# Patient Record
Sex: FEMALE | Race: WHITE | NOT HISPANIC OR LATINO | Employment: UNEMPLOYED | URBAN - NONMETROPOLITAN AREA
[De-identification: names, ages, dates, MRNs, and addresses within clinical notes are randomized per-mention and may not be internally consistent; named-entity substitution may affect disease eponyms.]

---

## 2023-01-01 ENCOUNTER — APPOINTMENT (OUTPATIENT)
Dept: ULTRASOUND IMAGING | Facility: HOSPITAL | Age: 63
DRG: 870 | End: 2023-01-01
Payer: COMMERCIAL

## 2023-01-01 ENCOUNTER — APPOINTMENT (OUTPATIENT)
Dept: CT IMAGING | Facility: HOSPITAL | Age: 63
DRG: 870 | End: 2023-01-01
Payer: COMMERCIAL

## 2023-01-01 ENCOUNTER — APPOINTMENT (OUTPATIENT)
Dept: GENERAL RADIOLOGY | Facility: HOSPITAL | Age: 63
DRG: 870 | End: 2023-01-01
Payer: COMMERCIAL

## 2023-01-01 ENCOUNTER — APPOINTMENT (OUTPATIENT)
Dept: CARDIOLOGY | Facility: HOSPITAL | Age: 63
DRG: 870 | End: 2023-01-01
Payer: COMMERCIAL

## 2023-01-01 ENCOUNTER — HOSPITAL ENCOUNTER (INPATIENT)
Facility: HOSPITAL | Age: 63
LOS: 14 days | DRG: 870 | End: 2023-07-27
Attending: STUDENT IN AN ORGANIZED HEALTH CARE EDUCATION/TRAINING PROGRAM | Admitting: INTERNAL MEDICINE
Payer: COMMERCIAL

## 2023-01-01 ENCOUNTER — APPOINTMENT (OUTPATIENT)
Dept: RESPIRATORY THERAPY | Facility: HOSPITAL | Age: 63
DRG: 870 | End: 2023-01-01
Payer: COMMERCIAL

## 2023-01-01 VITALS
TEMPERATURE: 99.4 F | BODY MASS INDEX: 41.95 KG/M2 | RESPIRATION RATE: 22 BRPM | HEIGHT: 70 IN | OXYGEN SATURATION: 79 % | SYSTOLIC BLOOD PRESSURE: 114 MMHG | WEIGHT: 293 LBS | DIASTOLIC BLOOD PRESSURE: 104 MMHG

## 2023-01-01 DIAGNOSIS — D64.9 SYMPTOMATIC ANEMIA: Primary | ICD-10-CM

## 2023-01-01 DIAGNOSIS — J96.01 SEPSIS WITH ACUTE HYPOXIC RESPIRATORY FAILURE WITHOUT SEPTIC SHOCK, DUE TO UNSPECIFIED ORGANISM: ICD-10-CM

## 2023-01-01 DIAGNOSIS — A41.9 SEPSIS WITH ACUTE HYPOXIC RESPIRATORY FAILURE WITHOUT SEPTIC SHOCK, DUE TO UNSPECIFIED ORGANISM: ICD-10-CM

## 2023-01-01 DIAGNOSIS — N39.0 ACUTE UTI: ICD-10-CM

## 2023-01-01 DIAGNOSIS — I50.31 ACUTE HEART FAILURE WITH PRESERVED EJECTION FRACTION (HFPEF): ICD-10-CM

## 2023-01-01 DIAGNOSIS — R65.20 SEPSIS WITH ACUTE HYPOXIC RESPIRATORY FAILURE WITHOUT SEPTIC SHOCK, DUE TO UNSPECIFIED ORGANISM: ICD-10-CM

## 2023-01-01 LAB
A-A DO2: 123 MMHG (ref 0–300)
A-A DO2: 130.7 MMHG (ref 0–300)
A-A DO2: 131.2 MMHG (ref 0–300)
A-A DO2: 136.5 MMHG (ref 0–300)
A-A DO2: 137 MMHG (ref 0–300)
A-A DO2: 139.3 MMHG (ref 0–300)
A-A DO2: 139.5 MMHG (ref 0–300)
A-A DO2: 151.6 MMHG (ref 0–300)
A-A DO2: 175.2 MMHG (ref 0–300)
A-A DO2: 194.9 MMHG (ref 0–300)
A-A DO2: 201.9 MMHG (ref 0–300)
A-A DO2: 270.3 MMHG (ref 0–300)
A-A DO2: 277.4 MMHG (ref 0–300)
A-A DO2: 39.2 MMHG (ref 0–300)
A-A DO2: 513.9 MMHG (ref 0–300)
A-A DO2: 75.1 MMHG (ref 0–300)
A-A DO2: 76.5 MMHG (ref 0–300)
ABO GROUP BLD: NORMAL
ABSOLUTE LUNG FLUID CONTENT: 28 % (ref 20–35)
ACANTHOCYTES BLD QL SMEAR: NORMAL
ACANTHOCYTES BLD QL SMEAR: NORMAL
ALBUMIN SERPL-MCNC: 1.9 G/DL (ref 3.5–5.2)
ALBUMIN SERPL-MCNC: 2 G/DL (ref 3.5–5.2)
ALBUMIN SERPL-MCNC: 2.1 G/DL (ref 3.5–5.2)
ALBUMIN SERPL-MCNC: 2.1 G/DL (ref 3.5–5.2)
ALBUMIN SERPL-MCNC: 2.3 G/DL (ref 3.5–5.2)
ALBUMIN SERPL-MCNC: 2.4 G/DL (ref 3.5–5.2)
ALBUMIN SERPL-MCNC: 2.5 G/DL (ref 3.5–5.2)
ALBUMIN SERPL-MCNC: 2.8 G/DL (ref 3.5–5.2)
ALBUMIN SERPL-MCNC: 2.9 G/DL (ref 3.5–5.2)
ALBUMIN SERPL-MCNC: 3 G/DL (ref 3.5–5.2)
ALBUMIN SERPL-MCNC: 3.1 G/DL (ref 3.5–5.2)
ALBUMIN SERPL-MCNC: 3.3 G/DL (ref 3.5–5.2)
ALBUMIN SERPL-MCNC: 3.4 G/DL (ref 3.5–5.2)
ALBUMIN SERPL-MCNC: 3.6 G/DL (ref 3.5–5.2)
ALBUMIN/GLOB SERPL: 0.5 G/DL
ALBUMIN/GLOB SERPL: 0.6 G/DL
ALBUMIN/GLOB SERPL: 0.7 G/DL
ALBUMIN/GLOB SERPL: 0.8 G/DL
ALBUMIN/GLOB SERPL: 0.9 G/DL
ALBUMIN/GLOB SERPL: 1.2 G/DL
ALBUMIN/GLOB SERPL: 1.3 G/DL
ALBUMIN/GLOB SERPL: 1.3 G/DL
ALBUMIN/GLOB SERPL: 1.4 G/DL
ALBUMIN/GLOB SERPL: 1.6 G/DL
ALP SERPL-CCNC: 49 U/L (ref 39–117)
ALP SERPL-CCNC: 51 U/L (ref 39–117)
ALP SERPL-CCNC: 59 U/L (ref 39–117)
ALP SERPL-CCNC: 60 U/L (ref 39–117)
ALP SERPL-CCNC: 68 U/L (ref 39–117)
ALP SERPL-CCNC: 73 U/L (ref 39–117)
ALP SERPL-CCNC: 73 U/L (ref 39–117)
ALP SERPL-CCNC: 81 U/L (ref 39–117)
ALP SERPL-CCNC: 91 U/L (ref 39–117)
ALP SERPL-CCNC: 93 U/L (ref 39–117)
ALP SERPL-CCNC: 95 U/L (ref 39–117)
ALP SERPL-CCNC: 97 U/L (ref 39–117)
ALP SERPL-CCNC: 97 U/L (ref 39–117)
ALP SERPL-CCNC: 99 U/L (ref 39–117)
ALT SERPL W P-5'-P-CCNC: 10 U/L (ref 1–33)
ALT SERPL W P-5'-P-CCNC: 10 U/L (ref 1–33)
ALT SERPL W P-5'-P-CCNC: 12 U/L (ref 1–33)
ALT SERPL W P-5'-P-CCNC: 33 U/L (ref 1–33)
ALT SERPL W P-5'-P-CCNC: 35 U/L (ref 1–33)
ALT SERPL W P-5'-P-CCNC: 5 U/L (ref 1–33)
ALT SERPL W P-5'-P-CCNC: 6 U/L (ref 1–33)
ALT SERPL W P-5'-P-CCNC: 8 U/L (ref 1–33)
ALT SERPL W P-5'-P-CCNC: 8 U/L (ref 1–33)
ALT SERPL W P-5'-P-CCNC: <5 U/L (ref 1–33)
AMMONIA BLD-SCNC: 35 UMOL/L (ref 11–51)
AMMONIA BLD-SCNC: 55 UMOL/L (ref 11–51)
AMMONIA BLD-SCNC: 55 UMOL/L (ref 11–51)
AMMONIA BLD-SCNC: 67 UMOL/L (ref 11–51)
AMMONIA BLD-SCNC: 79 UMOL/L (ref 11–51)
ANION GAP SERPL CALCULATED.3IONS-SCNC: 10.1 MMOL/L (ref 5–15)
ANION GAP SERPL CALCULATED.3IONS-SCNC: 11.2 MMOL/L (ref 5–15)
ANION GAP SERPL CALCULATED.3IONS-SCNC: 12.2 MMOL/L (ref 5–15)
ANION GAP SERPL CALCULATED.3IONS-SCNC: 12.5 MMOL/L (ref 5–15)
ANION GAP SERPL CALCULATED.3IONS-SCNC: 13.1 MMOL/L (ref 5–15)
ANION GAP SERPL CALCULATED.3IONS-SCNC: 13.4 MMOL/L (ref 5–15)
ANION GAP SERPL CALCULATED.3IONS-SCNC: 13.8 MMOL/L (ref 5–15)
ANION GAP SERPL CALCULATED.3IONS-SCNC: 15.5 MMOL/L (ref 5–15)
ANION GAP SERPL CALCULATED.3IONS-SCNC: 21.5 MMOL/L (ref 5–15)
ANION GAP SERPL CALCULATED.3IONS-SCNC: 7.2 MMOL/L (ref 5–15)
ANION GAP SERPL CALCULATED.3IONS-SCNC: 7.2 MMOL/L (ref 5–15)
ANION GAP SERPL CALCULATED.3IONS-SCNC: 7.4 MMOL/L (ref 5–15)
ANION GAP SERPL CALCULATED.3IONS-SCNC: 7.8 MMOL/L (ref 5–15)
ANION GAP SERPL CALCULATED.3IONS-SCNC: 8.8 MMOL/L (ref 5–15)
ANION GAP SERPL CALCULATED.3IONS-SCNC: 9.3 MMOL/L (ref 5–15)
ANION GAP SERPL CALCULATED.3IONS-SCNC: 9.8 MMOL/L (ref 5–15)
ANION GAP SERPL CALCULATED.3IONS-SCNC: 9.8 MMOL/L (ref 5–15)
ANION GAP SERPL CALCULATED.3IONS-SCNC: 9.9 MMOL/L (ref 5–15)
ANION GAP SERPL CALCULATED.3IONS-SCNC: 9.9 MMOL/L (ref 5–15)
ANISOCYTOSIS BLD QL: ABNORMAL
ANISOCYTOSIS BLD QL: ABNORMAL
ANISOCYTOSIS BLD QL: NORMAL
APTT PPP: 108.6 SECONDS (ref 26.5–34.5)
APTT PPP: 30.6 SECONDS (ref 26.5–34.5)
APTT PPP: 36.4 SECONDS (ref 26.5–34.5)
APTT PPP: 87.5 SECONDS (ref 26.5–34.5)
ARTERIAL PATENCY WRIST A: ABNORMAL
ARTERIAL PATENCY WRIST A: POSITIVE
AST SERPL-CCNC: 11 U/L (ref 1–32)
AST SERPL-CCNC: 11 U/L (ref 1–32)
AST SERPL-CCNC: 12 U/L (ref 1–32)
AST SERPL-CCNC: 14 U/L (ref 1–32)
AST SERPL-CCNC: 14 U/L (ref 1–32)
AST SERPL-CCNC: 15 U/L (ref 1–32)
AST SERPL-CCNC: 15 U/L (ref 1–32)
AST SERPL-CCNC: 17 U/L (ref 1–32)
AST SERPL-CCNC: 23 U/L (ref 1–32)
AST SERPL-CCNC: 30 U/L (ref 1–32)
AST SERPL-CCNC: 36 U/L (ref 1–32)
ATMOSPHERIC PRESS: 722 MMHG
ATMOSPHERIC PRESS: 723 MMHG
ATMOSPHERIC PRESS: 724 MMHG
ATMOSPHERIC PRESS: 725 MMHG
ATMOSPHERIC PRESS: 726 MMHG
ATMOSPHERIC PRESS: 727 MMHG
ATMOSPHERIC PRESS: 728 MMHG
ATMOSPHERIC PRESS: 728 MMHG
ATMOSPHERIC PRESS: 729 MMHG
ATMOSPHERIC PRESS: 730 MMHG
ATMOSPHERIC PRESS: 731 MMHG
ATMOSPHERIC PRESS: 731 MMHG
ATMOSPHERIC PRESS: 732 MMHG
B PARAPERT DNA SPEC QL NAA+PROBE: NOT DETECTED
B PERT DNA SPEC QL NAA+PROBE: NOT DETECTED
BACTERIA SPEC AEROBE CULT: ABNORMAL
BACTERIA SPEC AEROBE CULT: NORMAL
BACTERIA SPEC RESP CULT: ABNORMAL
BACTERIA UR QL AUTO: ABNORMAL /HPF
BASE EXCESS BLDA CALC-SCNC: 0.8 MMOL/L (ref 0–2)
BASE EXCESS BLDA CALC-SCNC: 11.4 MMOL/L (ref 0–2)
BASE EXCESS BLDA CALC-SCNC: 15.5 MMOL/L (ref 0–2)
BASE EXCESS BLDA CALC-SCNC: 17.4 MMOL/L (ref 0–2)
BASE EXCESS BLDA CALC-SCNC: 17.5 MMOL/L (ref 0–2)
BASE EXCESS BLDA CALC-SCNC: 18.1 MMOL/L (ref 0–2)
BASE EXCESS BLDA CALC-SCNC: 18.1 MMOL/L (ref 0–2)
BASE EXCESS BLDA CALC-SCNC: 18.6 MMOL/L (ref 0–2)
BASE EXCESS BLDA CALC-SCNC: 18.6 MMOL/L (ref 0–2)
BASE EXCESS BLDA CALC-SCNC: 18.8 MMOL/L (ref 0–2)
BASE EXCESS BLDA CALC-SCNC: 19.1 MMOL/L (ref 0–2)
BASE EXCESS BLDA CALC-SCNC: 19.4 MMOL/L (ref 0–2)
BASE EXCESS BLDA CALC-SCNC: 19.7 MMOL/L (ref 0–2)
BASE EXCESS BLDA CALC-SCNC: 20 MMOL/L (ref 0–2)
BASE EXCESS BLDA CALC-SCNC: 21.7 MMOL/L (ref 0–2)
BASE EXCESS BLDA CALC-SCNC: 4.4 MMOL/L (ref 0–2)
BASE EXCESS BLDA CALC-SCNC: 5.1 MMOL/L (ref 0–2)
BASE EXCESS BLDV CALC-SCNC: 19.8 MMOL/L (ref 0–2)
BASE EXCESS BLDV CALC-SCNC: 4.6 MMOL/L (ref 0–2)
BASE EXCESS BLDV CALC-SCNC: 8.8 MMOL/L (ref 0–2)
BASO STIPL COARSE BLD QL SMEAR: NORMAL
BASO STIPL COARSE BLD QL SMEAR: NORMAL
BASOPHILS # BLD AUTO: 0.02 10*3/MM3 (ref 0–0.2)
BASOPHILS # BLD AUTO: 0.03 10*3/MM3 (ref 0–0.2)
BASOPHILS # BLD AUTO: 0.05 10*3/MM3 (ref 0–0.2)
BASOPHILS # BLD AUTO: 0.06 10*3/MM3 (ref 0–0.2)
BASOPHILS # BLD AUTO: 0.07 10*3/MM3 (ref 0–0.2)
BASOPHILS # BLD AUTO: 0.08 10*3/MM3 (ref 0–0.2)
BASOPHILS # BLD AUTO: 0.09 10*3/MM3 (ref 0–0.2)
BASOPHILS NFR BLD AUTO: 0.1 % (ref 0–1.5)
BASOPHILS NFR BLD AUTO: 0.2 % (ref 0–1.5)
BASOPHILS NFR BLD AUTO: 0.3 % (ref 0–1.5)
BASOPHILS NFR BLD AUTO: 0.4 % (ref 0–1.5)
BASOPHILS NFR BLD AUTO: 0.4 % (ref 0–1.5)
BDY SITE: ABNORMAL
BH BB BLOOD EXPIRATION DATE: NORMAL
BH BB BLOOD TYPE BARCODE: 5100
BH BB DISPENSE STATUS: NORMAL
BH BB PRODUCT CODE: NORMAL
BH BB UNIT NUMBER: NORMAL
BH CV ECHO MEAS - ACS: 2 CM
BH CV ECHO MEAS - AO MAX PG: 17.2 MMHG
BH CV ECHO MEAS - AO MEAN PG: 8.5 MMHG
BH CV ECHO MEAS - AO ROOT DIAM: 3.3 CM
BH CV ECHO MEAS - AO V2 MAX: 207.5 CM/SEC
BH CV ECHO MEAS - AO V2 VTI: 28.6 CM
BH CV ECHO MEAS - AVA(I,D): 3.1 CM2
BH CV ECHO MEAS - EDV(CUBED): 146.1 ML
BH CV ECHO MEAS - EDV(CUBED): 182.3 ML
BH CV ECHO MEAS - EDV(MOD-SP4): 144 ML
BH CV ECHO MEAS - EF(MOD-SP4): 55.3 %
BH CV ECHO MEAS - ESV(CUBED): 46.7 ML
BH CV ECHO MEAS - ESV(CUBED): 71.5 ML
BH CV ECHO MEAS - ESV(MOD-SP4): 64.4 ML
BH CV ECHO MEAS - FS: 26.8 %
BH CV ECHO MEAS - FS: 31.6 %
BH CV ECHO MEAS - IVS/LVPW: 0.9 CM
BH CV ECHO MEAS - IVS/LVPW: 1.43 CM
BH CV ECHO MEAS - IVSD: 1.55 CM
BH CV ECHO MEAS - IVSD: 2.1 CM
BH CV ECHO MEAS - LAT PEAK E' VEL: 6.6 CM/SEC
BH CV ECHO MEAS - LV DIASTOLIC VOL/BSA (35-75): 51.4 CM2
BH CV ECHO MEAS - LV MASS(C)D: 445.4 GRAMS
BH CV ECHO MEAS - LV MASS(C)D: 452.5 GRAMS
BH CV ECHO MEAS - LV MAX PG: 5.4 MMHG
BH CV ECHO MEAS - LV MEAN PG: 3 MMHG
BH CV ECHO MEAS - LV SYSTOLIC VOL/BSA (12-30): 23 CM2
BH CV ECHO MEAS - LV V1 MAX: 116 CM/SEC
BH CV ECHO MEAS - LV V1 VTI: 19.4 CM
BH CV ECHO MEAS - LVIDD: 5.3 CM
BH CV ECHO MEAS - LVIDD: 5.7 CM
BH CV ECHO MEAS - LVIDS: 3.6 CM
BH CV ECHO MEAS - LVIDS: 4.2 CM
BH CV ECHO MEAS - LVOT AREA: 4.5 CM2
BH CV ECHO MEAS - LVOT DIAM: 2.4 CM
BH CV ECHO MEAS - LVPWD: 1.47 CM
BH CV ECHO MEAS - LVPWD: 1.73 CM
BH CV ECHO MEAS - MED PEAK E' VEL: 5.3 CM/SEC
BH CV ECHO MEAS - MV A MAX VEL: 98.8 CM/SEC
BH CV ECHO MEAS - MV E MAX VEL: 69.7 CM/SEC
BH CV ECHO MEAS - MV E/A: 0.71
BH CV ECHO MEAS - PA ACC TIME: 0.08 SEC
BH CV ECHO MEAS - RAP SYSTOLE: 10 MMHG
BH CV ECHO MEAS - RAP SYSTOLE: 10 MMHG
BH CV ECHO MEAS - RVSP: 36 MMHG
BH CV ECHO MEAS - RVSP: 55.2 MMHG
BH CV ECHO MEAS - SI(MOD-SP4): 28.4 ML/M2
BH CV ECHO MEAS - SV(LVOT): 87.8 ML
BH CV ECHO MEAS - SV(MOD-SP4): 79.6 ML
BH CV ECHO MEAS - TAPSE (>1.6): 2.42 CM
BH CV ECHO MEAS - TR MAX PG: 26 MMHG
BH CV ECHO MEAS - TR MAX PG: 45.2 MMHG
BH CV ECHO MEAS - TR MAX VEL: 255 CM/SEC
BH CV ECHO MEAS - TR MAX VEL: 336 CM/SEC
BH CV ECHO MEASUREMENTS AVERAGE E/E' RATIO: 11.71
BILIRUB SERPL-MCNC: 0.4 MG/DL (ref 0–1.2)
BILIRUB SERPL-MCNC: 0.5 MG/DL (ref 0–1.2)
BILIRUB SERPL-MCNC: 0.6 MG/DL (ref 0–1.2)
BILIRUB SERPL-MCNC: 0.7 MG/DL (ref 0–1.2)
BILIRUB SERPL-MCNC: 0.8 MG/DL (ref 0–1.2)
BILIRUB SERPL-MCNC: 0.8 MG/DL (ref 0–1.2)
BILIRUB UR QL STRIP: ABNORMAL
BILIRUB UR QL STRIP: NEGATIVE
BILIRUB UR QL STRIP: NEGATIVE
BLD GP AB SCN SERPL QL: NEGATIVE
BUN SERPL-MCNC: 18 MG/DL (ref 8–23)
BUN SERPL-MCNC: 20 MG/DL (ref 8–23)
BUN SERPL-MCNC: 25 MG/DL (ref 8–23)
BUN SERPL-MCNC: 27 MG/DL (ref 8–23)
BUN SERPL-MCNC: 28 MG/DL (ref 8–23)
BUN SERPL-MCNC: 29 MG/DL (ref 8–23)
BUN SERPL-MCNC: 31 MG/DL (ref 8–23)
BUN SERPL-MCNC: 32 MG/DL (ref 8–23)
BUN SERPL-MCNC: 33 MG/DL (ref 8–23)
BUN SERPL-MCNC: 42 MG/DL (ref 8–23)
BUN SERPL-MCNC: 50 MG/DL (ref 8–23)
BUN SERPL-MCNC: 56 MG/DL (ref 8–23)
BUN SERPL-MCNC: 60 MG/DL (ref 8–23)
BUN SERPL-MCNC: 63 MG/DL (ref 8–23)
BUN SERPL-MCNC: 64 MG/DL (ref 8–23)
BUN SERPL-MCNC: 68 MG/DL (ref 8–23)
BUN SERPL-MCNC: 69 MG/DL (ref 8–23)
BUN SERPL-MCNC: 70 MG/DL (ref 8–23)
BUN SERPL-MCNC: 74 MG/DL (ref 8–23)
BUN/CREAT SERPL: 19.6 (ref 7–25)
BUN/CREAT SERPL: 20.2 (ref 7–25)
BUN/CREAT SERPL: 20.4 (ref 7–25)
BUN/CREAT SERPL: 20.6 (ref 7–25)
BUN/CREAT SERPL: 21.1 (ref 7–25)
BUN/CREAT SERPL: 22.3 (ref 7–25)
BUN/CREAT SERPL: 23.4 (ref 7–25)
BUN/CREAT SERPL: 23.9 (ref 7–25)
BUN/CREAT SERPL: 25.2 (ref 7–25)
BUN/CREAT SERPL: 25.3 (ref 7–25)
BUN/CREAT SERPL: 25.8 (ref 7–25)
BUN/CREAT SERPL: 26.3 (ref 7–25)
BUN/CREAT SERPL: 28.1 (ref 7–25)
BUN/CREAT SERPL: 31.7 (ref 7–25)
BUN/CREAT SERPL: 32.2 (ref 7–25)
BUN/CREAT SERPL: 32.5 (ref 7–25)
BUN/CREAT SERPL: 32.6 (ref 7–25)
BUN/CREAT SERPL: 33.3 (ref 7–25)
BUN/CREAT SERPL: 33.7 (ref 7–25)
C PNEUM DNA NPH QL NAA+NON-PROBE: NOT DETECTED
CALCIUM SPEC-SCNC: 10.2 MG/DL (ref 8.6–10.5)
CALCIUM SPEC-SCNC: 10.3 MG/DL (ref 8.6–10.5)
CALCIUM SPEC-SCNC: 10.4 MG/DL (ref 8.6–10.5)
CALCIUM SPEC-SCNC: 10.5 MG/DL (ref 8.6–10.5)
CALCIUM SPEC-SCNC: 10.7 MG/DL (ref 8.6–10.5)
CALCIUM SPEC-SCNC: 10.9 MG/DL (ref 8.6–10.5)
CALCIUM SPEC-SCNC: 11.1 MG/DL (ref 8.6–10.5)
CALCIUM SPEC-SCNC: 11.2 MG/DL (ref 8.6–10.5)
CALCIUM SPEC-SCNC: 9.6 MG/DL (ref 8.6–10.5)
CALCIUM SPEC-SCNC: 9.6 MG/DL (ref 8.6–10.5)
CALCIUM SPEC-SCNC: 9.8 MG/DL (ref 8.6–10.5)
CALCIUM SPEC-SCNC: 9.9 MG/DL (ref 8.6–10.5)
CHLORIDE SERPL-SCNC: 102 MMOL/L (ref 98–107)
CHLORIDE SERPL-SCNC: 104 MMOL/L (ref 98–107)
CHLORIDE SERPL-SCNC: 109 MMOL/L (ref 98–107)
CHLORIDE SERPL-SCNC: 109 MMOL/L (ref 98–107)
CHLORIDE SERPL-SCNC: 112 MMOL/L (ref 98–107)
CHLORIDE SERPL-SCNC: 112 MMOL/L (ref 98–107)
CHLORIDE SERPL-SCNC: 93 MMOL/L (ref 98–107)
CHLORIDE SERPL-SCNC: 93 MMOL/L (ref 98–107)
CHLORIDE SERPL-SCNC: 94 MMOL/L (ref 98–107)
CHLORIDE SERPL-SCNC: 95 MMOL/L (ref 98–107)
CHLORIDE SERPL-SCNC: 97 MMOL/L (ref 98–107)
CHLORIDE SERPL-SCNC: 97 MMOL/L (ref 98–107)
CHLORIDE SERPL-SCNC: 99 MMOL/L (ref 98–107)
CLARITY UR: ABNORMAL
CO2 BLDA-SCNC: 28.7 MMOL/L (ref 22–33)
CO2 BLDA-SCNC: 32.2 MMOL/L (ref 22–33)
CO2 BLDA-SCNC: 32.9 MMOL/L (ref 22–33)
CO2 BLDA-SCNC: 34.7 MMOL/L (ref 22–33)
CO2 BLDA-SCNC: 38.1 MMOL/L (ref 22–33)
CO2 BLDA-SCNC: 39.6 MMOL/L (ref 22–33)
CO2 BLDA-SCNC: 44 MMOL/L (ref 22–33)
CO2 BLDA-SCNC: 46.2 MMOL/L (ref 22–33)
CO2 BLDA-SCNC: 47.1 MMOL/L (ref 22–33)
CO2 BLDA-SCNC: 47.8 MMOL/L (ref 22–33)
CO2 BLDA-SCNC: 48.1 MMOL/L (ref 22–33)
CO2 BLDA-SCNC: 48.2 MMOL/L (ref 22–33)
CO2 BLDA-SCNC: 49.6 MMOL/L (ref 22–33)
CO2 BLDA-SCNC: 49.7 MMOL/L (ref 22–33)
CO2 BLDA-SCNC: 50.1 MMOL/L (ref 22–33)
CO2 BLDA-SCNC: 50.2 MMOL/L (ref 22–33)
CO2 BLDA-SCNC: 50.4 MMOL/L (ref 22–33)
CO2 BLDA-SCNC: 50.5 MMOL/L (ref 22–33)
CO2 BLDA-SCNC: 50.7 MMOL/L (ref 22–33)
CO2 BLDA-SCNC: 51.1 MMOL/L (ref 22–33)
CO2 SERPL-SCNC: 26.2 MMOL/L (ref 22–29)
CO2 SERPL-SCNC: 27.6 MMOL/L (ref 22–29)
CO2 SERPL-SCNC: 29.2 MMOL/L (ref 22–29)
CO2 SERPL-SCNC: 29.5 MMOL/L (ref 22–29)
CO2 SERPL-SCNC: 29.9 MMOL/L (ref 22–29)
CO2 SERPL-SCNC: 30.1 MMOL/L (ref 22–29)
CO2 SERPL-SCNC: 30.8 MMOL/L (ref 22–29)
CO2 SERPL-SCNC: 31.9 MMOL/L (ref 22–29)
CO2 SERPL-SCNC: 32.7 MMOL/L (ref 22–29)
CO2 SERPL-SCNC: 33.5 MMOL/L (ref 22–29)
CO2 SERPL-SCNC: 33.5 MMOL/L (ref 22–29)
CO2 SERPL-SCNC: 33.6 MMOL/L (ref 22–29)
CO2 SERPL-SCNC: 39.1 MMOL/L (ref 22–29)
CO2 SERPL-SCNC: 39.8 MMOL/L (ref 22–29)
CO2 SERPL-SCNC: 42.8 MMOL/L (ref 22–29)
CO2 SERPL-SCNC: 43.2 MMOL/L (ref 22–29)
CO2 SERPL-SCNC: 43.8 MMOL/L (ref 22–29)
CO2 SERPL-SCNC: 45.2 MMOL/L (ref 22–29)
CO2 SERPL-SCNC: 46.2 MMOL/L (ref 22–29)
COHGB MFR BLD: 2.3 % (ref 0–5)
COHGB MFR BLD: 2.3 % (ref 0–5)
COHGB MFR BLD: 2.4 % (ref 0–5)
COHGB MFR BLD: 2.5 % (ref 0–5)
COHGB MFR BLD: 2.6 % (ref 0–5)
COHGB MFR BLD: 2.7 % (ref 0–5)
COHGB MFR BLD: 2.8 % (ref 0–5)
COHGB MFR BLD: 2.8 % (ref 0–5)
COHGB MFR BLD: 2.9 % (ref 0–5)
COLOR UR: ABNORMAL
COLOR UR: YELLOW
COLOR UR: YELLOW
CORTIS AM PEAK SERPL-MCNC: 19.04 MCG/DL
CORTIS AM PEAK SERPL-MCNC: 28.94 MCG/DL
CORTIS SERPL-MCNC: 39.96 MCG/DL
CREAT SERPL-MCNC: 0.64 MG/DL (ref 0.57–1)
CREAT SERPL-MCNC: 0.76 MG/DL (ref 0.57–1)
CREAT SERPL-MCNC: 0.77 MG/DL (ref 0.57–1)
CREAT SERPL-MCNC: 0.83 MG/DL (ref 0.57–1)
CREAT SERPL-MCNC: 0.9 MG/DL (ref 0.57–1)
CREAT SERPL-MCNC: 0.95 MG/DL (ref 0.57–1)
CREAT SERPL-MCNC: 0.96 MG/DL (ref 0.57–1)
CREAT SERPL-MCNC: 1.04 MG/DL (ref 0.57–1)
CREAT SERPL-MCNC: 1.31 MG/DL (ref 0.57–1)
CREAT SERPL-MCNC: 2.08 MG/DL (ref 0.57–1)
CREAT SERPL-MCNC: 2.45 MG/DL (ref 0.57–1)
CREAT SERPL-MCNC: 2.64 MG/DL (ref 0.57–1)
CREAT SERPL-MCNC: 2.65 MG/DL (ref 0.57–1)
CREAT SERPL-MCNC: 2.7 MG/DL (ref 0.57–1)
CREAT SERPL-MCNC: 2.71 MG/DL (ref 0.57–1)
CREAT SERPL-MCNC: 2.74 MG/DL (ref 0.57–1)
CREAT SERPL-MCNC: 2.93 MG/DL (ref 0.57–1)
CREAT SERPL-MCNC: 3.06 MG/DL (ref 0.57–1)
CREAT SERPL-MCNC: 3.09 MG/DL (ref 0.57–1)
CREAT UR-MCNC: 139.1 MG/DL
CREAT UR-MCNC: 8.5 MG/DL
CROSSMATCH INTERPRETATION: NORMAL
CRP SERPL-MCNC: 15.57 MG/DL (ref 0–0.5)
CRP SERPL-MCNC: 20.36 MG/DL (ref 0–0.5)
CRP SERPL-MCNC: 20.87 MG/DL (ref 0–0.5)
CRP SERPL-MCNC: 21.23 MG/DL (ref 0–0.5)
CRP SERPL-MCNC: 22.62 MG/DL (ref 0–0.5)
CRP SERPL-MCNC: 24.42 MG/DL (ref 0–0.5)
D-LACTATE SERPL-SCNC: 0.8 MMOL/L (ref 0.5–2)
D-LACTATE SERPL-SCNC: 1.6 MMOL/L (ref 0.5–2)
D-LACTATE SERPL-SCNC: 2.1 MMOL/L (ref 0.5–2)
D-LACTATE SERPL-SCNC: 2.1 MMOL/L (ref 0.5–2)
D-LACTATE SERPL-SCNC: 2.5 MMOL/L (ref 0.5–2)
D-LACTATE SERPL-SCNC: 2.6 MMOL/L (ref 0.5–2)
D-LACTATE SERPL-SCNC: 2.6 MMOL/L (ref 0.5–2)
D-LACTATE SERPL-SCNC: 2.7 MMOL/L (ref 0.5–2)
DACRYOCYTES BLD QL SMEAR: NORMAL
DEPRECATED RDW RBC AUTO: 61.1 FL (ref 37–54)
DEPRECATED RDW RBC AUTO: 61.8 FL (ref 37–54)
DEPRECATED RDW RBC AUTO: 62.4 FL (ref 37–54)
DEPRECATED RDW RBC AUTO: 63 FL (ref 37–54)
DEPRECATED RDW RBC AUTO: 63.2 FL (ref 37–54)
DEPRECATED RDW RBC AUTO: 64.4 FL (ref 37–54)
DEPRECATED RDW RBC AUTO: 65.1 FL (ref 37–54)
DEPRECATED RDW RBC AUTO: 67.2 FL (ref 37–54)
DEPRECATED RDW RBC AUTO: 69.4 FL (ref 37–54)
DEPRECATED RDW RBC AUTO: 70.7 FL (ref 37–54)
DEPRECATED RDW RBC AUTO: 74.3 FL (ref 37–54)
DEPRECATED RDW RBC AUTO: 77.1 FL (ref 37–54)
DEPRECATED RDW RBC AUTO: 90.2 FL (ref 37–54)
DEPRECATED RDW RBC AUTO: 94.7 FL (ref 37–54)
DEPRECATED RDW RBC AUTO: 96.5 FL (ref 37–54)
DEPRECATED RDW RBC AUTO: 97.2 FL (ref 37–54)
DEPRECATED RDW RBC AUTO: 97.6 FL (ref 37–54)
DEPRECATED RDW RBC AUTO: 98 FL (ref 37–54)
EGFRCR SERPLBLD CKD-EPI 2021: 100.7 ML/MIN/1.73
EGFRCR SERPLBLD CKD-EPI 2021: 16.6 ML/MIN/1.73
EGFRCR SERPLBLD CKD-EPI 2021: 16.8 ML/MIN/1.73
EGFRCR SERPLBLD CKD-EPI 2021: 17.7 ML/MIN/1.73
EGFRCR SERPLBLD CKD-EPI 2021: 19.2 ML/MIN/1.73
EGFRCR SERPLBLD CKD-EPI 2021: 19.4 ML/MIN/1.73
EGFRCR SERPLBLD CKD-EPI 2021: 19.5 ML/MIN/1.73
EGFRCR SERPLBLD CKD-EPI 2021: 19.9 ML/MIN/1.73
EGFRCR SERPLBLD CKD-EPI 2021: 20 ML/MIN/1.73
EGFRCR SERPLBLD CKD-EPI 2021: 21.9 ML/MIN/1.73
EGFRCR SERPLBLD CKD-EPI 2021: 26.7 ML/MIN/1.73
EGFRCR SERPLBLD CKD-EPI 2021: 46.5 ML/MIN/1.73
EGFRCR SERPLBLD CKD-EPI 2021: 61.3 ML/MIN/1.73
EGFRCR SERPLBLD CKD-EPI 2021: 67.5 ML/MIN/1.73
EGFRCR SERPLBLD CKD-EPI 2021: 68.3 ML/MIN/1.73
EGFRCR SERPLBLD CKD-EPI 2021: 72.9 ML/MIN/1.73
EGFRCR SERPLBLD CKD-EPI 2021: 80.3 ML/MIN/1.73
EGFRCR SERPLBLD CKD-EPI 2021: 87.9 ML/MIN/1.73
EGFRCR SERPLBLD CKD-EPI 2021: 89.3 ML/MIN/1.73
ELLIPTOCYTES BLD QL SMEAR: ABNORMAL
ELLIPTOCYTES BLD QL SMEAR: NORMAL
EOSINOPHIL # BLD AUTO: 0 10*3/MM3 (ref 0–0.4)
EOSINOPHIL # BLD AUTO: 0.01 10*3/MM3 (ref 0–0.4)
EOSINOPHIL # BLD AUTO: 0.05 10*3/MM3 (ref 0–0.4)
EOSINOPHIL # BLD AUTO: 0.06 10*3/MM3 (ref 0–0.4)
EOSINOPHIL # BLD AUTO: 0.07 10*3/MM3 (ref 0–0.4)
EOSINOPHIL # BLD AUTO: 0.07 10*3/MM3 (ref 0–0.4)
EOSINOPHIL # BLD AUTO: 0.12 10*3/MM3 (ref 0–0.4)
EOSINOPHIL # BLD AUTO: 0.15 10*3/MM3 (ref 0–0.4)
EOSINOPHIL # BLD AUTO: 0.27 10*3/MM3 (ref 0–0.4)
EOSINOPHIL # BLD AUTO: 0.38 10*3/MM3 (ref 0–0.4)
EOSINOPHIL # BLD AUTO: 0.48 10*3/MM3 (ref 0–0.4)
EOSINOPHIL # BLD AUTO: 0.7 10*3/MM3 (ref 0–0.4)
EOSINOPHIL # BLD MANUAL: 0.68 10*3/MM3 (ref 0–0.4)
EOSINOPHIL NFR BLD AUTO: 0 % (ref 0.3–6.2)
EOSINOPHIL NFR BLD AUTO: 0.2 % (ref 0.3–6.2)
EOSINOPHIL NFR BLD AUTO: 0.3 % (ref 0.3–6.2)
EOSINOPHIL NFR BLD AUTO: 0.6 % (ref 0.3–6.2)
EOSINOPHIL NFR BLD AUTO: 0.8 % (ref 0.3–6.2)
EOSINOPHIL NFR BLD AUTO: 1.1 % (ref 0.3–6.2)
EOSINOPHIL NFR BLD AUTO: 1.8 % (ref 0.3–6.2)
EOSINOPHIL NFR BLD AUTO: 2.1 % (ref 0.3–6.2)
EOSINOPHIL NFR BLD AUTO: 2.4 % (ref 0.3–6.2)
EOSINOPHIL NFR BLD MANUAL: 2 % (ref 0.3–6.2)
EPAP: 12
EPAP: 8
ERYTHROCYTE [DISTWIDTH] IN BLOOD BY AUTOMATED COUNT: 23.7 % (ref 12.3–15.4)
ERYTHROCYTE [DISTWIDTH] IN BLOOD BY AUTOMATED COUNT: 23.7 % (ref 12.3–15.4)
ERYTHROCYTE [DISTWIDTH] IN BLOOD BY AUTOMATED COUNT: 23.9 % (ref 12.3–15.4)
ERYTHROCYTE [DISTWIDTH] IN BLOOD BY AUTOMATED COUNT: 23.9 % (ref 12.3–15.4)
ERYTHROCYTE [DISTWIDTH] IN BLOOD BY AUTOMATED COUNT: 24 % (ref 12.3–15.4)
ERYTHROCYTE [DISTWIDTH] IN BLOOD BY AUTOMATED COUNT: 24.1 % (ref 12.3–15.4)
ERYTHROCYTE [DISTWIDTH] IN BLOOD BY AUTOMATED COUNT: 24.4 % (ref 12.3–15.4)
ERYTHROCYTE [DISTWIDTH] IN BLOOD BY AUTOMATED COUNT: 25.2 % (ref 12.3–15.4)
ERYTHROCYTE [DISTWIDTH] IN BLOOD BY AUTOMATED COUNT: 25.5 % (ref 12.3–15.4)
ERYTHROCYTE [DISTWIDTH] IN BLOOD BY AUTOMATED COUNT: 25.7 % (ref 12.3–15.4)
ERYTHROCYTE [DISTWIDTH] IN BLOOD BY AUTOMATED COUNT: 27.4 % (ref 12.3–15.4)
ERYTHROCYTE [DISTWIDTH] IN BLOOD BY AUTOMATED COUNT: 29.4 % (ref 12.3–15.4)
ERYTHROCYTE [DISTWIDTH] IN BLOOD BY AUTOMATED COUNT: 30.9 % (ref 12.3–15.4)
ERYTHROCYTE [DISTWIDTH] IN BLOOD BY AUTOMATED COUNT: 32.2 % (ref 12.3–15.4)
ERYTHROCYTE [DISTWIDTH] IN BLOOD BY AUTOMATED COUNT: 32.6 % (ref 12.3–15.4)
ERYTHROCYTE [DISTWIDTH] IN BLOOD BY AUTOMATED COUNT: 33.1 % (ref 12.3–15.4)
ERYTHROCYTE [DISTWIDTH] IN BLOOD BY AUTOMATED COUNT: 33.1 % (ref 12.3–15.4)
ERYTHROCYTE [DISTWIDTH] IN BLOOD BY AUTOMATED COUNT: 33.2 % (ref 12.3–15.4)
FERRITIN SERPL-MCNC: 1554 NG/ML (ref 13–150)
FERRITIN SERPL-MCNC: 25.22 NG/ML (ref 13–150)
FIBRINOGEN PPP-MCNC: 678 MG/DL (ref 173–524)
FLUAV RNA RESP QL NAA+PROBE: NOT DETECTED
FLUAV SUBTYP SPEC NAA+PROBE: NOT DETECTED
FLUBV RNA ISLT QL NAA+PROBE: NOT DETECTED
FLUBV RNA ISLT QL NAA+PROBE: NOT DETECTED
FOLATE SERPL-MCNC: 9.36 NG/ML (ref 4.78–24.2)
GAS FLOW AIRWAY: 3 LPM
GAS FLOW AIRWAY: 4 LPM
GAS FLOW AIRWAY: 4 LPM
GAS FLOW AIRWAY: 6 LPM
GEN 5 2HR TROPONIN T REFLEX: 24 NG/L
GEN 5 2HR TROPONIN T REFLEX: 32 NG/L
GLOBULIN UR ELPH-MCNC: 2 GM/DL
GLOBULIN UR ELPH-MCNC: 2.5 GM/DL
GLOBULIN UR ELPH-MCNC: 2.6 GM/DL
GLOBULIN UR ELPH-MCNC: 2.6 GM/DL
GLOBULIN UR ELPH-MCNC: 2.7 GM/DL
GLOBULIN UR ELPH-MCNC: 2.8 GM/DL
GLOBULIN UR ELPH-MCNC: 3 GM/DL
GLOBULIN UR ELPH-MCNC: 3.1 GM/DL
GLOBULIN UR ELPH-MCNC: 3.2 GM/DL
GLOBULIN UR ELPH-MCNC: 3.4 GM/DL
GLOBULIN UR ELPH-MCNC: 3.4 GM/DL
GLOBULIN UR ELPH-MCNC: 3.6 GM/DL
GLUCOSE BLDC GLUCOMTR-MCNC: 111 MG/DL (ref 70–130)
GLUCOSE BLDC GLUCOMTR-MCNC: 130 MG/DL (ref 70–130)
GLUCOSE BLDC GLUCOMTR-MCNC: 133 MG/DL (ref 70–130)
GLUCOSE BLDC GLUCOMTR-MCNC: 165 MG/DL (ref 70–130)
GLUCOSE BLDC GLUCOMTR-MCNC: 211 MG/DL (ref 70–130)
GLUCOSE BLDC GLUCOMTR-MCNC: 213 MG/DL (ref 70–130)
GLUCOSE BLDC GLUCOMTR-MCNC: 217 MG/DL (ref 70–130)
GLUCOSE BLDC GLUCOMTR-MCNC: 221 MG/DL (ref 70–130)
GLUCOSE BLDC GLUCOMTR-MCNC: 223 MG/DL (ref 70–130)
GLUCOSE BLDC GLUCOMTR-MCNC: 223 MG/DL (ref 70–130)
GLUCOSE BLDC GLUCOMTR-MCNC: 225 MG/DL (ref 70–130)
GLUCOSE BLDC GLUCOMTR-MCNC: 225 MG/DL (ref 70–130)
GLUCOSE BLDC GLUCOMTR-MCNC: 234 MG/DL (ref 70–130)
GLUCOSE SERPL-MCNC: 106 MG/DL (ref 65–99)
GLUCOSE SERPL-MCNC: 107 MG/DL (ref 65–99)
GLUCOSE SERPL-MCNC: 110 MG/DL (ref 65–99)
GLUCOSE SERPL-MCNC: 111 MG/DL (ref 65–99)
GLUCOSE SERPL-MCNC: 118 MG/DL (ref 65–99)
GLUCOSE SERPL-MCNC: 118 MG/DL (ref 65–99)
GLUCOSE SERPL-MCNC: 120 MG/DL (ref 65–99)
GLUCOSE SERPL-MCNC: 121 MG/DL (ref 65–99)
GLUCOSE SERPL-MCNC: 121 MG/DL (ref 65–99)
GLUCOSE SERPL-MCNC: 126 MG/DL (ref 65–99)
GLUCOSE SERPL-MCNC: 130 MG/DL (ref 65–99)
GLUCOSE SERPL-MCNC: 133 MG/DL (ref 65–99)
GLUCOSE SERPL-MCNC: 135 MG/DL (ref 65–99)
GLUCOSE SERPL-MCNC: 138 MG/DL (ref 65–99)
GLUCOSE SERPL-MCNC: 138 MG/DL (ref 65–99)
GLUCOSE SERPL-MCNC: 152 MG/DL (ref 65–99)
GLUCOSE SERPL-MCNC: 157 MG/DL (ref 65–99)
GLUCOSE SERPL-MCNC: 220 MG/DL (ref 65–99)
GLUCOSE SERPL-MCNC: 229 MG/DL (ref 65–99)
GLUCOSE UR STRIP-MCNC: NEGATIVE MG/DL
GRAM STN SPEC: ABNORMAL
HADV DNA SPEC NAA+PROBE: NOT DETECTED
HBA1C MFR BLD: 5.8 % (ref 4.8–5.6)
HCO3 BLDA-SCNC: 27.1 MMOL/L (ref 20–26)
HCO3 BLDA-SCNC: 30.6 MMOL/L (ref 20–26)
HCO3 BLDA-SCNC: 31.1 MMOL/L (ref 20–26)
HCO3 BLDA-SCNC: 37.8 MMOL/L (ref 20–26)
HCO3 BLDA-SCNC: 42 MMOL/L (ref 20–26)
HCO3 BLDA-SCNC: 44.1 MMOL/L (ref 20–26)
HCO3 BLDA-SCNC: 44.8 MMOL/L (ref 20–26)
HCO3 BLDA-SCNC: 45.7 MMOL/L (ref 20–26)
HCO3 BLDA-SCNC: 46.1 MMOL/L (ref 20–26)
HCO3 BLDA-SCNC: 46.5 MMOL/L (ref 20–26)
HCO3 BLDA-SCNC: 46.8 MMOL/L (ref 20–26)
HCO3 BLDA-SCNC: 47.1 MMOL/L (ref 20–26)
HCO3 BLDA-SCNC: 47.2 MMOL/L (ref 20–26)
HCO3 BLDA-SCNC: 47.2 MMOL/L (ref 20–26)
HCO3 BLDA-SCNC: 47.7 MMOL/L (ref 20–26)
HCO3 BLDA-SCNC: 47.7 MMOL/L (ref 20–26)
HCO3 BLDA-SCNC: 47.8 MMOL/L (ref 20–26)
HCO3 BLDV-SCNC: 32.6 MMOL/L (ref 22–28)
HCO3 BLDV-SCNC: 36.1 MMOL/L (ref 22–28)
HCO3 BLDV-SCNC: 47.8 MMOL/L (ref 22–28)
HCOV 229E RNA SPEC QL NAA+PROBE: NOT DETECTED
HCOV HKU1 RNA SPEC QL NAA+PROBE: NOT DETECTED
HCOV NL63 RNA SPEC QL NAA+PROBE: NOT DETECTED
HCOV OC43 RNA SPEC QL NAA+PROBE: NOT DETECTED
HCT VFR BLD AUTO: 26.5 % (ref 34–46.6)
HCT VFR BLD AUTO: 27 % (ref 34–46.6)
HCT VFR BLD AUTO: 27.4 % (ref 34–46.6)
HCT VFR BLD AUTO: 28.2 % (ref 34–46.6)
HCT VFR BLD AUTO: 28.4 % (ref 34–46.6)
HCT VFR BLD AUTO: 29.3 % (ref 34–46.6)
HCT VFR BLD AUTO: 29.4 % (ref 34–46.6)
HCT VFR BLD AUTO: 29.4 % (ref 34–46.6)
HCT VFR BLD AUTO: 29.9 % (ref 34–46.6)
HCT VFR BLD AUTO: 30.4 % (ref 34–46.6)
HCT VFR BLD AUTO: 30.9 % (ref 34–46.6)
HCT VFR BLD AUTO: 31.2 % (ref 34–46.6)
HCT VFR BLD AUTO: 32 % (ref 34–46.6)
HCT VFR BLD AUTO: 33.5 % (ref 34–46.6)
HCT VFR BLD AUTO: 33.8 % (ref 34–46.6)
HCT VFR BLD AUTO: 34.4 % (ref 34–46.6)
HCT VFR BLD AUTO: 35.1 % (ref 34–46.6)
HCT VFR BLD AUTO: 35.7 % (ref 34–46.6)
HCT VFR BLD AUTO: 35.8 % (ref 34–46.6)
HCT VFR BLD AUTO: 36 % (ref 34–46.6)
HCT VFR BLD AUTO: 38.7 % (ref 34–46.6)
HCT VFR BLD CALC: 20.1 % (ref 38–51)
HCT VFR BLD CALC: 20.4 % (ref 38–51)
HCT VFR BLD CALC: 20.8 % (ref 38–51)
HCT VFR BLD CALC: 21.6 % (ref 38–51)
HCT VFR BLD CALC: 22.4 % (ref 38–51)
HCT VFR BLD CALC: 22.5 % (ref 38–51)
HCT VFR BLD CALC: 22.5 % (ref 38–51)
HCT VFR BLD CALC: 22.7 % (ref 38–51)
HCT VFR BLD CALC: 23.2 % (ref 38–51)
HCT VFR BLD CALC: 23.4 % (ref 38–51)
HCT VFR BLD CALC: 23.6 % (ref 38–51)
HCT VFR BLD CALC: 24.1 % (ref 38–51)
HCT VFR BLD CALC: 25.9 % (ref 38–51)
HCT VFR BLD CALC: 28.1 % (ref 38–51)
HCT VFR BLD CALC: 28.4 % (ref 38–51)
HCT VFR BLD CALC: 28.7 % (ref 38–51)
HCT VFR BLD CALC: 30.3 % (ref 38–51)
HGB BLD-MCNC: 6 G/DL (ref 12–15.9)
HGB BLD-MCNC: 6.4 G/DL (ref 12–15.9)
HGB BLD-MCNC: 7 G/DL (ref 12–15.9)
HGB BLD-MCNC: 7 G/DL (ref 12–15.9)
HGB BLD-MCNC: 7.1 G/DL (ref 12–15.9)
HGB BLD-MCNC: 7.1 G/DL (ref 12–15.9)
HGB BLD-MCNC: 7.2 G/DL (ref 12–15.9)
HGB BLD-MCNC: 7.4 G/DL (ref 12–15.9)
HGB BLD-MCNC: 7.4 G/DL (ref 12–15.9)
HGB BLD-MCNC: 7.7 G/DL (ref 12–15.9)
HGB BLD-MCNC: 7.8 G/DL (ref 12–15.9)
HGB BLD-MCNC: 7.9 G/DL (ref 12–15.9)
HGB BLD-MCNC: 8.1 G/DL (ref 12–15.9)
HGB BLD-MCNC: 8.2 G/DL (ref 12–15.9)
HGB BLD-MCNC: 8.5 G/DL (ref 12–15.9)
HGB BLD-MCNC: 8.8 G/DL (ref 12–15.9)
HGB BLD-MCNC: 9.1 G/DL (ref 12–15.9)
HGB BLD-MCNC: 9.2 G/DL (ref 12–15.9)
HGB BLD-MCNC: 9.9 G/DL (ref 12–15.9)
HGB BLDA-MCNC: 6.6 G/DL (ref 13.5–17.5)
HGB BLDA-MCNC: 6.7 G/DL (ref 13.5–17.5)
HGB BLDA-MCNC: 6.8 G/DL (ref 13.5–17.5)
HGB BLDA-MCNC: 7 G/DL (ref 13.5–17.5)
HGB BLDA-MCNC: 7.3 G/DL (ref 13.5–17.5)
HGB BLDA-MCNC: 7.4 G/DL (ref 13.5–17.5)
HGB BLDA-MCNC: 7.4 G/DL (ref 13.5–17.5)
HGB BLDA-MCNC: 7.6 G/DL (ref 13.5–17.5)
HGB BLDA-MCNC: 7.6 G/DL (ref 13.5–17.5)
HGB BLDA-MCNC: 7.7 G/DL (ref 13.5–17.5)
HGB BLDA-MCNC: 7.9 G/DL (ref 13.5–17.5)
HGB BLDA-MCNC: 8.5 G/DL (ref 13.5–17.5)
HGB BLDA-MCNC: 8.9 G/DL (ref 13.5–17.5)
HGB BLDA-MCNC: 9 G/DL (ref 13.5–17.5)
HGB BLDA-MCNC: 9.2 G/DL (ref 13.5–17.5)
HGB BLDA-MCNC: 9.3 G/DL (ref 13.5–17.5)
HGB BLDA-MCNC: 9.4 G/DL (ref 13.5–17.5)
HGB BLDA-MCNC: 9.9 G/DL (ref 13.5–17.5)
HGB UR QL STRIP.AUTO: ABNORMAL
HMPV RNA NPH QL NAA+NON-PROBE: NOT DETECTED
HOLD SPECIMEN: NORMAL
HOLD SPECIMEN: NORMAL
HPIV1 RNA ISLT QL NAA+PROBE: NOT DETECTED
HPIV2 RNA SPEC QL NAA+PROBE: NOT DETECTED
HPIV3 RNA NPH QL NAA+PROBE: NOT DETECTED
HPIV4 P GENE NPH QL NAA+PROBE: NOT DETECTED
HYALINE CASTS UR QL AUTO: ABNORMAL /LPF
HYPOCHROMIA BLD QL: ABNORMAL
HYPOCHROMIA BLD QL: ABNORMAL
HYPOCHROMIA BLD QL: NORMAL
IMM GRANULOCYTES # BLD AUTO: 0.16 10*3/MM3 (ref 0–0.05)
IMM GRANULOCYTES # BLD AUTO: 0.16 10*3/MM3 (ref 0–0.05)
IMM GRANULOCYTES # BLD AUTO: 0.18 10*3/MM3 (ref 0–0.05)
IMM GRANULOCYTES # BLD AUTO: 0.2 10*3/MM3 (ref 0–0.05)
IMM GRANULOCYTES # BLD AUTO: 0.2 10*3/MM3 (ref 0–0.05)
IMM GRANULOCYTES # BLD AUTO: 0.21 10*3/MM3 (ref 0–0.05)
IMM GRANULOCYTES # BLD AUTO: 0.28 10*3/MM3 (ref 0–0.05)
IMM GRANULOCYTES # BLD AUTO: 0.31 10*3/MM3 (ref 0–0.05)
IMM GRANULOCYTES # BLD AUTO: 0.36 10*3/MM3 (ref 0–0.05)
IMM GRANULOCYTES # BLD AUTO: 0.37 10*3/MM3 (ref 0–0.05)
IMM GRANULOCYTES # BLD AUTO: 0.39 10*3/MM3 (ref 0–0.05)
IMM GRANULOCYTES # BLD AUTO: 0.49 10*3/MM3 (ref 0–0.05)
IMM GRANULOCYTES NFR BLD AUTO: 0.7 % (ref 0–0.5)
IMM GRANULOCYTES NFR BLD AUTO: 0.8 % (ref 0–0.5)
IMM GRANULOCYTES NFR BLD AUTO: 0.8 % (ref 0–0.5)
IMM GRANULOCYTES NFR BLD AUTO: 1 % (ref 0–0.5)
IMM GRANULOCYTES NFR BLD AUTO: 1 % (ref 0–0.5)
IMM GRANULOCYTES NFR BLD AUTO: 1.1 % (ref 0–0.5)
IMM GRANULOCYTES NFR BLD AUTO: 1.1 % (ref 0–0.5)
IMM GRANULOCYTES NFR BLD AUTO: 1.2 % (ref 0–0.5)
IMM GRANULOCYTES NFR BLD AUTO: 1.3 % (ref 0–0.5)
IMM GRANULOCYTES NFR BLD AUTO: 1.3 % (ref 0–0.5)
IMM GRANULOCYTES NFR BLD AUTO: 1.4 % (ref 0–0.5)
IMM GRANULOCYTES NFR BLD AUTO: 1.4 % (ref 0–0.5)
IMM GRANULOCYTES NFR BLD AUTO: 1.5 % (ref 0–0.5)
IMM GRANULOCYTES NFR BLD AUTO: 1.8 % (ref 0–0.5)
INHALED O2 CONCENTRATION: 21 %
INHALED O2 CONCENTRATION: 32 %
INHALED O2 CONCENTRATION: 32 %
INHALED O2 CONCENTRATION: 36 %
INHALED O2 CONCENTRATION: 40 %
INHALED O2 CONCENTRATION: 44 %
INHALED O2 CONCENTRATION: 45 %
INHALED O2 CONCENTRATION: 50 %
INHALED O2 CONCENTRATION: 55 %
INHALED O2 CONCENTRATION: 60 %
INHALED O2 CONCENTRATION: 99 %
INR PPP: 1.11 (ref 0.9–1.1)
INR PPP: 1.19 (ref 0.9–1.1)
INR PPP: 1.38 (ref 0.9–1.1)
IRON 24H UR-MRATE: 12 MCG/DL (ref 37–145)
IRON 24H UR-MRATE: 28 MCG/DL (ref 37–145)
IRON SATN MFR SERPL: 16 % (ref 20–50)
IRON SATN MFR SERPL: 3 % (ref 20–50)
KETONES UR QL STRIP: ABNORMAL
KETONES UR QL STRIP: NEGATIVE
KETONES UR QL STRIP: NEGATIVE
LARGE PLATELETS: ABNORMAL
LARGE PLATELETS: NORMAL
LEFT ATRIUM VOLUME INDEX: 23.7 ML/M2
LEUKOCYTE ESTERASE UR QL STRIP.AUTO: ABNORMAL
LYMPHOCYTES # BLD AUTO: 0.38 10*3/MM3 (ref 0.7–3.1)
LYMPHOCYTES # BLD AUTO: 0.4 10*3/MM3 (ref 0.7–3.1)
LYMPHOCYTES # BLD AUTO: 0.42 10*3/MM3 (ref 0.7–3.1)
LYMPHOCYTES # BLD AUTO: 0.43 10*3/MM3 (ref 0.7–3.1)
LYMPHOCYTES # BLD AUTO: 0.56 10*3/MM3 (ref 0.7–3.1)
LYMPHOCYTES # BLD AUTO: 0.57 10*3/MM3 (ref 0.7–3.1)
LYMPHOCYTES # BLD AUTO: 0.6 10*3/MM3 (ref 0.7–3.1)
LYMPHOCYTES # BLD AUTO: 0.66 10*3/MM3 (ref 0.7–3.1)
LYMPHOCYTES # BLD AUTO: 0.78 10*3/MM3 (ref 0.7–3.1)
LYMPHOCYTES # BLD AUTO: 0.89 10*3/MM3 (ref 0.7–3.1)
LYMPHOCYTES # BLD AUTO: 0.91 10*3/MM3 (ref 0.7–3.1)
LYMPHOCYTES # BLD AUTO: 1.29 10*3/MM3 (ref 0.7–3.1)
LYMPHOCYTES # BLD AUTO: 1.52 10*3/MM3 (ref 0.7–3.1)
LYMPHOCYTES # BLD AUTO: 1.95 10*3/MM3 (ref 0.7–3.1)
LYMPHOCYTES # BLD MANUAL: 2.68 10*3/MM3 (ref 0.7–3.1)
LYMPHOCYTES # BLD MANUAL: 3.05 10*3/MM3 (ref 0.7–3.1)
LYMPHOCYTES NFR BLD AUTO: 1.4 % (ref 19.6–45.3)
LYMPHOCYTES NFR BLD AUTO: 1.9 % (ref 19.6–45.3)
LYMPHOCYTES NFR BLD AUTO: 2 % (ref 19.6–45.3)
LYMPHOCYTES NFR BLD AUTO: 2.2 % (ref 19.6–45.3)
LYMPHOCYTES NFR BLD AUTO: 2.4 % (ref 19.6–45.3)
LYMPHOCYTES NFR BLD AUTO: 2.6 % (ref 19.6–45.3)
LYMPHOCYTES NFR BLD AUTO: 2.6 % (ref 19.6–45.3)
LYMPHOCYTES NFR BLD AUTO: 3.1 % (ref 19.6–45.3)
LYMPHOCYTES NFR BLD AUTO: 3.5 % (ref 19.6–45.3)
LYMPHOCYTES NFR BLD AUTO: 4 % (ref 19.6–45.3)
LYMPHOCYTES NFR BLD AUTO: 4.4 % (ref 19.6–45.3)
LYMPHOCYTES NFR BLD AUTO: 5.3 % (ref 19.6–45.3)
LYMPHOCYTES NFR BLD AUTO: 7.4 % (ref 19.6–45.3)
LYMPHOCYTES NFR BLD AUTO: 8.3 % (ref 19.6–45.3)
LYMPHOCYTES NFR BLD MANUAL: 3 % (ref 5–12)
LYMPHOCYTES NFR BLD MANUAL: 8 % (ref 5–12)
Lab: ABNORMAL
M PNEUMO IGG SER IA-ACNC: NOT DETECTED
MAGNESIUM SERPL-MCNC: 1.6 MG/DL (ref 1.6–2.4)
MAGNESIUM SERPL-MCNC: 1.7 MG/DL (ref 1.6–2.4)
MAGNESIUM SERPL-MCNC: 1.8 MG/DL (ref 1.6–2.4)
MAGNESIUM SERPL-MCNC: 1.9 MG/DL (ref 1.6–2.4)
MAGNESIUM SERPL-MCNC: 2 MG/DL (ref 1.6–2.4)
MAGNESIUM SERPL-MCNC: 2.1 MG/DL (ref 1.6–2.4)
MAGNESIUM SERPL-MCNC: 2.3 MG/DL (ref 1.6–2.4)
MAGNESIUM SERPL-MCNC: 2.3 MG/DL (ref 1.6–2.4)
MAGNESIUM SERPL-MCNC: 2.5 MG/DL (ref 1.6–2.4)
MCH RBC QN AUTO: 17.3 PG (ref 26.6–33)
MCH RBC QN AUTO: 17.9 PG (ref 26.6–33)
MCH RBC QN AUTO: 18 PG (ref 26.6–33)
MCH RBC QN AUTO: 18.1 PG (ref 26.6–33)
MCH RBC QN AUTO: 18.3 PG (ref 26.6–33)
MCH RBC QN AUTO: 18.5 PG (ref 26.6–33)
MCH RBC QN AUTO: 18.6 PG (ref 26.6–33)
MCH RBC QN AUTO: 18.7 PG (ref 26.6–33)
MCH RBC QN AUTO: 18.9 PG (ref 26.6–33)
MCH RBC QN AUTO: 19.5 PG (ref 26.6–33)
MCH RBC QN AUTO: 20.3 PG (ref 26.6–33)
MCH RBC QN AUTO: 21.1 PG (ref 26.6–33)
MCH RBC QN AUTO: 21.1 PG (ref 26.6–33)
MCH RBC QN AUTO: 21.5 PG (ref 26.6–33)
MCH RBC QN AUTO: 21.8 PG (ref 26.6–33)
MCH RBC QN AUTO: 22.1 PG (ref 26.6–33)
MCH RBC QN AUTO: 22.5 PG (ref 26.6–33)
MCH RBC QN AUTO: 22.7 PG (ref 26.6–33)
MCHC RBC AUTO-ENTMCNC: 22.4 G/DL (ref 31.5–35.7)
MCHC RBC AUTO-ENTMCNC: 22.6 G/DL (ref 31.5–35.7)
MCHC RBC AUTO-ENTMCNC: 23.1 G/DL (ref 31.5–35.7)
MCHC RBC AUTO-ENTMCNC: 23.3 G/DL (ref 31.5–35.7)
MCHC RBC AUTO-ENTMCNC: 23.4 G/DL (ref 31.5–35.7)
MCHC RBC AUTO-ENTMCNC: 23.7 G/DL (ref 31.5–35.7)
MCHC RBC AUTO-ENTMCNC: 23.8 G/DL (ref 31.5–35.7)
MCHC RBC AUTO-ENTMCNC: 24.1 G/DL (ref 31.5–35.7)
MCHC RBC AUTO-ENTMCNC: 24.2 G/DL (ref 31.5–35.7)
MCHC RBC AUTO-ENTMCNC: 24.6 G/DL (ref 31.5–35.7)
MCHC RBC AUTO-ENTMCNC: 25.2 G/DL (ref 31.5–35.7)
MCHC RBC AUTO-ENTMCNC: 25.3 G/DL (ref 31.5–35.7)
MCHC RBC AUTO-ENTMCNC: 25.3 G/DL (ref 31.5–35.7)
MCHC RBC AUTO-ENTMCNC: 25.5 G/DL (ref 31.5–35.7)
MCHC RBC AUTO-ENTMCNC: 25.6 G/DL (ref 31.5–35.7)
MCHC RBC AUTO-ENTMCNC: 25.6 G/DL (ref 31.5–35.7)
MCHC RBC AUTO-ENTMCNC: 25.7 G/DL (ref 31.5–35.7)
MCHC RBC AUTO-ENTMCNC: 26.3 G/DL (ref 31.5–35.7)
MCV RBC AUTO: 72.6 FL (ref 79–97)
MCV RBC AUTO: 73.2 FL (ref 79–97)
MCV RBC AUTO: 75.5 FL (ref 79–97)
MCV RBC AUTO: 75.6 FL (ref 79–97)
MCV RBC AUTO: 76.5 FL (ref 79–97)
MCV RBC AUTO: 77.5 FL (ref 79–97)
MCV RBC AUTO: 77.6 FL (ref 79–97)
MCV RBC AUTO: 82.5 FL (ref 79–97)
MCV RBC AUTO: 83.2 FL (ref 79–97)
MCV RBC AUTO: 83.3 FL (ref 79–97)
MCV RBC AUTO: 83.5 FL (ref 79–97)
MCV RBC AUTO: 83.7 FL (ref 79–97)
MCV RBC AUTO: 83.8 FL (ref 79–97)
MCV RBC AUTO: 84.5 FL (ref 79–97)
MCV RBC AUTO: 85.2 FL (ref 79–97)
MCV RBC AUTO: 86.3 FL (ref 79–97)
MCV RBC AUTO: 86.6 FL (ref 79–97)
MCV RBC AUTO: 89.1 FL (ref 79–97)
METHGB BLD QL: 0 % (ref 0–3)
METHGB BLD QL: 0.2 % (ref 0–3)
METHGB BLD QL: 0.3 % (ref 0–3)
METHGB BLD QL: 0.4 % (ref 0–3)
METHGB BLD QL: <-0.1 % (ref 0–3)
MICROCYTES BLD QL: NORMAL
MODALITY: ABNORMAL
MONOCYTES # BLD AUTO: 1.22 10*3/MM3 (ref 0.1–0.9)
MONOCYTES # BLD AUTO: 1.34 10*3/MM3 (ref 0.1–0.9)
MONOCYTES # BLD AUTO: 1.48 10*3/MM3 (ref 0.1–0.9)
MONOCYTES # BLD AUTO: 1.51 10*3/MM3 (ref 0.1–0.9)
MONOCYTES # BLD AUTO: 1.58 10*3/MM3 (ref 0.1–0.9)
MONOCYTES # BLD AUTO: 1.61 10*3/MM3 (ref 0.1–0.9)
MONOCYTES # BLD AUTO: 1.61 10*3/MM3 (ref 0.1–0.9)
MONOCYTES # BLD AUTO: 1.67 10*3/MM3 (ref 0.1–0.9)
MONOCYTES # BLD AUTO: 1.71 10*3/MM3 (ref 0.1–0.9)
MONOCYTES # BLD AUTO: 1.75 10*3/MM3 (ref 0.1–0.9)
MONOCYTES # BLD AUTO: 1.92 10*3/MM3 (ref 0.1–0.9)
MONOCYTES # BLD AUTO: 2.13 10*3/MM3 (ref 0.1–0.9)
MONOCYTES # BLD AUTO: 2.62 10*3/MM3 (ref 0.1–0.9)
MONOCYTES # BLD AUTO: 3.27 10*3/MM3 (ref 0.1–0.9)
MONOCYTES # BLD: 0.89 10*3/MM3 (ref 0.1–0.9)
MONOCYTES # BLD: 2.71 10*3/MM3 (ref 0.1–0.9)
MONOCYTES NFR BLD AUTO: 10.8 % (ref 5–12)
MONOCYTES NFR BLD AUTO: 11.7 % (ref 5–12)
MONOCYTES NFR BLD AUTO: 12.5 % (ref 5–12)
MONOCYTES NFR BLD AUTO: 5.8 % (ref 5–12)
MONOCYTES NFR BLD AUTO: 6.1 % (ref 5–12)
MONOCYTES NFR BLD AUTO: 6.5 % (ref 5–12)
MONOCYTES NFR BLD AUTO: 6.8 % (ref 5–12)
MONOCYTES NFR BLD AUTO: 7 % (ref 5–12)
MONOCYTES NFR BLD AUTO: 7.1 % (ref 5–12)
MONOCYTES NFR BLD AUTO: 7.7 % (ref 5–12)
MONOCYTES NFR BLD AUTO: 7.7 % (ref 5–12)
MONOCYTES NFR BLD AUTO: 7.9 % (ref 5–12)
MONOCYTES NFR BLD AUTO: 8.1 % (ref 5–12)
MONOCYTES NFR BLD AUTO: 8.6 % (ref 5–12)
MRSA DNA SPEC QL NAA+PROBE: ABNORMAL
MUCOUS THREADS URNS QL MICRO: ABNORMAL /HPF
NEUTROPHILS # BLD AUTO: 26.23 10*3/MM3 (ref 1.7–7)
NEUTROPHILS # BLD AUTO: 27.47 10*3/MM3 (ref 1.7–7)
NEUTROPHILS NFR BLD AUTO: 13.9 10*3/MM3 (ref 1.7–7)
NEUTROPHILS NFR BLD AUTO: 16.51 10*3/MM3 (ref 1.7–7)
NEUTROPHILS NFR BLD AUTO: 16.78 10*3/MM3 (ref 1.7–7)
NEUTROPHILS NFR BLD AUTO: 16.95 10*3/MM3 (ref 1.7–7)
NEUTROPHILS NFR BLD AUTO: 17.96 10*3/MM3 (ref 1.7–7)
NEUTROPHILS NFR BLD AUTO: 18.65 10*3/MM3 (ref 1.7–7)
NEUTROPHILS NFR BLD AUTO: 18.88 10*3/MM3 (ref 1.7–7)
NEUTROPHILS NFR BLD AUTO: 19.1 10*3/MM3 (ref 1.7–7)
NEUTROPHILS NFR BLD AUTO: 19.24 10*3/MM3 (ref 1.7–7)
NEUTROPHILS NFR BLD AUTO: 19.67 10*3/MM3 (ref 1.7–7)
NEUTROPHILS NFR BLD AUTO: 20.04 10*3/MM3 (ref 1.7–7)
NEUTROPHILS NFR BLD AUTO: 20.93 10*3/MM3 (ref 1.7–7)
NEUTROPHILS NFR BLD AUTO: 24.83 10*3/MM3 (ref 1.7–7)
NEUTROPHILS NFR BLD AUTO: 25.85 10*3/MM3 (ref 1.7–7)
NEUTROPHILS NFR BLD AUTO: 76.3 % (ref 42.7–76)
NEUTROPHILS NFR BLD AUTO: 76.6 % (ref 42.7–76)
NEUTROPHILS NFR BLD AUTO: 81 % (ref 42.7–76)
NEUTROPHILS NFR BLD AUTO: 85.9 % (ref 42.7–76)
NEUTROPHILS NFR BLD AUTO: 86.5 % (ref 42.7–76)
NEUTROPHILS NFR BLD AUTO: 87.3 % (ref 42.7–76)
NEUTROPHILS NFR BLD AUTO: 87.7 % (ref 42.7–76)
NEUTROPHILS NFR BLD AUTO: 87.9 % (ref 42.7–76)
NEUTROPHILS NFR BLD AUTO: 88.1 % (ref 42.7–76)
NEUTROPHILS NFR BLD AUTO: 89.1 % (ref 42.7–76)
NEUTROPHILS NFR BLD AUTO: 89.2 % (ref 42.7–76)
NEUTROPHILS NFR BLD AUTO: 89.5 % (ref 42.7–76)
NEUTROPHILS NFR BLD AUTO: 89.9 % (ref 42.7–76)
NEUTROPHILS NFR BLD AUTO: 90.3 % (ref 42.7–76)
NEUTROPHILS NFR BLD MANUAL: 77 % (ref 42.7–76)
NEUTROPHILS NFR BLD MANUAL: 87 % (ref 42.7–76)
NEUTS BAND NFR BLD MANUAL: 1 % (ref 0–5)
NEUTS BAND NFR BLD MANUAL: 4 % (ref 0–5)
NITRITE UR QL STRIP: NEGATIVE
NITRITE UR QL STRIP: POSITIVE
NITRITE UR QL STRIP: POSITIVE
NOTE: ABNORMAL
NOTIFIED BY: ABNORMAL
NOTIFIED WHO: ABNORMAL
NRBC BLD AUTO-RTO: 0 /100 WBC (ref 0–0.2)
NRBC BLD AUTO-RTO: 0.1 /100 WBC (ref 0–0.2)
NRBC BLD AUTO-RTO: 0.1 /100 WBC (ref 0–0.2)
NRBC BLD AUTO-RTO: 0.2 /100 WBC (ref 0–0.2)
NRBC BLD AUTO-RTO: 0.3 /100 WBC (ref 0–0.2)
NRBC BLD AUTO-RTO: 0.4 /100 WBC (ref 0–0.2)
NRBC BLD AUTO-RTO: 0.6 /100 WBC (ref 0–0.2)
NRBC BLD AUTO-RTO: 0.6 /100 WBC (ref 0–0.2)
NRBC BLD AUTO-RTO: 0.7 /100 WBC (ref 0–0.2)
NT-PROBNP SERPL-MCNC: ABNORMAL PG/ML (ref 0–900)
OVALOCYTES BLD QL SMEAR: NORMAL
OVALOCYTES BLD QL SMEAR: NORMAL
OXYHGB MFR BLDV: 61.3 % (ref 45–75)
OXYHGB MFR BLDV: 62.9 % (ref 45–75)
OXYHGB MFR BLDV: 77.4 % (ref 94–99)
OXYHGB MFR BLDV: 79.3 % (ref 45–75)
OXYHGB MFR BLDV: 80.3 % (ref 94–99)
OXYHGB MFR BLDV: 86.3 % (ref 94–99)
OXYHGB MFR BLDV: 86.7 % (ref 94–99)
OXYHGB MFR BLDV: 89.4 % (ref 94–99)
OXYHGB MFR BLDV: 89.5 % (ref 94–99)
OXYHGB MFR BLDV: 90.2 % (ref 94–99)
OXYHGB MFR BLDV: 90.4 % (ref 94–99)
OXYHGB MFR BLDV: 91.1 % (ref 94–99)
OXYHGB MFR BLDV: 91.4 % (ref 94–99)
OXYHGB MFR BLDV: 91.5 % (ref 94–99)
OXYHGB MFR BLDV: 91.6 % (ref 94–99)
OXYHGB MFR BLDV: 91.8 % (ref 94–99)
OXYHGB MFR BLDV: 93.6 % (ref 94–99)
OXYHGB MFR BLDV: 93.7 % (ref 94–99)
OXYHGB MFR BLDV: 94.3 % (ref 94–99)
OXYHGB MFR BLDV: 94.4 % (ref 94–99)
PAW @ PEAK INSP FLOW SETTING VENT: 18 CMH2O
PAW @ PEAK INSP FLOW SETTING VENT: 30 CMH2O
PAW @ PEAK INSP FLOW SETTING VENT: 30 CMH2O
PCO2 BLDA: 101 MM HG (ref 35–45)
PCO2 BLDA: 50.8 MM HG (ref 35–45)
PCO2 BLDA: 50.9 MM HG (ref 35–45)
PCO2 BLDA: 54.6 MM HG (ref 35–45)
PCO2 BLDA: 57.4 MM HG (ref 35–45)
PCO2 BLDA: 60.6 MM HG (ref 35–45)
PCO2 BLDA: 63.3 MM HG (ref 35–45)
PCO2 BLDA: 67.4 MM HG (ref 35–45)
PCO2 BLDA: 68 MM HG (ref 35–45)
PCO2 BLDA: 68.7 MM HG (ref 35–45)
PCO2 BLDA: 76.4 MM HG (ref 35–45)
PCO2 BLDA: 76.7 MM HG (ref 35–45)
PCO2 BLDA: 92.8 MM HG (ref 35–45)
PCO2 BLDA: 93.8 MM HG (ref 35–45)
PCO2 BLDA: 97 MM HG (ref 35–45)
PCO2 BLDA: 97.2 MM HG (ref 35–45)
PCO2 BLDA: >104 MM HG (ref 35–45)
PCO2 BLDV: 66.6 MM HG (ref 41–51)
PCO2 BLDV: 70 MM HG (ref 41–51)
PCO2 BLDV: 86.5 MM HG (ref 41–51)
PCO2 TEMP ADJ BLD: ABNORMAL MM[HG]
PEEP RESPIRATORY: 10 CM[H2O]
PEEP RESPIRATORY: 8 CM[H2O]
PH BLDA: 7.26 PH UNITS (ref 7.35–7.45)
PH BLDA: 7.28 PH UNITS (ref 7.35–7.45)
PH BLDA: 7.29 PH UNITS (ref 7.35–7.45)
PH BLDA: 7.3 PH UNITS (ref 7.35–7.45)
PH BLDA: 7.31 PH UNITS (ref 7.35–7.45)
PH BLDA: 7.32 PH UNITS (ref 7.35–7.45)
PH BLDA: 7.33 PH UNITS (ref 7.35–7.45)
PH BLDA: 7.34 PH UNITS (ref 7.35–7.45)
PH BLDA: 7.38 PH UNITS (ref 7.35–7.45)
PH BLDA: 7.39 PH UNITS (ref 7.35–7.45)
PH BLDA: 7.4 PH UNITS (ref 7.35–7.45)
PH BLDA: 7.4 PH UNITS (ref 7.35–7.45)
PH BLDA: 7.42 PH UNITS (ref 7.35–7.45)
PH BLDA: 7.43 PH UNITS (ref 7.35–7.45)
PH BLDA: 7.43 PH UNITS (ref 7.35–7.45)
PH BLDA: 7.44 PH UNITS (ref 7.35–7.45)
PH BLDA: 7.54 PH UNITS (ref 7.35–7.45)
PH BLDV: 7.28 PH UNITS (ref 7.32–7.42)
PH BLDV: 7.34 PH UNITS (ref 7.32–7.42)
PH BLDV: 7.35 PH UNITS (ref 7.32–7.42)
PH UR STRIP.AUTO: 5.5 [PH] (ref 5–8)
PH UR STRIP.AUTO: 5.5 [PH] (ref 5–8)
PH UR STRIP.AUTO: 6 [PH] (ref 5–8)
PH, TEMP CORRECTED: ABNORMAL
PHOSPHATE SERPL-MCNC: 1.6 MG/DL (ref 2.5–4.5)
PHOSPHATE SERPL-MCNC: 2.6 MG/DL (ref 2.5–4.5)
PHOSPHATE SERPL-MCNC: 4.7 MG/DL (ref 2.5–4.5)
PHOSPHATE SERPL-MCNC: 5.4 MG/DL (ref 2.5–4.5)
PHOSPHATE SERPL-MCNC: 5.6 MG/DL (ref 2.5–4.5)
PHOSPHATE SERPL-MCNC: 5.8 MG/DL (ref 2.5–4.5)
PHOSPHATE SERPL-MCNC: 6.1 MG/DL (ref 2.5–4.5)
PHOSPHATE SERPL-MCNC: 6.7 MG/DL (ref 2.5–4.5)
PLAT MORPH BLD: NORMAL
PLATELET # BLD AUTO: 373 10*3/MM3 (ref 140–450)
PLATELET # BLD AUTO: 398 10*3/MM3 (ref 140–450)
PLATELET # BLD AUTO: 401 10*3/MM3 (ref 140–450)
PLATELET # BLD AUTO: 418 10*3/MM3 (ref 140–450)
PLATELET # BLD AUTO: 420 10*3/MM3 (ref 140–450)
PLATELET # BLD AUTO: 427 10*3/MM3 (ref 140–450)
PLATELET # BLD AUTO: 436 10*3/MM3 (ref 140–450)
PLATELET # BLD AUTO: 441 10*3/MM3 (ref 140–450)
PLATELET # BLD AUTO: 457 10*3/MM3 (ref 140–450)
PLATELET # BLD AUTO: 459 10*3/MM3 (ref 140–450)
PLATELET # BLD AUTO: 476 10*3/MM3 (ref 140–450)
PLATELET # BLD AUTO: 494 10*3/MM3 (ref 140–450)
PLATELET # BLD AUTO: 495 10*3/MM3 (ref 140–450)
PLATELET # BLD AUTO: 499 10*3/MM3 (ref 140–450)
PLATELET # BLD AUTO: 503 10*3/MM3 (ref 140–450)
PLATELET # BLD AUTO: 548 10*3/MM3 (ref 140–450)
PLATELET # BLD AUTO: 592 10*3/MM3 (ref 140–450)
PLATELET # BLD AUTO: 665 10*3/MM3 (ref 140–450)
PLATELET SATEL BLD QL SMEAR: PRESENT
PMV BLD AUTO: 10 FL (ref 6–12)
PMV BLD AUTO: 10.1 FL (ref 6–12)
PMV BLD AUTO: 10.2 FL (ref 6–12)
PMV BLD AUTO: 10.3 FL (ref 6–12)
PMV BLD AUTO: 10.4 FL (ref 6–12)
PMV BLD AUTO: 10.4 FL (ref 6–12)
PMV BLD AUTO: 10.5 FL (ref 6–12)
PMV BLD AUTO: 10.9 FL (ref 6–12)
PMV BLD AUTO: 9.9 FL (ref 6–12)
PMV BLD AUTO: 9.9 FL (ref 6–12)
PO2 BLDA: 38.3 MM HG (ref 83–108)
PO2 BLDA: 45.8 MM HG (ref 83–108)
PO2 BLDA: 54.3 MM HG (ref 83–108)
PO2 BLDA: 56.3 MM HG (ref 83–108)
PO2 BLDA: 62.7 MM HG (ref 83–108)
PO2 BLDA: 62.9 MM HG (ref 83–108)
PO2 BLDA: 65.7 MM HG (ref 83–108)
PO2 BLDA: 65.8 MM HG (ref 83–108)
PO2 BLDA: 67.2 MM HG (ref 83–108)
PO2 BLDA: 69 MM HG (ref 83–108)
PO2 BLDA: 69.2 MM HG (ref 83–108)
PO2 BLDA: 70.3 MM HG (ref 83–108)
PO2 BLDA: 70.5 MM HG (ref 83–108)
PO2 BLDA: 71.4 MM HG (ref 83–108)
PO2 BLDA: 80.7 MM HG (ref 83–108)
PO2 BLDA: 84.2 MM HG (ref 83–108)
PO2 BLDA: 93.7 MM HG (ref 83–108)
PO2 BLDV: 37.9 MM HG (ref 27–53)
PO2 BLDV: 39.9 MM HG (ref 27–53)
PO2 BLDV: 49.5 MM HG (ref 27–53)
PO2 TEMP ADJ BLD: ABNORMAL MM[HG]
POIKILOCYTOSIS BLD QL SMEAR: NORMAL
POIKILOCYTOSIS BLD QL SMEAR: NORMAL
POLYCHROMASIA BLD QL SMEAR: NORMAL
POTASSIUM SERPL-SCNC: 2.6 MMOL/L (ref 3.5–5.2)
POTASSIUM SERPL-SCNC: 2.7 MMOL/L (ref 3.5–5.2)
POTASSIUM SERPL-SCNC: 3.3 MMOL/L (ref 3.5–5.2)
POTASSIUM SERPL-SCNC: 3.4 MMOL/L (ref 3.5–5.2)
POTASSIUM SERPL-SCNC: 3.5 MMOL/L (ref 3.5–5.2)
POTASSIUM SERPL-SCNC: 3.6 MMOL/L (ref 3.5–5.2)
POTASSIUM SERPL-SCNC: 3.6 MMOL/L (ref 3.5–5.2)
POTASSIUM SERPL-SCNC: 3.7 MMOL/L (ref 3.5–5.2)
POTASSIUM SERPL-SCNC: 3.8 MMOL/L (ref 3.5–5.2)
POTASSIUM SERPL-SCNC: 3.9 MMOL/L (ref 3.5–5.2)
POTASSIUM SERPL-SCNC: 4 MMOL/L (ref 3.5–5.2)
POTASSIUM SERPL-SCNC: 4 MMOL/L (ref 3.5–5.2)
POTASSIUM SERPL-SCNC: 4.1 MMOL/L (ref 3.5–5.2)
POTASSIUM SERPL-SCNC: 4.2 MMOL/L (ref 3.5–5.2)
POTASSIUM SERPL-SCNC: 4.7 MMOL/L (ref 3.5–5.2)
POTASSIUM SERPL-SCNC: 4.8 MMOL/L (ref 3.5–5.2)
PROCALCITONIN SERPL-MCNC: 0.18 NG/ML (ref 0–0.25)
PROCALCITONIN SERPL-MCNC: 0.27 NG/ML (ref 0–0.25)
PROCALCITONIN SERPL-MCNC: 0.29 NG/ML (ref 0–0.25)
PROCALCITONIN SERPL-MCNC: 0.53 NG/ML (ref 0–0.25)
PROCALCITONIN SERPL-MCNC: 0.72 NG/ML (ref 0–0.25)
PROCALCITONIN SERPL-MCNC: 0.91 NG/ML (ref 0–0.25)
PROCALCITONIN SERPL-MCNC: 1.18 NG/ML (ref 0–0.25)
PROCALCITONIN SERPL-MCNC: 3.57 NG/ML (ref 0–0.25)
PROT ?TM UR-MCNC: 4.3 MG/DL
PROT ?TM UR-MCNC: 4.4 MG/DL
PROT SERPL-MCNC: 5.1 G/DL (ref 6–8.5)
PROT SERPL-MCNC: 5.3 G/DL (ref 6–8.5)
PROT SERPL-MCNC: 5.5 G/DL (ref 6–8.5)
PROT SERPL-MCNC: 5.6 G/DL (ref 6–8.5)
PROT SERPL-MCNC: 5.6 G/DL (ref 6–8.5)
PROT SERPL-MCNC: 5.7 G/DL (ref 6–8.5)
PROT SERPL-MCNC: 5.7 G/DL (ref 6–8.5)
PROT SERPL-MCNC: 5.8 G/DL (ref 6–8.5)
PROT SERPL-MCNC: 5.9 G/DL (ref 6–8.5)
PROT SERPL-MCNC: 6 G/DL (ref 6–8.5)
PROT SERPL-MCNC: 6.1 G/DL (ref 6–8.5)
PROT SERPL-MCNC: 6.2 G/DL (ref 6–8.5)
PROT UR QL STRIP: ABNORMAL
PROT/CREAT UR: 505.9 MG/G CREA (ref 0–200)
PROTHROMBIN TIME: 14.9 SECONDS (ref 12.1–14.7)
PROTHROMBIN TIME: 15.6 SECONDS (ref 12.1–14.7)
PROTHROMBIN TIME: 17.6 SECONDS (ref 12.1–14.7)
QT INTERVAL: 302 MS
QT INTERVAL: 314 MS
QT INTERVAL: 320 MS
QT INTERVAL: 326 MS
QT INTERVAL: 328 MS
QT INTERVAL: 330 MS
QT INTERVAL: 354 MS
QT INTERVAL: 356 MS
QTC INTERVAL: 383 MS
QTC INTERVAL: 412 MS
QTC INTERVAL: 428 MS
QTC INTERVAL: 430 MS
QTC INTERVAL: 437 MS
QTC INTERVAL: 440 MS
QTC INTERVAL: 445 MS
QTC INTERVAL: 461 MS
RBC # BLD AUTO: 3.18 10*6/MM3 (ref 3.77–5.28)
RBC # BLD AUTO: 3.32 10*6/MM3 (ref 3.77–5.28)
RBC # BLD AUTO: 3.54 10*6/MM3 (ref 3.77–5.28)
RBC # BLD AUTO: 3.69 10*6/MM3 (ref 3.77–5.28)
RBC # BLD AUTO: 3.69 10*6/MM3 (ref 3.77–5.28)
RBC # BLD AUTO: 3.71 10*6/MM3 (ref 3.77–5.28)
RBC # BLD AUTO: 3.75 10*6/MM3 (ref 3.77–5.28)
RBC # BLD AUTO: 3.76 10*6/MM3 (ref 3.77–5.28)
RBC # BLD AUTO: 3.83 10*6/MM3 (ref 3.77–5.28)
RBC # BLD AUTO: 3.87 10*6/MM3 (ref 3.77–5.28)
RBC # BLD AUTO: 3.92 10*6/MM3 (ref 3.77–5.28)
RBC # BLD AUTO: 4.04 10*6/MM3 (ref 3.77–5.28)
RBC # BLD AUTO: 4.05 10*6/MM3 (ref 3.77–5.28)
RBC # BLD AUTO: 4.12 10*6/MM3 (ref 3.77–5.28)
RBC # BLD AUTO: 4.27 10*6/MM3 (ref 3.77–5.28)
RBC # BLD AUTO: 4.36 10*6/MM3 (ref 3.77–5.28)
RBC # BLD AUTO: 4.54 10*6/MM3 (ref 3.77–5.28)
RBC # BLD AUTO: 4.72 10*6/MM3 (ref 3.77–5.28)
RBC # UR STRIP: ABNORMAL /HPF
REF LAB TEST METHOD: ABNORMAL
REF LAB TEST METHOD: NORMAL
RETICS # AUTO: 0.06 10*6/MM3 (ref 0.02–0.13)
RETICS/RBC NFR AUTO: 1.85 % (ref 0.7–1.9)
RH BLD: POSITIVE
RHINOVIRUS RNA SPEC NAA+PROBE: NOT DETECTED
RSV RNA NPH QL NAA+NON-PROBE: NOT DETECTED
SAO2 % BLDCOA: 79.5 % (ref 94–99)
SAO2 % BLDCOA: 82.4 % (ref 94–99)
SAO2 % BLDCOA: 88.7 % (ref 94–99)
SAO2 % BLDCOA: 89.1 % (ref 94–99)
SAO2 % BLDCOA: 91.8 % (ref 94–99)
SAO2 % BLDCOA: 92.2 % (ref 94–99)
SAO2 % BLDCOA: 92.6 % (ref 94–99)
SAO2 % BLDCOA: 93 % (ref 94–99)
SAO2 % BLDCOA: 93.8 % (ref 94–99)
SAO2 % BLDCOA: 93.9 % (ref 94–99)
SAO2 % BLDCOA: 94 % (ref 94–99)
SAO2 % BLDCOA: 94 % (ref 94–99)
SAO2 % BLDCOA: 94.3 % (ref 94–99)
SAO2 % BLDCOA: 94.4 % (ref 94–99)
SAO2 % BLDCOA: 95.8 % (ref 94–99)
SAO2 % BLDCOA: 96.5 % (ref 94–99)
SAO2 % BLDCOA: 97.2 % (ref 94–99)
SAO2 % BLDCOV: 63.1 % (ref 45–75)
SAO2 % BLDCOV: 64.8 % (ref 45–75)
SAO2 % BLDCOV: 81.4 % (ref 45–75)
SARS-COV-2 RNA NPH QL NAA+NON-PROBE: NOT DETECTED
SARS-COV-2 RNA RESP QL NAA+PROBE: NOT DETECTED
SCHISTOCYTES BLD QL SMEAR: ABNORMAL
SCHISTOCYTES BLD QL SMEAR: NORMAL
SET MECH RESP RATE: 16
SET MECH RESP RATE: 18
SET MECH RESP RATE: 20
SET MECH RESP RATE: 22
SET MECH RESP RATE: 22
SET MECH RESP RATE: 24
SET MECH RESP RATE: 28
SMALL PLATELETS BLD QL SMEAR: ABNORMAL
SMALL PLATELETS BLD QL SMEAR: NORMAL
SMALL PLATELETS BLD QL SMEAR: NORMAL
SODIUM SERPL-SCNC: 135 MMOL/L (ref 136–145)
SODIUM SERPL-SCNC: 138 MMOL/L (ref 136–145)
SODIUM SERPL-SCNC: 139 MMOL/L (ref 136–145)
SODIUM SERPL-SCNC: 141 MMOL/L (ref 136–145)
SODIUM SERPL-SCNC: 142 MMOL/L (ref 136–145)
SODIUM SERPL-SCNC: 144 MMOL/L (ref 136–145)
SODIUM SERPL-SCNC: 146 MMOL/L (ref 136–145)
SODIUM SERPL-SCNC: 147 MMOL/L (ref 136–145)
SODIUM SERPL-SCNC: 148 MMOL/L (ref 136–145)
SODIUM SERPL-SCNC: 148 MMOL/L (ref 136–145)
SODIUM SERPL-SCNC: 149 MMOL/L (ref 136–145)
SODIUM SERPL-SCNC: 150 MMOL/L (ref 136–145)
SODIUM SERPL-SCNC: 150 MMOL/L (ref 136–145)
SODIUM SERPL-SCNC: 151 MMOL/L (ref 136–145)
SODIUM UR-SCNC: 49 MMOL/L
SP GR UR STRIP: 1.01 (ref 1–1.03)
SP GR UR STRIP: 1.02 (ref 1–1.03)
SP GR UR STRIP: 1.02 (ref 1–1.03)
SQUAMOUS #/AREA URNS HPF: ABNORMAL /HPF
STOMATOCYTES BLD QL SMEAR: NORMAL
T&S EXPIRATION DATE: NORMAL
TARGETS BLD QL SMEAR: NORMAL
TIBC SERPL-MCNC: 176 MCG/DL (ref 298–536)
TIBC SERPL-MCNC: 359 MCG/DL (ref 298–536)
TRANSFERRIN SERPL-MCNC: 118 MG/DL (ref 200–360)
TRANSFERRIN SERPL-MCNC: 241 MG/DL (ref 200–360)
TROPONIN T DELTA: -2 NG/L
TROPONIN T DELTA: 3 NG/L
TROPONIN T SERPL HS-MCNC: 26 NG/L
TROPONIN T SERPL HS-MCNC: 29 NG/L
TSH SERPL DL<=0.05 MIU/L-ACNC: 0.62 UIU/ML (ref 0.27–4.2)
UFH PPP CHRO-ACNC: 0.16 IU/ML (ref 0.3–0.7)
UFH PPP CHRO-ACNC: 0.19 IU/ML (ref 0.3–0.7)
UFH PPP CHRO-ACNC: 0.21 IU/ML (ref 0.3–0.7)
UFH PPP CHRO-ACNC: 0.25 IU/ML (ref 0.3–0.7)
UFH PPP CHRO-ACNC: 0.26 IU/ML (ref 0.3–0.7)
UFH PPP CHRO-ACNC: 0.27 IU/ML (ref 0.3–0.7)
UFH PPP CHRO-ACNC: 0.29 IU/ML (ref 0.3–0.7)
UFH PPP CHRO-ACNC: 0.29 IU/ML (ref 0.3–0.7)
UFH PPP CHRO-ACNC: 0.31 IU/ML (ref 0.3–0.7)
UFH PPP CHRO-ACNC: 0.32 IU/ML (ref 0.3–0.7)
UFH PPP CHRO-ACNC: 0.32 IU/ML (ref 0.3–0.7)
UFH PPP CHRO-ACNC: <0.1 IU/ML (ref 0.3–0.7)
UNIT  ABO: NORMAL
UNIT  RH: NORMAL
URATE SERPL-MCNC: 9.5 MG/DL (ref 2.4–5.7)
UROBILINOGEN UR QL STRIP: ABNORMAL
VARIANT LYMPHS NFR BLD MANUAL: 9 % (ref 19.6–45.3)
VARIANT LYMPHS NFR BLD MANUAL: 9 % (ref 19.6–45.3)
VENTILATOR MODE: ABNORMAL
VIT B12 BLD-MCNC: 1133 PG/ML (ref 211–946)
VT ON VENT VENT: 400 ML
VT ON VENT VENT: 410 ML
VT ON VENT VENT: 450 ML
VT ON VENT VENT: 480 ML
WBC # UR STRIP: ABNORMAL /HPF
WBC NRBC COR # BLD: 18.22 10*3/MM3 (ref 3.4–10.8)
WBC NRBC COR # BLD: 18.35 10*3/MM3 (ref 3.4–10.8)
WBC NRBC COR # BLD: 18.78 10*3/MM3 (ref 3.4–10.8)
WBC NRBC COR # BLD: 19 10*3/MM3 (ref 3.4–10.8)
WBC NRBC COR # BLD: 19.23 10*3/MM3 (ref 3.4–10.8)
WBC NRBC COR # BLD: 20.79 10*3/MM3 (ref 3.4–10.8)
WBC NRBC COR # BLD: 20.91 10*3/MM3 (ref 3.4–10.8)
WBC NRBC COR # BLD: 21.02 10*3/MM3 (ref 3.4–10.8)
WBC NRBC COR # BLD: 21.69 10*3/MM3 (ref 3.4–10.8)
WBC NRBC COR # BLD: 22.4 10*3/MM3 (ref 3.4–10.8)
WBC NRBC COR # BLD: 23.36 10*3/MM3 (ref 3.4–10.8)
WBC NRBC COR # BLD: 24.3 10*3/MM3 (ref 3.4–10.8)
WBC NRBC COR # BLD: 26.19 10*3/MM3 (ref 3.4–10.8)
WBC NRBC COR # BLD: 27.03 10*3/MM3 (ref 3.4–10.8)
WBC NRBC COR # BLD: 27.48 10*3/MM3 (ref 3.4–10.8)
WBC NRBC COR # BLD: 29.5 10*3/MM3 (ref 3.4–10.8)
WBC NRBC COR # BLD: 29.81 10*3/MM3 (ref 3.4–10.8)
WBC NRBC COR # BLD: 33.91 10*3/MM3 (ref 3.4–10.8)
WHOLE BLOOD HOLD COAG: NORMAL
WHOLE BLOOD HOLD SPECIMEN: NORMAL
YEAST URNS QL MICRO: ABNORMAL /HPF

## 2023-01-01 PROCEDURE — 94003 VENT MGMT INPAT SUBQ DAY: CPT

## 2023-01-01 PROCEDURE — 82948 REAGENT STRIP/BLOOD GLUCOSE: CPT

## 2023-01-01 PROCEDURE — 83735 ASSAY OF MAGNESIUM: CPT | Performed by: INTERNAL MEDICINE

## 2023-01-01 PROCEDURE — 25010000002 HEPARIN (PORCINE) PER 1000 UNITS: Performed by: INTERNAL MEDICINE

## 2023-01-01 PROCEDURE — 71045 X-RAY EXAM CHEST 1 VIEW: CPT

## 2023-01-01 PROCEDURE — 94762 N-INVAS EAR/PLS OXIMTRY CONT: CPT

## 2023-01-01 PROCEDURE — 25010000002 PROPOFOL 1000 MG/100ML EMULSION: Performed by: INTERNAL MEDICINE

## 2023-01-01 PROCEDURE — 25010000002 FUROSEMIDE PER 20 MG: Performed by: INTERNAL MEDICINE

## 2023-01-01 PROCEDURE — 74018 RADEX ABDOMEN 1 VIEW: CPT

## 2023-01-01 PROCEDURE — 82805 BLOOD GASES W/O2 SATURATION: CPT

## 2023-01-01 PROCEDURE — 94799 UNLISTED PULMONARY SVC/PX: CPT

## 2023-01-01 PROCEDURE — 25010000002 FENTANYL CITRATE (PF) 50 MCG/ML SOLUTION: Performed by: INTERNAL MEDICINE

## 2023-01-01 PROCEDURE — 85520 HEPARIN ASSAY: CPT | Performed by: INTERNAL MEDICINE

## 2023-01-01 PROCEDURE — 25010000002 MEROPENEM PER 100 MG: Performed by: INTERNAL MEDICINE

## 2023-01-01 PROCEDURE — 71045 X-RAY EXAM CHEST 1 VIEW: CPT | Performed by: RADIOLOGY

## 2023-01-01 PROCEDURE — 82140 ASSAY OF AMMONIA: CPT | Performed by: INTERNAL MEDICINE

## 2023-01-01 PROCEDURE — 99232 SBSQ HOSP IP/OBS MODERATE 35: CPT | Performed by: STUDENT IN AN ORGANIZED HEALTH CARE EDUCATION/TRAINING PROGRAM

## 2023-01-01 PROCEDURE — 80053 COMPREHEN METABOLIC PANEL: CPT | Performed by: INTERNAL MEDICINE

## 2023-01-01 PROCEDURE — 76705 ECHO EXAM OF ABDOMEN: CPT

## 2023-01-01 PROCEDURE — 94761 N-INVAS EAR/PLS OXIMETRY MLT: CPT

## 2023-01-01 PROCEDURE — 25010000002 IRON SUCROSE PER 1 MG: Performed by: NURSE PRACTITIONER

## 2023-01-01 PROCEDURE — 36600 WITHDRAWAL OF ARTERIAL BLOOD: CPT

## 2023-01-01 PROCEDURE — 74018 RADEX ABDOMEN 1 VIEW: CPT | Performed by: RADIOLOGY

## 2023-01-01 PROCEDURE — 25010000002 ERTAPENEM PER 500 MG: Performed by: INTERNAL MEDICINE

## 2023-01-01 PROCEDURE — 86923 COMPATIBILITY TEST ELECTRIC: CPT

## 2023-01-01 PROCEDURE — 25010000002 FUROSEMIDE PER 20 MG: Performed by: STUDENT IN AN ORGANIZED HEALTH CARE EDUCATION/TRAINING PROGRAM

## 2023-01-01 PROCEDURE — 93005 ELECTROCARDIOGRAM TRACING: CPT | Performed by: STUDENT IN AN ORGANIZED HEALTH CARE EDUCATION/TRAINING PROGRAM

## 2023-01-01 PROCEDURE — 86140 C-REACTIVE PROTEIN: CPT | Performed by: STUDENT IN AN ORGANIZED HEALTH CARE EDUCATION/TRAINING PROGRAM

## 2023-01-01 PROCEDURE — 85025 COMPLETE CBC W/AUTO DIFF WBC: CPT | Performed by: STUDENT IN AN ORGANIZED HEALTH CARE EDUCATION/TRAINING PROGRAM

## 2023-01-01 PROCEDURE — 84484 ASSAY OF TROPONIN QUANT: CPT | Performed by: INTERNAL MEDICINE

## 2023-01-01 PROCEDURE — 85007 BL SMEAR W/DIFF WBC COUNT: CPT | Performed by: INTERNAL MEDICINE

## 2023-01-01 PROCEDURE — 71250 CT THORAX DX C-: CPT | Performed by: RADIOLOGY

## 2023-01-01 PROCEDURE — 93321 DOPPLER ECHO F-UP/LMTD STD: CPT | Performed by: INTERNAL MEDICINE

## 2023-01-01 PROCEDURE — 82375 ASSAY CARBOXYHB QUANT: CPT

## 2023-01-01 PROCEDURE — 83605 ASSAY OF LACTIC ACID: CPT | Performed by: INTERNAL MEDICINE

## 2023-01-01 PROCEDURE — 25010000002 LINEZOLID 600 MG/300ML SOLUTION: Performed by: INTERNAL MEDICINE

## 2023-01-01 PROCEDURE — 84145 PROCALCITONIN (PCT): CPT | Performed by: INTERNAL MEDICINE

## 2023-01-01 PROCEDURE — 36556 INSERT NON-TUNNEL CV CATH: CPT

## 2023-01-01 PROCEDURE — 0BH17EZ INSERTION OF ENDOTRACHEAL AIRWAY INTO TRACHEA, VIA NATURAL OR ARTIFICIAL OPENING: ICD-10-PCS | Performed by: INTERNAL MEDICINE

## 2023-01-01 PROCEDURE — 25010000002 PROCHLORPERAZINE 10 MG/2ML SOLUTION: Performed by: STUDENT IN AN ORGANIZED HEALTH CARE EDUCATION/TRAINING PROGRAM

## 2023-01-01 PROCEDURE — 70450 CT HEAD/BRAIN W/O DYE: CPT | Performed by: RADIOLOGY

## 2023-01-01 PROCEDURE — 86900 BLOOD TYPING SEROLOGIC ABO: CPT

## 2023-01-01 PROCEDURE — 86850 RBC ANTIBODY SCREEN: CPT | Performed by: STUDENT IN AN ORGANIZED HEALTH CARE EDUCATION/TRAINING PROGRAM

## 2023-01-01 PROCEDURE — 82820 HEMOGLOBIN-OXYGEN AFFINITY: CPT

## 2023-01-01 PROCEDURE — 99233 SBSQ HOSP IP/OBS HIGH 50: CPT | Performed by: NURSE PRACTITIONER

## 2023-01-01 PROCEDURE — 85007 BL SMEAR W/DIFF WBC COUNT: CPT | Performed by: STUDENT IN AN ORGANIZED HEALTH CARE EDUCATION/TRAINING PROGRAM

## 2023-01-01 PROCEDURE — 82533 TOTAL CORTISOL: CPT | Performed by: INTERNAL MEDICINE

## 2023-01-01 PROCEDURE — 85610 PROTHROMBIN TIME: CPT | Performed by: INTERNAL MEDICINE

## 2023-01-01 PROCEDURE — 87077 CULTURE AEROBIC IDENTIFY: CPT | Performed by: INTERNAL MEDICINE

## 2023-01-01 PROCEDURE — 83050 HGB METHEMOGLOBIN QUAN: CPT

## 2023-01-01 PROCEDURE — 93321 DOPPLER ECHO F-UP/LMTD STD: CPT | Performed by: SPECIALIST

## 2023-01-01 PROCEDURE — 99233 SBSQ HOSP IP/OBS HIGH 50: CPT | Performed by: INTERNAL MEDICINE

## 2023-01-01 PROCEDURE — 83735 ASSAY OF MAGNESIUM: CPT | Performed by: STUDENT IN AN ORGANIZED HEALTH CARE EDUCATION/TRAINING PROGRAM

## 2023-01-01 PROCEDURE — 85018 HEMOGLOBIN: CPT | Performed by: HOSPITALIST

## 2023-01-01 PROCEDURE — 93306 TTE W/DOPPLER COMPLETE: CPT

## 2023-01-01 PROCEDURE — 99232 SBSQ HOSP IP/OBS MODERATE 35: CPT | Performed by: INTERNAL MEDICINE

## 2023-01-01 PROCEDURE — 85025 COMPLETE CBC W/AUTO DIFF WBC: CPT | Performed by: INTERNAL MEDICINE

## 2023-01-01 PROCEDURE — 82140 ASSAY OF AMMONIA: CPT | Performed by: STUDENT IN AN ORGANIZED HEALTH CARE EDUCATION/TRAINING PROGRAM

## 2023-01-01 PROCEDURE — 85730 THROMBOPLASTIN TIME PARTIAL: CPT | Performed by: SPECIALIST

## 2023-01-01 PROCEDURE — 25010000002 PROPOFOL 1000 MG/100ML EMULSION

## 2023-01-01 PROCEDURE — 83605 ASSAY OF LACTIC ACID: CPT | Performed by: STUDENT IN AN ORGANIZED HEALTH CARE EDUCATION/TRAINING PROGRAM

## 2023-01-01 PROCEDURE — 87086 URINE CULTURE/COLONY COUNT: CPT | Performed by: NURSE PRACTITIONER

## 2023-01-01 PROCEDURE — 83880 ASSAY OF NATRIURETIC PEPTIDE: CPT | Performed by: STUDENT IN AN ORGANIZED HEALTH CARE EDUCATION/TRAINING PROGRAM

## 2023-01-01 PROCEDURE — 25010000002 PROPOFOL 10 MG/ML EMULSION: Performed by: INTERNAL MEDICINE

## 2023-01-01 PROCEDURE — 84100 ASSAY OF PHOSPHORUS: CPT | Performed by: INTERNAL MEDICINE

## 2023-01-01 PROCEDURE — 84132 ASSAY OF SERUM POTASSIUM: CPT | Performed by: INTERNAL MEDICINE

## 2023-01-01 PROCEDURE — 82570 ASSAY OF URINE CREATININE: CPT | Performed by: INTERNAL MEDICINE

## 2023-01-01 PROCEDURE — 25010000002 NA FERRIC GLUC CPLX PER 12.5 MG: Performed by: STUDENT IN AN ORGANIZED HEALTH CARE EDUCATION/TRAINING PROGRAM

## 2023-01-01 PROCEDURE — 93005 ELECTROCARDIOGRAM TRACING: CPT | Performed by: INTERNAL MEDICINE

## 2023-01-01 PROCEDURE — 25010000002 CEFTRIAXONE PER 250 MG: Performed by: STUDENT IN AN ORGANIZED HEALTH CARE EDUCATION/TRAINING PROGRAM

## 2023-01-01 PROCEDURE — 25010000002 ALBUMIN HUMAN 25% PER 50 ML: Performed by: STUDENT IN AN ORGANIZED HEALTH CARE EDUCATION/TRAINING PROGRAM

## 2023-01-01 PROCEDURE — 87086 URINE CULTURE/COLONY COUNT: CPT | Performed by: INTERNAL MEDICINE

## 2023-01-01 PROCEDURE — 93308 TTE F-UP OR LMTD: CPT | Performed by: INTERNAL MEDICINE

## 2023-01-01 PROCEDURE — 80048 BASIC METABOLIC PNL TOTAL CA: CPT | Performed by: STUDENT IN AN ORGANIZED HEALTH CARE EDUCATION/TRAINING PROGRAM

## 2023-01-01 PROCEDURE — 0 POTASSIUM CHLORIDE 10 MEQ/100ML SOLUTION: Performed by: INTERNAL MEDICINE

## 2023-01-01 PROCEDURE — 99222 1ST HOSP IP/OBS MODERATE 55: CPT | Performed by: INTERNAL MEDICINE

## 2023-01-01 PROCEDURE — 25010000002 FENTANYL 10 MCG/1 ML NS: Performed by: INTERNAL MEDICINE

## 2023-01-01 PROCEDURE — 74176 CT ABD & PELVIS W/O CONTRAST: CPT | Performed by: RADIOLOGY

## 2023-01-01 PROCEDURE — 85014 HEMATOCRIT: CPT | Performed by: STUDENT IN AN ORGANIZED HEALTH CARE EDUCATION/TRAINING PROGRAM

## 2023-01-01 PROCEDURE — 25010000002 FLUCONAZOLE PER 200 MG: Performed by: NURSE PRACTITIONER

## 2023-01-01 PROCEDURE — 99232 SBSQ HOSP IP/OBS MODERATE 35: CPT | Performed by: NURSE PRACTITIONER

## 2023-01-01 PROCEDURE — 74176 CT ABD & PELVIS W/O CONTRAST: CPT

## 2023-01-01 PROCEDURE — 71250 CT THORAX DX C-: CPT

## 2023-01-01 PROCEDURE — 70450 CT HEAD/BRAIN W/O DYE: CPT

## 2023-01-01 PROCEDURE — 84132 ASSAY OF SERUM POTASSIUM: CPT | Performed by: PHYSICIAN ASSISTANT

## 2023-01-01 PROCEDURE — 25010000002 HEPARIN (PORCINE) 25000-0.45 UT/250ML-% SOLUTION: Performed by: SPECIALIST

## 2023-01-01 PROCEDURE — 99233 SBSQ HOSP IP/OBS HIGH 50: CPT | Performed by: STUDENT IN AN ORGANIZED HEALTH CARE EDUCATION/TRAINING PROGRAM

## 2023-01-01 PROCEDURE — 25010000002 ERTAPENEM PER 500 MG: Performed by: STUDENT IN AN ORGANIZED HEALTH CARE EDUCATION/TRAINING PROGRAM

## 2023-01-01 PROCEDURE — 25010000002 PHENYLEPHRINE 10 MG/ML SOLUTION: Performed by: INTERNAL MEDICINE

## 2023-01-01 PROCEDURE — 94660 CPAP INITIATION&MGMT: CPT

## 2023-01-01 PROCEDURE — 25010000002 HEPARIN (PORCINE) 25000-0.45 UT/250ML-% SOLUTION: Performed by: INTERNAL MEDICINE

## 2023-01-01 PROCEDURE — 25010000002 GENTAMICIN PER 80 MG: Performed by: INTERNAL MEDICINE

## 2023-01-01 PROCEDURE — P9047 ALBUMIN (HUMAN), 25%, 50ML: HCPCS | Performed by: INTERNAL MEDICINE

## 2023-01-01 PROCEDURE — 83735 ASSAY OF MAGNESIUM: CPT | Performed by: NURSE PRACTITIONER

## 2023-01-01 PROCEDURE — 83880 ASSAY OF NATRIURETIC PEPTIDE: CPT | Performed by: INTERNAL MEDICINE

## 2023-01-01 PROCEDURE — 25010000002 ALBUMIN HUMAN 5% PER 50 ML: Performed by: INTERNAL MEDICINE

## 2023-01-01 PROCEDURE — 84484 ASSAY OF TROPONIN QUANT: CPT | Performed by: STUDENT IN AN ORGANIZED HEALTH CARE EDUCATION/TRAINING PROGRAM

## 2023-01-01 PROCEDURE — 94726 PLETHYSMOGRAPHY LUNG VOLUMES: CPT

## 2023-01-01 PROCEDURE — 0 POTASSIUM CHLORIDE 10 MEQ/100ML SOLUTION: Performed by: NURSE PRACTITIONER

## 2023-01-01 PROCEDURE — 93308 TTE F-UP OR LMTD: CPT | Performed by: SPECIALIST

## 2023-01-01 PROCEDURE — 85014 HEMATOCRIT: CPT | Performed by: INTERNAL MEDICINE

## 2023-01-01 PROCEDURE — 84550 ASSAY OF BLOOD/URIC ACID: CPT | Performed by: INTERNAL MEDICINE

## 2023-01-01 PROCEDURE — 25010000002 ALBUMIN HUMAN 25% PER 50 ML: Performed by: INTERNAL MEDICINE

## 2023-01-01 PROCEDURE — 36415 COLL VENOUS BLD VENIPUNCTURE: CPT

## 2023-01-01 PROCEDURE — 94664 DEMO&/EVAL PT USE INHALER: CPT

## 2023-01-01 PROCEDURE — 25010000002 DIPHENHYDRAMINE PER 50 MG: Performed by: INTERNAL MEDICINE

## 2023-01-01 PROCEDURE — 80053 COMPREHEN METABOLIC PANEL: CPT | Performed by: STUDENT IN AN ORGANIZED HEALTH CARE EDUCATION/TRAINING PROGRAM

## 2023-01-01 PROCEDURE — 99291 CRITICAL CARE FIRST HOUR: CPT | Performed by: INTERNAL MEDICINE

## 2023-01-01 PROCEDURE — 99285 EMERGENCY DEPT VISIT HI MDM: CPT

## 2023-01-01 PROCEDURE — 93005 ELECTROCARDIOGRAM TRACING: CPT | Performed by: HOSPITALIST

## 2023-01-01 PROCEDURE — 25010000002 ADENOSINE PER 6 MG: Performed by: INTERNAL MEDICINE

## 2023-01-01 PROCEDURE — 93308 TTE F-UP OR LMTD: CPT

## 2023-01-01 PROCEDURE — P9047 ALBUMIN (HUMAN), 25%, 50ML: HCPCS | Performed by: STUDENT IN AN ORGANIZED HEALTH CARE EDUCATION/TRAINING PROGRAM

## 2023-01-01 PROCEDURE — 84466 ASSAY OF TRANSFERRIN: CPT | Performed by: INTERNAL MEDICINE

## 2023-01-01 PROCEDURE — P9612 CATHETERIZE FOR URINE SPEC: HCPCS

## 2023-01-01 PROCEDURE — 84156 ASSAY OF PROTEIN URINE: CPT | Performed by: STUDENT IN AN ORGANIZED HEALTH CARE EDUCATION/TRAINING PROGRAM

## 2023-01-01 PROCEDURE — 85520 HEPARIN ASSAY: CPT

## 2023-01-01 PROCEDURE — 84132 ASSAY OF SERUM POTASSIUM: CPT | Performed by: STUDENT IN AN ORGANIZED HEALTH CARE EDUCATION/TRAINING PROGRAM

## 2023-01-01 PROCEDURE — 85014 HEMATOCRIT: CPT | Performed by: HOSPITALIST

## 2023-01-01 PROCEDURE — 82746 ASSAY OF FOLIC ACID SERUM: CPT | Performed by: INTERNAL MEDICINE

## 2023-01-01 PROCEDURE — 85018 HEMOGLOBIN: CPT | Performed by: INTERNAL MEDICINE

## 2023-01-01 PROCEDURE — 85045 AUTOMATED RETICULOCYTE COUNT: CPT | Performed by: NURSE PRACTITIONER

## 2023-01-01 PROCEDURE — 86900 BLOOD TYPING SEROLOGIC ABO: CPT | Performed by: INTERNAL MEDICINE

## 2023-01-01 PROCEDURE — 85007 BL SMEAR W/DIFF WBC COUNT: CPT | Performed by: SPECIALIST

## 2023-01-01 PROCEDURE — 84145 PROCALCITONIN (PCT): CPT | Performed by: STUDENT IN AN ORGANIZED HEALTH CARE EDUCATION/TRAINING PROGRAM

## 2023-01-01 PROCEDURE — 93321 DOPPLER ECHO F-UP/LMTD STD: CPT

## 2023-01-01 PROCEDURE — 85018 HEMOGLOBIN: CPT | Performed by: STUDENT IN AN ORGANIZED HEALTH CARE EDUCATION/TRAINING PROGRAM

## 2023-01-01 PROCEDURE — 83036 HEMOGLOBIN GLYCOSYLATED A1C: CPT | Performed by: INTERNAL MEDICINE

## 2023-01-01 PROCEDURE — 85027 COMPLETE CBC AUTOMATED: CPT | Performed by: STUDENT IN AN ORGANIZED HEALTH CARE EDUCATION/TRAINING PROGRAM

## 2023-01-01 PROCEDURE — 0202U NFCT DS 22 TRGT SARS-COV-2: CPT | Performed by: INTERNAL MEDICINE

## 2023-01-01 PROCEDURE — 31500 INSERT EMERGENCY AIRWAY: CPT | Performed by: INTERNAL MEDICINE

## 2023-01-01 PROCEDURE — 0 POTASSIUM CHLORIDE 10 MEQ/100ML SOLUTION: Performed by: PHYSICIAN ASSISTANT

## 2023-01-01 PROCEDURE — 25010000002 METOCLOPRAMIDE PER 10 MG: Performed by: INTERNAL MEDICINE

## 2023-01-01 PROCEDURE — 81001 URINALYSIS AUTO W/SCOPE: CPT | Performed by: INTERNAL MEDICINE

## 2023-01-01 PROCEDURE — 82728 ASSAY OF FERRITIN: CPT | Performed by: INTERNAL MEDICINE

## 2023-01-01 PROCEDURE — 0 POTASSIUM CHLORIDE PER 2 MEQ: Performed by: INTERNAL MEDICINE

## 2023-01-01 PROCEDURE — 93306 TTE W/DOPPLER COMPLETE: CPT | Performed by: INTERNAL MEDICINE

## 2023-01-01 PROCEDURE — 99222 1ST HOSP IP/OBS MODERATE 55: CPT | Performed by: NURSE PRACTITIONER

## 2023-01-01 PROCEDURE — 85610 PROTHROMBIN TIME: CPT | Performed by: SPECIALIST

## 2023-01-01 PROCEDURE — 25010000002 VANCOMYCIN 5 G RECONSTITUTED SOLUTION: Performed by: STUDENT IN AN ORGANIZED HEALTH CARE EDUCATION/TRAINING PROGRAM

## 2023-01-01 PROCEDURE — 85610 PROTHROMBIN TIME: CPT | Performed by: STUDENT IN AN ORGANIZED HEALTH CARE EDUCATION/TRAINING PROGRAM

## 2023-01-01 PROCEDURE — 97163 PT EVAL HIGH COMPLEX 45 MIN: CPT

## 2023-01-01 PROCEDURE — 36430 TRANSFUSION BLD/BLD COMPNT: CPT

## 2023-01-01 PROCEDURE — 86140 C-REACTIVE PROTEIN: CPT | Performed by: INTERNAL MEDICINE

## 2023-01-01 PROCEDURE — 25010000002 IRON SUCROSE PER 1 MG: Performed by: STUDENT IN AN ORGANIZED HEALTH CARE EDUCATION/TRAINING PROGRAM

## 2023-01-01 PROCEDURE — 95816 EEG AWAKE AND DROWSY: CPT

## 2023-01-01 PROCEDURE — 93970 EXTREMITY STUDY: CPT | Performed by: RADIOLOGY

## 2023-01-01 PROCEDURE — 86901 BLOOD TYPING SEROLOGIC RH(D): CPT | Performed by: STUDENT IN AN ORGANIZED HEALTH CARE EDUCATION/TRAINING PROGRAM

## 2023-01-01 PROCEDURE — 85025 COMPLETE CBC W/AUTO DIFF WBC: CPT | Performed by: SPECIALIST

## 2023-01-01 PROCEDURE — 5A1955Z RESPIRATORY VENTILATION, GREATER THAN 96 CONSECUTIVE HOURS: ICD-10-PCS | Performed by: INTERNAL MEDICINE

## 2023-01-01 PROCEDURE — 87070 CULTURE OTHR SPECIMN AEROBIC: CPT | Performed by: INTERNAL MEDICINE

## 2023-01-01 PROCEDURE — 92610 EVALUATE SWALLOWING FUNCTION: CPT

## 2023-01-01 PROCEDURE — 87186 SC STD MICRODIL/AGAR DIL: CPT | Performed by: INTERNAL MEDICINE

## 2023-01-01 PROCEDURE — 87040 BLOOD CULTURE FOR BACTERIA: CPT | Performed by: INTERNAL MEDICINE

## 2023-01-01 PROCEDURE — 94002 VENT MGMT INPAT INIT DAY: CPT

## 2023-01-01 PROCEDURE — 99221 1ST HOSP IP/OBS SF/LOW 40: CPT | Performed by: SURGERY

## 2023-01-01 PROCEDURE — 84100 ASSAY OF PHOSPHORUS: CPT | Performed by: STUDENT IN AN ORGANIZED HEALTH CARE EDUCATION/TRAINING PROGRAM

## 2023-01-01 PROCEDURE — 87040 BLOOD CULTURE FOR BACTERIA: CPT | Performed by: STUDENT IN AN ORGANIZED HEALTH CARE EDUCATION/TRAINING PROGRAM

## 2023-01-01 PROCEDURE — 02HV33Z INSERTION OF INFUSION DEVICE INTO SUPERIOR VENA CAVA, PERCUTANEOUS APPROACH: ICD-10-PCS | Performed by: INTERNAL MEDICINE

## 2023-01-01 PROCEDURE — 0 POTASSIUM CHLORIDE 10 MEQ/100ML SOLUTION

## 2023-01-01 PROCEDURE — 76775 US EXAM ABDO BACK WALL LIM: CPT | Performed by: RADIOLOGY

## 2023-01-01 PROCEDURE — 85730 THROMBOPLASTIN TIME PARTIAL: CPT | Performed by: STUDENT IN AN ORGANIZED HEALTH CARE EDUCATION/TRAINING PROGRAM

## 2023-01-01 PROCEDURE — 25010000002 MAGNESIUM SULFATE 2 GM/50ML SOLUTION: Performed by: STUDENT IN AN ORGANIZED HEALTH CARE EDUCATION/TRAINING PROGRAM

## 2023-01-01 PROCEDURE — 86901 BLOOD TYPING SEROLOGIC RH(D): CPT | Performed by: INTERNAL MEDICINE

## 2023-01-01 PROCEDURE — 87636 SARSCOV2 & INF A&B AMP PRB: CPT | Performed by: STUDENT IN AN ORGANIZED HEALTH CARE EDUCATION/TRAINING PROGRAM

## 2023-01-01 PROCEDURE — 86900 BLOOD TYPING SEROLOGIC ABO: CPT | Performed by: STUDENT IN AN ORGANIZED HEALTH CARE EDUCATION/TRAINING PROGRAM

## 2023-01-01 PROCEDURE — 93325 DOPPLER ECHO COLOR FLOW MAPG: CPT

## 2023-01-01 PROCEDURE — 85730 THROMBOPLASTIN TIME PARTIAL: CPT | Performed by: INTERNAL MEDICINE

## 2023-01-01 PROCEDURE — 93005 ELECTROCARDIOGRAM TRACING: CPT | Performed by: NURSE PRACTITIONER

## 2023-01-01 PROCEDURE — 87641 MR-STAPH DNA AMP PROBE: CPT | Performed by: INTERNAL MEDICINE

## 2023-01-01 PROCEDURE — P9016 RBC LEUKOCYTES REDUCED: HCPCS

## 2023-01-01 PROCEDURE — 84443 ASSAY THYROID STIM HORMONE: CPT | Performed by: INTERNAL MEDICINE

## 2023-01-01 PROCEDURE — 97110 THERAPEUTIC EXERCISES: CPT

## 2023-01-01 PROCEDURE — 93325 DOPPLER ECHO COLOR FLOW MAPG: CPT | Performed by: INTERNAL MEDICINE

## 2023-01-01 PROCEDURE — 82570 ASSAY OF URINE CREATININE: CPT | Performed by: STUDENT IN AN ORGANIZED HEALTH CARE EDUCATION/TRAINING PROGRAM

## 2023-01-01 PROCEDURE — 5A09357 ASSISTANCE WITH RESPIRATORY VENTILATION, LESS THAN 24 CONSECUTIVE HOURS, CONTINUOUS POSITIVE AIRWAY PRESSURE: ICD-10-PCS | Performed by: INTERNAL MEDICINE

## 2023-01-01 PROCEDURE — 86850 RBC ANTIBODY SCREEN: CPT | Performed by: INTERNAL MEDICINE

## 2023-01-01 PROCEDURE — 83540 ASSAY OF IRON: CPT | Performed by: INTERNAL MEDICINE

## 2023-01-01 PROCEDURE — 25010000002 ONDANSETRON PER 1 MG: Performed by: STUDENT IN AN ORGANIZED HEALTH CARE EDUCATION/TRAINING PROGRAM

## 2023-01-01 PROCEDURE — 76775 US EXAM ABDO BACK WALL LIM: CPT

## 2023-01-01 PROCEDURE — 87205 SMEAR GRAM STAIN: CPT | Performed by: INTERNAL MEDICINE

## 2023-01-01 PROCEDURE — 76705 ECHO EXAM OF ABDOMEN: CPT | Performed by: RADIOLOGY

## 2023-01-01 PROCEDURE — 83880 ASSAY OF NATRIURETIC PEPTIDE: CPT | Performed by: NURSE PRACTITIONER

## 2023-01-01 PROCEDURE — 85520 HEPARIN ASSAY: CPT | Performed by: SPECIALIST

## 2023-01-01 PROCEDURE — P9041 ALBUMIN (HUMAN),5%, 50ML: HCPCS | Performed by: INTERNAL MEDICINE

## 2023-01-01 PROCEDURE — 93970 EXTREMITY STUDY: CPT

## 2023-01-01 PROCEDURE — 95816 EEG AWAKE AND DROWSY: CPT | Performed by: PSYCHIATRY & NEUROLOGY

## 2023-01-01 PROCEDURE — 0 POTASSIUM CHLORIDE 10 MEQ/100ML SOLUTION: Performed by: STUDENT IN AN ORGANIZED HEALTH CARE EDUCATION/TRAINING PROGRAM

## 2023-01-01 PROCEDURE — 86901 BLOOD TYPING SEROLOGIC RH(D): CPT

## 2023-01-01 PROCEDURE — 99223 1ST HOSP IP/OBS HIGH 75: CPT | Performed by: INTERNAL MEDICINE

## 2023-01-01 PROCEDURE — 25010000002 HEPARIN (PORCINE) PER 1000 UNITS: Performed by: SPECIALIST

## 2023-01-01 PROCEDURE — 82607 VITAMIN B-12: CPT | Performed by: INTERNAL MEDICINE

## 2023-01-01 PROCEDURE — 81001 URINALYSIS AUTO W/SCOPE: CPT | Performed by: STUDENT IN AN ORGANIZED HEALTH CARE EDUCATION/TRAINING PROGRAM

## 2023-01-01 PROCEDURE — 85384 FIBRINOGEN ACTIVITY: CPT | Performed by: INTERNAL MEDICINE

## 2023-01-01 PROCEDURE — 84300 ASSAY OF URINE SODIUM: CPT | Performed by: INTERNAL MEDICINE

## 2023-01-01 RX ORDER — BISACODYL 10 MG
10 SUPPOSITORY, RECTAL RECTAL DAILY PRN
Status: DISCONTINUED | OUTPATIENT
Start: 2023-01-01 | End: 2023-01-01 | Stop reason: SDUPTHER

## 2023-01-01 RX ORDER — SODIUM CHLORIDE 0.9 % (FLUSH) 0.9 %
10 SYRINGE (ML) INJECTION AS NEEDED
Status: DISCONTINUED | OUTPATIENT
Start: 2023-01-01 | End: 2023-01-01

## 2023-01-01 RX ORDER — BISACODYL 5 MG/1
5 TABLET, DELAYED RELEASE ORAL DAILY PRN
Status: DISCONTINUED | OUTPATIENT
Start: 2023-01-01 | End: 2023-01-01

## 2023-01-01 RX ORDER — DEXTROSE MONOHYDRATE AND SODIUM CHLORIDE 5; .225 G/100ML; G/100ML
100 INJECTION, SOLUTION INTRAVENOUS CONTINUOUS
Status: DISPENSED | OUTPATIENT
Start: 2023-01-01 | End: 2023-01-01

## 2023-01-01 RX ORDER — DIPHENHYDRAMINE HYDROCHLORIDE 50 MG/ML
25 INJECTION INTRAMUSCULAR; INTRAVENOUS EVERY 6 HOURS
Status: DISCONTINUED | OUTPATIENT
Start: 2023-01-01 | End: 2023-01-01

## 2023-01-01 RX ORDER — SODIUM CHLORIDE 450 MG/100ML
100 INJECTION, SOLUTION INTRAVENOUS CONTINUOUS
Status: DISCONTINUED | OUTPATIENT
Start: 2023-01-01 | End: 2023-01-01

## 2023-01-01 RX ORDER — MAGNESIUM SULFATE HEPTAHYDRATE 40 MG/ML
2 INJECTION, SOLUTION INTRAVENOUS ONCE
Status: COMPLETED | OUTPATIENT
Start: 2023-01-01 | End: 2023-01-01

## 2023-01-01 RX ORDER — FUROSEMIDE 10 MG/ML
80 INJECTION INTRAMUSCULAR; INTRAVENOUS ONCE
Status: COMPLETED | OUTPATIENT
Start: 2023-01-01 | End: 2023-01-01

## 2023-01-01 RX ORDER — AMOXICILLIN 250 MG
2 CAPSULE ORAL 2 TIMES DAILY
Status: DISCONTINUED | OUTPATIENT
Start: 2023-01-01 | End: 2023-01-01

## 2023-01-01 RX ORDER — POTASSIUM CHLORIDE 7.45 MG/ML
10 INJECTION INTRAVENOUS
Status: DISCONTINUED | OUTPATIENT
Start: 2023-01-01 | End: 2023-01-01

## 2023-01-01 RX ORDER — ACETAMINOPHEN 325 MG/1
650 TABLET ORAL EVERY 6 HOURS PRN
COMMUNITY

## 2023-01-01 RX ORDER — HEPARIN SODIUM 5000 [USP'U]/ML
2000 INJECTION, SOLUTION INTRAVENOUS; SUBCUTANEOUS ONCE
Status: COMPLETED | OUTPATIENT
Start: 2023-01-01 | End: 2023-01-01

## 2023-01-01 RX ORDER — SODIUM CHLORIDE 0.9 % (FLUSH) 0.9 %
10 SYRINGE (ML) INJECTION EVERY 12 HOURS SCHEDULED
Status: DISCONTINUED | OUTPATIENT
Start: 2023-01-01 | End: 2023-01-01 | Stop reason: HOSPADM

## 2023-01-01 RX ORDER — FAMOTIDINE 10 MG/ML
20 INJECTION, SOLUTION INTRAVENOUS ONCE
Status: COMPLETED | OUTPATIENT
Start: 2023-01-01 | End: 2023-01-01

## 2023-01-01 RX ORDER — BISACODYL 10 MG
10 SUPPOSITORY, RECTAL RECTAL DAILY PRN
Status: DISCONTINUED | OUTPATIENT
Start: 2023-01-01 | End: 2023-01-01

## 2023-01-01 RX ORDER — ACETAMINOPHEN 325 MG/1
650 TABLET ORAL EVERY 6 HOURS PRN
Status: CANCELLED | OUTPATIENT
Start: 2023-01-01

## 2023-01-01 RX ORDER — SODIUM CHLORIDE 0.9 % (FLUSH) 0.9 %
20 SYRINGE (ML) INJECTION AS NEEDED
Status: DISCONTINUED | OUTPATIENT
Start: 2023-01-01 | End: 2023-01-01 | Stop reason: HOSPADM

## 2023-01-01 RX ORDER — ASPIRIN 81 MG/1
81 TABLET, CHEWABLE ORAL DAILY
Status: DISCONTINUED | OUTPATIENT
Start: 2023-01-01 | End: 2023-01-01

## 2023-01-01 RX ORDER — PROCHLORPERAZINE EDISYLATE 5 MG/ML
5 INJECTION INTRAMUSCULAR; INTRAVENOUS EVERY 6 HOURS PRN
Status: DISCONTINUED | OUTPATIENT
Start: 2023-01-01 | End: 2023-01-01

## 2023-01-01 RX ORDER — SIMETHICONE 80 MG
80 TABLET,CHEWABLE ORAL 4 TIMES DAILY PRN
Status: DISCONTINUED | OUTPATIENT
Start: 2023-01-01 | End: 2023-01-01

## 2023-01-01 RX ORDER — LACTULOSE 10 G/15ML
30 SOLUTION ORAL 2 TIMES DAILY
Status: DISCONTINUED | OUTPATIENT
Start: 2023-01-01 | End: 2023-01-01

## 2023-01-01 RX ORDER — SODIUM CHLORIDE 450 MG/100ML
40 INJECTION, SOLUTION INTRAVENOUS CONTINUOUS
Status: DISCONTINUED | OUTPATIENT
Start: 2023-01-01 | End: 2023-01-01

## 2023-01-01 RX ORDER — METOPROLOL SUCCINATE 25 MG/1
25 TABLET, EXTENDED RELEASE ORAL
Status: DISCONTINUED | OUTPATIENT
Start: 2023-01-01 | End: 2023-01-01

## 2023-01-01 RX ORDER — PROPOFOL 10 MG/ML
INJECTION, EMULSION INTRAVENOUS
Status: COMPLETED
Start: 2023-01-01 | End: 2023-01-01

## 2023-01-01 RX ORDER — LACTULOSE 10 G/15ML
300 SOLUTION ORAL EVERY 6 HOURS
Status: DISCONTINUED | OUTPATIENT
Start: 2023-01-01 | End: 2023-01-01

## 2023-01-01 RX ORDER — HEPARIN SODIUM 5000 [USP'U]/ML
2000 INJECTION, SOLUTION INTRAVENOUS; SUBCUTANEOUS AS NEEDED
Status: DISCONTINUED | OUTPATIENT
Start: 2023-01-01 | End: 2023-01-01

## 2023-01-01 RX ORDER — MIDODRINE HYDROCHLORIDE 2.5 MG/1
5 TABLET ORAL
Status: DISCONTINUED | OUTPATIENT
Start: 2023-01-01 | End: 2023-01-01

## 2023-01-01 RX ORDER — HEPARIN SODIUM 5000 [USP'U]/ML
5000 INJECTION, SOLUTION INTRAVENOUS; SUBCUTANEOUS EVERY 8 HOURS SCHEDULED
Status: DISCONTINUED | OUTPATIENT
Start: 2023-01-01 | End: 2023-01-01

## 2023-01-01 RX ORDER — LACTULOSE 10 G/15ML
300 SOLUTION ORAL DAILY PRN
Status: DISCONTINUED | OUTPATIENT
Start: 2023-01-01 | End: 2023-01-01

## 2023-01-01 RX ORDER — METOCLOPRAMIDE HYDROCHLORIDE 5 MG/ML
5 INJECTION INTRAMUSCULAR; INTRAVENOUS 2 TIMES DAILY
Status: DISCONTINUED | OUTPATIENT
Start: 2023-01-01 | End: 2023-01-01

## 2023-01-01 RX ORDER — HEPARIN SODIUM 5000 [USP'U]/ML
4000 INJECTION, SOLUTION INTRAVENOUS; SUBCUTANEOUS ONCE
Status: COMPLETED | OUTPATIENT
Start: 2023-01-01 | End: 2023-01-01

## 2023-01-01 RX ORDER — POTASSIUM CHLORIDE 29.8 MG/ML
20 INJECTION INTRAVENOUS
Status: COMPLETED | OUTPATIENT
Start: 2023-01-01 | End: 2023-01-01

## 2023-01-01 RX ORDER — AMOXICILLIN 250 MG
2 CAPSULE ORAL 2 TIMES DAILY
Status: DISCONTINUED | OUTPATIENT
Start: 2023-01-01 | End: 2023-01-01 | Stop reason: SDUPTHER

## 2023-01-01 RX ORDER — ASPIRIN 81 MG/1
81 TABLET ORAL DAILY
Status: DISCONTINUED | OUTPATIENT
Start: 2023-01-01 | End: 2023-01-01

## 2023-01-01 RX ORDER — ROSUVASTATIN CALCIUM 20 MG/1
20 TABLET, COATED ORAL NIGHTLY
Status: DISCONTINUED | OUTPATIENT
Start: 2023-01-01 | End: 2023-01-01

## 2023-01-01 RX ORDER — POLYETHYLENE GLYCOL 3350 17 G/17G
17 POWDER, FOR SOLUTION ORAL DAILY PRN
Status: DISCONTINUED | OUTPATIENT
Start: 2023-01-01 | End: 2023-01-01

## 2023-01-01 RX ORDER — IRON POLYSACCHARIDE COMPLEX 150 MG
150 CAPSULE ORAL DAILY
Status: DISCONTINUED | OUTPATIENT
Start: 2023-01-01 | End: 2023-01-01

## 2023-01-01 RX ORDER — SODIUM CHLORIDE 0.9 % (FLUSH) 0.9 %
10 SYRINGE (ML) INJECTION EVERY 12 HOURS SCHEDULED
Status: DISCONTINUED | OUTPATIENT
Start: 2023-01-01 | End: 2023-01-01

## 2023-01-01 RX ORDER — LACTULOSE 10 G/15ML
300 SOLUTION ORAL ONCE
Status: COMPLETED | OUTPATIENT
Start: 2023-01-01 | End: 2023-01-01

## 2023-01-01 RX ORDER — PANTOPRAZOLE SODIUM 40 MG/10ML
40 INJECTION, POWDER, LYOPHILIZED, FOR SOLUTION INTRAVENOUS
Status: DISCONTINUED | OUTPATIENT
Start: 2023-01-01 | End: 2023-01-01

## 2023-01-01 RX ORDER — HEPARIN SODIUM 10000 [USP'U]/100ML
18.85 INJECTION, SOLUTION INTRAVENOUS
Status: DISCONTINUED | OUTPATIENT
Start: 2023-01-01 | End: 2023-01-01

## 2023-01-01 RX ORDER — PANTOPRAZOLE SODIUM 40 MG/1
40 TABLET, DELAYED RELEASE ORAL
Status: DISCONTINUED | OUTPATIENT
Start: 2023-01-01 | End: 2023-01-01

## 2023-01-01 RX ORDER — IBUPROFEN 200 MG
200 TABLET ORAL EVERY 6 HOURS PRN
Status: CANCELLED | OUTPATIENT
Start: 2023-01-01

## 2023-01-01 RX ORDER — SODIUM CHLORIDE 0.9 % (FLUSH) 0.9 %
10 SYRINGE (ML) INJECTION AS NEEDED
Status: DISCONTINUED | OUTPATIENT
Start: 2023-01-01 | End: 2023-01-01 | Stop reason: HOSPADM

## 2023-01-01 RX ORDER — POTASSIUM CHLORIDE 1.5 G/1.58G
40 POWDER, FOR SOLUTION ORAL EVERY 4 HOURS
Status: DISCONTINUED | OUTPATIENT
Start: 2023-01-01 | End: 2023-01-01

## 2023-01-01 RX ORDER — FENTANYL CITRATE 50 UG/ML
50 INJECTION, SOLUTION INTRAMUSCULAR; INTRAVENOUS
Status: DISCONTINUED | OUTPATIENT
Start: 2023-01-01 | End: 2023-01-01 | Stop reason: HOSPADM

## 2023-01-01 RX ORDER — MIDODRINE HYDROCHLORIDE 2.5 MG/1
10 TABLET ORAL
Status: DISCONTINUED | OUTPATIENT
Start: 2023-01-01 | End: 2023-01-01

## 2023-01-01 RX ORDER — ESCITALOPRAM OXALATE 10 MG/1
10 TABLET ORAL DAILY
Status: DISCONTINUED | OUTPATIENT
Start: 2023-01-01 | End: 2023-01-01

## 2023-01-01 RX ORDER — POTASSIUM CHLORIDE 7.45 MG/ML
10 INJECTION INTRAVENOUS
Status: COMPLETED | OUTPATIENT
Start: 2023-01-01 | End: 2023-01-01

## 2023-01-01 RX ORDER — FUROSEMIDE 10 MG/ML
60 INJECTION INTRAMUSCULAR; INTRAVENOUS
Status: DISCONTINUED | OUTPATIENT
Start: 2023-01-01 | End: 2023-01-01

## 2023-01-01 RX ORDER — FUROSEMIDE 10 MG/ML
80 INJECTION INTRAMUSCULAR; INTRAVENOUS
Status: DISCONTINUED | OUTPATIENT
Start: 2023-01-01 | End: 2023-01-01

## 2023-01-01 RX ORDER — ADENOSINE 3 MG/ML
6 INJECTION, SOLUTION INTRAVENOUS ONCE
Status: COMPLETED | OUTPATIENT
Start: 2023-01-01 | End: 2023-01-01

## 2023-01-01 RX ORDER — ALBUMIN (HUMAN) 12.5 G/50ML
25 SOLUTION INTRAVENOUS 2 TIMES DAILY
Status: DISCONTINUED | OUTPATIENT
Start: 2023-01-01 | End: 2023-01-01

## 2023-01-01 RX ORDER — CHLORHEXIDINE GLUCONATE 0.12 MG/ML
15 RINSE ORAL EVERY 12 HOURS SCHEDULED
Status: DISCONTINUED | OUTPATIENT
Start: 2023-01-01 | End: 2023-01-01

## 2023-01-01 RX ORDER — SODIUM CHLORIDE 9 MG/ML
40 INJECTION, SOLUTION INTRAVENOUS AS NEEDED
Status: DISCONTINUED | OUTPATIENT
Start: 2023-01-01 | End: 2023-01-01

## 2023-01-01 RX ORDER — FENTANYL/ROPIVACAINE/NS/PF 2-625MCG/1
15 PLASTIC BAG, INJECTION (ML) EPIDURAL
Status: COMPLETED | OUTPATIENT
Start: 2023-01-01 | End: 2023-01-01

## 2023-01-01 RX ORDER — PHENYLEPHRINE HCL IN 0.9% NACL 0.5 MG/5ML
.5-3 SYRINGE (ML) INTRAVENOUS
Status: DISCONTINUED | OUTPATIENT
Start: 2023-01-01 | End: 2023-01-01

## 2023-01-01 RX ORDER — SODIUM CHLORIDE 450 MG/100ML
75 INJECTION, SOLUTION INTRAVENOUS CONTINUOUS
Status: DISCONTINUED | OUTPATIENT
Start: 2023-01-01 | End: 2023-01-01

## 2023-01-01 RX ORDER — NOREPINEPHRINE BITARTRATE 0.03 MG/ML
.01-.07 INJECTION, SOLUTION INTRAVENOUS
Status: DISCONTINUED | OUTPATIENT
Start: 2023-01-01 | End: 2023-01-01

## 2023-01-01 RX ORDER — FUROSEMIDE 10 MG/ML
60 INJECTION INTRAMUSCULAR; INTRAVENOUS ONCE
Status: COMPLETED | OUTPATIENT
Start: 2023-01-01 | End: 2023-01-01

## 2023-01-01 RX ORDER — FENTANYL CITRATE 50 UG/ML
100 INJECTION, SOLUTION INTRAMUSCULAR; INTRAVENOUS ONCE
Status: COMPLETED | OUTPATIENT
Start: 2023-01-01 | End: 2023-01-01

## 2023-01-01 RX ORDER — HEPARIN SODIUM 5000 [USP'U]/ML
4000 INJECTION, SOLUTION INTRAVENOUS; SUBCUTANEOUS AS NEEDED
Status: DISCONTINUED | OUTPATIENT
Start: 2023-01-01 | End: 2023-01-01

## 2023-01-01 RX ORDER — LINEZOLID 2 MG/ML
600 INJECTION, SOLUTION INTRAVENOUS EVERY 12 HOURS
Status: DISCONTINUED | OUTPATIENT
Start: 2023-01-01 | End: 2023-01-01

## 2023-01-01 RX ORDER — POTASSIUM CHLORIDE 1.5 G/1.58G
40 POWDER, FOR SOLUTION ORAL EVERY 4 HOURS
Status: COMPLETED | OUTPATIENT
Start: 2023-01-01 | End: 2023-01-01

## 2023-01-01 RX ORDER — IBUPROFEN 200 MG
200 TABLET ORAL EVERY 6 HOURS PRN
COMMUNITY

## 2023-01-01 RX ORDER — ACETAMINOPHEN 650 MG/1
650 SUPPOSITORY RECTAL EVERY 6 HOURS PRN
Status: DISCONTINUED | OUTPATIENT
Start: 2023-01-01 | End: 2023-01-01

## 2023-01-01 RX ORDER — METOPROLOL TARTRATE 5 MG/5ML
5 INJECTION INTRAVENOUS ONCE
Status: COMPLETED | OUTPATIENT
Start: 2023-01-01 | End: 2023-01-01

## 2023-01-01 RX ORDER — METOPROLOL TARTRATE 5 MG/5ML
INJECTION INTRAVENOUS
Status: COMPLETED
Start: 2023-01-01 | End: 2023-01-01

## 2023-01-01 RX ORDER — HEPARIN SODIUM 10000 [USP'U]/100ML
15.85 INJECTION, SOLUTION INTRAVENOUS
Status: DISCONTINUED | OUTPATIENT
Start: 2023-01-01 | End: 2023-01-01

## 2023-01-01 RX ORDER — ACETAMINOPHEN 325 MG/1
650 TABLET ORAL EVERY 6 HOURS PRN
Status: DISCONTINUED | OUTPATIENT
Start: 2023-01-01 | End: 2023-01-01

## 2023-01-01 RX ORDER — BISACODYL 5 MG/1
5 TABLET, DELAYED RELEASE ORAL DAILY PRN
Status: DISCONTINUED | OUTPATIENT
Start: 2023-01-01 | End: 2023-01-01 | Stop reason: SDUPTHER

## 2023-01-01 RX ORDER — FLUCONAZOLE 2 MG/ML
200 INJECTION, SOLUTION INTRAVENOUS EVERY 24 HOURS
Status: DISCONTINUED | OUTPATIENT
Start: 2023-01-01 | End: 2023-01-01

## 2023-01-01 RX ORDER — POLYETHYLENE GLYCOL 3350 17 G/17G
17 POWDER, FOR SOLUTION ORAL DAILY
Status: DISCONTINUED | OUTPATIENT
Start: 2023-01-01 | End: 2023-01-01

## 2023-01-01 RX ORDER — ONDANSETRON 2 MG/ML
4 INJECTION INTRAMUSCULAR; INTRAVENOUS EVERY 6 HOURS PRN
Status: DISCONTINUED | OUTPATIENT
Start: 2023-01-01 | End: 2023-01-01 | Stop reason: HOSPADM

## 2023-01-01 RX ORDER — POTASSIUM CHLORIDE 20 MEQ/1
40 TABLET, EXTENDED RELEASE ORAL ONCE
Status: DISCONTINUED | OUTPATIENT
Start: 2023-01-01 | End: 2023-01-01

## 2023-01-01 RX ORDER — SODIUM CHLORIDE 9 MG/ML
40 INJECTION, SOLUTION INTRAVENOUS AS NEEDED
Status: DISCONTINUED | OUTPATIENT
Start: 2023-01-01 | End: 2023-01-01 | Stop reason: HOSPADM

## 2023-01-01 RX ORDER — ALBUMIN, HUMAN INJ 5% 5 %
500 SOLUTION INTRAVENOUS ONCE
Status: COMPLETED | OUTPATIENT
Start: 2023-01-01 | End: 2023-01-01

## 2023-01-01 RX ORDER — METOPROLOL TARTRATE 5 MG/5ML
2.5 INJECTION INTRAVENOUS ONCE
Status: COMPLETED | OUTPATIENT
Start: 2023-01-01 | End: 2023-01-01

## 2023-01-01 RX ORDER — POTASSIUM CHLORIDE 1.5 G/1.58G
40 POWDER, FOR SOLUTION ORAL DAILY
Status: DISCONTINUED | OUTPATIENT
Start: 2023-01-01 | End: 2023-01-01

## 2023-01-01 RX ORDER — POTASSIUM CHLORIDE 20 MEQ/1
40 TABLET, EXTENDED RELEASE ORAL DAILY
Status: DISCONTINUED | OUTPATIENT
Start: 2023-01-01 | End: 2023-01-01

## 2023-01-01 RX ORDER — HYDROXYZINE HYDROCHLORIDE 25 MG/1
25 TABLET, FILM COATED ORAL EVERY 8 HOURS
Status: DISCONTINUED | OUTPATIENT
Start: 2023-01-01 | End: 2023-01-01

## 2023-01-01 RX ADMIN — PANTOPRAZOLE SODIUM 40 MG: 40 INJECTION, POWDER, FOR SOLUTION INTRAVENOUS at 18:16

## 2023-01-01 RX ADMIN — PANTOPRAZOLE SODIUM 40 MG: 40 INJECTION, POWDER, FOR SOLUTION INTRAVENOUS at 06:55

## 2023-01-01 RX ADMIN — VITAMINS A AND D OINTMENT 1 APPLICATION: 15.5; 53.4 OINTMENT TOPICAL at 08:09

## 2023-01-01 RX ADMIN — ACETAMINOPHEN 650 MG: 650 SUPPOSITORY RECTAL at 17:29

## 2023-01-01 RX ADMIN — ASPIRIN 81 MG: 81 TABLET, CHEWABLE ORAL at 08:11

## 2023-01-01 RX ADMIN — MEROPENEM 1000 MG: 1 INJECTION, POWDER, FOR SOLUTION INTRAVENOUS at 11:57

## 2023-01-01 RX ADMIN — PROPOFOL 30 MCG/KG/MIN: 10 INJECTION, EMULSION INTRAVENOUS at 09:17

## 2023-01-01 RX ADMIN — Medication 150 MG: at 08:36

## 2023-01-01 RX ADMIN — Medication 10 ML: at 08:13

## 2023-01-01 RX ADMIN — Medication 10 ML: at 08:51

## 2023-01-01 RX ADMIN — MEROPENEM 1000 MG: 1 INJECTION, POWDER, FOR SOLUTION INTRAVENOUS at 23:46

## 2023-01-01 RX ADMIN — PHENYLEPHRINE HYDROCHLORIDE 1.4 MCG/KG/MIN: 10 INJECTION INTRAVENOUS at 22:42

## 2023-01-01 RX ADMIN — VITAMINS A AND D OINTMENT 1 APPLICATION: 15.5; 53.4 OINTMENT TOPICAL at 20:39

## 2023-01-01 RX ADMIN — MUPIROCIN 1 APPLICATION: 20 OINTMENT TOPICAL at 21:04

## 2023-01-01 RX ADMIN — ROSUVASTATIN CALCIUM 20 MG: 20 TABLET, FILM COATED ORAL at 22:06

## 2023-01-01 RX ADMIN — ASCORBIC ACID, VITAMIN A PALMITATE, CHOLECALCIFEROL, THIAMINE HYDROCHLORIDE, RIBOFLAVIN-5 PHOSPHATE SODIUM, PYRIDOXINE HYDROCHLORIDE, NIACINAMIDE, DEXPANTHENOL, ALPHA-TOCOPHEROL ACETATE, VITAMIN K1, FOLIC ACID, BIOTIN, CYANOCOBALAMIN: 200; 3300; 200; 6; 3.6; 6; 40; 15; 10; 150; 600; 60; 5 INJECTION, SOLUTION INTRAVENOUS at 11:22

## 2023-01-01 RX ADMIN — Medication 10 ML: at 20:37

## 2023-01-01 RX ADMIN — Medication 10 ML: at 08:01

## 2023-01-01 RX ADMIN — PROPOFOL 40 MCG/KG/MIN: 10 INJECTION, EMULSION INTRAVENOUS at 09:50

## 2023-01-01 RX ADMIN — PHENYLEPHRINE HYDROCHLORIDE 1.1 MCG/KG/MIN: 10 INJECTION INTRAVENOUS at 16:23

## 2023-01-01 RX ADMIN — HEPARIN SODIUM 5.85 UNITS/KG/HR: 10000 INJECTION, SOLUTION INTRAVENOUS at 11:51

## 2023-01-01 RX ADMIN — CHLORHEXIDINE GLUCONATE 15 ML: 1.2 RINSE ORAL at 21:05

## 2023-01-01 RX ADMIN — VITAMINS A AND D OINTMENT 1 APPLICATION: 15.5; 53.4 OINTMENT TOPICAL at 08:52

## 2023-01-01 RX ADMIN — SODIUM CHLORIDE 100 ML/HR: 4.5 INJECTION, SOLUTION INTRAVENOUS at 20:04

## 2023-01-01 RX ADMIN — PHENYLEPHRINE HYDROCHLORIDE 1.4 MCG/KG/MIN: 10 INJECTION INTRAVENOUS at 20:04

## 2023-01-01 RX ADMIN — IRON SUCROSE 300 MG: 20 INJECTION, SOLUTION INTRAVENOUS at 15:02

## 2023-01-01 RX ADMIN — WHITE PETROLATUM 57.7 %-MINERAL OIL 31.9 % EYE OINTMENT: at 17:19

## 2023-01-01 RX ADMIN — IRON SUCROSE 300 MG: 20 INJECTION, SOLUTION INTRAVENOUS at 14:31

## 2023-01-01 RX ADMIN — CHLORHEXIDINE GLUCONATE 15 ML: 1.2 RINSE ORAL at 22:02

## 2023-01-01 RX ADMIN — FENTANYL CITRATE 50 MCG: 50 INJECTION, SOLUTION INTRAMUSCULAR; INTRAVENOUS at 14:42

## 2023-01-01 RX ADMIN — MEROPENEM 1000 MG: 1 INJECTION, POWDER, FOR SOLUTION INTRAVENOUS at 00:12

## 2023-01-01 RX ADMIN — Medication 0.06 MCG/KG/MIN: at 11:34

## 2023-01-01 RX ADMIN — METOPROLOL SUCCINATE 25 MG: 25 TABLET, EXTENDED RELEASE ORAL at 08:00

## 2023-01-01 RX ADMIN — POTASSIUM CHLORIDE 10 MEQ: 7.46 INJECTION, SOLUTION INTRAVENOUS at 04:17

## 2023-01-01 RX ADMIN — PROPOFOL 20 MCG/KG/MIN: 10 INJECTION, EMULSION INTRAVENOUS at 09:08

## 2023-01-01 RX ADMIN — ALBUMIN HUMAN 25 G: 0.25 SOLUTION INTRAVENOUS at 22:06

## 2023-01-01 RX ADMIN — Medication 10 ML: at 09:16

## 2023-01-01 RX ADMIN — ROSUVASTATIN CALCIUM 20 MG: 20 TABLET, FILM COATED ORAL at 20:58

## 2023-01-01 RX ADMIN — ERTAPENEM SODIUM 1000 MG: 1 INJECTION, POWDER, LYOPHILIZED, FOR SOLUTION INTRAMUSCULAR; INTRAVENOUS at 12:21

## 2023-01-01 RX ADMIN — DOCUSATE SODIUM 50 MG AND SENNOSIDES 8.6 MG 2 TABLET: 8.6; 5 TABLET, FILM COATED ORAL at 08:38

## 2023-01-01 RX ADMIN — LACTULOSE 300 ML: 10 SOLUTION ORAL at 18:43

## 2023-01-01 RX ADMIN — PANTOPRAZOLE SODIUM 40 MG: 40 TABLET, DELAYED RELEASE ORAL at 09:16

## 2023-01-01 RX ADMIN — POTASSIUM CHLORIDE 40 MEQ: 1500 TABLET, EXTENDED RELEASE ORAL at 08:36

## 2023-01-01 RX ADMIN — HEPARIN SODIUM 5000 UNITS: 5000 INJECTION INTRAVENOUS; SUBCUTANEOUS at 13:26

## 2023-01-01 RX ADMIN — PROPOFOL 25 MCG/KG/MIN: 10 INJECTION, EMULSION INTRAVENOUS at 00:06

## 2023-01-01 RX ADMIN — LEUCINE, PHENYLALANINE, LYSINE, METHIONINE, ISOLEUCINE, VALINE, HISTIDINE, THREONINE, TRYPTOPHAN, ALANINE, GLYCINE, ARGININE, PROLINE, SERINE, TYROSINE, DEXTROSE: 365; 280; 290; 200; 300; 290; 240; 210; 90; 1035; 515; 575; 340; 250; 20; 20 INJECTION INTRAVENOUS at 18:24

## 2023-01-01 RX ADMIN — CARBIDOPA AND LEVODOPA 5 MG: 50; 200 TABLET, EXTENDED RELEASE ORAL at 11:12

## 2023-01-01 RX ADMIN — POTASSIUM CHLORIDE 40 MEQ: 1.5 POWDER, FOR SOLUTION ORAL at 06:23

## 2023-01-01 RX ADMIN — CHLORHEXIDINE GLUCONATE 15 ML: 1.2 RINSE ORAL at 08:00

## 2023-01-01 RX ADMIN — VITAMINS A AND D OINTMENT 1 APPLICATION: 15.5; 53.4 OINTMENT TOPICAL at 08:29

## 2023-01-01 RX ADMIN — ROSUVASTATIN CALCIUM 20 MG: 20 TABLET, FILM COATED ORAL at 21:05

## 2023-01-01 RX ADMIN — FLUCONAZOLE 200 MG: 2 INJECTION, SOLUTION INTRAVENOUS at 10:58

## 2023-01-01 RX ADMIN — PANTOPRAZOLE SODIUM 40 MG: 40 INJECTION, POWDER, FOR SOLUTION INTRAVENOUS at 17:12

## 2023-01-01 RX ADMIN — ALBUMIN HUMAN 25 G: 0.25 SOLUTION INTRAVENOUS at 21:38

## 2023-01-01 RX ADMIN — PHENYLEPHRINE HYDROCHLORIDE 0.5 MCG/KG/MIN: 10 INJECTION INTRAVENOUS at 08:41

## 2023-01-01 RX ADMIN — SODIUM CHLORIDE 250 MG: 9 INJECTION, SOLUTION INTRAVENOUS at 09:18

## 2023-01-01 RX ADMIN — MEROPENEM 1000 MG: 1 INJECTION, POWDER, FOR SOLUTION INTRAVENOUS at 11:18

## 2023-01-01 RX ADMIN — DOCUSATE SODIUM 50 MG AND SENNOSIDES 8.6 MG 2 TABLET: 8.6; 5 TABLET, FILM COATED ORAL at 20:58

## 2023-01-01 RX ADMIN — POTASSIUM PHOSPHATE, MONOBASIC AND POTASSIUM PHOSPHATE, DIBASIC 15 MMOL: 224; 236 INJECTION, SOLUTION, CONCENTRATE INTRAVENOUS at 05:42

## 2023-01-01 RX ADMIN — Medication 10 ML: at 08:39

## 2023-01-01 RX ADMIN — DOCUSATE SODIUM 50 MG AND SENNOSIDES 8.6 MG 2 TABLET: 8.6; 5 TABLET, FILM COATED ORAL at 08:40

## 2023-01-01 RX ADMIN — POTASSIUM CHLORIDE 20 MEQ: 29.8 INJECTION, SOLUTION INTRAVENOUS at 06:41

## 2023-01-01 RX ADMIN — Medication 10 ML: at 20:07

## 2023-01-01 RX ADMIN — MUPIROCIN 1 APPLICATION: 20 OINTMENT TOPICAL at 08:13

## 2023-01-01 RX ADMIN — HEPARIN SODIUM 5000 UNITS: 5000 INJECTION INTRAVENOUS; SUBCUTANEOUS at 05:08

## 2023-01-01 RX ADMIN — MUPIROCIN 1 APPLICATION: 20 OINTMENT TOPICAL at 08:38

## 2023-01-01 RX ADMIN — Medication 10 ML: at 21:41

## 2023-01-01 RX ADMIN — LEUCINE, PHENYLALANINE, LYSINE, METHIONINE, ISOLEUCINE, VALINE, HISTIDINE, THREONINE, TRYPTOPHAN, ALANINE, GLYCINE, ARGININE, PROLINE, SERINE, TYROSINE, DEXTROSE: 365; 280; 290; 200; 300; 290; 240; 210; 90; 1035; 515; 575; 340; 250; 20; 20 INJECTION INTRAVENOUS at 18:53

## 2023-01-01 RX ADMIN — PROPOFOL 25 MCG/KG/MIN: 10 INJECTION, EMULSION INTRAVENOUS at 00:07

## 2023-01-01 RX ADMIN — PHENYLEPHRINE HYDROCHLORIDE 2.3 MCG/KG/MIN: 10 INJECTION INTRAVENOUS at 12:55

## 2023-01-01 RX ADMIN — Medication 10 ML: at 08:46

## 2023-01-01 RX ADMIN — Medication 10 ML: at 20:11

## 2023-01-01 RX ADMIN — POTASSIUM CHLORIDE 40 MEQ: 1500 TABLET, EXTENDED RELEASE ORAL at 09:15

## 2023-01-01 RX ADMIN — MUPIROCIN 1 APPLICATION: 20 OINTMENT TOPICAL at 08:36

## 2023-01-01 RX ADMIN — VITAMINS A AND D OINTMENT 1 APPLICATION: 15.5; 53.4 OINTMENT TOPICAL at 21:00

## 2023-01-01 RX ADMIN — VITAMINS A AND D OINTMENT 1 APPLICATION: 15.5; 53.4 OINTMENT TOPICAL at 08:00

## 2023-01-01 RX ADMIN — MEROPENEM 1000 MG: 1 INJECTION, POWDER, FOR SOLUTION INTRAVENOUS at 11:04

## 2023-01-01 RX ADMIN — VITAMINS A AND D OINTMENT 1 APPLICATION: 15.5; 53.4 OINTMENT TOPICAL at 20:37

## 2023-01-01 RX ADMIN — PHENYLEPHRINE HYDROCHLORIDE 1.8 MCG/KG/MIN: 10 INJECTION INTRAVENOUS at 09:52

## 2023-01-01 RX ADMIN — PHENYLEPHRINE HYDROCHLORIDE 1.1 MCG/KG/MIN: 10 INJECTION INTRAVENOUS at 03:46

## 2023-01-01 RX ADMIN — LINEZOLID 600 MG: 600 INJECTION, SOLUTION INTRAVENOUS at 11:19

## 2023-01-01 RX ADMIN — Medication 10 ML: at 21:04

## 2023-01-01 RX ADMIN — PANTOPRAZOLE SODIUM 40 MG: 40 INJECTION, POWDER, FOR SOLUTION INTRAVENOUS at 18:30

## 2023-01-01 RX ADMIN — ASPIRIN 81 MG: 81 TABLET, CHEWABLE ORAL at 08:36

## 2023-01-01 RX ADMIN — ASPIRIN 81 MG: 81 TABLET, CHEWABLE ORAL at 08:40

## 2023-01-01 RX ADMIN — SODIUM CHLORIDE 2055 ML: 9 INJECTION, SOLUTION INTRAVENOUS at 23:06

## 2023-01-01 RX ADMIN — Medication 10 ML: at 09:39

## 2023-01-01 RX ADMIN — FENTANYL CITRATE 50 MCG: 50 INJECTION, SOLUTION INTRAMUSCULAR; INTRAVENOUS at 23:06

## 2023-01-01 RX ADMIN — PROPOFOL 40 MCG/KG/MIN: 10 INJECTION, EMULSION INTRAVENOUS at 15:33

## 2023-01-01 RX ADMIN — SODIUM CHLORIDE 100 ML/HR: 4.5 INJECTION, SOLUTION INTRAVENOUS at 05:08

## 2023-01-01 RX ADMIN — PROPOFOL 40 MCG/KG/MIN: 10 INJECTION, EMULSION INTRAVENOUS at 18:13

## 2023-01-01 RX ADMIN — POTASSIUM CHLORIDE 10 MEQ: 7.46 INJECTION, SOLUTION INTRAVENOUS at 17:29

## 2023-01-01 RX ADMIN — DOXYCYCLINE 100 MG: 100 INJECTION, POWDER, LYOPHILIZED, FOR SOLUTION INTRAVENOUS at 03:00

## 2023-01-01 RX ADMIN — Medication 10 ML: at 08:43

## 2023-01-01 RX ADMIN — ASPIRIN 81 MG: 81 TABLET, CHEWABLE ORAL at 08:38

## 2023-01-01 RX ADMIN — POTASSIUM CHLORIDE 10 MEQ: 7.46 INJECTION, SOLUTION INTRAVENOUS at 13:25

## 2023-01-01 RX ADMIN — SODIUM CHLORIDE 250 MG: 9 INJECTION, SOLUTION INTRAVENOUS at 08:21

## 2023-01-01 RX ADMIN — PANTOPRAZOLE SODIUM 40 MG: 40 INJECTION, POWDER, FOR SOLUTION INTRAVENOUS at 17:50

## 2023-01-01 RX ADMIN — HEPARIN SODIUM 5000 UNITS: 5000 INJECTION INTRAVENOUS; SUBCUTANEOUS at 21:03

## 2023-01-01 RX ADMIN — SODIUM CHLORIDE 0.2 MCG/KG/HR: 9 INJECTION, SOLUTION INTRAVENOUS at 13:39

## 2023-01-01 RX ADMIN — PROPOFOL 40 MCG/KG/MIN: 10 INJECTION, EMULSION INTRAVENOUS at 18:02

## 2023-01-01 RX ADMIN — HEPARIN SODIUM 8.85 UNITS/KG/HR: 10000 INJECTION, SOLUTION INTRAVENOUS at 06:00

## 2023-01-01 RX ADMIN — FUROSEMIDE 80 MG: 10 INJECTION, SOLUTION INTRAMUSCULAR; INTRAVENOUS at 08:13

## 2023-01-01 RX ADMIN — PROPOFOL 15 MCG/KG/MIN: 10 INJECTION, EMULSION INTRAVENOUS at 05:54

## 2023-01-01 RX ADMIN — POTASSIUM CHLORIDE 10 MEQ: 7.46 INJECTION, SOLUTION INTRAVENOUS at 00:35

## 2023-01-01 RX ADMIN — Medication 10 ML: at 20:41

## 2023-01-01 RX ADMIN — PROPOFOL 35 MCG/KG/MIN: 10 INJECTION, EMULSION INTRAVENOUS at 20:10

## 2023-01-01 RX ADMIN — GENTAMICIN SULFATE 540 MG: 40 INJECTION, SOLUTION INTRAMUSCULAR; INTRAVENOUS at 14:35

## 2023-01-01 RX ADMIN — ALBUMIN HUMAN 25 G: 0.25 SOLUTION INTRAVENOUS at 20:10

## 2023-01-01 RX ADMIN — FLUCONAZOLE 200 MG: 2 INJECTION, SOLUTION INTRAVENOUS at 10:47

## 2023-01-01 RX ADMIN — HEPARIN SODIUM 4000 UNITS: 5000 INJECTION, SOLUTION INTRAVENOUS; SUBCUTANEOUS at 15:25

## 2023-01-01 RX ADMIN — SIMETHICONE 80 MG: 80 TABLET, CHEWABLE ORAL at 06:55

## 2023-01-01 RX ADMIN — FUROSEMIDE 60 MG: 10 INJECTION, SOLUTION INTRAMUSCULAR; INTRAVENOUS at 21:36

## 2023-01-01 RX ADMIN — Medication 10 ML: at 22:07

## 2023-01-01 RX ADMIN — PHENYLEPHRINE HYDROCHLORIDE 2.8 MCG/KG/MIN: 10 INJECTION INTRAVENOUS at 23:16

## 2023-01-01 RX ADMIN — POTASSIUM CHLORIDE 40 MEQ: 1.5 POWDER, FOR SOLUTION ORAL at 21:03

## 2023-01-01 RX ADMIN — Medication 10 ML: at 08:45

## 2023-01-01 RX ADMIN — MUPIROCIN 1 APPLICATION: 20 OINTMENT TOPICAL at 08:31

## 2023-01-01 RX ADMIN — Medication 10 ML: at 20:38

## 2023-01-01 RX ADMIN — FUROSEMIDE 80 MG: 10 INJECTION, SOLUTION INTRAMUSCULAR; INTRAVENOUS at 01:14

## 2023-01-01 RX ADMIN — FUROSEMIDE 60 MG: 10 INJECTION, SOLUTION INTRAMUSCULAR; INTRAVENOUS at 08:20

## 2023-01-01 RX ADMIN — PANTOPRAZOLE SODIUM 40 MG: 40 INJECTION, POWDER, FOR SOLUTION INTRAVENOUS at 17:32

## 2023-01-01 RX ADMIN — PANTOPRAZOLE SODIUM 40 MG: 40 INJECTION, POWDER, FOR SOLUTION INTRAVENOUS at 16:54

## 2023-01-01 RX ADMIN — Medication 100 MCG/HR: at 19:26

## 2023-01-01 RX ADMIN — LEUCINE, PHENYLALANINE, LYSINE, METHIONINE, ISOLEUCINE, VALINE, HISTIDINE, THREONINE, TRYPTOPHAN, ALANINE, GLYCINE, ARGININE, PROLINE, SERINE, TYROSINE, DEXTROSE: 365; 280; 290; 200; 300; 290; 240; 210; 90; 1035; 515; 575; 340; 250; 20; 20 INJECTION INTRAVENOUS at 18:13

## 2023-01-01 RX ADMIN — DOCUSATE SODIUM 50 MG AND SENNOSIDES 8.6 MG 2 TABLET: 8.6; 5 TABLET, FILM COATED ORAL at 08:36

## 2023-01-01 RX ADMIN — FUROSEMIDE 60 MG: 10 INJECTION, SOLUTION INTRAMUSCULAR; INTRAVENOUS at 17:35

## 2023-01-01 RX ADMIN — FUROSEMIDE 80 MG: 10 INJECTION, SOLUTION INTRAMUSCULAR; INTRAVENOUS at 14:18

## 2023-01-01 RX ADMIN — SODIUM CHLORIDE 1.1 MCG/KG/HR: 9 INJECTION, SOLUTION INTRAVENOUS at 01:18

## 2023-01-01 RX ADMIN — SODIUM CHLORIDE 100 ML/HR: 4.5 INJECTION, SOLUTION INTRAVENOUS at 20:12

## 2023-01-01 RX ADMIN — HEPARIN SODIUM 19.85 UNITS/KG/HR: 10000 INJECTION, SOLUTION INTRAVENOUS at 04:54

## 2023-01-01 RX ADMIN — Medication 150 MG: at 08:44

## 2023-01-01 RX ADMIN — Medication 10 ML: at 20:03

## 2023-01-01 RX ADMIN — Medication 10 ML: at 22:31

## 2023-01-01 RX ADMIN — DOXYCYCLINE 100 MG: 100 INJECTION, POWDER, LYOPHILIZED, FOR SOLUTION INTRAVENOUS at 15:59

## 2023-01-01 RX ADMIN — MAGNESIUM SULFATE HEPTAHYDRATE 2 G: 40 INJECTION, SOLUTION INTRAVENOUS at 14:37

## 2023-01-01 RX ADMIN — SIMETHICONE 80 MG: 80 TABLET, CHEWABLE ORAL at 09:17

## 2023-01-01 RX ADMIN — POTASSIUM CHLORIDE 40 MEQ: 1.5 POWDER, FOR SOLUTION ORAL at 09:49

## 2023-01-01 RX ADMIN — FENTANYL CITRATE 50 MCG: 50 INJECTION, SOLUTION INTRAMUSCULAR; INTRAVENOUS at 12:48

## 2023-01-01 RX ADMIN — CHLORHEXIDINE GLUCONATE 15 ML: 1.2 RINSE ORAL at 08:40

## 2023-01-01 RX ADMIN — POLYETHYLENE GLYCOL (3350) 17 G: 17 POWDER, FOR SOLUTION ORAL at 08:38

## 2023-01-01 RX ADMIN — FLUCONAZOLE 200 MG: 2 INJECTION, SOLUTION INTRAVENOUS at 09:50

## 2023-01-01 RX ADMIN — LACTULOSE 300 ML: 10 SOLUTION ORAL at 10:23

## 2023-01-01 RX ADMIN — PROPOFOL 25 MCG/KG/MIN: 10 INJECTION, EMULSION INTRAVENOUS at 04:14

## 2023-01-01 RX ADMIN — PANTOPRAZOLE SODIUM 40 MG: 40 INJECTION, POWDER, FOR SOLUTION INTRAVENOUS at 17:07

## 2023-01-01 RX ADMIN — FENTANYL CITRATE 50 MCG: 50 INJECTION, SOLUTION INTRAMUSCULAR; INTRAVENOUS at 19:36

## 2023-01-01 RX ADMIN — DOCUSATE SODIUM 50 MG AND SENNOSIDES 8.6 MG 2 TABLET: 8.6; 5 TABLET, FILM COATED ORAL at 21:38

## 2023-01-01 RX ADMIN — PHENYLEPHRINE HYDROCHLORIDE 2.3 MCG/KG/MIN: 10 INJECTION INTRAVENOUS at 20:33

## 2023-01-01 RX ADMIN — Medication 10 ML: at 08:36

## 2023-01-01 RX ADMIN — ESCITALOPRAM OXALATE 10 MG: 10 TABLET, FILM COATED ORAL at 11:53

## 2023-01-01 RX ADMIN — FLUCONAZOLE 200 MG: 2 INJECTION, SOLUTION INTRAVENOUS at 11:21

## 2023-01-01 RX ADMIN — HEPARIN SODIUM 5000 UNITS: 5000 INJECTION INTRAVENOUS; SUBCUTANEOUS at 22:29

## 2023-01-01 RX ADMIN — Medication 10 ML: at 08:12

## 2023-01-01 RX ADMIN — MEROPENEM 1000 MG: 1 INJECTION, POWDER, FOR SOLUTION INTRAVENOUS at 00:44

## 2023-01-01 RX ADMIN — POTASSIUM CHLORIDE 10 MEQ: 7.46 INJECTION, SOLUTION INTRAVENOUS at 14:22

## 2023-01-01 RX ADMIN — MUPIROCIN 1 APPLICATION: 20 OINTMENT TOPICAL at 21:01

## 2023-01-01 RX ADMIN — VITAMINS A AND D OINTMENT 1 APPLICATION: 15.5; 53.4 OINTMENT TOPICAL at 20:06

## 2023-01-01 RX ADMIN — POTASSIUM CHLORIDE 10 MEQ: 7.46 INJECTION, SOLUTION INTRAVENOUS at 02:26

## 2023-01-01 RX ADMIN — POTASSIUM CHLORIDE 40 MEQ: 1.5 POWDER, FOR SOLUTION ORAL at 08:38

## 2023-01-01 RX ADMIN — CARBIDOPA AND LEVODOPA 10 MG: 50; 200 TABLET, EXTENDED RELEASE ORAL at 12:40

## 2023-01-01 RX ADMIN — PROPOFOL 20 MCG/KG/MIN: 10 INJECTION, EMULSION INTRAVENOUS at 07:43

## 2023-01-01 RX ADMIN — POTASSIUM CHLORIDE 10 MEQ: 7.46 INJECTION, SOLUTION INTRAVENOUS at 19:44

## 2023-01-01 RX ADMIN — Medication 10 ML: at 08:30

## 2023-01-01 RX ADMIN — PHENYLEPHRINE HYDROCHLORIDE 1.1 MCG/KG/MIN: 10 INJECTION INTRAVENOUS at 01:18

## 2023-01-01 RX ADMIN — FUROSEMIDE 60 MG: 10 INJECTION, SOLUTION INTRAMUSCULAR; INTRAVENOUS at 08:46

## 2023-01-01 RX ADMIN — VITAMINS A AND D OINTMENT 1 APPLICATION: 15.5; 53.4 OINTMENT TOPICAL at 20:21

## 2023-01-01 RX ADMIN — ESCITALOPRAM OXALATE 10 MG: 10 TABLET, FILM COATED ORAL at 09:15

## 2023-01-01 RX ADMIN — Medication 10 ML: at 21:00

## 2023-01-01 RX ADMIN — ACETAMINOPHEN 650 MG: 325 TABLET ORAL at 16:52

## 2023-01-01 RX ADMIN — WHITE PETROLATUM 57.7 %-MINERAL OIL 31.9 % EYE OINTMENT: at 20:06

## 2023-01-01 RX ADMIN — PANTOPRAZOLE SODIUM 40 MG: 40 INJECTION, POWDER, FOR SOLUTION INTRAVENOUS at 07:39

## 2023-01-01 RX ADMIN — POTASSIUM CHLORIDE 40 MEQ: 1500 TABLET, EXTENDED RELEASE ORAL at 08:23

## 2023-01-01 RX ADMIN — ROSUVASTATIN CALCIUM 20 MG: 20 TABLET, FILM COATED ORAL at 21:38

## 2023-01-01 RX ADMIN — CHLORHEXIDINE GLUCONATE 15 ML: 1.2 RINSE ORAL at 20:02

## 2023-01-01 RX ADMIN — MEROPENEM 1000 MG: 1 INJECTION, POWDER, FOR SOLUTION INTRAVENOUS at 13:25

## 2023-01-01 RX ADMIN — ACETAMINOPHEN 650 MG: 325 TABLET ORAL at 22:29

## 2023-01-01 RX ADMIN — SODIUM CHLORIDE 2000 MG: 9 INJECTION, SOLUTION INTRAVENOUS at 23:39

## 2023-01-01 RX ADMIN — HEPARIN SODIUM 14.85 UNITS/KG/HR: 10000 INJECTION, SOLUTION INTRAVENOUS at 15:29

## 2023-01-01 RX ADMIN — FENTANYL CITRATE 50 MCG: 50 INJECTION, SOLUTION INTRAMUSCULAR; INTRAVENOUS at 15:25

## 2023-01-01 RX ADMIN — VITAMINS A AND D OINTMENT 1 APPLICATION: 15.5; 53.4 OINTMENT TOPICAL at 08:38

## 2023-01-01 RX ADMIN — PHENYLEPHRINE HYDROCHLORIDE 1.4 MCG/KG/MIN: 10 INJECTION INTRAVENOUS at 16:31

## 2023-01-01 RX ADMIN — PANTOPRAZOLE SODIUM 40 MG: 40 INJECTION, POWDER, FOR SOLUTION INTRAVENOUS at 06:31

## 2023-01-01 RX ADMIN — PHENYLEPHRINE HYDROCHLORIDE 1.4 MCG/KG/MIN: 10 INJECTION INTRAVENOUS at 09:36

## 2023-01-01 RX ADMIN — Medication 10 ML: at 22:28

## 2023-01-01 RX ADMIN — Medication 10 ML: at 09:11

## 2023-01-01 RX ADMIN — DIPHENHYDRAMINE HYDROCHLORIDE 25 MG: 50 INJECTION INTRAMUSCULAR; INTRAVENOUS at 16:55

## 2023-01-01 RX ADMIN — LINEZOLID 600 MG: 600 INJECTION, SOLUTION INTRAVENOUS at 11:46

## 2023-01-01 RX ADMIN — FAMOTIDINE 20 MG: 10 INJECTION INTRAVENOUS at 18:04

## 2023-01-01 RX ADMIN — VITAMINS A AND D OINTMENT 1 APPLICATION: 15.5; 53.4 OINTMENT TOPICAL at 20:02

## 2023-01-01 RX ADMIN — SIMETHICONE 80 MG: 80 TABLET, CHEWABLE ORAL at 12:41

## 2023-01-01 RX ADMIN — PHENYLEPHRINE HYDROCHLORIDE 3 MCG/KG/MIN: 10 INJECTION INTRAVENOUS at 13:53

## 2023-01-01 RX ADMIN — Medication 10 ML: at 08:11

## 2023-01-01 RX ADMIN — PROPOFOL 40 MCG/KG/MIN: 10 INJECTION, EMULSION INTRAVENOUS at 07:26

## 2023-01-01 RX ADMIN — VITAMINS A AND D OINTMENT 1 APPLICATION: 15.5; 53.4 OINTMENT TOPICAL at 08:35

## 2023-01-01 RX ADMIN — SODIUM CHLORIDE 0.9 MCG/KG/HR: 9 INJECTION, SOLUTION INTRAVENOUS at 20:05

## 2023-01-01 RX ADMIN — POLYETHYLENE GLYCOL (3350) 17 G: 17 POWDER, FOR SOLUTION ORAL at 08:10

## 2023-01-01 RX ADMIN — PROCHLORPERAZINE EDISYLATE 5 MG: 5 INJECTION INTRAMUSCULAR; INTRAVENOUS at 12:41

## 2023-01-01 RX ADMIN — FENTANYL CITRATE 50 MCG: 50 INJECTION, SOLUTION INTRAMUSCULAR; INTRAVENOUS at 16:21

## 2023-01-01 RX ADMIN — MEROPENEM 1000 MG: 1 INJECTION, POWDER, FOR SOLUTION INTRAVENOUS at 23:48

## 2023-01-01 RX ADMIN — POTASSIUM CHLORIDE 40 MEQ: 1.5 POWDER, FOR SOLUTION ORAL at 14:22

## 2023-01-01 RX ADMIN — ASPIRIN 81 MG: 81 TABLET, COATED ORAL at 09:17

## 2023-01-01 RX ADMIN — FENTANYL CITRATE 50 MCG: 50 INJECTION, SOLUTION INTRAMUSCULAR; INTRAVENOUS at 02:05

## 2023-01-01 RX ADMIN — SODIUM CHLORIDE 40 ML/HR: 4.5 INJECTION, SOLUTION INTRAVENOUS at 08:38

## 2023-01-01 RX ADMIN — VITAMINS A AND D OINTMENT 1 APPLICATION: 15.5; 53.4 OINTMENT TOPICAL at 08:13

## 2023-01-01 RX ADMIN — PROPOFOL 40 MCG/KG/MIN: 10 INJECTION, EMULSION INTRAVENOUS at 12:27

## 2023-01-01 RX ADMIN — Medication 10 ML: at 22:30

## 2023-01-01 RX ADMIN — PROPOFOL 30 MCG/KG/MIN: 10 INJECTION, EMULSION INTRAVENOUS at 17:00

## 2023-01-01 RX ADMIN — CHLORHEXIDINE GLUCONATE 15 ML: 1.2 RINSE ORAL at 08:12

## 2023-01-01 RX ADMIN — PHENYLEPHRINE HYDROCHLORIDE 2.3 MCG/KG/MIN: 10 INJECTION INTRAVENOUS at 15:08

## 2023-01-01 RX ADMIN — HEPARIN SODIUM 5000 UNITS: 5000 INJECTION INTRAVENOUS; SUBCUTANEOUS at 21:36

## 2023-01-01 RX ADMIN — HEPARIN SODIUM 18.85 UNITS/KG/HR: 10000 INJECTION, SOLUTION INTRAVENOUS at 04:29

## 2023-01-01 RX ADMIN — LINEZOLID 600 MG: 600 INJECTION, SOLUTION INTRAVENOUS at 11:52

## 2023-01-01 RX ADMIN — DIPHENHYDRAMINE HYDROCHLORIDE 25 MG: 50 INJECTION INTRAMUSCULAR; INTRAVENOUS at 23:46

## 2023-01-01 RX ADMIN — PHENYLEPHRINE HYDROCHLORIDE 0.8 MCG/KG/MIN: 10 INJECTION INTRAVENOUS at 03:43

## 2023-01-01 RX ADMIN — SODIUM CHLORIDE 0.9 MCG/KG/HR: 9 INJECTION, SOLUTION INTRAVENOUS at 08:43

## 2023-01-01 RX ADMIN — ACETAMINOPHEN 650 MG: 325 TABLET ORAL at 01:19

## 2023-01-01 RX ADMIN — Medication 10 ML: at 22:04

## 2023-01-01 RX ADMIN — POTASSIUM CHLORIDE 10 MEQ: 7.46 INJECTION, SOLUTION INTRAVENOUS at 03:34

## 2023-01-01 RX ADMIN — PANTOPRAZOLE SODIUM 40 MG: 40 INJECTION, POWDER, FOR SOLUTION INTRAVENOUS at 06:38

## 2023-01-01 RX ADMIN — LINEZOLID 600 MG: 600 INJECTION, SOLUTION INTRAVENOUS at 23:46

## 2023-01-01 RX ADMIN — FUROSEMIDE 60 MG: 10 INJECTION, SOLUTION INTRAMUSCULAR; INTRAVENOUS at 17:00

## 2023-01-01 RX ADMIN — LACTULOSE 300 ML: 10 SOLUTION ORAL at 15:53

## 2023-01-01 RX ADMIN — CARBIDOPA AND LEVODOPA 10 MG: 50; 200 TABLET, EXTENDED RELEASE ORAL at 11:51

## 2023-01-01 RX ADMIN — PROPOFOL 20 MCG/KG/MIN: 10 INJECTION, EMULSION INTRAVENOUS at 18:37

## 2023-01-01 RX ADMIN — ACETAMINOPHEN 650 MG: 325 TABLET ORAL at 05:36

## 2023-01-01 RX ADMIN — DIPHENHYDRAMINE HYDROCHLORIDE 25 MG: 50 INJECTION INTRAMUSCULAR; INTRAVENOUS at 22:13

## 2023-01-01 RX ADMIN — METOPROLOL TARTRATE 5 MG: 5 INJECTION INTRAVENOUS at 12:48

## 2023-01-01 RX ADMIN — HEPARIN SODIUM 2000 UNITS: 5000 INJECTION INTRAVENOUS; SUBCUTANEOUS at 06:46

## 2023-01-01 RX ADMIN — HYDROXYZINE HYDROCHLORIDE 25 MG: 25 TABLET, FILM COATED ORAL at 13:39

## 2023-01-01 RX ADMIN — POTASSIUM CHLORIDE 40 MEQ: 1.5 POWDER, FOR SOLUTION ORAL at 11:47

## 2023-01-01 RX ADMIN — PANTOPRAZOLE SODIUM 40 MG: 40 INJECTION, POWDER, FOR SOLUTION INTRAVENOUS at 06:33

## 2023-01-01 RX ADMIN — PHENYLEPHRINE HYDROCHLORIDE 1.4 MCG/KG/MIN: 10 INJECTION INTRAVENOUS at 01:24

## 2023-01-01 RX ADMIN — MEROPENEM 1000 MG: 1 INJECTION, POWDER, FOR SOLUTION INTRAVENOUS at 11:51

## 2023-01-01 RX ADMIN — CHLORHEXIDINE GLUCONATE 15 ML: 1.2 RINSE ORAL at 20:58

## 2023-01-01 RX ADMIN — ONDANSETRON 4 MG: 2 INJECTION INTRAMUSCULAR; INTRAVENOUS at 05:49

## 2023-01-01 RX ADMIN — POTASSIUM CHLORIDE 10 MEQ: 7.46 INJECTION, SOLUTION INTRAVENOUS at 06:55

## 2023-01-01 RX ADMIN — FUROSEMIDE 60 MG: 10 INJECTION, SOLUTION INTRAMUSCULAR; INTRAVENOUS at 09:17

## 2023-01-01 RX ADMIN — DOCUSATE SODIUM 50 MG AND SENNOSIDES 8.6 MG 2 TABLET: 8.6; 5 TABLET, FILM COATED ORAL at 22:28

## 2023-01-01 RX ADMIN — PANTOPRAZOLE SODIUM 40 MG: 40 TABLET, DELAYED RELEASE ORAL at 08:20

## 2023-01-01 RX ADMIN — DOCUSATE SODIUM 50 MG AND SENNOSIDES 8.6 MG 2 TABLET: 8.6; 5 TABLET, FILM COATED ORAL at 22:07

## 2023-01-01 RX ADMIN — Medication 10 ML: at 08:44

## 2023-01-01 RX ADMIN — POLYETHYLENE GLYCOL (3350) 17 G: 17 POWDER, FOR SOLUTION ORAL at 08:40

## 2023-01-01 RX ADMIN — VITAMINS A AND D OINTMENT 1 APPLICATION: 15.5; 53.4 OINTMENT TOPICAL at 09:11

## 2023-01-01 RX ADMIN — ESCITALOPRAM OXALATE 10 MG: 10 TABLET, FILM COATED ORAL at 08:36

## 2023-01-01 RX ADMIN — POTASSIUM CHLORIDE 10 MEQ: 7.46 INJECTION, SOLUTION INTRAVENOUS at 15:45

## 2023-01-01 RX ADMIN — ROSUVASTATIN CALCIUM 20 MG: 20 TABLET, FILM COATED ORAL at 20:05

## 2023-01-01 RX ADMIN — Medication 10 ML: at 20:21

## 2023-01-01 RX ADMIN — WHITE PETROLATUM 57.7 %-MINERAL OIL 31.9 % EYE OINTMENT: at 20:58

## 2023-01-01 RX ADMIN — FENTANYL CITRATE 50 MCG: 50 INJECTION, SOLUTION INTRAMUSCULAR; INTRAVENOUS at 11:25

## 2023-01-01 RX ADMIN — LINEZOLID 600 MG: 600 INJECTION, SOLUTION INTRAVENOUS at 11:05

## 2023-01-01 RX ADMIN — CARBIDOPA AND LEVODOPA 10 MG: 50; 200 TABLET, EXTENDED RELEASE ORAL at 11:05

## 2023-01-01 RX ADMIN — CHLORHEXIDINE GLUCONATE 15 ML: 1.2 RINSE ORAL at 08:14

## 2023-01-01 RX ADMIN — CHLORHEXIDINE GLUCONATE 15 ML: 1.2 RINSE ORAL at 08:29

## 2023-01-01 RX ADMIN — MEROPENEM 1000 MG: 1 INJECTION, POWDER, FOR SOLUTION INTRAVENOUS at 20:26

## 2023-01-01 RX ADMIN — PROPOFOL 15 MCG/KG/MIN: 10 INJECTION, EMULSION INTRAVENOUS at 17:12

## 2023-01-01 RX ADMIN — MEROPENEM 1000 MG: 1 INJECTION, POWDER, FOR SOLUTION INTRAVENOUS at 11:46

## 2023-01-01 RX ADMIN — Medication 10 ML: at 20:22

## 2023-01-01 RX ADMIN — HEPARIN SODIUM 20.85 UNITS/KG/HR: 10000 INJECTION, SOLUTION INTRAVENOUS at 12:42

## 2023-01-01 RX ADMIN — PROPOFOL 40 MCG/KG/MIN: 10 INJECTION, EMULSION INTRAVENOUS at 01:21

## 2023-01-01 RX ADMIN — HEPARIN SODIUM 20.85 UNITS/KG/HR: 10000 INJECTION, SOLUTION INTRAVENOUS at 20:07

## 2023-01-01 RX ADMIN — METOCLOPRAMIDE HYDROCHLORIDE 5 MG: 5 INJECTION INTRAMUSCULAR; INTRAVENOUS at 20:04

## 2023-01-01 RX ADMIN — ALBUMIN HUMAN 25 G: 0.25 SOLUTION INTRAVENOUS at 20:04

## 2023-01-01 RX ADMIN — FENTANYL CITRATE 50 MCG: 50 INJECTION, SOLUTION INTRAMUSCULAR; INTRAVENOUS at 14:07

## 2023-01-01 RX ADMIN — MEROPENEM 1000 MG: 1 INJECTION, POWDER, FOR SOLUTION INTRAVENOUS at 00:21

## 2023-01-01 RX ADMIN — POTASSIUM CHLORIDE 10 MEQ: 7.46 INJECTION, SOLUTION INTRAVENOUS at 14:37

## 2023-01-01 RX ADMIN — Medication 10 ML: at 09:18

## 2023-01-01 RX ADMIN — HEPARIN SODIUM 15.85 UNITS/KG/HR: 10000 INJECTION, SOLUTION INTRAVENOUS at 02:59

## 2023-01-01 RX ADMIN — MUPIROCIN 1 APPLICATION: 20 OINTMENT TOPICAL at 20:03

## 2023-01-01 RX ADMIN — PROPOFOL 45 MCG/KG/MIN: 10 INJECTION, EMULSION INTRAVENOUS at 04:27

## 2023-01-01 RX ADMIN — DIPHENHYDRAMINE HYDROCHLORIDE 25 MG: 50 INJECTION INTRAMUSCULAR; INTRAVENOUS at 05:43

## 2023-01-01 RX ADMIN — CHLORHEXIDINE GLUCONATE 15 ML: 1.2 RINSE ORAL at 20:37

## 2023-01-01 RX ADMIN — Medication 0.02 MCG/KG/MIN: at 05:37

## 2023-01-01 RX ADMIN — DIPHENHYDRAMINE HYDROCHLORIDE 25 MG: 50 INJECTION INTRAMUSCULAR; INTRAVENOUS at 16:22

## 2023-01-01 RX ADMIN — PHENYLEPHRINE HYDROCHLORIDE 2.3 MCG/KG/MIN: 10 INJECTION INTRAVENOUS at 18:19

## 2023-01-01 RX ADMIN — SIMETHICONE 80 MG: 80 TABLET, CHEWABLE ORAL at 08:20

## 2023-01-01 RX ADMIN — ALBUMIN HUMAN 25 G: 0.25 SOLUTION INTRAVENOUS at 08:50

## 2023-01-01 RX ADMIN — ACETAMINOPHEN 650 MG: 650 SUPPOSITORY RECTAL at 22:01

## 2023-01-01 RX ADMIN — POTASSIUM CHLORIDE 10 MEQ: 7.46 INJECTION, SOLUTION INTRAVENOUS at 09:16

## 2023-01-01 RX ADMIN — Medication 10 ML: at 08:20

## 2023-01-01 RX ADMIN — ONDANSETRON 4 MG: 2 INJECTION INTRAMUSCULAR; INTRAVENOUS at 20:18

## 2023-01-01 RX ADMIN — POTASSIUM CHLORIDE 10 MEQ: 7.46 INJECTION, SOLUTION INTRAVENOUS at 03:16

## 2023-01-01 RX ADMIN — CARBIDOPA AND LEVODOPA 5 MG: 50; 200 TABLET, EXTENDED RELEASE ORAL at 06:30

## 2023-01-01 RX ADMIN — CARBIDOPA AND LEVODOPA 10 MG: 50; 200 TABLET, EXTENDED RELEASE ORAL at 18:24

## 2023-01-01 RX ADMIN — SIMETHICONE 80 MG: 80 TABLET, CHEWABLE ORAL at 13:20

## 2023-01-01 RX ADMIN — ASPIRIN 81 MG: 81 TABLET, CHEWABLE ORAL at 08:00

## 2023-01-01 RX ADMIN — HEPARIN SODIUM 5000 UNITS: 5000 INJECTION INTRAVENOUS; SUBCUTANEOUS at 15:01

## 2023-01-01 RX ADMIN — ALBUMIN HUMAN 25 G: 0.25 SOLUTION INTRAVENOUS at 08:21

## 2023-01-01 RX ADMIN — ADENOSINE 6 MG: 3 INJECTION, SOLUTION INTRAVENOUS at 12:47

## 2023-01-01 RX ADMIN — LINEZOLID 600 MG: 600 INJECTION, SOLUTION INTRAVENOUS at 23:47

## 2023-01-01 RX ADMIN — HEPARIN SODIUM 5000 UNITS: 5000 INJECTION INTRAVENOUS; SUBCUTANEOUS at 15:13

## 2023-01-01 RX ADMIN — PANTOPRAZOLE SODIUM 40 MG: 40 TABLET, DELAYED RELEASE ORAL at 16:43

## 2023-01-01 RX ADMIN — MEROPENEM 1000 MG: 1 INJECTION, POWDER, FOR SOLUTION INTRAVENOUS at 12:02

## 2023-01-01 RX ADMIN — DOCUSATE SODIUM 50 MG AND SENNOSIDES 8.6 MG 2 TABLET: 8.6; 5 TABLET, FILM COATED ORAL at 08:11

## 2023-01-01 RX ADMIN — ASPIRIN 81 MG: 81 TABLET, COATED ORAL at 14:02

## 2023-01-01 RX ADMIN — PANTOPRAZOLE SODIUM 40 MG: 40 INJECTION, POWDER, FOR SOLUTION INTRAVENOUS at 09:34

## 2023-01-01 RX ADMIN — CARBIDOPA AND LEVODOPA 10 MG: 50; 200 TABLET, EXTENDED RELEASE ORAL at 08:11

## 2023-01-01 RX ADMIN — PROPOFOL 5 MCG/KG/MIN: 10 INJECTION, EMULSION INTRAVENOUS at 08:21

## 2023-01-01 RX ADMIN — FUROSEMIDE 80 MG: 10 INJECTION, SOLUTION INTRAMUSCULAR; INTRAVENOUS at 08:42

## 2023-01-01 RX ADMIN — FUROSEMIDE 60 MG: 10 INJECTION, SOLUTION INTRAMUSCULAR; INTRAVENOUS at 10:43

## 2023-01-01 RX ADMIN — PANTOPRAZOLE SODIUM 40 MG: 40 INJECTION, POWDER, FOR SOLUTION INTRAVENOUS at 06:30

## 2023-01-01 RX ADMIN — SODIUM CHLORIDE 2000 MG: 9 INJECTION, SOLUTION INTRAVENOUS at 23:06

## 2023-01-01 RX ADMIN — VITAMINS A AND D OINTMENT 1 APPLICATION: 15.5; 53.4 OINTMENT TOPICAL at 22:28

## 2023-01-01 RX ADMIN — SODIUM CHLORIDE 100 ML/HR: 4.5 INJECTION, SOLUTION INTRAVENOUS at 14:23

## 2023-01-01 RX ADMIN — METOPROLOL SUCCINATE 25 MG: 25 TABLET, EXTENDED RELEASE ORAL at 14:02

## 2023-01-01 RX ADMIN — PHENYLEPHRINE HYDROCHLORIDE 2.8 MCG/KG/MIN: 10 INJECTION INTRAVENOUS at 01:37

## 2023-01-01 RX ADMIN — HEPARIN SODIUM 19.85 UNITS/KG/HR: 10000 INJECTION, SOLUTION INTRAVENOUS at 13:20

## 2023-01-01 RX ADMIN — SODIUM CHLORIDE 250 MG: 9 INJECTION, SOLUTION INTRAVENOUS at 11:53

## 2023-01-01 RX ADMIN — ALBUMIN HUMAN 500 ML: 0.05 INJECTION, SOLUTION INTRAVENOUS at 00:41

## 2023-01-01 RX ADMIN — ALBUMIN HUMAN 25 G: 0.25 SOLUTION INTRAVENOUS at 20:32

## 2023-01-01 RX ADMIN — ERTAPENEM SODIUM 1000 MG: 1 INJECTION, POWDER, LYOPHILIZED, FOR SOLUTION INTRAMUSCULAR; INTRAVENOUS at 10:59

## 2023-01-01 RX ADMIN — FUROSEMIDE 60 MG: 10 INJECTION, SOLUTION INTRAMUSCULAR; INTRAVENOUS at 17:29

## 2023-01-01 RX ADMIN — POTASSIUM PHOSPHATE, MONOBASIC AND POTASSIUM PHOSPHATE, DIBASIC 15 MMOL: 224; 236 INJECTION, SOLUTION, CONCENTRATE INTRAVENOUS at 02:08

## 2023-01-01 RX ADMIN — Medication 10 ML: at 20:59

## 2023-01-01 RX ADMIN — PANTOPRAZOLE SODIUM 40 MG: 40 INJECTION, POWDER, FOR SOLUTION INTRAVENOUS at 17:46

## 2023-01-01 RX ADMIN — CARBIDOPA AND LEVODOPA 10 MG: 50; 200 TABLET, EXTENDED RELEASE ORAL at 17:07

## 2023-01-01 RX ADMIN — PHENYLEPHRINE HYDROCHLORIDE 0.5 MCG/KG/MIN: 10 INJECTION INTRAVENOUS at 21:22

## 2023-01-01 RX ADMIN — PANTOPRAZOLE SODIUM 40 MG: 40 TABLET, DELAYED RELEASE ORAL at 16:58

## 2023-01-01 RX ADMIN — PANTOPRAZOLE SODIUM 40 MG: 40 INJECTION, POWDER, FOR SOLUTION INTRAVENOUS at 18:06

## 2023-01-01 RX ADMIN — PROPOFOL 40 MCG/KG/MIN: 10 INJECTION, EMULSION INTRAVENOUS at 21:06

## 2023-01-01 RX ADMIN — SODIUM CHLORIDE 75 ML/HR: 4.5 INJECTION, SOLUTION INTRAVENOUS at 09:16

## 2023-01-01 RX ADMIN — VANCOMYCIN HYDROCHLORIDE 2500 MG: 5 INJECTION, POWDER, LYOPHILIZED, FOR SOLUTION INTRAVENOUS at 18:16

## 2023-01-01 RX ADMIN — MEROPENEM 1000 MG: 1 INJECTION, POWDER, FOR SOLUTION INTRAVENOUS at 23:47

## 2023-01-01 RX ADMIN — LINEZOLID 600 MG: 600 INJECTION, SOLUTION INTRAVENOUS at 23:48

## 2023-01-01 RX ADMIN — SODIUM CHLORIDE 100 ML/HR: 4.5 INJECTION, SOLUTION INTRAVENOUS at 06:02

## 2023-01-01 RX ADMIN — PROPOFOL 15 MCG/KG/MIN: 10 INJECTION, EMULSION INTRAVENOUS at 23:54

## 2023-01-01 RX ADMIN — PHENYLEPHRINE HYDROCHLORIDE 0.5 MCG/KG/MIN: 10 INJECTION INTRAVENOUS at 07:42

## 2023-01-01 RX ADMIN — PROPOFOL 30 MCG/KG/MIN: 10 INJECTION, EMULSION INTRAVENOUS at 12:55

## 2023-01-01 RX ADMIN — PROPOFOL 40 MCG/KG/MIN: 10 INJECTION, EMULSION INTRAVENOUS at 12:49

## 2023-01-01 RX ADMIN — METOPROLOL TARTRATE 2.5 MG: 1 INJECTION, SOLUTION INTRAVENOUS at 14:15

## 2023-01-01 RX ADMIN — LINEZOLID 600 MG: 600 INJECTION, SOLUTION INTRAVENOUS at 11:22

## 2023-01-01 RX ADMIN — SODIUM CHLORIDE 100 ML/HR: 4.5 INJECTION, SOLUTION INTRAVENOUS at 00:11

## 2023-01-01 RX ADMIN — PHENYLEPHRINE HYDROCHLORIDE 1.4 MCG/KG/MIN: 10 INJECTION INTRAVENOUS at 21:22

## 2023-01-01 RX ADMIN — ALBUMIN HUMAN 25 G: 0.25 SOLUTION INTRAVENOUS at 09:34

## 2023-01-01 RX ADMIN — ACETAMINOPHEN 650 MG: 325 TABLET ORAL at 06:44

## 2023-01-01 RX ADMIN — FENTANYL CITRATE 50 MCG: 50 INJECTION, SOLUTION INTRAMUSCULAR; INTRAVENOUS at 23:49

## 2023-01-01 RX ADMIN — PROPOFOL 50 MCG/KG/MIN: 10 INJECTION, EMULSION INTRAVENOUS at 00:39

## 2023-01-01 RX ADMIN — PANTOPRAZOLE SODIUM 40 MG: 40 INJECTION, POWDER, FOR SOLUTION INTRAVENOUS at 06:49

## 2023-01-01 RX ADMIN — VITAMINS A AND D OINTMENT 1 APPLICATION: 15.5; 53.4 OINTMENT TOPICAL at 20:10

## 2023-01-01 RX ADMIN — DOCUSATE SODIUM 50 MG AND SENNOSIDES 8.6 MG 2 TABLET: 8.6; 5 TABLET, FILM COATED ORAL at 20:04

## 2023-01-01 RX ADMIN — PROPOFOL 40 MCG/KG/MIN: 10 INJECTION, EMULSION INTRAVENOUS at 03:07

## 2023-01-01 RX ADMIN — METOCLOPRAMIDE HYDROCHLORIDE 5 MG: 5 INJECTION INTRAMUSCULAR; INTRAVENOUS at 08:41

## 2023-01-01 RX ADMIN — HEPARIN SODIUM 5000 UNITS: 5000 INJECTION INTRAVENOUS; SUBCUTANEOUS at 14:23

## 2023-01-01 RX ADMIN — MUPIROCIN 1 APPLICATION: 20 OINTMENT TOPICAL at 08:00

## 2023-01-01 RX ADMIN — SODIUM CHLORIDE 100 ML/HR: 4.5 INJECTION, SOLUTION INTRAVENOUS at 09:08

## 2023-01-01 RX ADMIN — LINEZOLID 600 MG: 600 INJECTION, SOLUTION INTRAVENOUS at 13:26

## 2023-01-01 RX ADMIN — LINEZOLID 600 MG: 600 INJECTION, SOLUTION INTRAVENOUS at 11:12

## 2023-01-01 RX ADMIN — POTASSIUM CHLORIDE 40 MEQ: 1.5 POWDER, FOR SOLUTION ORAL at 08:40

## 2023-01-01 RX ADMIN — PHENYLEPHRINE HYDROCHLORIDE 2.8 MCG/KG/MIN: 10 INJECTION INTRAVENOUS at 04:20

## 2023-01-01 RX ADMIN — Medication 50 MCG/HR: at 20:48

## 2023-01-01 RX ADMIN — ASPIRIN 81 MG: 81 TABLET, COATED ORAL at 08:20

## 2023-01-01 RX ADMIN — PANTOPRAZOLE SODIUM 40 MG: 40 INJECTION, POWDER, FOR SOLUTION INTRAVENOUS at 11:23

## 2023-01-01 RX ADMIN — POTASSIUM CHLORIDE 20 MEQ: 29.8 INJECTION, SOLUTION INTRAVENOUS at 08:09

## 2023-01-01 RX ADMIN — DIPHENHYDRAMINE HYDROCHLORIDE 25 MG: 50 INJECTION INTRAMUSCULAR; INTRAVENOUS at 04:29

## 2023-01-01 RX ADMIN — HEPARIN SODIUM 5000 UNITS: 5000 INJECTION INTRAVENOUS; SUBCUTANEOUS at 05:22

## 2023-01-01 RX ADMIN — ROSUVASTATIN CALCIUM 20 MG: 20 TABLET, FILM COATED ORAL at 20:41

## 2023-01-01 RX ADMIN — POTASSIUM CHLORIDE 10 MEQ: 7.46 INJECTION, SOLUTION INTRAVENOUS at 18:36

## 2023-01-01 RX ADMIN — MEROPENEM 1000 MG: 1 INJECTION, POWDER, FOR SOLUTION INTRAVENOUS at 03:34

## 2023-01-01 RX ADMIN — PANTOPRAZOLE SODIUM 40 MG: 40 INJECTION, POWDER, FOR SOLUTION INTRAVENOUS at 06:34

## 2023-01-01 RX ADMIN — ASPIRIN 81 MG: 81 TABLET, CHEWABLE ORAL at 08:31

## 2023-01-01 RX ADMIN — LACTULOSE 300 ML: 10 SOLUTION ORAL at 11:18

## 2023-01-01 RX ADMIN — FENTANYL CITRATE 50 MCG: 50 INJECTION, SOLUTION INTRAMUSCULAR; INTRAVENOUS at 08:34

## 2023-01-01 RX ADMIN — PANTOPRAZOLE SODIUM 40 MG: 40 TABLET, DELAYED RELEASE ORAL at 17:35

## 2023-01-01 RX ADMIN — VITAMINS A AND D OINTMENT 1 APPLICATION: 15.5; 53.4 OINTMENT TOPICAL at 21:04

## 2023-01-01 RX ADMIN — ALBUMIN HUMAN 25 G: 0.25 SOLUTION INTRAVENOUS at 09:17

## 2023-01-01 RX ADMIN — HEPARIN SODIUM 17.85 UNITS/KG/HR: 10000 INJECTION, SOLUTION INTRAVENOUS at 12:44

## 2023-01-01 RX ADMIN — MUPIROCIN 1 APPLICATION: 20 OINTMENT TOPICAL at 20:37

## 2023-01-01 RX ADMIN — CARBIDOPA AND LEVODOPA 10 MG: 50; 200 TABLET, EXTENDED RELEASE ORAL at 06:33

## 2023-01-01 RX ADMIN — FUROSEMIDE 80 MG: 10 INJECTION, SOLUTION INTRAMUSCULAR; INTRAVENOUS at 11:12

## 2023-01-01 RX ADMIN — CARBIDOPA AND LEVODOPA 10 MG: 50; 200 TABLET, EXTENDED RELEASE ORAL at 07:40

## 2023-01-01 RX ADMIN — MUPIROCIN 1 APPLICATION: 20 OINTMENT TOPICAL at 20:12

## 2023-01-01 RX ADMIN — PHENYLEPHRINE HYDROCHLORIDE 1.4 MCG/KG/MIN: 10 INJECTION INTRAVENOUS at 15:11

## 2023-01-01 RX ADMIN — PROPOFOL 50 MCG/KG/MIN: 10 INJECTION, EMULSION INTRAVENOUS at 22:51

## 2023-01-01 RX ADMIN — VITAMINS A AND D OINTMENT 1 APPLICATION: 15.5; 53.4 OINTMENT TOPICAL at 20:05

## 2023-01-01 RX ADMIN — DIPHENHYDRAMINE HYDROCHLORIDE 25 MG: 50 INJECTION INTRAMUSCULAR; INTRAVENOUS at 11:04

## 2023-01-01 RX ADMIN — DOCUSATE SODIUM 50 MG AND SENNOSIDES 8.6 MG 2 TABLET: 8.6; 5 TABLET, FILM COATED ORAL at 22:04

## 2023-01-01 RX ADMIN — DOCUSATE SODIUM 50 MG AND SENNOSIDES 8.6 MG 2 TABLET: 8.6; 5 TABLET, FILM COATED ORAL at 20:41

## 2023-01-01 RX ADMIN — PROPOFOL 30 MCG/KG/MIN: 10 INJECTION, EMULSION INTRAVENOUS at 06:18

## 2023-01-01 RX ADMIN — PROPOFOL 30 MCG/KG/MIN: 10 INJECTION, EMULSION INTRAVENOUS at 20:03

## 2023-01-01 RX ADMIN — ALBUMIN HUMAN 25 G: 0.25 SOLUTION INTRAVENOUS at 20:39

## 2023-01-01 RX ADMIN — DEXTROSE AND SODIUM CHLORIDE 100 ML/HR: 5; 200 INJECTION, SOLUTION INTRAVENOUS at 09:49

## 2023-01-01 RX ADMIN — Medication 10 ML: at 20:05

## 2023-01-01 RX ADMIN — Medication 10 ML: at 20:06

## 2023-01-01 RX ADMIN — HEPARIN SODIUM 5000 UNITS: 5000 INJECTION INTRAVENOUS; SUBCUTANEOUS at 05:09

## 2023-01-01 RX ADMIN — PROPOFOL 40 MCG/KG/MIN: 10 INJECTION, EMULSION INTRAVENOUS at 15:12

## 2023-01-01 RX ADMIN — HEPARIN SODIUM 4000 UNITS: 5000 INJECTION INTRAVENOUS; SUBCUTANEOUS at 18:37

## 2023-01-01 RX ADMIN — FLUCONAZOLE 200 MG: 2 INJECTION, SOLUTION INTRAVENOUS at 11:12

## 2023-01-01 RX ADMIN — VITAMINS A AND D OINTMENT 1 APPLICATION: 15.5; 53.4 OINTMENT TOPICAL at 08:43

## 2023-01-01 RX ADMIN — VITAMINS A AND D OINTMENT 1 APPLICATION: 15.5; 53.4 OINTMENT TOPICAL at 09:16

## 2023-01-01 RX ADMIN — METOPROLOL SUCCINATE 25 MG: 25 TABLET, EXTENDED RELEASE ORAL at 08:20

## 2023-01-01 RX ADMIN — PROPOFOL 20 MCG/KG/MIN: 10 INJECTION, EMULSION INTRAVENOUS at 05:21

## 2023-01-01 RX ADMIN — FENTANYL CITRATE 100 MCG: 50 INJECTION, SOLUTION INTRAMUSCULAR; INTRAVENOUS at 13:20

## 2023-01-01 RX ADMIN — LINEZOLID 600 MG: 600 INJECTION, SOLUTION INTRAVENOUS at 00:43

## 2023-01-01 RX ADMIN — IRON SUCROSE 300 MG: 20 INJECTION, SOLUTION INTRAVENOUS at 15:27

## 2023-01-01 RX ADMIN — MEROPENEM 1000 MG: 1 INJECTION, POWDER, FOR SOLUTION INTRAVENOUS at 20:10

## 2023-01-01 RX ADMIN — LINEZOLID 600 MG: 600 INJECTION, SOLUTION INTRAVENOUS at 23:07

## 2023-01-01 RX ADMIN — Medication 150 MG: at 09:15

## 2023-01-01 RX ADMIN — FUROSEMIDE 80 MG: 10 INJECTION, SOLUTION INTRAMUSCULAR; INTRAVENOUS at 14:22

## 2023-01-01 RX ADMIN — DOCUSATE SODIUM 50 MG AND SENNOSIDES 8.6 MG 2 TABLET: 8.6; 5 TABLET, FILM COATED ORAL at 08:20

## 2023-01-01 RX ADMIN — VITAMINS A AND D OINTMENT 1 APPLICATION: 15.5; 53.4 OINTMENT TOPICAL at 15:57

## 2023-01-01 RX ADMIN — PROPOFOL 40 MCG/KG/MIN: 10 INJECTION, EMULSION INTRAVENOUS at 10:21

## 2023-01-01 RX ADMIN — POTASSIUM CHLORIDE 10 MEQ: 7.46 INJECTION, SOLUTION INTRAVENOUS at 05:31

## 2023-01-01 RX ADMIN — HEPARIN SODIUM 4000 UNITS: 5000 INJECTION, SOLUTION INTRAVENOUS; SUBCUTANEOUS at 07:43

## 2023-01-01 RX ADMIN — PHENYLEPHRINE HYDROCHLORIDE 1.4 MCG/KG/MIN: 10 INJECTION INTRAVENOUS at 11:45

## 2023-01-01 RX ADMIN — DIPHENHYDRAMINE HYDROCHLORIDE 25 MG: 50 INJECTION INTRAMUSCULAR; INTRAVENOUS at 11:30

## 2023-01-01 RX ADMIN — MEROPENEM 1000 MG: 1 INJECTION, POWDER, FOR SOLUTION INTRAVENOUS at 04:25

## 2023-01-01 RX ADMIN — ESCITALOPRAM OXALATE 10 MG: 10 TABLET, FILM COATED ORAL at 08:20

## 2023-01-01 RX ADMIN — LINEZOLID 600 MG: 600 INJECTION, SOLUTION INTRAVENOUS at 00:20

## 2023-01-01 RX ADMIN — PHENYLEPHRINE HYDROCHLORIDE 1.4 MCG/KG/MIN: 10 INJECTION INTRAVENOUS at 07:35

## 2023-01-01 RX ADMIN — METOPROLOL TARTRATE 5 MG: 1 INJECTION, SOLUTION INTRAVENOUS at 12:48

## 2023-01-01 RX ADMIN — HEPARIN SODIUM 17.85 UNITS/KG/HR: 10000 INJECTION, SOLUTION INTRAVENOUS at 06:47

## 2023-01-01 RX ADMIN — PHENYLEPHRINE HYDROCHLORIDE 1.4 MCG/KG/MIN: 10 INJECTION INTRAVENOUS at 05:39

## 2023-01-01 RX ADMIN — POTASSIUM CHLORIDE 10 MEQ: 7.46 INJECTION, SOLUTION INTRAVENOUS at 12:15

## 2023-01-01 RX ADMIN — ROSUVASTATIN CALCIUM 20 MG: 20 TABLET, FILM COATED ORAL at 20:10

## 2023-01-01 RX ADMIN — DOCUSATE SODIUM 50 MG AND SENNOSIDES 8.6 MG 2 TABLET: 8.6; 5 TABLET, FILM COATED ORAL at 09:15

## 2023-01-01 RX ADMIN — ESCITALOPRAM OXALATE 10 MG: 10 TABLET, FILM COATED ORAL at 09:17

## 2023-01-01 RX ADMIN — ALBUMIN HUMAN 25 G: 0.25 SOLUTION INTRAVENOUS at 09:11

## 2023-01-01 RX ADMIN — METOPROLOL SUCCINATE 25 MG: 25 TABLET, EXTENDED RELEASE ORAL at 09:17

## 2023-01-01 RX ADMIN — MEROPENEM 1000 MG: 1 INJECTION, POWDER, FOR SOLUTION INTRAVENOUS at 11:22

## 2023-01-01 RX ADMIN — LACTULOSE 30 G: 20 SOLUTION ORAL at 20:13

## 2023-01-01 RX ADMIN — ERTAPENEM SODIUM 1000 MG: 1 INJECTION, POWDER, LYOPHILIZED, FOR SOLUTION INTRAMUSCULAR; INTRAVENOUS at 11:17

## 2023-01-01 RX ADMIN — ERTAPENEM SODIUM 1000 MG: 1 INJECTION, POWDER, LYOPHILIZED, FOR SOLUTION INTRAMUSCULAR; INTRAVENOUS at 12:08

## 2023-01-01 RX ADMIN — PROPOFOL 20 MCG/KG/MIN: 10 INJECTION, EMULSION INTRAVENOUS at 15:10

## 2023-01-01 RX ADMIN — POTASSIUM CHLORIDE 10 MEQ: 7.46 INJECTION, SOLUTION INTRAVENOUS at 12:57

## 2023-01-01 RX ADMIN — PROPOFOL 40 MCG/KG/MIN: 10 INJECTION, EMULSION INTRAVENOUS at 23:09

## 2023-01-01 RX ADMIN — Medication 100 MCG/HR: at 22:12

## 2023-01-01 RX ADMIN — SODIUM CHLORIDE 2000 MG: 9 INJECTION, SOLUTION INTRAVENOUS at 08:21

## 2023-01-01 RX ADMIN — PANTOPRAZOLE SODIUM 40 MG: 40 INJECTION, POWDER, FOR SOLUTION INTRAVENOUS at 17:37

## 2023-01-01 RX ADMIN — DOCUSATE SODIUM 50 MG AND SENNOSIDES 8.6 MG 2 TABLET: 8.6; 5 TABLET, FILM COATED ORAL at 08:44

## 2023-01-01 RX ADMIN — ACETAMINOPHEN 650 MG: 325 TABLET ORAL at 01:18

## 2023-01-01 RX ADMIN — CHLORHEXIDINE GLUCONATE 15 ML: 1.2 RINSE ORAL at 20:05

## 2023-01-01 RX ADMIN — PANTOPRAZOLE SODIUM 40 MG: 40 INJECTION, POWDER, FOR SOLUTION INTRAVENOUS at 06:32

## 2023-01-01 RX ADMIN — SODIUM CHLORIDE 40 ML/HR: 4.5 INJECTION, SOLUTION INTRAVENOUS at 05:43

## 2023-01-01 RX ADMIN — POTASSIUM CHLORIDE 10 MEQ: 7.46 INJECTION, SOLUTION INTRAVENOUS at 06:09

## 2023-01-01 RX ADMIN — ROSUVASTATIN CALCIUM 20 MG: 20 TABLET, FILM COATED ORAL at 22:28

## 2023-01-01 RX ADMIN — HEPARIN SODIUM 18.85 UNITS/KG/HR: 10000 INJECTION, SOLUTION INTRAVENOUS at 21:01

## 2023-01-01 RX ADMIN — POTASSIUM CHLORIDE 10 MEQ: 7.46 INJECTION, SOLUTION INTRAVENOUS at 05:08

## 2023-01-01 RX ADMIN — CHLORHEXIDINE GLUCONATE 15 ML: 1.2 RINSE ORAL at 08:37

## 2023-07-13 PROBLEM — D64.9 SYMPTOMATIC ANEMIA: Status: ACTIVE | Noted: 2023-01-01

## 2023-07-13 NOTE — H&P
"[facility]   HISTORY AND PHYSICAL    Patient Name: Judy Lutz  : 1960  MRN: [id number]  Primary Care Physician:  Angela, No Known  Date of admission: 2023    Subjective   Subjective     Chief Complaint: Fatigue/Weakness and SOA    History of Present Illness the patient is a 63-year-old female with no known past medical history who presents to the emergency department complaining of progressive weakness, fatigue and shortness of air.    Patient was seen and examined in .  The patient's daughter is present at bedside and provides majority of the history of presenting illness.    She states that for the past 3 months or so, she has noticed that her mom has had progressive dyspnea, especially with minimal exertion.  Patient's daughter states that she lives approximately 2 hours away but the patient's son lives with her.  The patient's daughter states that her father passed away in February of this year and since that time her mother has not focused on her health.    Patient denies any chest pains.  She does not report any cough.  No recent fever.  No recent vomiting or diarrhea.  No known hematochezia or melena.  The patient has not seen a primary care provider in approximately 30 years.  She has never had endoscopies.    Patient's daughter states that she is also noted that her mother has had some intermittent cognitive changes recently with some confusion and at times speaking \"like a baby.\"  She states that this is not typical of her mother whatsoever.  She states that due to the patient's weight she has been dependent in terms of her mobility for several years and has used crutches for over 10 years for ambulation.    According to the ED provider, patient with the above mentioned symptoms for the past several weeks that acutely worsened over the past 1-2 days. Patient reported worsening dyspnea with exertion.    Review of Systems   Unable to perform ROS: Acuity of condition "   Respiratory:  Positive for shortness of breath.    Cardiovascular:  Negative for chest pain.      Personal History     History reviewed. No pertinent past medical history.    History reviewed. No pertinent surgical history.    Family History: Patient's mother with unknown type of heart disease; patient's uncles also with heart disease.    Social History:  reports that she has never smoked. She has never used smokeless tobacco. She reports that she does not drink alcohol and does not use drugs.    Home Medications:  acetaminophen and ibuprofen    Allergies:  Allergies   Allergen Reactions    Codeine GI Intolerance       Objective    Objective     Vitals:   Temp:  [98.4 °F (36.9 °C)-98.9 °F (37.2 °C)] 98.4 °F (36.9 °C)  Heart Rate:  [] 101  Resp:  [12-35] 12  BP: (100-158)/() 140/72  Flow (L/min):  [2-3] 3    Physical Exam  Constitutional:       General: She is not in acute distress.     Appearance: She is well-developed. She is ill-appearing.      Interventions: Nasal cannula in place.   HENT:      Head: Normocephalic and atraumatic.   Eyes:      Conjunctiva/sclera: Conjunctivae normal.   Cardiovascular:      Rate and Rhythm: Normal rate and regular rhythm.      Pulses:           Dorsalis pedis pulses are 2+ on the right side and 2+ on the left side.      Heart sounds: No murmur heard.    No friction rub. No gallop.   Pulmonary:      Effort: Accessory muscle usage (mild) present. No respiratory distress.      Breath sounds: Decreased breath sounds present. No wheezing or rales.   Abdominal:      General: Abdomen is protuberant. Bowel sounds are normal. There is no distension.      Palpations: Abdomen is soft.      Tenderness: There is no abdominal tenderness. There is no guarding.   Genitourinary:     Comments: External catheter in place  Musculoskeletal:      Right lower leg: Edema present.      Left lower leg: Edema present.   Skin:     General: Skin is warm and dry.      Coloration: Skin is pale.       Findings: No erythema or rash.   Neurological:      Mental Status: She is alert.      Cranial Nerves: No cranial nerve deficit.      Comments: Oriented to self and place ('hospital'); equal hand  bilaterally       Result Review    Result Review:  I have personally reviewed the results from the time of this admission to 7/13/2023 04:14 EDT and agree with these findings:  [x]  Laboratory list / accordion  []  Microbiology  [x]  Radiology  [x]  EKG/Telemetry   []  Cardiology/Vascular   []  Pathology  []  Old records  []  Other:  Most notable findings include: RA ABG with paO2 45.8, O2 saturation 79.5%.     EKG per my view: sinus tachycardia with occasional PVCs    Results from last 7 days   Lab Units 07/13/23  0343 07/12/23 2153   SODIUM mmol/L 151* 150*   POTASSIUM mmol/L 4.7 4.8   CHLORIDE mmol/L 112* 112*   CO2 mmol/L 29.2* 30.8*   BUN mg/dL 32* 33*   CREATININE mg/dL 0.96 1.04*   CALCIUM mg/dL 10.2 10.4   BILIRUBIN mg/dL 0.5 0.4   ALK PHOS U/L 95 97   ALT (SGPT) U/L 33 35*   AST (SGOT) U/L 30 36*   GLUCOSE mg/dL 138* 135*     Results from last 7 days   Lab Units 07/12/23 2153   WBC 10*3/mm3 20.79*   HEMOGLOBIN g/dL 6.4*   HEMATOCRIT % 27.0*   PLATELETS 10*3/mm3 418     UA          7/12/2023    21:40   Urinalysis   Squamous Epithelial Cells, UA 3-6    Specific Gravity, UA 1.019    Ketones, UA Negative    Blood, UA Moderate (2+)    Leukocytes, UA Large (3+)    Nitrite, UA Positive    RBC, UA 6-12    WBC, UA Too Numerous to Count    Bacteria, UA 4+        CT head/chest/abdomen/pelvis:  Findings:  1.  No acute intracranial hemorrhage, mass effect or cerebral cortical  edema.  2.  Normal ventricular volume for the patient's age.  3.  Mild cerebral cortical atrophy.   4.  No skull fractures.  5.  The mastoid air cells are clear.  6.  The visualized paranasal sinuses are clear.     IMPRESSION:  Impression:     No acute intracranial abnormality.    Findings:  1.  No focal infiltrate, pleural effusion or  pneumothorax.  2.  A 7 mm posterior subpleural noncalcified groundglass density in the  right upper lobe, likely related to postinflammatory changes.  3.  Mild pulmonary vascular congestion.  4.  Reece cardiomegaly with small to moderate superior/posterior  pericardial effusion, 9-18 mm in thickness.  5.  No aortic aneurysm.  6.  No mediastinal lymphadenopathy.  7.  Motion artifacts degrading image quality.     IMPRESSION:  Impression:     1.  Marked cardiomegaly and small-to-moderate pericardial effusion with  mild pulmonary vascular congestion.  2.  No focal infiltrate or pleural effusion.     Findings:  1.  Moderate ascites in the abdomen and pelvis.  2.  No bowel obstruction, free air, or abscess.  3.  No evidence of acute colitis or diverticulitis.  4.  The appendix is not seen.  5.  No CT evidence of acute pancreatitis.  6.  Contracted gallbladder with a questionable stone in the GB fundus.  7.  Small air in the nondependent portion of the urinary bladder, likely  related to recent catheterization versus cystitis.  Correlate  clinically.  8.  Bilateral renal calculi without hydronephrosis.  9.  Moderate retained fecal volume in the rectum.  10.  Diffuse subcutaneous edema throughout the abdominal/pelvic wall.  11.  Mild thoracolumbar scoliosis with multilevel lumbar discogenic and  facet degenerative change.     IMPRESSION:  Impression:  1.  No bowel obstruction, free air, or abscess.  2.  Moderate ascites in the abdomen and pelvis.  3.  No evidence of acute colitis or diverticulitis.  4.  Biparietal renal calculi without hydronephrosis.  5.  Probable cholelithiasis.  6.  Small air in the nondependent portion of the urinary bladder, likely  related to recent catheterization versus cystitis.  Correlate  clinically.    Assessment / Plan     Brief Patient Summary:  Judy Lutz is a 63 y.o. female who has no known medical problems presents with progressive weakness and dyspnea. Workup thus far reveals anemia  and significant cardiomegaly.    #Symptomatic anemia with microcytosis, present on admission and s/p 2 units PRBCs  #Severe cardiomegaly with small to moderate superior/posterior pericardial effusion  #Mild pulmonary vascular congestion  #Moderate ascites  #Acute hypoxemic respiratory failure  #Morbid obesity per BMI 57.39 kg/m²  #Incidentally noted 7 mm right upper lobe subpleural noncalcified groundglass density, thought to be postinflammatory change  #Sepsis due to UTI, present on admission  #Acute encephalopathy, suspect multifactorial and related to hypoxia and/or infectious/UTI  -Patient will be admitted to the progressive care unit  -Source of patient's anemia is unclear; patient is noted to take ibuprofen at home; could also be nutritional deficiencies as patient appears to be grieving over the death of her  who passed away earlier this year  -Once patient is stable from a respiratory standpoint, would consider an inpatient general surgery consult for timing of endoscopies  -In the meantime, patient has been started on Protonix, 40 mg p.o. twice daily  -Add iron studies to blood in the lab; obtain ferritin and testing for Vitamin B12 and folate  -Obtain an echocardiogram  -Patient did receive 80 mg IV Lasix in the ED after her first unit of PRBCs; will plan to diurese and monitor intake and output  -Cardiology has been consulted  -I do not suspect tamponade physiology at this time  -Will likely require ischemic workup once stable from a respiratory standpoint for further evaluation of her impressive cardiomyopathy  -Continue with supplemental oxygen and titrate for saturations 90 to 95%  -Patient has been started on empiric antibiotic therapy with Rocephin  -Repeat CBC at approximately 10 AM to follow-up on leukocytosis and anemia  -I have added a urine culture to UA in the lab; daughter is reporting encephalopathy that is possibly due to hypoxia versus infectious versus other  -We will recommend a  repeat CT scan of the chest in the outpatient setting to follow on the incidentally noted RUL GGO      DVT prophylaxis:  SCDs    CODE STATUS:    Code Status (Patient has no pulse and is not breathing): CPR (Attempt to Resuscitate)  Medical Interventions (Patient has pulse or is breathing): Full Support    Admission Status:  I believe this patient meets inpatient status.    Patient is considered to be high risk patient due to: Symptomatic anemia of unclear etiology, severe cardiomegaly with pericardial effusion, respiratory failure, super morbid obesity    Case d/w patient's daughter at bedside    Sapphireanalisa Contreras DO

## 2023-07-13 NOTE — PLAN OF CARE
Goal Outcome Evaluation:           Problem: Skin Injury Risk Increased  Goal: Skin Health and Integrity  Outcome: Ongoing, Progressing     Problem: Fall Injury Risk  Goal: Absence of Fall and Fall-Related Injury  Outcome: Ongoing, Progressing      Pt received from ED during shift, pt currently resting in bed on 3 L NC, 2 L NS bolus infusing 1 Unit of PRB given. No other significant changes during shift, care ongoing.

## 2023-07-13 NOTE — PLAN OF CARE
Goal Outcome Evaluation:  Plan of Care Reviewed With: patient        Progress: no change  Outcome Evaluation: Pt w/ decreased bed mobility, transfers, and gait limited by edema, pain, and weakness of (B)LE.  Fair/guarded prognosis for rehabilitation. D/C disposition depends on progress while in house.      Anticipated Discharge Disposition (PT):  (TBD)

## 2023-07-13 NOTE — CASE MANAGEMENT/SOCIAL WORK
Discharge Planning Assessment  [facility]     Patient Name: Judy Lutz  MRN: [id number]  Today's Date: 7/13/2023    Admit Date: 7/12/2023    Plan: SS received a consult for discahrge planning. SS was unable to speak with pt on this date due to pt resting. Pt's daughter, Nancy was at bedside. Pt lives with sonMike at [address] [place] [address] in [facility]. Pt does not have PCP. Pt does not utilize home health services and does not qualify due to not having PCP. Pt has rolling walker, crutches, shower chair, and electric wheelchair via unknown provider. Pt does not have a POA/Living will. Pt's daughter states the goal is for pt to return home at discharge pending how pt works with therapy. SS to follow and assist with discharge planning.   Discharge Needs Assessment       Row Name 07/13/23 1519       Living Environment    People in Home child(brenda), adult    Name(s) of People in Home Mike Lee    Current Living Arrangements home    Potentially Unsafe Housing Conditions none    Primary Care Provided by self    Provides Primary Care For no one    Family Caregiver if Needed child(bernda), adult    Family Caregiver Names Mike Lee and DaughterNancy    Quality of Family Relationships helpful;involved;supportive    Able to Return to Prior Arrangements yes       Resource/Environmental Concerns    Resource/Environmental Concerns none    Transportation Concerns none       Transition Planning    Patient/Family Anticipates Transition to home with family    Patient/Family Anticipated Services at Transition none       Discharge Needs Assessment    Equipment Currently Used at Home crutches;shower chair;wheelchair, motorized;walker, rolling  via unknown provider    Anticipated Changes Related to Illness none    Equipment Needed After Discharge none                   Discharge Plan       Row Name 07/13/23 1521       Plan    Plan SS received a consult for discahrge planning. SS was unable to  speak with pt on this date due to pt resting. Pt's daughter, Nancy was at bedside. Pt lives with son, Mike at [address] [place] [address] in [facility]. Pt does not have PCP. Pt does not utilize home health services and does not qualify due to not having PCP. Pt has rolling walker, crutches, shower chair, and electric wheelchair via unknown provider. Pt does not have a POA/Living will. Pt's daughter states the goal is for pt to return home at discharge pending how pt works with therapy. SS to follow and assist with discharge planning.    Patient/Family in Agreement with Plan yes               Continued Care and Services - Admitted Since 7/12/2023    Coordination has not been started for this encounter.       Expected Discharge Date and Time       Expected Discharge Date Expected Discharge Time    Jul 15, 2023            Demographic Summary       Row Name 07/13/23 1458       General Information    Admission Type inpatient    Referral Source nursing    Reason for Consult discharge planning  SS received a consult for discahrge planning.                  SCOTT Mar

## 2023-07-13 NOTE — CONSULTS
[facility]  CONSULT  NOTE      Patient information:  Date of Admit: 7/12/2023  Date of Consult: 07/13/23  Hospitalist/Referring MD:Sapphire Contreras DO  PCP: Provider, No Known  MRN:  [id number]  Visit Number:  [id number]    LOS: 0  CODE STATUS:  Code Status and Medical Interventions:   Ordered at: 07/13/23 0152     Code Status (Patient has no pulse and is not breathing):    CPR (Attempt to Resuscitate)     Medical Interventions (Patient has pulse or is breathing):    Full Support       PROBLEM LIST: Principal Problem:    Symptomatic anemia      Inpatient Cardiology Consult  Consult performed by: Mona Garrison APRN  Consult ordered by: Sapphire Contreras DO      08:29 EDT  7/13/2023    General Cardiology Consulting Physician: Dr. Israel Jett MD, [facility]    Assessment      Acute decompensated heart failure (possibly HFpEF).  Moderate size pericardial effusion adjacent to the left ventricle with no echocardiographic evidence of cardiac tamponade.  Mildly elevated high-sensitivity troponin with a flat trend which is probably a type II MI from acute heart failure.  Severe anemia with possible acute GI bleed.        Recommendations     Continue with IV diuretics.  Agree with GI evaluation of her anemia  May consider ischemic evaluation later on when her anemia evaluation is completed and has no evidence of any bleeding.    Reason for Cardiology consultation: Severe CM with tayla-cardial effusion    Subjective Data   ADMISSION INFORMATION:  Chief Complaint   Patient presents with    Shortness of Breath     History of Present Illness    Judy Lutz is a 63 y.o. female with a no considerable past medical history noted in patient's chart.  Patient presented to [facility] ([facility] [place]) emergency room (ER) on 7/12/2023 with complaints of increased fatigue, malaise, shortness of breath, and generalized weakness for the past several weeks.  Patient reported her  "symptoms and gotten even worse in the last 1 to 2 days.  She denied fever, chills, cough, congestion, chest pain, or abdominal pain.  Patient reports that her symptoms are aggravated with ambulation and is relieved with rest.  EKG revealed sinus tach with no acute ST elevation noted.  CT abdomen pelvis without contrast revealed moderate ascites in the abdomen and pelvis, probable cholelithiasis, small air in the nondependent portion of the urinary bladder likely related to recent catheterization versus cystitis.  CT head without contrast revealed no acute intracranial abnormalities.  CT of the chest without contrast revealed marked cardiomegaly and small to moderate pericardial effusion with mild pulmonary vascular congestion.  Hemoglobin of 6.4 with a platelet count of 418.  Creatinine 1.04, potassium 4.8, ALT 35, AST 36, initial high sensitive troponin is 26-> 24.  Patient was admitted and Cardiology has been consulted for further evaluation and management.     No primary Cardiologist noted in her chart.    Patient is in room  and was examined by Dr. Jett.  2D echo has been ordered and results are pending.  Patient is lying in bed resting quietly.  She does seem to be short of breath and unable to carry on much of a conversation with us today.  She can give one-word answers.  She denies seeing any active blood in her stool or urine.  She denies any nausea or vomiting.  She does report daily use of some NSAIDs alternating with Tylenol, ibuprofen, and Aleve.  She is not on any prescribed medications. Patient does report intermittent chest pain and abdominal pain over the last 3 to 4 weeks.  She has noticed increased shortness of breath and swelling in the past couple days.  Patient lives with her son however, her daughter and patient's brother are at bedside.  Patient's brother endorses that they do have family history of congestive heart failure 3 with her father and grandfather.  Her daughter states, \"my " "father who was patient's spouse, passed away February 2023.\"    Known medications given enroute vis EMS and in the ER:         Cardiac risk factors:Negative      Last Echo: Results for orders placed during the hospital encounter of 07/12/23    Adult Transthoracic Echo Complete W/ Cont if Necessary Per Protocol    Interpretation Summary  Images from the original result were not included.      Normal left ventricular cavity size noted. Left ventricular wall thickness is consistent with mild concentric hypertrophy. All left ventricular wall segments contract normally.    Left ventricular ejection fraction appears to be 51 - 55%.    Left ventricular diastolic function is consistent with (grade I) impaired relaxation.    There is a moderate (1-2cm) pericardial effusion adjacent to the left ventricle. There is no evidence of cardiac tamponade.    The aortic valve is structurally normal with no regurgitation or stenosis present.    The mitral valve is structurally normal with no significant stenosis present. Trace mitral valve regurgitation is present.    Mild tricuspid valve regurgitation is present. Estimated right ventricular systolic pressure from tricuspid regurgitation is moderately elevated (45-55 mmHg).    Moderate pulmonary hypertension is present.    There is a moderate (1-2cm) pericardial effusion adjacent to the left ventricle. The effusion is fluid filled. There is no evidence of cardiac tamponade.   None found in patient's chart      Last Stress: None found in patient's chart      Last Cath:  None found in patient's chart                         History reviewed. No pertinent past medical history.  History reviewed. No pertinent surgical history.  History reviewed. No pertinent family history.  Social History     Tobacco Use    Smoking status: Never    Smokeless tobacco: Never   Vaping Use    Vaping Use: Never used   Substance Use Topics    Alcohol use: Never    Drug use: Never       Medications listed below " are reported home medications pulling from within the system:  Medications Prior to Admission   Medication Sig Dispense Refill Last Dose    acetaminophen (TYLENOL) 325 MG tablet Take 2 tablets by mouth Every 6 (Six) Hours As Needed for Mild Pain or Headache.   Past Week    ibuprofen (ADVIL,MOTRIN) 200 MG tablet Take 1 tablet by mouth Every 6 (Six) Hours As Needed for Mild Pain or Headache.   Past Week     Allergies:  Codeine    Review of Systems   Constitutional:  Positive for fatigue. Negative for activity change, appetite change and diaphoresis.   HENT:  Negative for facial swelling and trouble swallowing.    Eyes:  Negative for visual disturbance.   Respiratory:  Positive for shortness of breath.    Cardiovascular:  Positive for chest pain and leg swelling. Negative for palpitations.   Gastrointestinal:  Positive for abdominal pain. Negative for blood in stool, diarrhea, nausea and vomiting.   Endocrine: Negative.  Negative for polyphagia and polyuria.   Genitourinary:  Negative for difficulty urinating, hematuria and menstrual problem.   Musculoskeletal:  Negative for gait problem.   Skin:  Positive for color change and pallor.   Neurological:  Positive for weakness. Negative for dizziness, syncope, speech difficulty, light-headedness and headaches.   Psychiatric/Behavioral:  Negative for agitation and behavioral problems.      Objective Data      Vital Signs  Temp:  [98.4 °F (36.9 °C)-99.2 °F (37.3 °C)] 99.2 °F (37.3 °C)  Heart Rate:  [] 97  Resp:  [12-35] 24  BP: (100-158)/() 126/68  Flow (L/min):  [2-3] 3  Vital Signs (last 72 hrs)         07/10 0700  07/11 0659 07/11 0700 07/12 0659 07/12 0700  07/13 0659 07/13 0700 07/13 0829   Most Recent      Temp (°F)     98.4 -  98.9       98.4 (36.9) 07/13 0245    Heart Rate     87 -  108    96 -  97     97 07/13 0800    Resp     12 -  35    22 -  24     24 07/13 0800    BP     100/60 -  158/80    148/79 -  149/73     148/79 07/13 0800    SpO2 (%)     80  -  98    90 -  94     90 07/13 0800          Body mass index is 57.39 kg/m².    Intake/Output Summary (Last 24 hours) at 7/13/2023 1758  Last data filed at 7/13/2023 1600  Gross per 24 hour   Intake 540 ml   Output 3000 ml   Net -2460 ml       I have seen and examined Ms. Su today  Physical Exam  Constitutional:       Appearance: Normal appearance.      Comments: BMI 57.39   HENT:      Head: Normocephalic and atraumatic.      Nose: Nose normal.      Mouth/Throat:      Mouth: Mucous membranes are moist.      Pharynx: Oropharynx is clear.   Eyes:      Conjunctiva/sclera: Conjunctivae normal.   Cardiovascular:      Rate and Rhythm: Normal rate and regular rhythm.      Pulses: Normal pulses.      Heart sounds: Normal heart sounds.   Pulmonary:      Effort: Pulmonary effort is normal. Respiratory distress present.      Breath sounds: Rales present.      Comments: Bilateral rales   Abdominal:      General: Bowel sounds are normal.      Palpations: Abdomen is soft.   Musculoskeletal:         General: Normal range of motion.      Cervical back: Normal range of motion.      Right lower leg: Edema present.      Left lower leg: Edema present.      Comments: + 3 pitting edema BLE    Skin:     General: Skin is warm and dry.      Coloration: Skin is pale.   Neurological:      General: No focal deficit present.      Mental Status: She is alert and oriented to person, place, and time.   Psychiatric:         Mood and Affect: Mood normal.         Behavior: Behavior normal.       Results review   Results Review:    I have reviewed the patient's new clinical results.  Results from last 7 days   Lab Units 07/13/23  0014 07/12/23  2153   HSTROP T ng/L 24* 26*     Results from last 7 days   Lab Units 07/13/23  1156 07/13/23  0516 07/13/23  0343 07/12/23  2153   WBC 10*3/mm3 21.69*  --  22.40* 20.79*   HEMOGLOBIN g/dL 8.5* 7.4* 7.4* 6.4*   PLATELETS 10*3/mm3 503*  --  436 418     Results from last 7 days   Lab Units 07/13/23  1156  23  0343 23  2153   SODIUM mmol/L 149* 151* 150*   POTASSIUM mmol/L 4.0 4.7 4.8   CHLORIDE mmol/L 109* 112* 112*   CO2 mmol/L 30.1* 29.2* 30.8*   BUN mg/dL 31* 32* 33*   CREATININE mg/dL 0.95 0.96 1.04*   CALCIUM mg/dL 10.5 10.2 10.4   GLUCOSE mg/dL 126* 138* 135*   ALT (SGPT) U/L  --  33 35*   AST (SGOT) U/L  --  30 36*     Lab Results   Component Value Date    INR 1.38 (H) 2023         No results found for: MG  No results found for: TSH, PSA   No results found for: CHOL, TRIG, HDL, LDL  Lab Results   Component Value Date    PROBNP 26,037.0 (H) 2023     No results found.  No results found for: URICACID  Pain Management Panel           No data to display              Microbiology Results (last 10 days)       Procedure Component Value - Date/Time    COVID-19 and FLU A/B PCR - Swab, Nasopharynx [[id number]]  (Normal) Collected: 23 0109    Lab Status: Final result Specimen: Swab from Nasopharynx Updated: 23     COVID19 Not Detected     Influenza A PCR Not Detected     Influenza B PCR Not Detected    Narrative:      Fact sheet for providers: https://www.fda.gov/media/442452/download    Fact sheet for patients: https://www.fda.gov/media/801525/download    Test performed by PCR.           No results found for: BLOODCX        EC2023      ECG/EMG Results (last 24 hours)       Procedure Component Value Units Date/Time    ECG 12 Lead Other; Sepsis [[id number]] Collected: 23 0012     Updated: 23     QT Interval 314 ms      QTC Interval 412 ms     Narrative:      Test Reason : Other~  Blood Pressure :   */*   mmHG  Vent. Rate : 104 BPM     Atrial Rate : 104 BPM     P-R Int : 150 ms          QRS Dur : 102 ms      QT Int : 314 ms       P-R-T Axes :  56 -37  87 degrees     QTc Int : 412 ms    Sinus tachycardia with occasional premature ventricular complexes  Left axis deviation  Low voltage QRS  Possible Anterolateral infarct , age undetermined  Abnormal ECG  No  previous ECGs available    Referred By: FORD           Confirmed By:     SCANNED - TELEMETRY   [[id number]] Resulted: 07/12/23     Updated: 07/13/23 0600    SCANNED - TELEMETRY   [[id number]] Resulted: 07/12/23     Updated: 07/13/23 0823            TELEMETRY:   SR 80's         RADIOLOGY STUDIES:  Imaging Results (Last 72 Hours)       Procedure Component Value Units Date/Time    CT Abdomen Pelvis Without Contrast [[id number]] Collected: 07/13/23 0026     Updated: 07/13/23 0029    Narrative:      CLINICAL INDICATION: ams abd pain. Dyspnea, chronic, unclear etiology  PROCEDURE DATE: 7/12/2023     CT ABD/PELVIS W/O IV C  Technique: Standard axial collimation through the abdomen and pelvis  without oral or intravenous contrast.  Reconstructed images were  submitted for interpretation. Limited exposure control, adjustment of  the mA and/or KV according to patient size or use of iterative  reconstruction technique was utilized.     Findings:     1.  Moderate ascites in the abdomen and pelvis.  2.  No bowel obstruction, free air, or abscess.  3.  No evidence of acute colitis or diverticulitis.  4.  The appendix is not seen.  5.  No CT evidence of acute pancreatitis.  6.  Contracted gallbladder with a questionable stone in the GB fundus.  7.  Small air in the nondependent portion of the urinary bladder, likely  related to recent catheterization versus cystitis.  Correlate  clinically.  8.  Bilateral renal calculi without hydronephrosis.  9.  Moderate retained fecal volume in the rectum.  10.  Diffuse subcutaneous edema throughout the abdominal/pelvic wall.  11.  Mild thoracolumbar scoliosis with multilevel lumbar discogenic and  facet degenerative change.       Impression:      Impression:     1.  No bowel obstruction, free air, or abscess.  2.  Moderate ascites in the abdomen and pelvis.  3.  No evidence of acute colitis or diverticulitis.  4.  Biparietal renal calculi without hydronephrosis.  5.  Probable cholelithiasis.  6.   Small air in the nondependent portion of the urinary bladder, likely  related to recent catheterization versus cystitis.  Correlate  clinically.        This report was finalized on 7/13/2023 12:26 AM by Ricky Perkins MD.       CT Chest Without Contrast Diagnostic [[id number]] Collected: 07/13/23 0025     Updated: 07/13/23 0028    Narrative:      CT CHEST W/O  Technique: Standard axial collimation through the chest without  intravenous contrast.  Reconstructed images were submitted for  interpretation. Limited exposure control, adjustment of the mA and/or KV  according to patient size or use of iterative reconstruction technique  was utilized.     Findings:     1.  No focal infiltrate, pleural effusion or pneumothorax.  2.  A 7 mm posterior subpleural noncalcified groundglass density in the  right upper lobe, likely related to postinflammatory changes.  3.  Mild pulmonary vascular congestion.  4.  Reece cardiomegaly with small to moderate superior/posterior  pericardial effusion, 9-18 mm in thickness.  5.  No aortic aneurysm.  6.  No mediastinal lymphadenopathy.  7.  Motion artifacts degrading image quality.       Impression:      Impression:     1.  Marked cardiomegaly and small-to-moderate pericardial effusion with  mild pulmonary vascular congestion.  2.  No focal infiltrate or pleural effusion.      This report was finalized on 7/13/2023 12:26 AM by Ricky Perkins MD.       CT Head Without Contrast [[id number]] Collected: 07/13/23 0025     Updated: 07/13/23 0027    Narrative:      CT HEAD     Technique: Standard axial collimation through the head without contrast.   Reconstructed images were submitted for interpretation. Limited  exposure control, adjustment of the mA and/or KV according to patient  size or use of iterative reconstruction technique was utilized.     Findings:     1.  No acute intracranial hemorrhage, mass effect or cerebral cortical  edema.  2.  Normal ventricular volume for the patient's age.  3.  Mild  cerebral cortical atrophy.   4.  No skull fractures.  5.  The mastoid air cells are clear.  6.  The visualized paranasal sinuses are clear.       Impression:      Impression:     No acute intracranial abnormality.     This report was finalized on 7/13/2023 12:25 AM by Ricky Perkins MD.       XR Chest 1 View [[id number]] Collected: 07/12/23 2259     Updated: 07/12/23 2301    Narrative:      Single view chest     Indications: Sepsis protocol     Findings:     AP view the chest shows enlargement of the cardiac silhouette.  The  osseous structures are normal.  Lungs are free from infiltrate and  effusion.       Impression:      Impression:     Cardiomegaly.  Lungs clear.     This report was finalized on 7/12/2023 10:59 PM by Wilber Lopez MD.               CURRENT MEDICATIONS:  Current list of medications may not reflect those currently placed in orders that are not signed or are being held.     cefTRIAXone, 2,000 mg, Intravenous, Daily  iron polysaccharides, 150 mg, Oral, Daily  pantoprazole, 40 mg, Intravenous, BID AC  senna-docusate sodium, 2 tablet, Oral, BID  sodium chloride, 10 mL, Intravenous, Q12H           acetaminophen    senna-docusate sodium **AND** polyethylene glycol **AND** bisacodyl **AND** bisacodyl    ondansetron    prochlorperazine    simethicone    sodium chloride    sodium chloride    sodium chloride    Thank you very much for asking us to be involved in this patient's care.  We will follow along with you.    I have discussed the patients findings and my recommendations with patient.      Electronically signed by WINDY Roberts, 07/13/23, 10:49 AM EDT.     Electronically signed by Israel Jett MD, 07/13/23, 6:08 PM EDT.            Please note that portions of this note were copied and has been reviewed and is accurate as of 7/13/2023 .      Please note that portions of this note were completed with a voice recognition program.

## 2023-07-13 NOTE — PLAN OF CARE
Problem: Fall Injury Risk  Goal: Absence of Fall and Fall-Related Injury  7/13/2023 1436 by Gabriella Forman RN  Outcome: Ongoing, Progressing  7/13/2023 1436 by Gabriella Forman RN  Outcome: Ongoing, Progressing  7/13/2023 1436 by Gabriella Forman RN  Outcome: Ongoing, Progressing  Intervention: Promote Injury-Free Environment  Recent Flowsheet Documentation  Taken 7/13/2023 1300 by Gabriella Forman RN  Safety Promotion/Fall Prevention:   activity supervised   assistive device/personal items within reach   clutter free environment maintained   fall prevention program maintained   nonskid shoes/slippers when out of bed   room organization consistent   safety round/check completed  Taken 7/13/2023 1100 by Gabriella Forman RN  Safety Promotion/Fall Prevention:   activity supervised   assistive device/personal items within reach   clutter free environment maintained   fall prevention program maintained   nonskid shoes/slippers when out of bed   room organization consistent   safety round/check completed  Taken 7/13/2023 0900 by Gabriella Forman RN  Safety Promotion/Fall Prevention:   activity supervised   assistive device/personal items within reach   clutter free environment maintained   fall prevention program maintained   nonskid shoes/slippers when out of bed   room organization consistent   safety round/check completed  Taken 7/13/2023 0700 by Gabriella Forman RN  Safety Promotion/Fall Prevention:   activity supervised   assistive device/personal items within reach   clutter free environment maintained   fall prevention program maintained   nonskid shoes/slippers when out of bed   room organization consistent   safety round/check completed     Problem: Skin Injury Risk Increased  Goal: Skin Health and Integrity  7/13/2023 1436 by Gabriella Forman RN  Outcome: Ongoing, Progressing  7/13/2023 1436 by Gabriella Forman, RN  Outcome: Ongoing, Progressing  7/13/2023 1436 by Gabriella Forman  RN  Outcome: Ongoing, Progressing  Intervention: Optimize Skin Protection  Recent Flowsheet Documentation  Taken 7/13/2023 1400 by Gabriella Forman RN  Pressure Reduction Techniques: frequent weight shift encouraged  Pressure Reduction Devices: pressure-redistributing mattress utilized  Skin Protection: adhesive use limited  Taken 7/13/2023 0800 by Gabriella Forman RN  Pressure Reduction Techniques: frequent weight shift encouraged  Pressure Reduction Devices: pressure-redistributing mattress utilized  Skin Protection:   adhesive use limited   incontinence pads utilized   tubing/devices free from skin contact     Problem: Nausea and Vomiting  Goal: Fluid and Electrolyte Balance  Outcome: Ongoing, Progressing  Intervention: Prevent and Manage Nausea and Vomiting  Recent Flowsheet Documentation  Taken 7/13/2023 1311 by Gabriella Forman RN  Nausea/Vomiting Interventions: see MAR   Goal Outcome Evaluation:           Progress: no change  Outcome Evaluation: Pt has been alert and oriented x4. 3L NC. Pt has complained of nausea this shift, prn meds given. Pt is resting in bed at this time with no distress noted. Daughter at bedside. Call light within reach. Bed alarm set. Will continue to follow plan of care throughout shift until 7p.

## 2023-07-13 NOTE — CASE MANAGEMENT/SOCIAL WORK
Continued Stay Note  SCOTT Riggins     Patient Name: Judy Lutz  MRN: [id number]  Today's Date: 7/13/2023    Admit Date: 7/12/2023    Plan: CM has spoke with Boy in [employer].  Boy states that patient is still employed per daughter and that is an old insurance listed on file.  Daughter is going to try to find her new insurance card or call HR in the AM to get information and give to [employer] so that they can put in computer.   Discharge Plan       Row Name 07/13/23 1525       Plan    Plan CM has spoke with Boy in [employer].  Boy states that patient is still employed per daughter and that is an old insurance listed on file.  Daughter is going to try to find her new insurance card or call HR in the AM to get information and give to [employer] so that they can put in computer.      Row Name 07/13/23 1521                       Row Name 07/13/23 1331       Plan    Plan Comments 7/13:  To ED for fatigue, malaise, SOB & weakness X 3 months but worse 1-2 days, pale on arrival, hypoxia noted & O2 applied, 2 units PRBC given for Hgb 6.4, not seen PCP in 30 yrs, confusing a speaking like a baby recently, uses crutches for mobility d/t weight X 10 yrs, worked with PT but declined to ambulate, hold diuretics pending cards [id number]                   Discharge Codes    No documentation.                 Expected Discharge Date and Time       Expected Discharge Date Expected Discharge Time    Jul 15, 2023               Latasha Mena RN

## 2023-07-13 NOTE — SIGNIFICANT NOTE
Consult received. Chart reviewed. Mr. Lutz is seen at bedside this pm for attempted clinical dysphagia assessment. She is somnolent, unarousable to functionally participate in assessment at this time. Her daughter is present at bedside, denies pt w/ any overt s/s aspiration w/ po intake. She does report mastication weakness 2/2 fatigue per current acute medical status, requests downgrade to softer textures per this status. Will downgrade to mechanical soft consistency, chopped meats. SLP to f/u 7/14/23 for pt status to participate in clinical dysphagia assessment.     Thank you-  Sugar Parker M.A., CCC-SLP

## 2023-07-13 NOTE — THERAPY EVALUATION
Acute Care - Physical Therapy Initial Evaluation   [facility]     Patient Name: Judy Lutz  : 1960  MRN: [id number]  Today's Date: 2023   Onset of Illness/Injury or Date of Surgery: 23  Visit Dx:     ICD-10-CM ICD-9-CM   1. Symptomatic anemia  D64.9 285.9   2. Sepsis with acute hypoxic respiratory failure without septic shock, due to unspecified organism  A41.9 038.9    R65.20 995.91    J96.01 518.81   3. Acute UTI  N39.0 599.0     Patient Active Problem List   Diagnosis    Symptomatic anemia     History reviewed. No pertinent past medical history.  History reviewed. No pertinent surgical history.  PT Assessment (last 12 hours)       PT Evaluation and Treatment       Row Name 23 1105          Physical Therapy Time and Intention    Subjective Information complains of;weakness;fatigue  -CS     Document Type evaluation  -CS     Mode of Treatment physical therapy  -CS     Patient Effort adequate  -CS     Symptoms Noted During/After Treatment increased pain  -CS     Comment Pt seen bedside this AM. Pt was mod(I) w/ crutches at baseline but over the past 2 weeks has become progressively weaker.  Pt agreed to evaluation.  -CS       Row Name 23 1105          General Information    Patient Profile Reviewed yes  -CS     Onset of Illness/Injury or Date of Surgery 23  -CS     Referring Physician [place]  -CS     Patient Observations alert;cooperative;agree to therapy  -CS     General Observations of Patient Pt supine, all PCU lines intact  -CS     Risks Reviewed patient:;LOB;dizziness;increased discomfort;change in vital signs;lines disloged  -CS     Benefits Reviewed patient:;improve function;increase independence;increase strength;increase balance;decrease pain;decrease risk of DVT;improve skin integrity;increase knowledge  -CS     Barriers to Rehab previous functional deficit  -CS       Row Name 23 1106          Pain    Additional Documentation --  moderate c/o w/ mobility but  did not rate  -       Row Name 07/13/23 1105          Cognition    Orientation Status (Cognition) oriented x 3  -       Row Name 07/13/23 1105          Range of Motion (ROM)    Range of Motion --  (B)LE limited by pain and edema  -       Row Name 07/13/23 1105          Strength Comprehensive (MMT)    Comment, General Manual Muscle Testing (MMT) Assessment (B)LE grossly 2-/5  -       Row Name 07/13/23 1105          Bed Mobility    Bed Mobility bed mobility (all) activities  -     All Activities, [place] (Bed Mobility) maximum assist (25% patient effort);dependent (less than 25% patient effort)  -CS     Assistive Device (Bed Mobility) bed rails;draw sheet;head of bed elevated  -       Row Name 07/13/23 1105          Transfers    Comment, (Transfers) Deferred per patient request  -       Row Name 07/13/23 1105          Gait/Stairs (Locomotion)    [place] Level (Gait) unable to assess;not tested  -CS     Comment, (Gait/Stairs) Pt declined to attempt  -       Row Name             Wound 07/13/23 0244 Left antecubital Extravasation    Wound - Properties Group Placement Date: 07/13/23  -AM Placement Time: 0244  -AM Present on Hospital Admission: N  -AM Side: Left  -AM Location: antecubital  -AM Primary Wound Type: Extravasatio  -AM    Retired Wound - Properties Group Placement Date: 07/13/23  -AM Placement Time: 0244  -AM Present on Hospital Admission: N  -AM Side: Left  -AM Location: antecubital  -AM Primary Wound Type: Extravasatio  -AM    Retired Wound - Properties Group Date first assessed: 07/13/23  -AM Time first assessed: 0244  -AM Present on Hospital Admission: N  -AM Side: Left  -AM Location: antecubital  -AM Primary Wound Type: Extravasatio  -AM      Row Name             Wound 07/13/23 0301 perineum MASD (Moisture associated skin damage)    Wound - Properties Group Placement Date: 07/13/23  -AM Placement Time: 0301  -AM Present on Hospital Admission: Y  -AM Location: perineum  -AM Primary  Wound Type: MASD  -AM    Retired Wound - Properties Group Placement Date: 07/13/23  -AM Placement Time: 0301  -AM Present on Hospital Admission: Y  -AM Location: perineum  -AM Primary Wound Type: MASD  -AM    Retired Wound - Properties Group Date first assessed: 07/13/23  -AM Time first assessed: 0301  -AM Present on Hospital Admission: Y  -AM Location: perineum  -AM Primary Wound Type: MASD  -AM      Row Name 07/13/23 1105          Plan of Care Review    Plan of Care Reviewed With patient  -CS     Progress no change  -CS     Outcome Evaluation Pt w/ decreased bed mobility, transfers, and gait limited by edema, pain, and weakness of (B)LE.  Fair/guarded prognosis for rehabilitation. D/C disposition depends on progress while in house.  -CS       Row Name 07/13/23 1105          Positioning and Restraints    Pre-Treatment Position in bed  -CS     Post Treatment Position bed  -CS     In Bed supine;call light within reach;encouraged to call for assist;exit alarm on;with family/caregiver  -CS       Row Name 07/13/23 1105          Therapy Assessment/Plan (PT)    Functional Level at Time of Evaluation (PT) max/dep  -CS     PT Diagnosis (PT) impaired functional mobility  -CS     Rehab Potential (PT) fair, will monitor progress closely  -CS     Criteria for Skilled Interventions Met (PT) yes;meets criteria;skilled treatment is necessary  -CS     Therapy Frequency (PT) 2 times/wk  2-5x/week  -CS     Predicted Duration of Therapy Intervention (PT) while in house  -CS     Problem List (PT) problems related to;balance;mobility;strength  -CS     Activity Limitations Related to Problem List (PT) unable to ambulate safely;unable to transfer safely  -CS     Comment, Therapy Assessment/Plan (PT) Pt w/ decreased bed mobility, transfers, and gait limited by edema, pain, and weakness of (B)LE. Fair/guarded prognosis for rehabilitation. D/C disposition depends on progress while in house.  -CS       Row Name 07/13/23 1105          PT  Evaluation Complexity    History, PT Evaluation Complexity 3 or more personal factors and/or comorbidities  -CS     Examination of Body Systems (PT Eval Complexity) total of 4 or more elements  -CS     Clinical Presentation (PT Evaluation Complexity) unstable  -CS     Clinical Decision Making (PT Evaluation Complexity) high complexity  -CS     Overall Complexity (PT Evaluation Complexity) high complexity  -CS       Row Name 07/13/23 1105          Therapy Plan Review/Discharge Plan (PT)    Therapy Plan Review (PT) evaluation/treatment results reviewed;care plan/treatment goals reviewed;risks/benefits reviewed;current/potential barriers reviewed;participants voiced agreement with care plan;participants included;patient  -CS       Row Name 07/13/23 1105          Physical Therapy Goals    Bed Mobility Goal Selection (PT) bed mobility, PT goal 1  -CS     Transfer Goal Selection (PT) transfer, PT goal 1  -CS     Gait Training Goal Selection (PT) gait training, PT goal 1  -CS       Row Name 07/13/23 1105          Bed Mobility Goal 1 (PT)    Activity/Assistive Device (Bed Mobility Goal 1, PT) bed mobility activities, all  -CS     [place] Level/Cues Needed (Bed Mobility Goal 1, PT) standby assist  -CS     Time Frame (Bed Mobility Goal 1, PT) long term goal (LTG);by discharge  -CS       Row Name 07/13/23 1105          Transfer Goal 1 (PT)    Activity/Assistive Device (Transfer Goal 1, PT) transfers, all  -CS     [place] Level/Cues Needed (Transfer Goal 1, PT) standby assist  -CS     Time Frame (Transfer Goal 1, PT) long term goal (LTG);by discharge  -CS       Row Name 07/13/23 1105          Gait Training Goal 1 (PT)    Activity/Assistive Device (Gait Training Goal 1, PT) gait (walking locomotion);assistive device use  -CS     [place] Level (Gait Training Goal 1, PT) standby assist  -CS     Distance (Gait Training Goal 1, PT) 25'  -CS     Time Frame (Gait Training Goal 1, PT) long term goal (LTG);by discharge   -               User Key  (r) = Recorded By, (t) = Taken By, (c) = Cosigned By      Initials Name Provider Type    AM Carolee Anglin, RN Registered Nurse     Rivas Dozier, PT Physical Therapist                    Physical Therapy Education       Title: PT OT SLP Therapies (Done)       Topic: Physical Therapy (Done)       Point: Mobility training (Done)       Learning Progress Summary             Patient Acceptance, E,TB, VU by  at 7/13/2023 1301                         Point: Home exercise program (Done)       Learning Progress Summary             Patient Acceptance, E,TB, VU by  at 7/13/2023 1301                         Point: Body mechanics (Done)       Learning Progress Summary             Patient Acceptance, E,TB, VU by  at 7/13/2023 1301                         Point: Precautions (Done)       Learning Progress Summary             Patient Acceptance, E,TB, VU by  at 7/13/2023 1301                                         User Key       Initials Effective Dates Name Provider Type Discipline     05/31/23 -  Rivas Dozier, PT Physical Therapist PT                  PT Recommendation and Plan  Anticipated Discharge Disposition (PT):  (TBD)  Planned Therapy Interventions (PT): balance training, bed mobility training, gait training, home exercise program, neuromuscular re-education, patient/family education, strengthening, transfer training  Therapy Frequency (PT): 2 times/wk (2-5x/week)  Plan of Care Reviewed With: patient  Progress: no change  Outcome Evaluation: Pt w/ decreased bed mobility, transfers, and gait limited by edema, pain, and weakness of (B)LE.  Fair/guarded prognosis for rehabilitation. D/C disposition depends on progress while in house.       Time Calculation:    PT Charges       Row Name 07/13/23 1301             Time Calculation    Start Time 1100  -CS      PT Received On 07/13/23  -      PT Goal Re-Cert Due Date 07/27/23  -                User Key  (r) = Recorded By, (t) = Taken  By, (c) = Cosigned By      Initials Name Provider Type    CS Rivas Dozier, PT Physical Therapist                  Therapy Charges for Today       Code Description Service Date Service Provider Modifiers Qty    21054584688 HC PT EVAL HIGH COMPLEXITY 4 7/13/2023 Rivas Dozier, PT GP 1            PT G-Codes  AM-PAC 6 Clicks Score (PT): 6    Rivas Dozier, PT  7/13/2023

## 2023-07-13 NOTE — PROGRESS NOTES
Patient seen in follow-up for symptomatic anemia and concerns for volume overload with pericardial effusion.  Initially hemoglobin 6.4, received 2 units PRBC with repeat hemoglobin 7.4.  No signs of ABL, denies melena or hematochezia.  Although she appears volume overloaded, she is hypernatremic therefore as she is hemodynamically stable without signs of volume overload on imaging, will hold diuretics pending cardiology evaluation.  Continue empiric cefepime, de-escalate FiO2 as tolerated, PPI twice daily, Niferex, follow-up on echo results

## 2023-07-14 NOTE — PROGRESS NOTES
[facility] HOSPITALIST PROGRESS NOTE     Patient Identification:  Name:  Judy Lutz  Age:  63 y.o.  Sex:  female  :  1960  MRN:  [id number]  Visit Number:  [id number]  ROOM: [place] [id number]     Primary Care Provider:  Provider, No Known    Length of stay in inpatient status:  1    Subjective     Chief Compliant:    Chief Complaint   Patient presents with    Shortness of Breath       History of Presenting Illness:    Patient seen in follow-up for shortness of breath, anasarca and pericardial effusion.  She is awake, alert and oriented x4 with family present at bedside.  She says she feels better this morning and not as short of breath.  She says she feels depressed as her  passed away 3 months ago and has been having issues taking care of herself since that time.  She reports overall feeling fatigue but denies any further systemic symptoms.  Denies any chest pain/pressure or tightness.  Denies any productive sputum.  Afebrile, SPO2 95% on 3 L.  No adverse events noted overnight.    Objective     Current Hospital Meds:albumin human, 25 g, Intravenous, BID  cefTRIAXone, 2,000 mg, Intravenous, Daily  escitalopram, 10 mg, Oral, Daily  ferric gluconate, 250 mg, Intravenous, Daily  furosemide, 60 mg, Intravenous, BID  [START ON 2023] iron polysaccharides, 150 mg, Oral, Daily  pantoprazole, 40 mg, Intravenous, BID AC  senna-docusate sodium, 2 tablet, Oral, BID  sodium chloride, 10 mL, Intravenous, Q12H  sodium chloride, 10 mL, Intravenous, Q12H         Current Antimicrobial Therapy:  Anti-Infectives (From admission, onward)      Ordered     Dose/Rate Route Frequency Start Stop    23 0542  cefTRIAXone (ROCEPHIN) 2,000 mg in sodium chloride 0.9 % 100 mL IVPB-VTB        Ordering Provider: Leonard Dang DO    2,000 mg  200 mL/hr over 30 Minutes Intravenous Daily 23 2300 23 0897    23 1625  cefTRIAXone (ROCEPHIN) 2,000 mg in sodium chloride 0.9 % 100 mL  IVPB-VTB        Ordering Provider: Yusuf Luo DO    2,000 mg  200 mL/hr over 30 Minutes Intravenous Once 07/12/23 2153 07/12/23 2330          Current Diuretic Therapy:  Diuretics (From admission, onward)      Ordered     Dose/Rate Route Frequency Start Stop    07/14/23 1026  furosemide (LASIX) injection 60 mg        Ordering Provider: Leonard Dang DO    60 mg Intravenous 2 Times Daily (Diuretics) 07/14/23 2130      07/14/23 0810  furosemide (LASIX) injection 60 mg        Ordering Provider: Leonard Dang DO    60 mg Intravenous Once 07/14/23 0900 07/14/23 0846    07/13/23 0054  furosemide (LASIX) injection 80 mg        Ordering Provider: Yusuf Luo DO    80 mg Intravenous Once 07/13/23 0110 07/13/23 0114          ----------------------------------------------------------------------------------------------------------------------  Vital Signs:  Temp:  [97.6 °F (36.4 °C)-98.6 °F (37 °C)] 98.2 °F (36.8 °C)  Heart Rate:  [] 105  Resp:  [12-26] 24  BP: (102-144)/(30-87) 130/77  SpO2:  [90 %-97 %] 95 %  on  Flow (L/min):  [3] 3;   Device (Oxygen Therapy): nasal cannula  Body mass index is 57.98 kg/m².    Wt Readings from Last 3 Encounters:   07/14/23 (!) 183 kg (404 lb 1.7 oz)     Intake & Output (last 3 days)         07/11 0701 07/12 0700 07/12 0701 07/13 0700 07/13 0701 07/14 0700 07/14 0701  07/15 0700    P.O.   2240     Blood  300      IV Piggyback   100     Total Intake(mL/kg)  300 (1.7) 2340 (12.8)     Urine (mL/kg/hr)  900 2300 (0.5) 700 (0.7)    Stool   0     Total Output  900 2300 700    Net  -600 +40 -700            Urine Unmeasured Occurrence  3 x 5 x           Diet: Cardiac Diets; Healthy Heart (2-3 Na+); Texture: Soft to Chew (NDD 3); Soft to Chew: Whole Meat; Fluid Consistency: Thin (IDDSI 0)  ----------------------------------------------------------------------------------------------------------------------  Physical exam:  Constitutional: Morbidly obese  female,, Well-developed and well-nourished, resting comfortably in bed, no acute distress.      HENT:  Head:  Normocephalic and atraumatic.  Mouth:  Moist mucous membranes.    Eyes:  Conjunctivae and EOM are normal. No scleral icterus.   Cardiovascular: Tachycardic, regular rhythm and normal heart sounds with no murmur. No JVD.   Pulmonary/Chest: Bibasilar crackles, no accessory muscle use, no wheezing unlabored. No accessory muscle use.  Abdominal: Anasarca.  Soft. No distension and no tenderness.  Bowel sounds present. No rebound or guarding.   Musculoskeletal:  No tenderness, and no deformity.  No red or swollen joints anywhere.    Neurological:  Alert and oriented to person, place, and time.  No cranial nerve deficit.   Nonfocal.   Skin:  Skin is warm and dry. No rash noted. No pallor.   Peripheral vascular:  No clubbing, no cyanosis, bilateral +3 lower extremity pitting edema up above the knee. Pedal and tibial pulses 2 out of 4 bilaterally.     ----------------------------------------------------------------------------------------------------------------------  Results from last 7 days   Lab Units 07/14/23  0029 07/13/23  1156 07/13/23  0516 07/13/23  0343 07/12/23  2153   CRP mg/dL  --   --   --  15.57*  --    LACTATE mmol/L  --   --   --   --  1.6   WBC 10*3/mm3 23.36* 21.69*  --  22.40* 20.79*   HEMOGLOBIN g/dL 7.8* 8.5* 7.4* 7.4* 6.4*   HEMATOCRIT % 33.8* 35.7 29.4* 29.4* 27.0*   MCV fL 77.5* 75.6*  --  72.6* 73.2*   MCHC g/dL 23.1* 23.8*  --  25.2* 23.7*   PLATELETS 10*3/mm3 401 503*  --  436 418   INR   --   --   --   --  1.38*     Results from last 7 days   Lab Units 07/12/23  2127   PH, ARTERIAL pH units 7.388   PO2 ART mm Hg 45.8*   PCO2, ARTERIAL mm Hg 50.9*   HCO3 ART mmol/L 30.6*     Results from last 7 days   Lab Units 07/14/23  0029 07/13/23  1156 07/13/23  0343 07/12/23  2153   SODIUM mmol/L 144 149* 151* 150*   POTASSIUM mmol/L 4.2 4.0 4.7 4.8   CHLORIDE mmol/L 109* 109* 112* 112*   CO2 mmol/L  27.6 30.1* 29.2* 30.8*   BUN mg/dL 29* 31* 32* 33*   CREATININE mg/dL 0.90 0.95 0.96 1.04*   CALCIUM mg/dL 9.6 10.5 10.2 10.4   GLUCOSE mg/dL 138* 126* 138* 135*   ALBUMIN g/dL  --   --  3.0* 2.9*   BILIRUBIN mg/dL  --   --  0.5 0.4   ALK PHOS U/L  --   --  95 97   AST (SGOT) U/L  --   --  30 36*   ALT (SGPT) U/L  --   --  33 35*   Estimated Creatinine Clearance: 118.1 mL/min (by C-G formula based on SCr of 0.9 mg/dL).  No results found for: AMMONIA  Results from last 7 days   Lab Units 07/13/23  0014 07/12/23 2153   HSTROP T ng/L 24* 26*     Results from last 7 days   Lab Units 07/13/23  0014   PROBNP pg/mL 26,037.0*         Hemoglobin A1C   Date/Time Value Ref Range Status   07/13/2023 0516 5.80 (H) 4.80 - 5.60 % Final     No results found for: TSH, FREET4  No results found for: PREGTESTUR, PREGSERUM, HCG, HCGQUANT  Pain Management Panel           No data to display              Brief Urine Lab Results  (Last result in the past 365 days)        Color   Clarity   Blood   Leuk Est   Nitrite   Protein   CREAT   Urine HCG        07/12/23 2140 Yellow   Cloudy   Moderate (2+)   Large (3+)   Positive   30 mg/dL (1+)                 Blood Culture   Date Value Ref Range Status   07/12/2023 No growth at 24 hours  Preliminary   07/12/2023 No growth at 24 hours  Preliminary     Urine Culture   Date Value Ref Range Status   07/12/2023 >100,000 CFU/mL Gram Negative Bacilli (A)  Preliminary     No results found for: WOUNDCX  No results found for: STOOLCX  No results found for: RESPCX  No results found for: AFBCX  Results from last 7 days   Lab Units 07/13/23  0343 07/12/23 2153   PROCALCITONIN ng/mL 0.18 0.29*   LACTATE mmol/L  --  1.6   CRP mg/dL 15.57*  --        I have personally looked at the labs and they are summarized above.  ----------------------------------------------------------------------------------------------------------------------  Detailed radiology reports for the last 24 hours:  Imaging Results (Last 24  "Hours)       ** No results found for the last 24 hours. **          Assessment & Plan      Patient is a 63-year-old morbidly obese female with history significant for with no known chronic medical problems presented to the ER with complaints of fatigue/weakness, shortness of air and daughter stating intermittent confusion and speaking \"like a baby \".    #Acute hypoxemic respiratory failure  #Acute pulmonary edema  #Acute heart failure exacerbation, systolic/diastolic  #Moderate pericardial effusion without tamponade physiology  #NSTEMI, likely type II  #Suspected NANCY/OHS  --Patient presented to the ER with AMS and shortness of air, noted to have a PaO2 of 45 on room air with imaging findings suggestive of pulmonary edema.  --Admit proBNP 26 K  --Initial troponin 26, repeat 24 with a delta of -2  --CT chest without contrast noted marked cardiomegaly, small to moderate pericardial effusion with mild pulmonary vascular congestion, no focal infiltrates  --Echocardiogram noted an EF of 51 to 55%, diastolic dysfunction with a 1 to 2 cm pericardial effusion without tamponade physiology  --Add albumin twice daily, Lasix IV 60 mg twice daily, insert Gonzlaez catheter for strict I's and O's (external catheter has been ineffective)  --Start metoprolol succinate, add aspirin, high intensity statin  --Patient needs outpatient sleep study and likely CPAP at bedtime  --Cardiology following, plan for ischemic work-up when more stable, appreciate assistance    #Anasarca  --Echo per above  --CT abdomen/pelvis noted moderate ascites in the abdomen, cystitis  --Urine protein/creatinine ratio sent to evaluate for nephrosis  --Liver ultrasound ordered to evaluate for potential Dawn cirrhosis  --IV diuretics per above    #Acute metabolic encephalopathy  #Urinary tract infection, POA  --Patient presented initially confused, patient's daughter says she was \"speaking like a baby \".  Leukocytosis persists despite resolution of AMS  --UA 4+ " bacteria, 3+ leukocytes, moderate blood, positive nitrites  --Blood cultures NGTD, urine culture pending  --Patient has no history of MDR and no risk factors therefore continue empiric Rocephin 2 g every 24, follow-up on urine culture and sensitivities    #Anemia, chronicity unknown  #Leukocytosis  --Initial hemoglobin 6.4, received 2 units PRBC with appropriate response with repeat hemoglobin 8.5.  She denies any melena, hematochezia or any other evidence of acute blood loss.  --Iron panel noted iron deficiency anemia vitamin B12/folate normal  --Peripheral smear pending  --Switch PPI to p.o.  --will give IV Ferrlecit x3 days followed by p.o. Niferex  --When patient is more medically stable, consider endoscopy, if she does not have any evidence of acute blood loss, endoscopy can likely be deferred to the outpatient setting    #Hypernatremia due to free water deficit  --Initial sodium 150, diuresis held but due to volume overload, will give albumin and diuretics per above, monitor sodium    #Major depressive disorder  --Patient states she has been depressed which is worsened after  passed approximately 3 months ago.  Agreeable to try medication, will start Lexapro daily, at the time of discharge, will arrange for outpatient counseling if able    #Acute on chronic debility  --patient's daughter stated she had used crutches for ambulation for over 10 years PTA, PT OT    CHECKLIST:  Abx: Rocephin  VTE: SCDs  GI ppx: PPI twice daily  Diet: Consistent carb  Code: CPR, full  Dispo: Patient is high risk due to acute hypoxemic respiratory failure due to heart failure exacerbation and anasarca.  Anticipate greater than 2 midnight stay.  Disposition expected Home +/- home health services versus SNF.    Leonard Dang,   [facility] Center[facility]  07/14/23  12:38 EDT

## 2023-07-14 NOTE — THERAPY EVALUATION
Acute Care - Speech Language Pathology   Swallow Initial Evaluation [facility]  CLINICAL DYSPHAGIA ASSESSMENT     Patient Name: Judy Lutz  : 1960  MRN: [id number]  Today's Date: 2023  Onset of Illness/Injury or Date of Surgery: 23     Referring Physician: Alton      Admit Date: 2023    Visit Dx:     ICD-10-CM ICD-9-CM   1. Symptomatic anemia  D64.9 285.9   2. Sepsis with acute hypoxic respiratory failure without septic shock, due to unspecified organism  A41.9 038.9    R65.20 995.91    J96.01 518.81   3. Acute UTI  N39.0 599.0     Patient Active Problem List   Diagnosis    Symptomatic anemia     History reviewed. No pertinent past medical history.  History reviewed. No pertinent surgical history.    Judy Lutz  is seen at bedside this am on PCU to assess safety/efficacy of swallowing fnx, determine safest/least restrictive diet tolerance. X2 family members are present at bedside.     Ms. Lutz presented to [facility] ED w/ increasing fatigue, weakness, malaise, sob. She is admitted w/ symptomatic anemia. She is referred for dysphagia assessment. She is a/a this am, interactive and cooperative to participate. She denies overt s/s aspiration or dysphagia, does reports poor appetite and motivation for po intake in general.     Social History     Socioeconomic History    Marital status:    Tobacco Use    Smoking status: Never    Smokeless tobacco: Never   Vaping Use    Vaping Use: Never used   Substance and Sexual Activity    Alcohol use: Never    Drug use: Never    Sexual activity: Defer      CT CHEST W/O  Technique: Standard axial collimation through the chest without  intravenous contrast.  Reconstructed images were submitted for  interpretation. Limited exposure control, adjustment of the mA and/or KV  according to patient size or use of iterative reconstruction technique  was utilized.     Findings:     1.  No focal infiltrate, pleural effusion or pneumothorax.  2.  A 7  mm posterior subpleural noncalcified groundglass density in the  right upper lobe, likely related to postinflammatory changes.  3.  Mild pulmonary vascular congestion.  4.  Reece cardiomegaly with small to moderate superior/posterior  pericardial effusion, 9-18 mm in thickness.  5.  No aortic aneurysm.  6.  No mediastinal lymphadenopathy.  7.  Motion artifacts degrading image quality.     IMPRESSION:  Impression:     1.  Marked cardiomegaly and small-to-moderate pericardial effusion with  mild pulmonary vascular congestion.  2.  No focal infiltrate or pleural effusion.      This report was finalized on 7/13/2023 12:26 AM by Ricky Perkins MD.    Diet Orders (active) (From admission, onward)       Start     Ordered    07/13/23 1800  Dietary Nutrition Supplements Boost Plus (Ensure Enlive, Ensure Plus)  Daily With Breakfast & Dinner       07/13/23 1550    07/13/23 1800  DIET MESSAGE Pt likes cottage cheese and chocolate pudding  Daily With Breakfast, Lunch & Dinner      Comments: Pt likes cottage cheese and chocolate pudding    07/13/23 1609    07/13/23 1609  Diet: Cardiac Diets; Healthy Heart (2-3 Na+); Texture: Soft to Chew (NDD 3); Soft to Chew: Chopped Meat; Fluid Consistency: Thin (IDDSI 0)  Diet Effective Now         07/13/23 1609                  She is observed on 3L O2 via NC w/o complications.     Pt is positioned upright and centered in bed to accept multiple po presentations of ice chips, puree and thin liquids via spoon, cup and straw. She declines solid cracker per preference. Pt is able to self feed.     Facial/oral structures are symmetrical upon observation. Lingual protrusion reveals no deviation. Oral mucosa are moist, pink, and clean. Secretions are clear, thin, and well controlled. OROM/JOSE E is wfl to imitate oral postures. Gag is not assessed. Volitional cough is intact w/ adequate  intensity, clear in quality, non-productive. Voice is adequate in intensity, clear in quality w/ intelligible speech. She  is oriented, follows directives and participates in simple conversational exchanges. She is fatigued in general today.     Upon po presentations, adequate bolus anticipation and acceptance w/ good labial seal for bolus clearance via spoon bowl, cup rim stability and suction via straw. Bolus formation, manipulation and control are wfl w/ rotary mastication pattern. A-p transit is timely w/o oral residue. No overt s/s aspiration evidenced before the swallow.     Pharyngeal swallow is timely w/ adequate hyolaryngeal elevation per palpation. No overt s/s aspiration evidenced across this assessment. No silent aspiration suspected as pt is w/o changes in vocal quality, respirations or secretions post po presentations. Pt denies odynophagia.    Impression: Per this assessment, Ms. Lutz presents w/ wfl oropharyngeal swallow w/o s/s aspiration. No s/s indicative of silent aspiration. No odynophagia reported. She is felt to most benefit from modified po diet of mechanical soft consistency, whole foods, thin liquids to aid in most optimal intake and endurance across meals 2/2 fatigue effects and poor appetite per current acute medical status. May upgrade to least restrictive regular consistency as tolerated/requesting.     SLP Recommendation and Plan  1. Mechanical soft consistency, whole foods, thin liquids.    2. Meds whole in puree/thins.   3. Upright and centered for all po intake.  4. JULIAN precautions.  5. Oral care protocol.  No further formal SLP f/u warranted at this time.    D/w pt and family results and recommendations w/ verbal agreement.    Thank you for allowing me to participate in the care of your patient-  Sugar Parker M.A., CCC-SLP    EDUCATION  The patient has been educated in the following areas:   Dysphagia (Swallowing Impairment) Oral Care/Hydration Modified Diet Instruction.        Time Calculation:       Therapy Charges for Today       Code Description Service Date Service Provider Modifiers Qty     [id number] [facility] ORAL PHARYNG SWALLOW 4 7/14/2023 Sugar Parker MA,CCC-SLP GN 1                 Sugar Parker MA,MARY-SLP  7/14/2023

## 2023-07-14 NOTE — PLAN OF CARE
Problem: Skin Injury Risk Increased  Goal: Skin Health and Integrity  Outcome: Ongoing, Progressing       Problem: Adult Inpatient Plan of Care  Goal: Patient-Specific Goal (Individualized)  Outcome: Ongoing, Progressing     Problem: Adult Inpatient Plan of Care  Goal: Absence of Hospital-Acquired Illness or Injury  Outcome: Ongoing, Progressing     Problem: Adult Inpatient Plan of Care  Goal: Optimal Comfort and Wellbeing  Outcome: Ongoing, Progressing     Problem: Adult Inpatient Plan of Care  Goal: Readiness for Transition of Care  Outcome: Ongoing, Progressing     Problem: Nausea and Vomiting  Goal: Fluid and Electrolyte Balance  Outcome: Ongoing, Progressing      Goal Outcome Evaluation:  Plan of Care Reviewed With: patient        Progress: no change  Outcome Evaluation: Pt is A&Ox 4 resting in bed on 3 L NC. Pt has had no complaints of nausea duirng shift. PRN pain meds given during shift. VSS duirng shift. No other significant changes during shift, care ongoing.

## 2023-07-14 NOTE — PLAN OF CARE
Problem: Fall Injury Risk  Goal: Absence of Fall and Fall-Related Injury  Outcome: Ongoing, Progressing  Intervention: Promote Injury-Free Environment  Recent Flowsheet Documentation  Taken 7/14/2023 1300 by Gabriella Forman, RN  Safety Promotion/Fall Prevention:   activity supervised   assistive device/personal items within reach   clutter free environment maintained   fall prevention program maintained   nonskid shoes/slippers when out of bed   room organization consistent   safety round/check completed  Taken 7/14/2023 1100 by Gabriella Forman, RN  Safety Promotion/Fall Prevention:   activity supervised   assistive device/personal items within reach   clutter free environment maintained   fall prevention program maintained   nonskid shoes/slippers when out of bed   room organization consistent   safety round/check completed  Taken 7/14/2023 0900 by Gabriella Forman, RN  Safety Promotion/Fall Prevention:   activity supervised   assistive device/personal items within reach   clutter free environment maintained   fall prevention program maintained   nonskid shoes/slippers when out of bed   room organization consistent   safety round/check completed  Taken 7/14/2023 0700 by Gabriella Forman, RN  Safety Promotion/Fall Prevention:   activity supervised   assistive device/personal items within reach   clutter free environment maintained   fall prevention program maintained   nonskid shoes/slippers when out of bed   room organization consistent   safety round/check completed     Problem: Skin Injury Risk Increased  Goal: Skin Health and Integrity  Outcome: Ongoing, Progressing  Intervention: Optimize Skin Protection  Recent Flowsheet Documentation  Taken 7/14/2023 1415 by Gabriella Forman, RN  Pressure Reduction Techniques:   weight shift assistance provided   heels elevated off bed   positioned off wounds  Pressure Reduction Devices: pressure-redistributing mattress utilized  Taken 7/14/2023 0900 by Dasia  Gabriella RN  Pressure Reduction Techniques:   frequent weight shift encouraged   heels elevated off bed  Pressure Reduction Devices: pressure-redistributing mattress utilized  Skin Protection:   adhesive use limited   incontinence pads utilized     Problem: Nausea and Vomiting  Goal: Fluid and Electrolyte Balance  Outcome: Ongoing, Progressing  Intervention: Monitor and Manage Hypovolemia  Recent Flowsheet Documentation  Taken 7/14/2023 1415 by Gabriella Forman, RN  Fluid/Electrolyte Management: fluids provided  Taken 7/14/2023 0900 by Gabriella Forman, RN  Fluid/Electrolyte Management: fluids provided   Goal Outcome Evaluation:           Progress: no change  Outcome Evaluation: Pt is alert and oriented, pleasant. Denies any pain or discomfort this shift. Gonzalez cath inserted this shift. Pt is resting in bed at this time with no distress noted. Family to remain at the bedside. Call light within reach. Bed alarm set. Will continue to follow plan of care throughout shift until 7p.

## 2023-07-14 NOTE — CONSULTS
"Upon review of the patient's chart, all of her lab findings are entirely consistent with severe, iron deficiency anemia. She had a severe microcytosis on presentation (MCV of 73). Serum ferritin was only 25 ng/mL. The leukocytosis with predominant neutrophilia (ANC of 20,000) is likely secondary to stress (caused by the severe iron deficiency anemia). Platelets are an acute phase reactant, and are therefore also commonly elevated in iron deficiency. The abnormal cells seen on the peripheral smear are all often seen in severe iron deficiency. Anisopoikilocytosis means (RBCs of) varied size and shape, elliptocytes are one of these abnormal shapes (an elongated, \"pencil cell\" shaped one), hypochromic cells (caused by the lack of iron) have the appearance of target cells; and schistocytes, in this situation, are not due to hemolysis, but rather the inherent fragility of the iron deficient RBCs themselves.    I therefore agree completely with the management you are already providing, including IV and PO iron supplementation along with a GI evaluation. As her iron stores increase, all of these lab abnormalities should improve and eventually resolve.    If she is still in the hospital on Monday (7/17), our service will see her in full consultation. In the meantime, please do not hesitate to call with any questions or concerns that you may have.  "

## 2023-07-14 NOTE — CASE MANAGEMENT/SOCIAL WORK
Discharge Planning Assessment  SCOTT Riggins     Patient Name: Judy Lutz  MRN: [id number]  Today's Date: 7/14/2023    Admit Date: 7/12/2023            Discharge Plan       Row Name 07/14/23 1747       Plan    Plan SS received Physician consult for likely needs SNF. SS spoke with Pt on this date. Pt currently is self pay. Pt states she and her daughter will call her place of employment on Monday and ask for the number to contact her insurance company and check on her short term disability benefits. SS spoke with [facility]  who states Pt does not qualify for [facility] due to being employed. SS spoke with Pt about SNF, Pt plans to return to work at discharge, but is agreeable to [facility].  to follow.                                     SCOTT Yuen

## 2023-07-14 NOTE — PROGRESS NOTES
[facility]  PROGRESS NOTE    Patient information:  Name: Judy Lutz  Age/Sex: 63 y.o. female  :  1960        PCP: Provider, No Known  Attending: Sapphire Lanre DianeDO  MRN:  [id number]  Visit Number:  [id number]    LOS:  LOS: 1 day     CODE STATUS:    Code Status and Medical Interventions:   Ordered at: 23 0152     Code Status (Patient has no pulse and is not breathing):    CPR (Attempt to Resuscitate)     Medical Interventions (Patient has pulse or is breathing):    Full Support       PROBLEM LIST:Principal Problem:    Symptomatic anemia      Reason for Cardiology follow-up: Severe CM with tayla-cardial effusion     Subjective   ADMISSION INFORMATION:  Chief Complaint   Patient presents with    Shortness of Breath       HPI:  Judy Lutz is a 63 y.o. female with a no considerable past medical history noted in patient's chart.  Patient presented to [facility] ([facility]) emergency room (ER) on 2023 with complaints of increased fatigue, malaise, shortness of breath, and generalized weakness for the past several weeks.  Patient reported her symptoms and gotten even worse in the last 1 to 2 days.  She denied fever, chills, cough, congestion, chest pain, or abdominal pain.  Patient reports that her symptoms are aggravated with ambulation and is relieved with rest.  EKG revealed sinus tach with no acute ST elevation noted.  CT abdomen pelvis without contrast revealed moderate ascites in the abdomen and pelvis, probable cholelithiasis, small air in the nondependent portion of the urinary bladder likely related to recent catheterization versus cystitis.  CT head without contrast revealed no acute intracranial abnormalities.  CT of the chest without contrast revealed marked cardiomegaly and small to moderate pericardial effusion with mild pulmonary vascular congestion.  Hemoglobin of 6.4 with a platelet count of 418.  Creatinine 1.04,  potassium 4.8, ALT 35, AST 36, initial high sensitive troponin is 26-> 24.  Patient was admitted and Cardiology has been consulted for further evaluation and management.      No primary Cardiologist noted in her chart.    Echo revealed a EF of 51 to 55%.     Interval History:   Patient is in room  and was examined by Dr. Rosas.   Telemetry reveals SR/ST inverted T waves 90- 100's with frequent PVCs and a brief episode of SVT around 05:39 am today.  Potassium 4.2, creatinine 0.90.  Hemoglobin 7.8 which is improvement from admission is 6.4 after blood transfusions.  Patient is lying in bed resting quietly.  She is able to carry on conversation with us today.  And her respirations have improved. No acute distress noted at this time.  Head of bed is elevated approximately 75 degrees.  Patient denies chest pain or palpitations.  Patient denies ever being diagnosed with NANCY however she does admit to snoring and having daytime naps in the last 4 weeks especially.  She denies daytime napping prior to the last 4 weeks however.  Oxygen saturations 94% on 3 L via nasal cannula.    Vital Signs  Temp:  [97.6 °F (36.4 °C)-99.2 °F (37.3 °C)] 98.6 °F (37 °C)  Heart Rate:  [] 102  Resp:  [12-26] 24  BP: (102-144)/(30-93) 122/66  Vital Signs (last 72 hrs)         07/11 0700  07/12 0659 07/12 0700  07/13 0659 07/13 0700  07/14 0659 07/14 0700  07/14 0819   Most Recent      Temp (°F)   98.4 -  98.9    97.6 -  99.2       98.6 (37) 07/14 0400    Heart Rate   87 -  108    91 -  105      102     102 07/14 0800    Resp   12 -  35    12 -  26      24     24 07/14 0800    BP   100/60 -  158/80    102/30 -  149/73    111/79 -  122/66     122/66 07/14 0800    SpO2 (%)   80 -  98    90 -  97    95 -  96     95 07/14 0800          Body mass index is 57.98 kg/m².    Intake/Output Summary (Last 24 hours) at 7/14/2023 0819  Last data filed at 7/14/2023 0600  Gross per 24 hour   Intake 2340 ml   Output 2300 ml   Net 40 ml       Objective      Physical Exam:      General Appearance:    Alert, cooperative, in no acute distress.   Head:    Normocephalic, without obvious abnormality, atraumatic.   Eyes:                          Conjunctivae and sclerae normal, no icterus, no pallor, corneas clear.   Neck:   No adenopathy, supple, trachea midline, no thyromegaly, no carotid bruit, no JVD.   Lungs:   Decreased breath sounds bibasilar to auscultation, respirations regular, even and unlabored.    Heart:    Regular rhythm and normal rate, normal S1 and S2, no          murmur, no gallop, no rub, no click   Chest Wall:    No abnormalities observed.   Abdomen:     Normal bowel sounds, no masses, no organomegaly, soft nontender, nondistended, no guarding, no rebound tenderness.   Extremities:   Moves all extremities well, + 2-3 pitting edema, no cyanosis, no redness.   Pulses:   Pulses palpable and equal bilaterally.   Skin:   No bleeding, bruising or rash.   Neurologic:   Alert and Oriented x 3, Speech Clear & comprehensive.       Results review   Results Review:   Results from last 7 days   Lab Units 07/14/23  0029 07/13/23  1156 07/13/23  0516 07/13/23  0343 07/12/23  2153   WBC 10*3/mm3 23.36* 21.69*  --  22.40* 20.79*   HEMOGLOBIN g/dL 7.8* 8.5* 7.4* 7.4* 6.4*   PLATELETS 10*3/mm3 401 503*  --  436 418     Results from last 7 days   Lab Units 07/14/23  0029 07/13/23  1156 07/13/23  0343 07/12/23  2153   SODIUM mmol/L 144 149* 151* 150*   POTASSIUM mmol/L 4.2 4.0 4.7 4.8   CHLORIDE mmol/L 109* 109* 112* 112*   CO2 mmol/L 27.6 30.1* 29.2* 30.8*   BUN mg/dL 29* 31* 32* 33*   CREATININE mg/dL 0.90 0.95 0.96 1.04*   CALCIUM mg/dL 9.6 10.5 10.2 10.4   GLUCOSE mg/dL 138* 126* 138* 135*   ALT (SGPT) U/L  --   --  33 35*   AST (SGOT) U/L  --   --  30 36*     Results from last 7 days   Lab Units 07/13/23  0014 07/12/23  2153   HSTROP T ng/L 24* 26*     Lab Results   Component Value Date    PROBNP 26,037.0 (H) 07/13/2023     No results found.     Lab Results    Component Value Date    INR 1.38 (H) 07/12/2023     No results found for: MG  No results found for: TSH, PSA   No results found for: CHOL, TRIG, HDL, LDL  Pain Management Panel           No data to display              Microbiology Results (last 10 days)       Procedure Component Value - Date/Time    COVID-19 and FLU A/B PCR - Swab, Nasopharynx [[id number]]  (Normal) Collected: 07/13/23 0109    Lab Status: Final result Specimen: Swab from Nasopharynx Updated: 07/13/23 0138     COVID19 Not Detected     Influenza A PCR Not Detected     Influenza B PCR Not Detected    Narrative:      Fact sheet for providers: https://www.fda.gov/media/779113/download    Fact sheet for patients: https://www.fda.gov/media/580760/download    Test performed by PCR.    Blood Culture - Blood, Arm, Left [[id number]]  (Normal) Collected: 07/12/23 2220    Lab Status: Preliminary result Specimen: Blood from Arm, Left Updated: 07/13/23 2231     Blood Culture No growth at 24 hours    Blood Culture - Blood, Arm, Left [[id number]]  (Normal) Collected: 07/12/23 2220    Lab Status: Preliminary result Specimen: Blood from Arm, Left Updated: 07/13/23 2231     Blood Culture No growth at 24 hours           Imaging Results (Last 48 Hours)       Procedure Component Value Units Date/Time    CT Abdomen Pelvis Without Contrast [[id number]] Collected: 07/13/23 0026     Updated: 07/13/23 0029    Narrative:      CLINICAL INDICATION: ams abd pain. Dyspnea, chronic, unclear etiology  PROCEDURE DATE: 7/12/2023     CT ABD/PELVIS W/O IV C  Technique: Standard axial collimation through the abdomen and pelvis  without oral or intravenous contrast.  Reconstructed images were  submitted for interpretation. Limited exposure control, adjustment of  the mA and/or KV according to patient size or use of iterative  reconstruction technique was utilized.     Findings:     1.  Moderate ascites in the abdomen and pelvis.  2.  No bowel obstruction, free air, or abscess.  3.  No  evidence of acute colitis or diverticulitis.  4.  The appendix is not seen.  5.  No CT evidence of acute pancreatitis.  6.  Contracted gallbladder with a questionable stone in the GB fundus.  7.  Small air in the nondependent portion of the urinary bladder, likely  related to recent catheterization versus cystitis.  Correlate  clinically.  8.  Bilateral renal calculi without hydronephrosis.  9.  Moderate retained fecal volume in the rectum.  10.  Diffuse subcutaneous edema throughout the abdominal/pelvic wall.  11.  Mild thoracolumbar scoliosis with multilevel lumbar discogenic and  facet degenerative change.       Impression:      Impression:     1.  No bowel obstruction, free air, or abscess.  2.  Moderate ascites in the abdomen and pelvis.  3.  No evidence of acute colitis or diverticulitis.  4.  Biparietal renal calculi without hydronephrosis.  5.  Probable cholelithiasis.  6.  Small air in the nondependent portion of the urinary bladder, likely  related to recent catheterization versus cystitis.  Correlate  clinically.        This report was finalized on 7/13/2023 12:26 AM by Ricky Perkins MD.       CT Chest Without Contrast Diagnostic [[id number]] Collected: 07/13/23 0025     Updated: 07/13/23 0028    Narrative:      CT CHEST W/O  Technique: Standard axial collimation through the chest without  intravenous contrast.  Reconstructed images were submitted for  interpretation. Limited exposure control, adjustment of the mA and/or KV  according to patient size or use of iterative reconstruction technique  was utilized.     Findings:     1.  No focal infiltrate, pleural effusion or pneumothorax.  2.  A 7 mm posterior subpleural noncalcified groundglass density in the  right upper lobe, likely related to postinflammatory changes.  3.  Mild pulmonary vascular congestion.  4.  Reece cardiomegaly with small to moderate superior/posterior  pericardial effusion, 9-18 mm in thickness.  5.  No aortic aneurysm.  6.  No mediastinal  lymphadenopathy.  7.  Motion artifacts degrading image quality.       Impression:      Impression:     1.  Marked cardiomegaly and small-to-moderate pericardial effusion with  mild pulmonary vascular congestion.  2.  No focal infiltrate or pleural effusion.      This report was finalized on 7/13/2023 12:26 AM by Ricky Perkins MD.       CT Head Without Contrast [[id number]] Collected: 07/13/23 0025     Updated: 07/13/23 0027    Narrative:      CT HEAD     Technique: Standard axial collimation through the head without contrast.   Reconstructed images were submitted for interpretation. Limited  exposure control, adjustment of the mA and/or KV according to patient  size or use of iterative reconstruction technique was utilized.     Findings:     1.  No acute intracranial hemorrhage, mass effect or cerebral cortical  edema.  2.  Normal ventricular volume for the patient's age.  3.  Mild cerebral cortical atrophy.   4.  No skull fractures.  5.  The mastoid air cells are clear.  6.  The visualized paranasal sinuses are clear.       Impression:      Impression:     No acute intracranial abnormality.     This report was finalized on 7/13/2023 12:25 AM by Ricky Perkins MD.       XR Chest 1 View [[id number]] Collected: 07/12/23 2259     Updated: 07/12/23 2301    Narrative:      Single view chest     Indications: Sepsis protocol     Findings:     AP view the chest shows enlargement of the cardiac silhouette.  The  osseous structures are normal.  Lungs are free from infiltrate and  effusion.       Impression:      Impression:     Cardiomegaly.  Lungs clear.     This report was finalized on 7/12/2023 10:59 PM by Wilber Lopez MD.               ECHO:  Results for orders placed during the hospital encounter of 07/12/23    Adult Transthoracic Echo Complete W/ Cont if Necessary Per Protocol    Interpretation Summary  Images from the original result were not included.      Normal left ventricular cavity size noted. Left ventricular wall  thickness is consistent with mild concentric hypertrophy. All left ventricular wall segments contract normally.    Left ventricular ejection fraction appears to be 51 - 55%.    Left ventricular diastolic function is consistent with (grade I) impaired relaxation.    There is a moderate (1-2cm) pericardial effusion adjacent to the left ventricle. There is no evidence of cardiac tamponade.    The aortic valve is structurally normal with no regurgitation or stenosis present.    The mitral valve is structurally normal with no significant stenosis present. Trace mitral valve regurgitation is present.    Mild tricuspid valve regurgitation is present. Estimated right ventricular systolic pressure from tricuspid regurgitation is moderately elevated (45-55 mmHg).    Moderate pulmonary hypertension is present.    There is a moderate (1-2cm) pericardial effusion adjacent to the left ventricle. The effusion is fluid filled. There is no evidence of cardiac tamponade.      STRESS TEST:   No results found for this or any previous visit.    HEART CATH:  No results found for this or any previous visit.      [id number]      TELEMETRY:      SR/ST 90- 100's with frequent PVCs and a brief episode of SVT around 05:39 am today.                 I reviewed the patient's new clinical results.    Medication Review:   Current list of medications may not reflect those currently placed in orders that are not signed or are being held.     cefTRIAXone, 2,000 mg, Intravenous, Daily  furosemide, 60 mg, Intravenous, Once  iron polysaccharides, 150 mg, Oral, Daily  pantoprazole, 40 mg, Intravenous, BID AC  senna-docusate sodium, 2 tablet, Oral, BID  sodium chloride, 10 mL, Intravenous, Q12H  sodium chloride, 10 mL, Intravenous, Q12H           acetaminophen    senna-docusate sodium **AND** polyethylene glycol **AND** bisacodyl **AND** bisacodyl    ondansetron    prochlorperazine    simethicone    sodium chloride    sodium chloride    sodium  chloride    sodium chloride    sodium chloride    Assessment    1.  Acute decompensated HFpEF  2.  Moderate-sized pericardial effusion with no tamponade physiology by echocardiogram  3.  Mild elevated high-sensitivity troponin with a flat trend, no chest pain reported most likely NSTEMI type II secondary to heart failure  4.  Severe anemia with possible GI blood loss  5.  Likely obstructive sleep apnea            Plan   1.  We will continue diuresis we will try to get accurate I's and O's for assessment she needs to be negative by at least a liter per day  2.  She will require ischemic evaluation once her CHF is under control  3.  Work-up for GI blood loss as per medicine service          I have discussed the patients findings and my recommendations with patient.    Electronically signed by WINDY Roberts, 07/14/23, 12:58 PM EDT.   Electronically signed by Bernice Rosas MD, 07/14/23, 2:14 PM EDT.                      Please note that portions of this note were completed with a voice recognition program.    Please note that portions of this note were copied and has been reviewed and is accurate as of 7/14/2023 .

## 2023-07-15 NOTE — PROGRESS NOTES
LOS: 2 days     Name: Judy Lutz  Age/Sex: 63 y.o. female  :  1960        PCP: Provider, No Known  REF: No Known Provider    Principal Problem:    Symptomatic anemia      Reason for follow-up: Cardiomyopathy, SVT and pericardial effusion    Subjective     Subjective     Judy Lutz is a 63 y.o. female with a no considerable past medical history noted in patient's chart.  Patient presented to [facility]) emergency room (ER) on 2023 with complaints of increased fatigue, malaise, shortness of breath, and generalized weakness for the past several weeks.     Interval History: Patient resting in bed in no acute distress on evaluation today. Has had excellent urine output with IV diuresis and is -4 liters. Mild hypokalemia today. Echocardiogram showed EF 51-55% with moderate pericardial effusion and moderate pulmonary hypertension.     Vital Signs  Temp:  [97.8 °F (36.6 °C)-99 °F (37.2 °C)] 98.1 °F (36.7 °C)  Heart Rate:  [] 97  Resp:  [-] 22  BP: (110-141)/(51-77) 141/69  Vital Signs (last 72 hrs)          0700  07/ 0659  0700   0659  0700  07/15 0659 07/15 0700  07/15 0907   Most Recent      Temp (°F) 98.4 -  98.9    97.6 -  99.2    97.8 -  99      98.1     98.1 (36.7) 07/15 0800    Heart Rate 87 -  108    91 -  105    91 -  105    94 -  97     97 /15 0800    Resp 12 -      12 -        22     22 07/15 0700    /60 -  158/80    102/30 -  149/73    110/68 -  137/70    130/73 -  141/69     141/69 /15 0800    SpO2 (%) 80 -  98    90 -  97    87 -  96    93 -  94     94 /15 0800          Documented weights    23 0400 07/15/23 0400   Weight: (!) 181 kg (400 lb) (!) 183 kg (404 lb 1.7 oz) (!) 177 kg (390 lb 7 oz)      Body mass index is 56.02 kg/m².    Intake/Output Summary (Last 24 hours) at 7/15/2023 0907  Last data filed at 7/15/2023 0400  Gross per 24 hour   Intake 1610 ml   Output 5900 ml   Net  "-4290 ml     Objective:  Vital signs: (most recent): Blood pressure 141/69, pulse 97, temperature 98.1 °F (36.7 °C), temperature source Axillary, resp. rate 22, height 177.8 cm (70\"), weight (!) 177 kg (390 lb 7 oz), SpO2 94 %.              Objective       Physical Exam:     General Appearance:    Alert, cooperative, in no acute distress   Head:    Normocephalic, without obvious abnormality, atraumatic   Eyes:            Conjunctivae and sclerae normal, no   icterus, no pallor, corneas clear.   Neck:   No adenopathy, supple, trachea midline, no thyromegaly, no   carotid bruit, +JVD   Lungs:     Clear to auscultation,respirations regular, even and            unlabored    Heart:    Regular rhythm and normal rate, normal S1 and S2, no         murmur, no gallop, no rub, no click   Chest Wall:    No abnormalities observed   Abdomen:     Normal bowel sounds, no masses, no organomegaly, soft     nontender, nondistended, no guarding, no rebound    tenderness   Extremities:   Moves all extremities well, 2+ BLE edema, no cyanosis, no         redness   Pulses:   Pulses palpable and equal bilaterally   Skin:   No bleeding, bruising or rash       Neurologic:   Alert and oriented      Results review       Results Review:   Results from last 7 days   Lab Units 07/15/23  0055 07/14/23  0029 07/13/23  1156 07/13/23  0516 07/13/23  0343 07/12/23  2153   WBC 10*3/mm3 19.00* 23.36* 21.69*  --  22.40* 20.79*   HEMOGLOBIN g/dL 7.1* 7.8* 8.5* 7.4* 7.4* 6.4*   PLATELETS 10*3/mm3 398 401 503*  --  436 418     Results from last 7 days   Lab Units 07/15/23  0055 07/14/23  1519 07/14/23  0029 07/13/23  1156 07/13/23  0343 07/12/23  2153   SODIUM mmol/L 144 146* 144 149* 151* 150*   POTASSIUM mmol/L 3.3* 3.7 4.2 4.0 4.7 4.8   CHLORIDE mmol/L 102 104 109* 109* 112* 112*   CO2 mmol/L 31.9* 32.7* 27.6 30.1* 29.2* 30.8*   BUN mg/dL 25* 28* 29* 31* 32* 33*   CREATININE mg/dL 0.77 0.83 0.90 0.95 0.96 1.04*   CALCIUM mg/dL 9.8 9.6 9.6 10.5 10.2 10.4 "   GLUCOSE mg/dL 118* 130* 138* 126* 138* 135*   ALT (SGPT) U/L  --   --   --   --  33 35*   AST (SGOT) U/L  --   --   --   --  30 36*     Results from last 7 days   Lab Units 07/13/23  0014 07/12/23 2153   HSTROP T ng/L 24* 26*     Lab Results   Component Value Date    INR 1.38 (H) 07/12/2023     No results found for: MG  No results found for: TSH, PSA, CHLPL, TRIG, HDL, LDL   Imaging Results (Last 48 Hours)       Procedure Component Value Units Date/Time    US Liver [[id number]] Resulted: 07/14/23 2138     Updated: 07/14/23 2138          No results found for: BNP           Echo   Results for orders placed during the hospital encounter of 07/12/23    Adult Transthoracic Echo Complete W/ Cont if Necessary Per Protocol    Interpretation Summary  Images from the original result were not included.      Normal left ventricular cavity size noted. Left ventricular wall thickness is consistent with mild concentric hypertrophy. All left ventricular wall segments contract normally.    Left ventricular ejection fraction appears to be 51 - 55%.    Left ventricular diastolic function is consistent with (grade I) impaired relaxation.    There is a moderate (1-2cm) pericardial effusion adjacent to the left ventricle. There is no evidence of cardiac tamponade.    The aortic valve is structurally normal with no regurgitation or stenosis present.    The mitral valve is structurally normal with no significant stenosis present. Trace mitral valve regurgitation is present.    Mild tricuspid valve regurgitation is present. Estimated right ventricular systolic pressure from tricuspid regurgitation is moderately elevated (45-55 mmHg).    Moderate pulmonary hypertension is present.    There is a moderate (1-2cm) pericardial effusion adjacent to the left ventricle. The effusion is fluid filled. There is no evidence of cardiac tamponade.       I reviewed the patient's new clinical results.    Telemetry: NSR  bpm      Medication Review:    albumin human, 25 g, Intravenous, BID  aspirin, 81 mg, Oral, Daily  cefTRIAXone, 2,000 mg, Intravenous, Daily  escitalopram, 10 mg, Oral, Daily  ferric gluconate, 250 mg, Intravenous, Daily  furosemide, 60 mg, Intravenous, BID  [START ON 7/17/2023] iron polysaccharides, 150 mg, Oral, Daily  metoprolol succinate XL, 25 mg, Oral, Q24H  pantoprazole, 40 mg, Oral, BID AC  potassium chloride, 40 mEq, Oral, Daily  rosuvastatin, 20 mg, Oral, Nightly  senna-docusate sodium, 2 tablet, Oral, BID  sodium chloride, 10 mL, Intravenous, Q12H  sodium chloride, 10 mL, Intravenous, Q12H             Assessment      Assessment:  Acute HFpEF, improving   Pericardial effusion, moderate   NSTEMI, likely type II from CHF, troponin trending downward    Plan     Recommendations:  HFpEF  Lower extremity edema improving. Has had excellent urine output. Continue with IV diuretics at current dosing as patient still has volume overload. Continue toprol XL.   Heart failure clinic referral placed.     2. Pericardial effusion  Echo showed moderate pericardial effusion. Continue IV diuresis. Follow up with repeat echocardiogram in 4 weeks as outpatient.     3. NSTEMI  likely type II from CHF, troponin trending downward. Can consider ischemic evaluation later on once stable. Continue asprin, statin and beta blocker.     I discussed the patient's findings and my recommendations with patient and family    Electronically signed by WINDY Kaur, 07/15/23, 9:07 AM EDT.     Please note that portions of this note were completed with a voice recognition program.     I evaluated and examined the patient with the WINDY Kaur and agree with her assessment.    Electronically signed by Anthony Alex MD, 07/15/23, 1:10 PM EDT.

## 2023-07-15 NOTE — PLAN OF CARE
Problem: Fall Injury Risk  Goal: Absence of Fall and Fall-Related Injury  Outcome: Ongoing, Progressing  Intervention: Identify and Manage Contributors  Recent Flowsheet Documentation  Taken 7/15/2023 1430 by Gabriella Forman RN  Medication Review/Management: medications reviewed  Intervention: Promote Injury-Free Environment  Recent Flowsheet Documentation  Taken 7/15/2023 1500 by Gabriella Forman RN  Safety Promotion/Fall Prevention:   activity supervised   assistive device/personal items within reach   clutter free environment maintained   fall prevention program maintained   nonskid shoes/slippers when out of bed   room organization consistent   safety round/check completed  Taken 7/15/2023 1430 by Gabriella Forman, RN  Safety Promotion/Fall Prevention: safety round/check completed  Taken 7/15/2023 1300 by Gabriella Forman RN  Safety Promotion/Fall Prevention:   activity supervised   assistive device/personal items within reach   clutter free environment maintained   fall prevention program maintained   nonskid shoes/slippers when out of bed   room organization consistent   safety round/check completed  Taken 7/15/2023 0900 by Gabriella Forman RN  Safety Promotion/Fall Prevention:   activity supervised   assistive device/personal items within reach   clutter free environment maintained   fall prevention program maintained   nonskid shoes/slippers when out of bed   room organization consistent   safety round/check completed  Taken 7/15/2023 0700 by Gabriella Forman, RN  Safety Promotion/Fall Prevention:   activity supervised   assistive device/personal items within reach   clutter free environment maintained   fall prevention program maintained   nonskid shoes/slippers when out of bed   room organization consistent   safety round/check completed     Problem: Skin Injury Risk Increased  Goal: Skin Health and Integrity  Outcome: Ongoing, Progressing  Intervention: Optimize Skin Protection  Recent  Flowsheet Documentation  Taken 7/15/2023 1430 by Gabriella Forman RN  Pressure Reduction Techniques:   weight shift assistance provided   heels elevated off bed   positioned off wounds  Pressure Reduction Devices: pressure-redistributing mattress utilized  Skin Protection:   adhesive use limited   incontinence pads utilized   tubing/devices free from skin contact  Taken 7/15/2023 1200 by Gabriella Forman RN  Head of Bed (HOB) Positioning: HOB elevated  Taken 7/15/2023 0830 by Gabriella Forman RN  Pressure Reduction Techniques:   weight shift assistance provided   heels elevated off bed   pressure points protected  Pressure Reduction Devices: pressure-redistributing mattress utilized  Skin Protection:   adhesive use limited   incontinence pads utilized   tubing/devices free from skin contact     Problem: Nausea and Vomiting  Goal: Fluid and Electrolyte Balance  Outcome: Ongoing, Progressing  Intervention: Monitor and Manage Hypovolemia  Recent Flowsheet Documentation  Taken 7/15/2023 1430 by Gabriella Forman RN  Fluid/Electrolyte Management: fluids provided  Taken 7/15/2023 0830 by Gabriella Forman RN  Fluid/Electrolyte Management: fluids provided   Goal Outcome Evaluation:           Progress: no change  Outcome Evaluation: Pt arouses to voice. Can answer all orientation questions but has been lethargic and confused at times, reorientation provided. Pt is resting in bed at this time with family at the bedside. Call light within reach. Bed alarm set. Will continue to follow plan of care throuhgout shift until 7p.

## 2023-07-15 NOTE — PROGRESS NOTES
[facility] HOSPITALIST PROGRESS NOTE     Patient Identification:  Name:  Judy Lutz  Age:  63 y.o.  Sex:  female  :  1960  MRN:  [id number]  Visit Number:  [id number]  ROOM: [place] [address]     Primary Care Provider:  Angela, No Known     Date of Admission: 2023    Length of stay in inpatient status:  2    Subjective     Chief Compliant:    Chief Complaint   Patient presents with    Shortness of Breath     History of Presenting Illness:  I am covering this patient with Dr. Dang.  2 family members were at bedside and they were very concerned that the patient was acting confused.  By the time I entered the room, the patient was oriented and knew her family members, which the family stated was not what they witnessed prior to my arrival to the patient's room.  The patient was slow to answer some questions but overall she seems to be improved compared to what the family was describing.    Objective     Current Hospital Meds:  albumin human, 25 g, Intravenous, BID  aspirin, 81 mg, Oral, Daily  cefTRIAXone, 2,000 mg, Intravenous, Daily  escitalopram, 10 mg, Oral, Daily  ferric gluconate, 250 mg, Intravenous, Daily  furosemide, 60 mg, Intravenous, BID  [START ON 2023] iron polysaccharides, 150 mg, Oral, Daily  metoprolol succinate XL, 25 mg, Oral, Q24H  pantoprazole, 40 mg, Oral, BID AC  potassium chloride, 40 mEq, Oral, Daily  rosuvastatin, 20 mg, Oral, Nightly  senna-docusate sodium, 2 tablet, Oral, BID  sodium chloride, 10 mL, Intravenous, Q12H  sodium chloride, 10 mL, Intravenous, Q12H       Current Antimicrobial Therapy:  Anti-Infectives (From admission, onward)      Ordered     Dose/Rate Route Frequency Start Stop    23 0542  cefTRIAXone (ROCEPHIN) 2,000 mg in sodium chloride 0.9 % 100 mL IVPB-VTB        Ordering Provider: Leonard Dang DO    2,000 mg  200 mL/hr over 30 Minutes Intravenous Daily 23 2300 23 0859    23 6532  cefTRIAXone  (ROCEPHIN) 2,000 mg in sodium chloride 0.9 % 100 mL IVPB-VTB        Ordering Provider: Yusuf Luo DO    2,000 mg  200 mL/hr over 30 Minutes Intravenous Once 07/12/23 2153 07/12/23 2330          Current Diuretic Therapy:  Diuretics (From admission, onward)      Ordered     Dose/Rate Route Frequency Start Stop    07/14/23 1026  furosemide (LASIX) injection 60 mg        Ordering Provider: Leonard Dang DO    60 mg Intravenous 2 Times Daily (Diuretics) 07/14/23 2130      07/14/23 0810  furosemide (LASIX) injection 60 mg        Ordering Provider: Leonard Dang DO    60 mg Intravenous Once 07/14/23 0900 07/14/23 0846    07/13/23 0054  furosemide (LASIX) injection 80 mg        Ordering Provider: Yusuf Luo DO    80 mg Intravenous Once 07/13/23 0110 07/13/23 0114          ----------------------------------------------------------------------------------------------------------------------  Vital Signs:  Temp:  [98.1 °F (36.7 °C)-99 °F (37.2 °C)] 98.9 °F (37.2 °C)  Heart Rate:  [92-99] 95  Resp:  [12-24] 22  BP: (116-160)/(51-83) 160/83  SpO2:  [85 %-94 %] 91 %  on  Flow (L/min):  [3-4] 3;   Device (Oxygen Therapy): humidified;nasal cannula  Body mass index is 56.02 kg/m².    Wt Readings from Last 3 Encounters:   07/15/23 (!) 177 kg (390 lb 7 oz)     Intake & Output (last 3 days)         07/13 0701 07/14 0700 07/14 0701  07/15 0700 07/15 0701  07/16 0700    P.O. 2240 1240     I.V. (mL/kg)   450 (2.5)    Blood       IV Piggyback 100 370     Total Intake(mL/kg) 2340 (12.8) 1610 (9.1) 450 (2.5)    Urine (mL/kg/hr) 2300 (0.5) 5900 (1.4) 4750 (2.2)    Stool 0 0 0    Total Output 2300 5900 4750    Net +40 -4290 -4300           Urine Unmeasured Occurrence 5 x 1 x     Stool Unmeasured Occurrence  0 x 1 x          Diet: Cardiac Diets; Healthy Heart (2-3 Na+); Texture: Soft to Chew (NDD 3); Soft to Chew: Whole Meat; Fluid Consistency: Thin (IDDSI  0)  ----------------------------------------------------------------------------------------------------------------------  Physical Exam  Vitals and nursing note reviewed. Exam conducted with a chaperone present.   Constitutional:       General: She is not in acute distress.     Appearance: She is well-developed. She is not ill-appearing, toxic-appearing or diaphoretic.   HENT:      Head: Normocephalic and atraumatic.      Right Ear: External ear normal.      Left Ear: External ear normal.      Nose: Nose normal.   Eyes:      General: No scleral icterus.        Right eye: No discharge.         Left eye: No discharge.      Extraocular Movements: Extraocular movements intact.      Conjunctiva/sclera: Conjunctivae normal.      Pupils: Pupils are equal, round, and reactive to light.   Cardiovascular:      Rate and Rhythm: Normal rate and regular rhythm.      Pulses: Normal pulses.      Heart sounds: No murmur heard.  Pulmonary:      Effort: Pulmonary effort is normal. No respiratory distress.      Breath sounds: No wheezing.   Abdominal:      General: Bowel sounds are normal. There is no distension.      Palpations: Abdomen is soft.   Musculoskeletal:         General: No swelling, deformity or signs of injury.   Skin:     Capillary Refill: Capillary refill takes less than 2 seconds.      Coloration: Skin is not jaundiced or pale.   Neurological:      Mental Status: She is alert and oriented to person, place, and time. Mental status is at baseline.      Cranial Nerves: No cranial nerve deficit.      Comments: Even though the patient is oriented, she does talk with a slow glenys and it takes her longer than normal to answer questions.   Psychiatric:         Mood and Affect: Mood normal.         Behavior: Behavior normal. Behavior is cooperative.     ----------------------------------------------------------------------------------------------------------------------  Tele:  NS with heart rates 80-90's.  I have  personally reviewed/looked at the telemetry strips.  ----------------------------------------------------------------------------------------------------------------------  LABS:    Peripheral smear 7/13/2023:  DIAGNOSIS:  PERIPHERAL SMEAR  Neutrophilic leukocytosis with left shift.  No circulating blasts identified.  Severe microcytic anemia with moderate hypochromasia and moderate  anisopoikilocytosis including target cells and elliptocytes.  Rare schistocytes  noted.  Platelets present in adequate numbers with normal appearance.     CBC and coagulation:  Results from last 7 days   Lab Units 07/15/23  0055 07/14/23  0029 07/13/23  1156 07/13/23  0516 07/13/23  0343 07/12/23  2153   PROCALCITONIN ng/mL  --   --   --   --  0.18 0.29*   LACTATE mmol/L  --   --   --   --   --  1.6   CRP mg/dL  --   --   --   --  15.57*  --    WBC 10*3/mm3 19.00* 23.36* 21.69*  --  22.40* 20.79*   HEMOGLOBIN g/dL 7.1* 7.8* 8.5* 7.4* 7.4* 6.4*   HEMATOCRIT % 30.4* 33.8* 35.7 29.4* 29.4* 27.0*   MCV fL 77.6* 77.5* 75.6*  --  72.6* 73.2*   MCHC g/dL 23.4* 23.1* 23.8*  --  25.2* 23.7*   PLATELETS 10*3/mm3 398 401 503*  --  436 418   INR   --   --   --   --   --  1.38*     Acid/base balance:  Results from last 7 days   Lab Units 07/15/23  1437 07/12/23  2127   PH, ARTERIAL pH units 7.376 7.388   PCO2, ARTERIAL mm Hg 76.4* 50.9*   PO2 ART mm Hg 56.3* 45.8*   HCO3 ART mmol/L 44.8* 30.6*     Renal and electrolytes:  Results from last 7 days   Lab Units 07/15/23  0055 07/14/23  1519 07/14/23  0029 07/13/23  1156 07/13/23  0343 07/12/23  2153   SODIUM mmol/L 144 146* 144 149* 151* 150*   POTASSIUM mmol/L 3.3* 3.7 4.2 4.0 4.7 4.8   CHLORIDE mmol/L 102 104 109* 109* 112* 112*   CO2 mmol/L 31.9* 32.7* 27.6 30.1* 29.2* 30.8*   BUN mg/dL 25* 28* 29* 31* 32* 33*   CREATININE mg/dL 0.77 0.83 0.90 0.95 0.96 1.04*   CALCIUM mg/dL 9.8 9.6 9.6 10.5 10.2 10.4   GLUCOSE mg/dL 118* 130* 138* 126* 138* 135*   ANION GAP mmol/L 10.1 9.3 7.4 9.9 9.8 7.2      Estimated Creatinine Clearance: 135.7 mL/min (by C-G formula based on SCr of 0.77 mg/dL).    Liver and pancreatic function:  Results from last 7 days   Lab Units 07/13/23  0343 07/12/23  2153   ALBUMIN g/dL 3.0* 2.9*   BILIRUBIN mg/dL 0.5 0.4   ALK PHOS U/L 95 97   AST (SGOT) U/L 30 36*   ALT (SGPT) U/L 33 35*     Endocrine function:  Lab Results   Component Value Date    HGBA1C 5.80 (H) 07/13/2023     Glucose levels from the CMP:  Results from last 7 days   Lab Units 07/15/23  0055 07/14/23  1519 07/14/23  0029 07/13/23  1156 07/13/23  0343 07/12/23 2153   GLUCOSE mg/dL 118* 130* 138* 126* 138* 135*     Cardiac:  Results from last 7 days   Lab Units 07/15/23  1504 07/15/23  1211 07/13/23  0014 07/12/23 2153   HSTROP T ng/L 32* 29* 24* 26*   PROBNP pg/mL  --   --  26,037.0*  --        Cultures:  Lab Results   Component Value Date    COLORU Yellow 07/15/2023    CLARITYU Cloudy (A) 07/15/2023    PHUR 5.5 07/15/2023    PROTEINUR 4.3 07/14/2023    PROTEINUR 4.4 07/14/2023    GLUCOSEU Negative 07/15/2023    KETONESU Negative 07/15/2023    BLOODU Small (1+) (A) 07/15/2023    NITRITEU Positive (A) 07/15/2023    LEUKOCYTESUR Large (3+) (A) 07/15/2023    BILIRUBINUR Negative 07/15/2023    UROBILINOGEN 0.2 E.U./dL 07/15/2023    RBCUA 3-5 (A) 07/15/2023    WBCUA 13-20 (A) 07/15/2023    BACTERIA 1+ (A) 07/15/2023     Microbiology Results (last 10 days)       Procedure Component Value - Date/Time    COVID-19 and FLU A/B PCR - Swab, Nasopharynx [[id number]]  (Normal) Collected: 07/13/23 0109    Lab Status: Final result Specimen: Swab from Nasopharynx Updated: 07/13/23 0138     COVID19 Not Detected     Influenza A PCR Not Detected     Influenza B PCR Not Detected    Narrative:      Fact sheet for providers: https://www.fda.gov/media/276959/download    Fact sheet for patients: https://www.fda.gov/media/826755/download    Test performed by PCR.    Blood Culture - Blood, Arm, Left [[id number]]  (Normal) Collected: 07/12/23 0212     Lab Status: Preliminary result Specimen: Blood from Arm, Left Updated: 07/14/23 2231     Blood Culture No growth at 2 days    Blood Culture - Blood, Arm, Left [[id number]]  (Normal) Collected: 07/12/23 2220    Lab Status: Preliminary result Specimen: Blood from Arm, Left Updated: 07/14/23 2231     Blood Culture No growth at 2 days    Urine Culture - Urine, Straight Cath [[id number]]  (Abnormal)  (Susceptibility) Collected: 07/12/23 2140    Lab Status: Final result Specimen: Urine from Straight Cath Updated: 07/15/23 1323     Urine Culture >100,000 CFU/mL Escherichia coli ESBL     Comment:   Consider infectious disease consult.  Susceptibility results may not correlate to clinical outcomes.         >100,000 CFU/mL Klebsiella pneumoniae ssp pneumoniae    Narrative:      Colonization of the urinary tract without infection is common. Treatment is discouraged unless the patient is symptomatic, pregnant, or undergoing an invasive urologic procedure.  Recent outcomes data supports the use of pip/tazo in the treatment of susceptible ESBL infections for uncomplicated UTI. Consider use of pip/tazo as a carbapenem-sparing regimen in applicable patients.    Susceptibility        Escherichia coli ESBL      MANUEL      Ertapenem Susceptible      Gentamicin Susceptible      Levofloxacin Resistant      Meropenem Susceptible      Nitrofurantoin Susceptible      Piperacillin + Tazobactam Resistant      Trimethoprim + Sulfamethoxazole Resistant                       Susceptibility        Klebsiella pneumoniae ssp pneumoniae      MANUEL      Ampicillin Resistant      Ampicillin + Sulbactam Susceptible      Cefazolin Susceptible      Cefepime Susceptible      Ceftazidime Susceptible      Ceftriaxone Susceptible      Gentamicin Susceptible      Levofloxacin Susceptible      Nitrofurantoin Intermediate      Piperacillin + Tazobactam Susceptible      Trimethoprim + Sulfamethoxazole Susceptible                       I have personally looked at  the labs and they are summarized above.    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 coli and Klebsiella pneumoniae  -Anemia noted on admission, suspect due to severe iron deficiency anemia  -Leukocytosis, present on admission, suspect secondary to bone marrow overproduction as a result of the severe iron deficiency anemia  -Hypernatremia due to free water deficit that was present on admission, now resolved  -History of major depressive disorder   -Acute on chronic debility that was present on admission    Since the urine cultures have resulted with an ESBL E. coli and Klebsiella, I will change the Rocephin to meropenem.  The UTI may be the cause of the intermittent encephalopathy/delirium and thus we will continue to monitor her neurological exam closely while she receives this antibiotic.  Please note that the blood pressures are at goal and controlled we will continue to monitor these.  I have written for magnesium replacement protocol and we will continue to monitor the electrolytes closely.  We will repeat the blood work in the morning.  Since the hemoglobin level is still decreasing even after 2 units of packed red blood cells on 7/12/2022, I have opted to keep the patient in the progressive care unit overnight to evaluate how she does.  We will repeat blood work morning.    VTE Prophylaxis:   Mechanical Order  History:        Ordered        07/13/23 0225  Place Sequential Compression Device  Once            07/13/23 0225  Maintain Sequential Compression Device  Continuous                     The patient is high risk due to the following diagnoses/reasons:  acute hypoxemic respiratory failure due to heart failure exacerbation and anasarca     Disposition: Home +/- home health services versus SNF     Candido Rodney MD  [facility] Hospitalist  07/15/23  19:04 EDT

## 2023-07-15 NOTE — PLAN OF CARE
Goal Outcome Evaluation:  Plan of Care Reviewed With: patient           Outcome Evaluation: Patient is alert and oriented x 4, NSR on monitor, 3L NC and tolerating well, patient's family at bedside, patient has slept between care, bed alarm set, call light in reach, and VSS at this time.

## 2023-07-16 NOTE — PROGRESS NOTES
LOS: 3 days     Name: Judy Lutz  Age/Sex: 63 y.o. female  :  1960        PCP: Provider, No Known  REF: No Known Provider    Principal Problem:    Symptomatic anemia      Reason for follow-up: Cardiomyopathy and pericardial effusion    Subjective     Subjective     Judy Lutz is a 63 y.o. female with a no considerable past medical history noted in patient's chart.  Patient presented to [facility]) emergency room (ER) on 2023 with complaints of increased fatigue, malaise, shortness of breath, and generalized weakness for the past several weeks     Interval History: Patient awake and oriented. Reports worsening anxiety and shortness of breath. Lower extremity edema improving but still present. Is negative 8 liters with IV diuresis.  Hemoglobin 7.1 today. Receiving iron infusions.     Vital Signs  Temp:  [97.8 °F (36.6 °C)-99.1 °F (37.3 °C)] 97.8 °F (36.6 °C)  Heart Rate:  [] 93  Resp:  [-] 18  BP: (121-160)/(58-87) 153/69  Vital Signs (last 72 hrs)          0700  / 0659  0700  07/15 0659 07/15 07 0659  07/ 0828   Most Recent      Temp (°F) 97.6 -  99.2    97.8 -  99    98.1 -  99.1      97.8     97.8 (36.6) /16 0800    Heart Rate 91 -  105    91 -  105    91 -  103      93     93 07/16 0700    Resp  -   -        18     18 /16 0700    /30 -  149/73    110/68 -  137/70    121/64 -  160/83      153/69     153/69 /16 0700    SpO2 (%) 90 -  97    87 -  96    85 -  99      91     91 / 07          Documented weights    07/12/23 2124 07/14/23 0400 07/15/23 0400 07/16/23 0400   Weight: (!) 181 kg (400 lb) (!) 183 kg (404 lb 1.7 oz) (!) 177 kg (390 lb 7 oz) (!) 170 kg (375 lb 7.1 oz)      Body mass index is 53.87 kg/m².    Intake/Output Summary (Last 24 hours) at 2023 1042  Last data filed at 2023 0400  Gross per 24 hour   Intake --   Output 6125 ml   Net -6125 ml  "    Objective:  Vital signs: (most recent): Blood pressure 153/69, pulse 93, temperature 97.8 °F (36.6 °C), temperature source Oral, resp. rate 18, height 177.8 cm (70\"), weight (!) 170 kg (375 lb 7.1 oz), SpO2 93 %.              Objective       Physical Exam:     General Appearance:    Alert, cooperative, in no acute distress   Head:    Normocephalic, without obvious abnormality, atraumatic   Eyes:            Conjunctivae and sclerae normal, no   icterus, no pallor, corneas clear.   Neck:   No adenopathy, supple, trachea midline, no thyromegaly, no   carotid bruit, no JVD   Lungs:     Clear to auscultation,respirations regular, even and            unlabored    Heart:    Regular rhythm and normal rate, normal S1 and S2, no         murmur, no gallop, no rub, no click   Chest Wall:    No abnormalities observed   Abdomen:     Normal bowel sounds, no masses, no organomegaly, soft     nontender, nondistended, no guarding, no rebound                tenderness   Extremities:   Moves all extremities well, 3+ BLE edema, no cyanosis, no   redness   Pulses:   Pulses palpable and equal bilaterally   Skin:   No bleeding, bruising or rash       Neurologic:   Alert and oriented      Results review       Results Review:   Results from last 7 days   Lab Units 07/16/23  0426 07/15/23  0055 07/14/23  0029 07/13/23  1156 07/13/23  0516 07/13/23  0343 07/12/23  2153   WBC 10*3/mm3 20.91* 19.00* 23.36* 21.69*  --  22.40* 20.79*   HEMOGLOBIN g/dL 7.1* 7.1* 7.8* 8.5* 7.4* 7.4* 6.4*   PLATELETS 10*3/mm3 499* 398 401 503*  --  436 418     Results from last 7 days   Lab Units 07/16/23  0426 07/15/23  0055 07/14/23  1519 07/14/23  0029 07/13/23  1156 07/13/23  0343 07/12/23  2153   SODIUM mmol/L 149* 144 146* 144 149* 151* 150*   POTASSIUM mmol/L 2.7* 3.3* 3.7 4.2 4.0 4.7 4.8   CHLORIDE mmol/L 99 102 104 109* 109* 112* 112*   CO2 mmol/L 42.8* 31.9* 32.7* 27.6 30.1* 29.2* 30.8*   BUN mg/dL 20 25* 28* 29* 31* 32* 33*   CREATININE mg/dL 0.76 0.77 " 0.83 0.90 0.95 0.96 1.04*   CALCIUM mg/dL 10.3 9.8 9.6 9.6 10.5 10.2 10.4   GLUCOSE mg/dL 110* 118* 130* 138* 126* 138* 135*   ALT (SGPT) U/L 12  --   --   --   --  33 35*   AST (SGOT) U/L 12  --   --   --   --  30 36*     Results from last 7 days   Lab Units 07/15/23  1504 07/15/23  1211 07/13/23  0014 07/12/23 2153   HSTROP T ng/L 32* 29* 24* 26*     Lab Results   Component Value Date    INR 1.38 (H) 07/12/2023     Lab Results   Component Value Date    MG 2.0 07/16/2023    MG 1.9 07/15/2023     Lab Results   Component Value Date    TSH 0.619 07/15/2023      Imaging Results (Last 48 Hours)       Procedure Component Value Units Date/Time    XR Chest 1 View [[id number]] Collected: 07/15/23 1651     Updated: 07/15/23 1821    Narrative:      Procedure: Portable chest x-ray examination performed on 07/15/2023.  Single view. Semiupright position.     HISTORY: Confusion. Progressive hypoxia.     COMPARISON: None.     FINDINGS:     Enlarged heart size.  Mild central pulmonary vascular congestion.  Mild edema.  Hypoinflated lungs.  Mild elevated right hemidiaphragm.  No pleural effusion or pneumothorax.  No fracture or foreign body.       Impression:         1.  Enlarged heart size.  2.  Central pulmonary vascular congestion with mild edema.  3.  Hypoinflated lungs.  4.  No pleural effusion or pneumothorax.     This report was finalized on 7/15/2023 4:52 PM by Sacha Syed MD.       US Liver [[id number]] Collected: 07/15/23 1058     Updated: 07/15/23 1101    Narrative:      EXAMINATION: US LIVER-      CLINICAL INDICATION: r/o cirrhosis; D64.9-Anemia, unspecified;  A41.9-Sepsis, unspecified organism; R65.20-Severe sepsis without septic  shock; J96.01-Acute respiratory failure with hypoxia; N39.0-Urinary  tract infection, site not specified        COMPARISON: None available     FINDINGS:  Sonographic imaging of the liver     Liver is homogeneous in echotexture  No intrahepatic ductal dilatation or focal hepatic  mass.  Hepatic flow is hepatopedal.     Small amount of perihepatic fluid is present.       Impression:      1. Small volume ascites  2. The liver is homogeneous      This report was finalized on 7/15/2023 10:59 AM by Dr. Nicho Reeevs MD.             No results found for: BNP    Lab Results   Component Value Date    ABSOLUTELUNG 28 07/15/2023         Echo   Results for orders placed during the hospital encounter of 07/12/23    Adult Transthoracic Echo Complete W/ Cont if Necessary Per Protocol    Interpretation Summary  Images from the original result were not included.      Normal left ventricular cavity size noted. Left ventricular wall thickness is consistent with mild concentric hypertrophy. All left ventricular wall segments contract normally.    Left ventricular ejection fraction appears to be 51 - 55%.    Left ventricular diastolic function is consistent with (grade I) impaired relaxation.    There is a moderate (1-2cm) pericardial effusion adjacent to the left ventricle. There is no evidence of cardiac tamponade.    The aortic valve is structurally normal with no regurgitation or stenosis present.    The mitral valve is structurally normal with no significant stenosis present. Trace mitral valve regurgitation is present.    Mild tricuspid valve regurgitation is present. Estimated right ventricular systolic pressure from tricuspid regurgitation is moderately elevated (45-55 mmHg).    Moderate pulmonary hypertension is present.    There is a moderate (1-2cm) pericardial effusion adjacent to the left ventricle. The effusion is fluid filled. There is no evidence of cardiac tamponade.       I reviewed the patient's new clinical results.    Telemetry: NSR/ Sinus tachycardia  bpm     Medication Review:   albumin human, 25 g, Intravenous, BID  aspirin, 81 mg, Oral, Daily  escitalopram, 10 mg, Oral, Daily  ferric gluconate, 250 mg, Intravenous, Daily  furosemide, 60 mg, Intravenous, BID  [START ON 7/17/2023]  iron polysaccharides, 150 mg, Oral, Daily  meropenem, 1,000 mg, Intravenous, Q8H  metoprolol succinate XL, 25 mg, Oral, Q24H  pantoprazole, 40 mg, Oral, BID AC  potassium chloride, 40 mEq, Oral, Daily  potassium chloride, 40 mEq, Oral, Q4H  rosuvastatin, 20 mg, Oral, Nightly  senna-docusate sodium, 2 tablet, Oral, BID  sodium chloride, 10 mL, Intravenous, Q12H  sodium chloride, 10 mL, Intravenous, Q12H             Assessment      Assessment:  Acute HFpEF, improving   Pericardial effusion, moderate  NSTEMI, likely type II from CHF, troponin trending downward   Iron deficiency anemia, receiving iron infusions    Plan     Recommendations:  HFpEF  Lower extremity edema improving with excellent urine output -8 liters with IV diuresis. Kidney function stable therefore continue IV lasix as she still has volume overload. Continue toprol XL.   Recommend treatment of anxiety.     2. Pericardial effusion  Echo showed moderate pericardial effusion. Continue IV diuresis. Follow up with repeat echocardiogram in 4 weeks as outpatient.     3. NSTEMI  Denies any chest pain. likely type II from CHF, troponin trending downward. Can consider        ischemic evaluation later on once heart failure resolved. Continue asprin, statin, BB.     I discussed the patient's findings and my recommendations with patient and family    Electronically signed by WINDY Kaur, 07/16/23, 8:28 AM EDT.     Please note that portions of this note were completed with a voice recognition program.     Patient seen and examined with Yessi TEMPLE.  Plan was formulated in conjunction.  I agree with the above assessment and plan.    Electronically signed by Anthony Alex MD, 07/16/23, 11:14 AM EDT.

## 2023-07-16 NOTE — PLAN OF CARE
Goal Outcome Evaluation:  Plan of Care Reviewed With: patient           Outcome Evaluation: Patient alert and oriented x 4, patient on nasal cannula and tolerating well, patient nsr to sinus tach on montior, bed alarm set, call light within reach, VSS at this time.

## 2023-07-16 NOTE — PROGRESS NOTES
[facility] HOSPITALIST PROGRESS NOTE     Patient Identification:  Name:  Judy Lutz  Age:  63 y.o.  Sex:  female  :  1960  MRN:  [id number]  Visit Number:  [id number]  ROOM: [place] [address]     Primary Care Provider:  Angela, No Known     Date of Admission: 2023    Length of stay in inpatient status:  3    Subjective     Chief Compliant:    Chief Complaint   Patient presents with    Shortness of Breath     History of Presenting Illness:  The daughter that I met yesterday is at bedside.  The patient is less confused but overall the mental status has improved.  The patient was asleep and kept her eyes closed; she did give me a thumbs up that she heard me but she did not answer any of my questions.  The daughter stated that the patient had not complained of anything and she did eat some of her food today.    Objective     Current Hospital Meds:  albumin human, 25 g, Intravenous, BID  aspirin, 81 mg, Oral, Daily  escitalopram, 10 mg, Oral, Daily  furosemide, 60 mg, Intravenous, BID  [START ON 2023] iron polysaccharides, 150 mg, Oral, Daily  meropenem, 1,000 mg, Intravenous, Q8H  metoprolol succinate XL, 25 mg, Oral, Q24H  pantoprazole, 40 mg, Oral, BID AC  potassium chloride, 40 mEq, Oral, Daily  potassium chloride, 40 mEq, Oral, Q4H  rosuvastatin, 20 mg, Oral, Nightly  senna-docusate sodium, 2 tablet, Oral, BID  sodium chloride, 10 mL, Intravenous, Q12H  sodium chloride, 10 mL, Intravenous, Q12H       Current Antimicrobial Therapy:  Anti-Infectives (From admission, onward)      Ordered     Dose/Rate Route Frequency Start Stop    07/15/23 1915  meropenem (MERREM) 1,000 mg in sodium chloride 0.9 % 100 mL IVPB-VTB        Ordering Provider: Candido Rodney MD    1,000 mg  over 3 Hours Intravenous Every 8 Hours 23 0400 23 0359    07/15/23 1915  meropenem (MERREM) 1,000 mg in sodium chloride 0.9 % 100 mL IVPB-VTB        Ordering Provider: Candido Rodney MD    1,000  mg  over 30 Minutes Intravenous Once 07/15/23 2015 07/15/23 2056    07/12/23 2137  cefTRIAXone (ROCEPHIN) 2,000 mg in sodium chloride 0.9 % 100 mL IVPB-VTB        Ordering Provider: Yusuf Luo DO    2,000 mg  200 mL/hr over 30 Minutes Intravenous Once 07/12/23 2153 07/12/23 2330          Current Diuretic Therapy:  Diuretics (From admission, onward)      Ordered     Dose/Rate Route Frequency Start Stop    07/14/23 1026  furosemide (LASIX) injection 60 mg        Ordering Provider: Leonard Dang DO    60 mg Intravenous 2 Times Daily (Diuretics) 07/14/23 2130 07/14/23 0810  furosemide (LASIX) injection 60 mg        Ordering Provider: Leonard Dang DO    60 mg Intravenous Once 07/14/23 0900 07/14/23 0846    07/13/23 0054  furosemide (LASIX) injection 80 mg        Ordering Provider: Yusuf Luo DO    80 mg Intravenous Once 07/13/23 0110 07/13/23 0114          ----------------------------------------------------------------------------------------------------------------------  Vital Signs:  Temp:  [97.8 °F (36.6 °C)-99.1 °F (37.3 °C)] 97.8 °F (36.6 °C)  Heart Rate:  [] 96  Resp:  [12-26] 18  BP: (121-160)/(58-87) 133/70  SpO2:  [85 %-99 %] 90 %  on  Flow (L/min):  [3-4] 3;   Device (Oxygen Therapy): room air  Body mass index is 53.87 kg/m².    Wt Readings from Last 3 Encounters:   07/16/23 (!) 170 kg (375 lb 7.1 oz)     Intake & Output (last 3 days)         07/13 0701 07/14 0700 07/14 0701  07/15 0700 07/15 0701 07/16 0700 07/16 0701 07/17 0700    P.O. 2240 1240      I.V. (mL/kg)   450 (2.6)     Blood        IV Piggyback 100 370      Total Intake(mL/kg) 2340 (12.8) 1610 (9.1) 450 (2.6)     Urine (mL/kg/hr) 2300 (0.5) 5900 (1.4) 9125 (2.2) 1300 (1.7)    Stool 0 0 0 0    Total Output 2300 5900 9125 1300    Net +40 -4290 -8938 -1300            Urine Unmeasured Occurrence 5 x 1 x      Stool Unmeasured Occurrence  0 x 1 x 1 x          Diet: Cardiac Diets; Healthy Heart (2-3  Na+); Texture: Soft to Chew (NDD 3); Soft to Chew: Whole Meat; Fluid Consistency: Thin (IDDSI 0)  ----------------------------------------------------------------------------------------------------------------------  Physical Exam  Vitals and nursing note reviewed. Exam conducted with a chaperone present.   Constitutional:       General: She is not in acute distress.  She is asleep and not snoring.     Appearance: She is well-developed.   Cardiovascular:      Rate and Rhythm: Normal rate and regular rhythm.      Pulses: Normal pulses.      Heart sounds: No murmur heard.  Pulmonary:      Effort: Pulmonary effort is normal. No respiratory distress.      Breath sounds: No wheezing.   Abdominal:      General: Bowel sounds are normal. There is no distension.      Palpations: Abdomen is soft.   Musculoskeletal:         General: No swelling, deformity or signs of injury.   Skin:     Capillary Refill: Capillary refill takes less than 2 seconds.      Coloration: Skin is not jaundiced or pale.   Urinary:     Gonzalez catheter in place with clear and yellow urine in the collection container.  ----------------------------------------------------------------------------------------------------------------------  Tele:  Sinus tachycardia 's.  I have personally reviewed/looked at the telemetry strips.  ----------------------------------------------------------------------------------------------------------------------  LABS:    CBC and coagulation:  Results from last 7 days   Lab Units 07/16/23  0426 07/16/23  0425 07/15/23  0055 07/14/23  0029 07/13/23  1156 07/13/23  0516 07/13/23  0343 07/12/23  3   PROCALCITONIN ng/mL  --  0.27*  --   --   --   --  0.18 0.29*   LACTATE mmol/L 0.8  --   --   --   --   --   --  1.6   CRP mg/dL 20.87*  --   --   --   --   --  15.57*  --    WBC 10*3/mm3 20.91*  --  19.00* 23.36* 21.69*  --  22.40* 20.79*   HEMOGLOBIN g/dL 7.1*  --  7.1* 7.8* 8.5* 7.4* 7.4* 6.4*   HEMATOCRIT % 29.3*  --   30.4* 33.8* 35.7 29.4* 29.4* 27.0*   MCV fL 76.5*  --  77.6* 77.5* 75.6*  --  72.6* 73.2*   MCHC g/dL 24.2*  --  23.4* 23.1* 23.8*  --  25.2* 23.7*   PLATELETS 10*3/mm3 499*  --  398 401 503*  --  436 418   INR   --   --   --   --   --   --   --  1.38*     Acid/base balance:  Results from last 7 days   Lab Units 07/15/23  1951 07/15/23  1437 07/12/23  2127   PH, ARTERIAL pH units 7.398 7.376 7.388   PCO2, ARTERIAL mm Hg 76.7* 76.4* 50.9*   PO2 ART mm Hg 54.3* 56.3* 45.8*   HCO3 ART mmol/L 47.2* 44.8* 30.6*     Renal and electrolytes:  Results from last 7 days   Lab Units 07/16/23  0426 07/15/23  1504 07/15/23  0055 07/14/23  1519 07/14/23  0029 07/13/23  1156 07/13/23  0343 07/12/23  2153   SODIUM mmol/L 149*  --  144 146* 144 149* 151* 150*   POTASSIUM mmol/L 2.7*  --  3.3* 3.7 4.2 4.0 4.7 4.8   MAGNESIUM mg/dL 2.0 1.9  --   --   --   --   --   --    CHLORIDE mmol/L 99  --  102 104 109* 109* 112* 112*   CO2 mmol/L 42.8*  --  31.9* 32.7* 27.6 30.1* 29.2* 30.8*   BUN mg/dL 20  --  25* 28* 29* 31* 32* 33*   CREATININE mg/dL 0.76  --  0.77 0.83 0.90 0.95 0.96 1.04*   CALCIUM mg/dL 10.3  --  9.8 9.6 9.6 10.5 10.2 10.4   PHOSPHORUS mg/dL 2.6  --   --   --   --   --   --   --    GLUCOSE mg/dL 110*  --  118* 130* 138* 126* 138* 135*   ANION GAP mmol/L 7.2  --  10.1 9.3 7.4 9.9 9.8 7.2     Estimated Creatinine Clearance: 133.8 mL/min (by C-G formula based on SCr of 0.76 mg/dL).    Liver and pancreatic function:  Results from last 7 days   Lab Units 07/16/23  0427 07/16/23  0426 07/13/23  0343 07/12/23  2153   ALBUMIN g/dL  --  2.8* 3.0* 2.9*   BILIRUBIN mg/dL  --  0.5 0.5 0.4   ALK PHOS U/L  --  99 95 97   AST (SGOT) U/L  --  12 30 36*   ALT (SGPT) U/L  --  12 33 35*   AMMONIA umol/L 55*  --   --   --      Endocrine function:  Lab Results   Component Value Date    HGBA1C 5.80 (H) 07/13/2023     Glucose [facility] from the [facility]:  Results from last 7 days   Lab Units 07/16/23  0426 07/15/23  0055 07/14/23  1519 07/14/23  0029  07/13/23  1156 07/13/23  0343 07/12/23  2153   GLUCOSE mg/dL 110* 118* 130* 138* 126* 138* 135*     Lab Results   Component Value Date    TSH 0.619 07/15/2023     Cardiac:  Results from last 7 days   Lab Units 07/15/23  1504 07/15/23  1211 07/13/23  0014 07/12/23  2153   HSTROP T ng/L 32* 29* 24* 26*   PROBNP pg/mL  --   --  26,037.0*  --        Cultures:  Microbiology Results (last 10 days)       Procedure Component Value - Date/Time    COVID-19 and FLU A/B PCR - Swab, Nasopharynx [[id number]]  (Normal) Collected: 07/13/23 0109    Lab Status: Final result Specimen: Swab from Nasopharynx Updated: 07/13/23 0138     COVID19 Not Detected     Influenza A PCR Not Detected     Influenza B PCR Not Detected    Narrative:      Fact sheet for providers: https://www.fda.gov/media/344670/download    Fact sheet for patients: https://www.fda.gov/media/543588/download    Test performed by PCR.    Blood Culture - Blood, Arm, Left [[id number]]  (Normal) Collected: 07/12/23 2220    Lab Status: Preliminary result Specimen: Blood from Arm, Left Updated: 07/15/23 2231     Blood Culture No growth at 3 days    Blood Culture - Blood, Arm, Left [[id number]]  (Normal) Collected: 07/12/23 2220    Lab Status: Preliminary result Specimen: Blood from Arm, Left Updated: 07/15/23 2231     Blood Culture No growth at 3 days    Urine Culture - Urine, Straight Cath [[id number]]  (Abnormal)  (Susceptibility) Collected: 07/12/23 2140    Lab Status: Final result Specimen: Urine from Straight Cath Updated: 07/15/23 1323     Urine Culture >100,000 CFU/mL Escherichia coli ESBL     Comment:   Consider infectious disease consult.  Susceptibility results may not correlate to clinical outcomes.         >100,000 CFU/mL Klebsiella pneumoniae ssp pneumoniae    Narrative:      Colonization of the urinary tract without infection is common. Treatment is discouraged unless the patient is symptomatic, pregnant, or undergoing an invasive urologic procedure.  Recent outcomes  data supports the use of pip/tazo in the treatment of susceptible ESBL infections for uncomplicated UTI. Consider use of pip/tazo as a carbapenem-sparing regimen in applicable patients.    Susceptibility        Escherichia coli ESBL      MANUEL      Ertapenem Susceptible      Gentamicin Susceptible      Levofloxacin Resistant      Meropenem Susceptible      Nitrofurantoin Susceptible      Piperacillin + Tazobactam Resistant      Trimethoprim + Sulfamethoxazole Resistant                       Susceptibility        Klebsiella pneumoniae ssp pneumoniae      MANUEL      Ampicillin Resistant      Ampicillin + Sulbactam Susceptible      Cefazolin Susceptible      Cefepime Susceptible      Ceftazidime Susceptible      Ceftriaxone Susceptible      Gentamicin Susceptible      Levofloxacin Susceptible      Nitrofurantoin Intermediate      Piperacillin + Tazobactam Susceptible      Trimethoprim + Sulfamethoxazole Susceptible                               I have personally looked at the labs and they are summarized above.  ----------------------------------------------------------------------------------------------------------------------  Detailed radiology reports for the last 24 hours:    Imaging Results (Last 24 Hours)       Procedure Component Value Units Date/Time    XR Chest 1 View [[id number]] Collected: 07/15/23 1651     Updated: 07/15/23 1821    Narrative:      Procedure: Portable chest x-ray examination performed on 07/15/2023.  Single view. Semiupright position.     HISTORY: Confusion. Progressive hypoxia.     COMPARISON: None.     FINDINGS:     Enlarged heart size.  Mild central pulmonary vascular congestion.  Mild edema.  Hypoinflated lungs.  Mild elevated right hemidiaphragm.  No pleural effusion or pneumothorax.  No fracture or foreign body.       Impression:         1.  Enlarged heart size.  2.  Central pulmonary vascular congestion with mild edema.  3.  Hypoinflated lungs.  4.  No pleural effusion or pneumothorax.      This report was finalized on 7/15/2023 4:52 PM by Sacha Syed MD.             I have personally looked at the radiology images and I have read the available final report.    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 coli and Klebsiella pneumoniae  -Anemia noted on admission, suspect due to severe iron deficiency anemia  -Leukocytosis, present on admission, suspect secondary to bone marrow overproduction as a result of the severe iron deficiency anemia  -Hypernatremia due to free water deficit that was present on admission, now resolved  -History of major depressive disorder   -Acute on chronic debility that was present on admission    Await infectious disease consult; will continue the meropenem for now.  Will continue the IV Lasix per cardiology recommendations as the Cr is improving, as is the edema.  I suspect that the potassium is low from the diuresis and thus will continue to trend the potassium and magnesium levels and replace per our replacement protocols.  Will repeat the labs in the am.  Will move her to the telemetry once a bed is available.  Will consult inpatient rehab for possible admission.    VTE Prophylaxis:   Mechanical Order History:        Ordered        07/13/23 0225  Place Sequential Compression Device  Once            07/13/23 0225  Maintain Sequential  Compression Device  Continuous                          Pharmalogical Order History:       None        Disposition: Attempting inpatient rehab    Candido Rodney MD  [facility] Hospitalist  07/16/23  11:32 EDT

## 2023-07-16 NOTE — PLAN OF CARE
Goal Outcome Evaluation:  Plan of Care Reviewed With: patient, daughter        Progress: no change  Outcome Evaluation: Pt. alert and oriented times 4, with intermitent confusion. SR/ST. 3LNC, shortness of breath with any exertion. Pt. very anxious, calming techniques provided. Gonzalez in place, good uop. Daughter at bedside. Bed alarm set, call light within reach.         Problem: Fall Injury Risk  Goal: Absence of Fall and Fall-Related Injury  Outcome: Ongoing, Progressing  Intervention: Identify and Manage Contributors  Recent Flowsheet Documentation  Taken 7/16/2023 1355 by Dana Lisa RN  Medication Review/Management: medications reviewed  Self-Care Promotion:   independence encouraged   BADL personal objects within reach   BADL personal routines maintained   meal set-up provided  Taken 7/16/2023 1300 by Dana Lisa RN  Medication Review/Management: medications reviewed  Taken 7/16/2023 1100 by Dana Lisa RN  Medication Review/Management: medications reviewed  Taken 7/16/2023 0900 by Dana Lisa RN  Medication Review/Management: medications reviewed  Taken 7/16/2023 0800 by Dana Lisa RN  Self-Care Promotion:   independence encouraged   BADL personal objects within reach   BADL personal routines maintained   meal set-up provided  Taken 7/16/2023 0700 by Dana Lisa RN  Medication Review/Management: medications reviewed  Intervention: Promote Injury-Free Environment  Recent Flowsheet Documentation  Taken 7/16/2023 1355 by Dana Lisa RN  Safety Promotion/Fall Prevention:   activity supervised   fall prevention program maintained   safety round/check completed  Taken 7/16/2023 1300 by Dana Lisa RN  Safety Promotion/Fall Prevention:   activity supervised   fall prevention program maintained   safety round/check completed  Taken 7/16/2023 1100 by Dana Lisa RN  Safety Promotion/Fall Prevention:   activity supervised   fall prevention program maintained   safety round/check  completed  Taken 7/16/2023 0900 by Dana Lisa RN  Safety Promotion/Fall Prevention:   activity supervised   fall prevention program maintained   safety round/check completed  Taken 7/16/2023 0700 by Dana Lisa RN  Safety Promotion/Fall Prevention:   activity supervised   fall prevention program maintained   safety round/check completed     Problem: Skin Injury Risk Increased  Goal: Skin Health and Integrity  Outcome: Ongoing, Progressing  Intervention: Optimize Skin Protection  Recent Flowsheet Documentation  Taken 7/16/2023 1355 by Dana Lisa RN  Pressure Reduction Techniques:   frequent weight shift encouraged   heels elevated off bed   positioned off wounds   pressure points protected   weight shift assistance provided  Pressure Reduction Devices:   pressure-redistributing mattress utilized   positioning supports utilized  Skin Protection:   adhesive use limited   incontinence pads utilized   transparent dressing maintained   tubing/devices free from skin contact  Taken 7/16/2023 0800 by Dana Lisa RN  Pressure Reduction Techniques:   weight shift assistance provided   rest period provided between sit times   pressure points protected   positioned off wounds   heels elevated off bed  Pressure Reduction Devices:   pressure-redistributing mattress utilized   positioning supports utilized  Skin Protection:   adhesive use limited   incontinence pads utilized   skin sealant/moisture barrier applied   transparent dressing maintained   tubing/devices free from skin contact     Problem: Adult Inpatient Plan of Care  Goal: Plan of Care Review  Outcome: Ongoing, Progressing  Flowsheets (Taken 7/16/2023 1400)  Progress: no change  Plan of Care Reviewed With:   patient   daughter  Outcome Evaluation: Pt. alert and oriented times 4, with intermitent confusion. SR/ST. 3LNC, shortness of breath with any exertion. Pt. very anxious, calming techniques provided. Gonzalez in place, good uop. Daughter at bedside. Bed  alarm set, call light within reach.  Goal: Patient-Specific Goal (Individualized)  Outcome: Ongoing, Progressing  Goal: Absence of Hospital-Acquired Illness or Injury  Outcome: Ongoing, Progressing  Intervention: Identify and Manage Fall Risk  Recent Flowsheet Documentation  Taken 7/16/2023 1355 by Dana Lisa RN  Safety Promotion/Fall Prevention:   activity supervised   fall prevention program maintained   safety round/check completed  Taken 7/16/2023 1300 by Dana Lisa RN  Safety Promotion/Fall Prevention:   activity supervised   fall prevention program maintained   safety round/check completed  Taken 7/16/2023 1100 by Dana Lisa RN  Safety Promotion/Fall Prevention:   activity supervised   fall prevention program maintained   safety round/check completed  Taken 7/16/2023 0900 by Dana Lisa RN  Safety Promotion/Fall Prevention:   activity supervised   fall prevention program maintained   safety round/check completed  Taken 7/16/2023 0700 by Dana Lisa RN  Safety Promotion/Fall Prevention:   activity supervised   fall prevention program maintained   safety round/check completed  Intervention: Prevent Skin Injury  Recent Flowsheet Documentation  Taken 7/16/2023 1355 by Dana Lisa RN  Skin Protection:   adhesive use limited   incontinence pads utilized   transparent dressing maintained   tubing/devices free from skin contact  Taken 7/16/2023 0800 by Dana Lisa RN  Skin Protection:   adhesive use limited   incontinence pads utilized   skin sealant/moisture barrier applied   transparent dressing maintained   tubing/devices free from skin contact  Intervention: Prevent and Manage VTE (Venous Thromboembolism) Risk  Recent Flowsheet Documentation  Taken 7/16/2023 1355 by Dana Lisa RN  Activity Management: activity encouraged  VTE Prevention/Management: (heparin sq) other (see comments)  Taken 7/16/2023 0800 by Dana Lisa RN  VTE Prevention/Management: (heparin sq) other (see  comments)  Intervention: Prevent Infection  Recent Flowsheet Documentation  Taken 7/16/2023 1355 by Dana Lisa RN  Infection Prevention:   single patient room provided   rest/sleep promoted   personal protective equipment utilized   hand hygiene promoted   equipment surfaces disinfected  Taken 7/16/2023 1300 by Dana Lisa RN  Infection Prevention:   single patient room provided   rest/sleep promoted   hand hygiene promoted   personal protective equipment utilized   equipment surfaces disinfected  Taken 7/16/2023 1100 by Dana Lisa RN  Infection Prevention:   single patient room provided   rest/sleep promoted   personal protective equipment utilized   hand hygiene promoted   equipment surfaces disinfected  Taken 7/16/2023 0900 by Dana Lisa RN  Infection Prevention:   single patient room provided   rest/sleep promoted   personal protective equipment utilized   hand hygiene promoted   equipment surfaces disinfected  Taken 7/16/2023 0700 by Dana Lisa RN  Infection Prevention:   single patient room provided   personal protective equipment utilized   rest/sleep promoted   hand hygiene promoted   equipment surfaces disinfected  Goal: Optimal Comfort and Wellbeing  Outcome: Ongoing, Progressing  Intervention: Provide Person-Centered Care  Recent Flowsheet Documentation  Taken 7/16/2023 1355 by Dana Lisa RN  Trust Relationship/Rapport:   care explained   choices provided  Taken 7/16/2023 0800 by Dana Lisa RN  Trust Relationship/Rapport:   care explained   choices provided   emotional support provided   thoughts/feelings acknowledged  Goal: Readiness for Transition of Care  Outcome: Ongoing, Progressing     Problem: Nausea and Vomiting  Goal: Fluid and Electrolyte Balance  Outcome: Ongoing, Progressing  Intervention: Monitor and Manage Hypovolemia  Recent Flowsheet Documentation  Taken 7/16/2023 1355 by Dana Lisa RN  Fluid/Electrolyte Management: fluids provided  Taken 7/16/2023 0800 by  Medley, April, RN  Fluid/Electrolyte Management: fluids provided  Intervention: Prevent and Manage Nausea and Vomiting  Recent Flowsheet Documentation  Taken 7/16/2023 0800 by Dana Lisa RN  Nausea/Vomiting Interventions: see MAR

## 2023-07-17 NOTE — PLAN OF CARE
Goal Outcome Evaluation:  Plan of Care Reviewed With: patient, son        Progress: no change  Outcome Evaluation: Pt A/Ox4 with intermittent confusion upon awakening. VSS on 4LNC. Pt denies any shortness of breath or chest pain. Gonzalez remains in place. Son at bedside, attentive to pt. Safety maintained. Will continue to monitor.

## 2023-07-17 NOTE — THERAPY TREATMENT NOTE
Acute Care - Physical Therapy Treatment Note   [facility]     Patient Name: Judy Lutz  : 1960  MRN: [id number]  Today's Date: 2023   Onset of Illness/Injury or Date of Surgery: 23  Visit Dx:     ICD-10-CM ICD-9-CM   1. Symptomatic anemia  D64.9 285.9   2. Sepsis with acute hypoxic respiratory failure without septic shock, due to unspecified organism  A41.9 038.9    R65.20 995.91    J96.01 518.81   3. Acute UTI  N39.0 599.0   4. Acute heart failure with preserved ejection fraction (HFpEF)  I50.31 428.9     Patient Active Problem List   Diagnosis    Symptomatic anemia     Past Medical History:   Diagnosis Date    Symptomatic anemia 2023     History reviewed. No pertinent surgical history.  PT Assessment (last 12 hours)       PT Evaluation and Treatment       Row Name 23 1200          Physical Therapy Time and Intention    Document Type therapy note (daily note)  -     Mode of Treatment physical therapy  -     Patient Effort other (see comments)  Pt unable to participate.  She remained with eyes closed and unable to communicate throughout treatment.  -       Row Name 23 1200          General Information    Patient Profile Reviewed yes  -     Patient/Family/Caregiver Comments/Observations Family was present during treatment and agreeable to LE PROM to try to increase level of alertness and decrease risk of further complications.  -     General Observations of Patient Pt remained sleeping with eyes closed  -     Existing Precautions/Restrictions fall  -       Row Name 23 1200          Cognition    Affect/Mental Status (Cognition) unresponsive  -     Orientation Status (Cognition) unable/difficult to assess  -       Row Name 23 1200          Range of Motion Comprehensive    Comment, General Range of Motion PROM was WFL in LE  -       Row Name 23 1200          Motor Skills    Therapeutic Exercise hip;knee;ankle  -       Row Name 23  1200          Hip (Therapeutic Exercise)    Hip (Therapeutic Exercise) PROM (passive range of motion)  -     Hip PROM (Therapeutic Exercise) left;right;flexion;aBduction;aDduction;external rotation;internal rotation;supine;10 repetitions  -       Row Name 07/17/23 1200          Knee (Therapeutic Exercise)    Knee (Therapeutic Exercise) PROM (passive range of motion)  -     Knee PROM (Therapeutic Exercise) left;right;flexion;extension;supine;3 repetitions  -       Row Name 07/17/23 1200          Ankle (Therapeutic Exercise)    Ankle (Therapeutic Exercise) PROM (passive range of motion)  -     Ankle PROM (Therapeutic Exercise) left;right;dorsiflexion;plantarflexion;supine;10 repetitions  -       Row Name             Wound 07/13/23 0301 perineum MASD (Moisture associated skin damage)    Wound - Properties Group Placement Date: 07/13/23  -AM Placement Time: 0301  -AM Present on Hospital Admission: Y  -AM Location: perineum  -AM Primary Wound Type: MASD  -AM    Retired Wound - Properties Group Placement Date: 07/13/23  -AM Placement Time: 0301  -AM Present on Hospital Admission: Y  -AM Location: perineum  -AM Primary Wound Type: MASD  -AM    Retired Wound - Properties Group Date first assessed: 07/13/23  -AM Time first assessed: 0301  -AM Present on Hospital Admission: Y  -AM Location: perineum  -AM Primary Wound Type: MASD  -AM      Row Name             Wound 07/15/23 1143 coccyx    Wound - Properties Group Placement Date: 07/15/23  -JJ Placement Time: 1143  -JJ Location: coccyx  -JJ    Retired Wound - Properties Group Placement Date: 07/15/23  -JJ Placement Time: 1143  -JJ Location: coccyx  -JJ    Retired Wound - Properties Group Date first assessed: 07/15/23  -JJ Time first assessed: 1143  -JJ Location: coccyx  -JJ      Row Name 07/17/23 1200          Plan of Care Review    Plan of Care Reviewed With patient  -KP     Outcome Evaluation PT treatment completed.  PROM only to bilateral LE due to pt lethargy  with inability to remain awake or follow commands.  Continue PT POC to patient tolerance.  -       Row Name 07/17/23 1200          Positioning and Restraints    Pre-Treatment Position in bed  -KP     Post Treatment Position bed  -KP     In Bed notified nsg;supine;call light within reach;encouraged to call for assist;exit alarm on;with family/caregiver;side rails up x3  Lines in tact and needs in reach  -KP               User Key  (r) = Recorded By, (t) = Taken By, (c) = Cosigned By      Initials Name Provider Type    Shelli Huynh, RN Registered Nurse    Carolee Kam RN Registered Nurse    Jessica Argueta, PT Physical Therapist                    Physical Therapy Education       Title: PT OT SLP Therapies (Done)       Topic: Physical Therapy (Done)       Point: Mobility training (Done)       Learning Progress Summary             Patient Acceptance, E, VU by  at 7/17/2023 0202    Acceptance, E, VU,NR by SC at 7/15/2023 0940    Acceptance, E, VU,NR by SC at 7/14/2023 0910    Acceptance, E,TB, VU by  at 7/13/2023 1301   Family Acceptance, E, VU by  at 7/17/2023 0202                         Point: Home exercise program (Done)       Learning Progress Summary             Patient Acceptance, E, VU by  at 7/17/2023 0202    Acceptance, E, VU,NR by SC at 7/15/2023 0940    Acceptance, E, VU,NR by SC at 7/14/2023 0910    Acceptance, E,TB, VU by  at 7/13/2023 1301   Family Acceptance, E, VU by  at 7/17/2023 0202                         Point: Body mechanics (Done)       Learning Progress Summary             Patient Acceptance, E, VU by  at 7/17/2023 0202    Acceptance, E, VU,NR by SC at 7/15/2023 0940    Acceptance, E, VU,NR by SC at 7/14/2023 0910    Acceptance, E,TB, VU by  at 7/13/2023 1301   Family Acceptance, E, VU by  at 7/17/2023 0202                         Point: Precautions (Done)       Learning Progress Summary             Patient Acceptance, E, VU by  at 7/17/2023 0202    Acceptance,  E, VU,NR by SC at 7/15/2023 0940    Acceptance, E, VU,NR by SC at 7/14/2023 0910    Acceptance, E,TB, VU by  at 7/13/2023 1301   Family Acceptance, E, VU by  at 7/17/2023 0202                                         User Key       Initials Effective Dates Name Provider Type Discipline     05/18/22 -  Jennifer Cano, RN Registered Nurse Nurse    SC 01/24/23 -  Gabriella Forman RN Registered Nurse Nurse     05/31/23 -  Rivas Dozier, PT Physical Therapist PT                  PT Recommendation and Plan     Plan of Care Reviewed With: patient  Outcome Evaluation: PT treatment completed.  PROM only to bilateral LE due to pt lethargy with inability to remain awake or follow commands.  Continue PT POC to patient tolerance.       Time Calculation:    PT Charges       Row Name 07/17/23 1239             Time Calculation    PT Received On 07/17/23  -      PT Goal Re-Cert Due Date 07/27/23  -KP         Timed Charges    57659 - PT Therapeutic Exercise Minutes 15  -KP         Total Minutes    Timed Charges Total Minutes 15  -KP       Total Minutes 15  -KP                User Key  (r) = Recorded By, (t) = Taken By, (c) = Cosigned By      Initials Name Provider Type    KP Jessica Gr, PT Physical Therapist                  Therapy Charges for Today       Code Description Service Date Service Provider Modifiers Qty    10359495486 HC PT THER PROC EA 15 MIN 7/17/2023 Jessica Gr, PT GP 1            PT G-Codes  AM-PAC 6 Clicks Score (PT): 6    Jessica Gr PT  7/17/2023

## 2023-07-17 NOTE — PAYOR COMM NOTE
"[facility]  NPI:[id number]    Utilization Review  Contact: Trcaie Canchola RN  Phone: [phone]  Fax:[phone]    INITIATE INPATIENT AUTHORIZATION    Judy Lutz (63 y.o. Female)       Date of Birth   1960    Social Security Number       Address   [address] [place], [place] [address]    Home Phone   [phone]    MRN   [id number]       Nondenominational   [facility]    Marital Status                               Admission Date   23    Admission Type   Emergency    Admitting Provider   Sapphire Contreras DO    Attending Provider   Ginette Tarango DO    Department, Room/Bed   [address], [address]/       Discharge Date       Discharge Disposition       Discharge Destination                                 Attending Provider: Ginette Tarango DO    Allergies: Codeine    Isolation: Contact   Infection: MDRO (07/15/23), ESBL E coli (07/15/23)   Code Status: CPR    Ht: 177.8 cm (70\")   Wt: 164 kg (361 lb 9.6 oz)    Admission Cmt: None   Principal Problem: Symptomatic anemia [D64.9]                   Active Insurance as of 2023       Primary Coverage       Payor Plan Insurance Group Employer/Plan Group    ANTHEM BLUE CROSS ANTHEM BLUE CROSS BLUE SHIELD PPO [id number]       Payor Plan Address Payor Plan Phone Number Payor Plan Fax Number Effective Dates    [address] [phone]  2020 - None Entered    [place] [address]         Subscriber Name Subscriber Birth Date Member ID       JUDY LUTZ 1960 [id number]                     Emergency Contacts        (Rel.) Home Phone Work Phone Mobile Phone    RALF LUTZ (Son) [phone] -- --                 History & Physical        Sapphire Contreras DO at 23 0414          [facility]   HISTORY AND PHYSICAL    Patient Name: Judy Lutz  : 1960  MRN: [id number]  Primary Care Physician:  Provider, No Known  Date of admission: " "7/12/2023    Subjective  Subjective     Chief Complaint: Fatigue/Weakness and SOA    History of Present Illness the patient is a 63-year-old female with no known past medical history who presents to the emergency department complaining of progressive weakness, fatigue and shortness of air.    Patient was seen and examined in .  The patient's daughter is present at bedside and provides majority of the history of presenting illness.    She states that for the past 3 months or so, she has noticed that her mom has had progressive dyspnea, especially with minimal exertion.  Patient's daughter states that she lives approximately 2 hours away but the patient's son lives with her.  The patient's daughter states that her father passed away in February of this year and since that time her mother has not focused on her health.    Patient denies any chest pains.  She does not report any cough.  No recent fever.  No recent vomiting or diarrhea.  No known hematochezia or melena.  The patient has not seen a primary care provider in approximately 30 years.  She has never had endoscopies.    Patient's daughter states that she is also noted that her mother has had some intermittent cognitive changes recently with some confusion and at times speaking \"like a baby.\"  She states that this is not typical of her mother whatsoever.  She states that due to the patient's weight she has been dependent in terms of her mobility for several years and has used crutches for over 10 years for ambulation.    According to the ED provider, patient with the above mentioned symptoms for the past several weeks that acutely worsened over the past 1-2 days. Patient reported worsening dyspnea with exertion.    Review of Systems   Unable to perform ROS: Acuity of condition   Respiratory:  Positive for shortness of breath.    Cardiovascular:  Negative for chest pain.      Personal History     History reviewed. No pertinent past medical " history.    History reviewed. No pertinent surgical history.    Family History: Patient's mother with unknown type of heart disease; patient's uncles also with heart disease.    Social History:  reports that she has never smoked. She has never used smokeless tobacco. She reports that she does not drink alcohol and does not use drugs.    Home Medications:  acetaminophen and ibuprofen    Allergies:  Allergies   Allergen Reactions    Codeine GI Intolerance       Objective   Objective     Vitals:   Temp:  [98.4 °F (36.9 °C)-98.9 °F (37.2 °C)] 98.4 °F (36.9 °C)  Heart Rate:  [] 101  Resp:  [12-35] 12  BP: (100-158)/() 140/72  Flow (L/min):  [2-3] 3    Physical Exam  Constitutional:       General: She is not in acute distress.     Appearance: She is well-developed. She is ill-appearing.      Interventions: Nasal cannula in place.   HENT:      Head: Normocephalic and atraumatic.   Eyes:      Conjunctiva/sclera: Conjunctivae normal.   Cardiovascular:      Rate and Rhythm: Normal rate and regular rhythm.      Pulses:           Dorsalis pedis pulses are 2+ on the right side and 2+ on the left side.      Heart sounds: No murmur heard.    No friction rub. No gallop.   Pulmonary:      Effort: Accessory muscle usage (mild) present. No respiratory distress.      Breath sounds: Decreased breath sounds present. No wheezing or rales.   Abdominal:      General: Abdomen is protuberant. Bowel sounds are normal. There is no distension.      Palpations: Abdomen is soft.      Tenderness: There is no abdominal tenderness. There is no guarding.   Genitourinary:     Comments: External catheter in place  Musculoskeletal:      Right lower leg: Edema present.      Left lower leg: Edema present.   Skin:     General: Skin is warm and dry.      Coloration: Skin is pale.      Findings: No erythema or rash.   Neurological:      Mental Status: She is alert.      Cranial Nerves: No cranial nerve deficit.      Comments: Oriented to self and  place ('hospital'); equal hand  bilaterally       Result Review   Result Review:  I have personally reviewed the results from the time of this admission to 7/13/2023 04:14 EDT and agree with these findings:  [x]  Laboratory list / accordion  []  Microbiology  [x]  Radiology  [x]  EKG/Telemetry   []  Cardiology/Vascular   []  Pathology  []  Old records  []  Other:  Most notable findings include: RA ABG with paO2 45.8, O2 saturation 79.5%.     EKG per my view: sinus tachycardia with occasional PVCs    Results from last 7 days   Lab Units 07/13/23  0343 07/12/23  2153   SODIUM mmol/L 151* 150*   POTASSIUM mmol/L 4.7 4.8   CHLORIDE mmol/L 112* 112*   CO2 mmol/L 29.2* 30.8*   BUN mg/dL 32* 33*   CREATININE mg/dL 0.96 1.04*   CALCIUM mg/dL 10.2 10.4   BILIRUBIN mg/dL 0.5 0.4   ALK PHOS U/L 95 97   ALT (SGPT) U/L 33 35*   AST (SGOT) U/L 30 36*   GLUCOSE mg/dL 138* 135*     Results from last 7 days   Lab Units 07/12/23 2153   WBC 10*3/mm3 20.79*   HEMOGLOBIN g/dL 6.4*   HEMATOCRIT % 27.0*   PLATELETS 10*3/mm3 418     UA          7/12/2023    21:40   Urinalysis   Squamous Epithelial Cells, UA 3-6    Specific Gravity, UA 1.019    Ketones, UA Negative    Blood, UA Moderate (2+)    Leukocytes, UA Large (3+)    Nitrite, UA Positive    RBC, UA 6-12    WBC, UA Too Numerous to Count    Bacteria, UA 4+        CT head/chest/abdomen/pelvis:  Findings:  1.  No acute intracranial hemorrhage, mass effect or cerebral cortical  edema.  2.  Normal ventricular volume for the patient's age.  3.  Mild cerebral cortical atrophy.   4.  No skull fractures.  5.  The mastoid air cells are clear.  6.  The visualized paranasal sinuses are clear.     IMPRESSION:  Impression:     No acute intracranial abnormality.    Findings:  1.  No focal infiltrate, pleural effusion or pneumothorax.  2.  A 7 mm posterior subpleural noncalcified groundglass density in the  right upper lobe, likely related to postinflammatory changes.  3.  Mild pulmonary vascular  congestion.  4.  Reece cardiomegaly with small to moderate superior/posterior  pericardial effusion, 9-18 mm in thickness.  5.  No aortic aneurysm.  6.  No mediastinal lymphadenopathy.  7.  Motion artifacts degrading image quality.     IMPRESSION:  Impression:     1.  Marked cardiomegaly and small-to-moderate pericardial effusion with  mild pulmonary vascular congestion.  2.  No focal infiltrate or pleural effusion.     Findings:  1.  Moderate ascites in the abdomen and pelvis.  2.  No bowel obstruction, free air, or abscess.  3.  No evidence of acute colitis or diverticulitis.  4.  The appendix is not seen.  5.  No CT evidence of acute pancreatitis.  6.  Contracted gallbladder with a questionable stone in the GB fundus.  7.  Small air in the nondependent portion of the urinary bladder, likely  related to recent catheterization versus cystitis.  Correlate  clinically.  8.  Bilateral renal calculi without hydronephrosis.  9.  Moderate retained fecal volume in the rectum.  10.  Diffuse subcutaneous edema throughout the abdominal/pelvic wall.  11.  Mild thoracolumbar scoliosis with multilevel lumbar discogenic and  facet degenerative change.     IMPRESSION:  Impression:  1.  No bowel obstruction, free air, or abscess.  2.  Moderate ascites in the abdomen and pelvis.  3.  No evidence of acute colitis or diverticulitis.  4.  Biparietal renal calculi without hydronephrosis.  5.  Probable cholelithiasis.  6.  Small air in the nondependent portion of the urinary bladder, likely  related to recent catheterization versus cystitis.  Correlate  clinically.    Assessment / Plan     Brief Patient Summary:  Judy Lutz is a 63 y.o. female who has no known medical problems presents with progressive weakness and dyspnea. Workup thus far reveals anemia and significant cardiomegaly.    #Symptomatic anemia with microcytosis, present on admission and s/p 2 units PRBCs  #Severe cardiomegaly with small to moderate superior/posterior  pericardial effusion  #Mild pulmonary vascular congestion  #Moderate ascites  #Acute hypoxemic respiratory failure  #Morbid obesity per BMI 57.39 kg/m²  #Incidentally noted 7 mm right upper lobe subpleural noncalcified groundglass density, thought to be postinflammatory change  #Sepsis due to UTI, present on admission  #Acute encephalopathy, suspect multifactorial and related to hypoxia and/or infectious/UTI  -Patient will be admitted to the progressive care unit  -Source of patient's anemia is unclear; patient is noted to take ibuprofen at home; could also be nutritional deficiencies as patient appears to be grieving over the death of her  who passed away earlier this year  -Once patient is stable from a respiratory standpoint, would consider an inpatient general surgery consult for timing of endoscopies  -In the meantime, patient has been started on Protonix, 40 mg p.o. twice daily  -Add iron studies to blood in the lab; obtain ferritin and testing for Vitamin B12 and folate  -Obtain an echocardiogram  -Patient did receive 80 mg IV Lasix in the ED after her first unit of PRBCs; will plan to diurese and monitor intake and output  -Cardiology has been consulted  -I do not suspect tamponade physiology at this time  -Will likely require ischemic workup once stable from a respiratory standpoint for further evaluation of her impressive cardiomyopathy  -Continue with supplemental oxygen and titrate for saturations 90 to 95%  -Patient has been started on empiric antibiotic therapy with Rocephin  -Repeat CBC at approximately 10 AM to follow-up on leukocytosis and anemia  -I have added a urine culture to UA in the lab; daughter is reporting encephalopathy that is possibly due to hypoxia versus infectious versus other  -We will recommend a repeat CT scan of the chest in the outpatient setting to follow on the incidentally noted RUL GGO      DVT prophylaxis:  SCDs    CODE STATUS:    Code Status (Patient has no pulse  and is not breathing): CPR (Attempt to Resuscitate)  Medical Interventions (Patient has pulse or is breathing): Full Support    Admission Status:  I believe this patient meets inpatient status.    Patient is considered to be high risk patient due to: Symptomatic anemia of unclear etiology, severe cardiomegaly with pericardial effusion, respiratory failure, super morbid obesity    Case d/w patient's daughter at bedside    Sapphire Contreras DO    Electronically signed by Sapphire Contreras DO at 07/13/23 0557          Emergency Department Notes        Yusuf Luo DO at 07/13/23 0122          Subjective   History of Present Illness  63-year-old female with no considerable past medical history presents to the ER due to concerns for significant increasing fatigue, malaise, shortness of breath, and generalized weakness.  Patient notes she has been feeling this way for the past several weeks.  However, patient is symptoms have worsened over the last 1 to 2 days.  Patient is obviously pale on arrival.  Patient notes no obvious concerns for fever or chills.  No cough or congestion.  However, hypoxia is noted on arrival as well.  Oxygen was applied.  Patient is awake, alert, oriented x3.  No chest pain.  No abdominal pain.  Symptoms aggravated with ambulation.  Relieved with some rest.  Vitals otherwise stable.    Review of Systems   Constitutional:  Positive for activity change, chills and fatigue.   Respiratory:  Positive for shortness of breath.    Neurological:  Positive for weakness.   All other systems reviewed and are negative.    No past medical history on file.    Allergies   Allergen Reactions    Codeine GI Intolerance       No past surgical history on file.    No family history on file.    Social History     Socioeconomic History    Marital status:            Objective   Physical Exam  Constitutional:       General: She is in acute distress.      Appearance: Normal appearance. She is  ill-appearing.      Comments: Pale   HENT:      Head: Normocephalic and atraumatic.      Right Ear: External ear normal.      Left Ear: External ear normal.      Nose: Nose normal.      Mouth/Throat:      Mouth: Mucous membranes are moist.   Eyes:      Extraocular Movements: Extraocular movements intact.      Pupils: Pupils are equal, round, and reactive to light.   Cardiovascular:      Rate and Rhythm: Normal rate and regular rhythm.      Heart sounds: No murmur heard.  Pulmonary:      Effort: Pulmonary effort is normal. No respiratory distress.      Breath sounds: Normal breath sounds. Examination of the right-lower field reveals decreased breath sounds. Examination of the left-lower field reveals decreased breath sounds. No wheezing.   Abdominal:      General: Abdomen is protuberant. Bowel sounds are normal.      Palpations: Abdomen is soft.      Tenderness: There is no abdominal tenderness.   Musculoskeletal:         General: No deformity or signs of injury. Normal range of motion.      Cervical back: Normal range of motion and neck supple.   Skin:     General: Skin is warm and dry.      Coloration: Skin is pale.      Findings: No erythema.   Neurological:      General: No focal deficit present.      Mental Status: She is alert and oriented to person, place, and time. Mental status is at baseline.      Cranial Nerves: No cranial nerve deficit.   Psychiatric:         Mood and Affect: Mood normal.         Behavior: Behavior normal.         Thought Content: Thought content normal.       Procedures          ED Course  ED Course as of 07/13/23 0152   Thu Jul 13, 2023   0014 EKG notes sinus tachycardia.  No acute ST elevation.  QTc 412.      Electronically signed by Yusuf Luo DO, 07/13/23, 12:15 AM EDT.   [SF]      ED Course User Index  [SF] Yusuf Luo DO      CT Abdomen Pelvis Without Contrast    Result Date: 7/13/2023  Impression:  1.  No bowel obstruction, free air, or abscess. 2.  Moderate ascites in  the abdomen and pelvis. 3.  No evidence of acute colitis or diverticulitis. 4.  Biparietal renal calculi without hydronephrosis. 5.  Probable cholelithiasis. 6.  Small air in the nondependent portion of the urinary bladder, likely related to recent catheterization versus cystitis.  Correlate clinically.   This report was finalized on 7/13/2023 12:26 AM by Ricky Perkins MD.      CT Head Without Contrast    Result Date: 7/13/2023  Impression:  No acute intracranial abnormality.  This report was finalized on 7/13/2023 12:25 AM by Ricky Perkins MD.      CT Chest Without Contrast Diagnostic    Result Date: 7/13/2023  Impression:  1.  Marked cardiomegaly and small-to-moderate pericardial effusion with mild pulmonary vascular congestion. 2.  No focal infiltrate or pleural effusion.  This report was finalized on 7/13/2023 12:26 AM by Ricky Perkins MD.      XR Chest 1 View    Result Date: 7/12/2023  Impression:  Cardiomegaly.  Lungs clear.  This report was finalized on 7/12/2023 10:59 PM by Wilber Lopez MD.       Results for orders placed or performed during the hospital encounter of 07/12/23   COVID-19 and FLU A/B PCR - Swab, Nasopharynx    Specimen: Nasopharynx; Swab   Result Value Ref Range    COVID19 Not Detected Not Detected - Ref. Range    Influenza A PCR Not Detected Not Detected    Influenza B PCR Not Detected Not Detected   Blood Gas, Arterial With Co-Ox    Specimen: Arterial Blood   Result Value Ref Range    Site Left Radial     Dash's Test Positive     pH, Arterial 7.388 7.350 - 7.450 pH units    pCO2, Arterial 50.9 (H) 35.0 - 45.0 mm Hg    pO2, Arterial 45.8 (C) 83.0 - 108.0 mm Hg    HCO3, Arterial 30.6 (H) 20.0 - 26.0 mmol/L    Base Excess, Arterial 5.1 (H) 0.0 - 2.0 mmol/L    O2 Saturation, Arterial 79.5 (L) 94.0 - 99.0 %    Hemoglobin, Blood Gas 6.7 (C) 13.5 - 17.5 g/dL    Hematocrit, Blood Gas 20.4 (L) 38.0 - 51.0 %    Oxyhemoglobin 77.4 (L) 94 - 99 %    Methemoglobin 0.30 0.00 - 3.00 %    Carboxyhemoglobin 2.4 0 -  5 %    A-a DO2 39.2 0.0 - 300.0 mmHg    CO2 Content 32.2 22 - 33 mmol/L    Barometric Pressure for Blood Gas 725 mmHg    Modality Room Air     FIO2 21 %    Ventilator Mode NA     Note      Notified Who ER  AND RN     Notified By [id number]     Notified Time 07/12/2023 21:32     Collected by [id number]     pH, Temp Corrected      pCO2, Temperature Corrected      pO2, Temperature Corrected     Comprehensive Metabolic Panel    Specimen: Blood   Result Value Ref Range    Glucose 135 (H) 65 - 99 mg/dL    BUN 33 (H) 8 - 23 mg/dL    Creatinine 1.04 (H) 0.57 - 1.00 mg/dL    Sodium 150 (H) 136 - 145 mmol/L    Potassium 4.8 3.5 - 5.2 mmol/L    Chloride 112 (H) 98 - 107 mmol/L    CO2 30.8 (H) 22.0 - 29.0 mmol/L    Calcium 10.4 8.6 - 10.5 mg/dL    Total Protein 6.0 6.0 - 8.5 g/dL    Albumin 2.9 (L) 3.5 - 5.2 g/dL    ALT (SGPT) 35 (H) 1 - 33 U/L    AST (SGOT) 36 (H) 1 - 32 U/L    Alkaline Phosphatase 97 39 - 117 U/L    Total Bilirubin 0.4 0.0 - 1.2 mg/dL    Globulin 3.1 gm/dL    A/G Ratio 0.9 g/dL    BUN/Creatinine Ratio 31.7 (H) 7.0 - 25.0    Anion Gap 7.2 5.0 - 15.0 mmol/L    eGFR 61.3 >60.0 mL/min/1.73   Protime-INR    Specimen: Blood   Result Value Ref Range    Protime 17.6 (H) 12.1 - 14.7 Seconds    INR 1.38 (H) 0.90 - 1.10   aPTT    Specimen: Blood   Result Value Ref Range    PTT 30.6 26.5 - 34.5 seconds   Lactic Acid, Plasma    Specimen: Blood   Result Value Ref Range    Lactate 1.6 0.5 - 2.0 mmol/L   Procalcitonin    Specimen: Blood   Result Value Ref Range    Procalcitonin 0.29 (H) 0.00 - 0.25 ng/mL   High Sensitivity Troponin T    Specimen: Blood   Result Value Ref Range    HS Troponin T 26 (H) <10 ng/L   Urinalysis With Microscopic If Indicated (No Culture) - Straight Cath    Specimen: Straight Cath; Urine   Result Value Ref Range    Color, UA Yellow Yellow, Straw    Appearance, UA Cloudy (A) Clear    pH, UA 5.5 5.0 - 8.0    Specific Gravity, UA 1.019 1.005 - 1.030    Glucose, UA Negative Negative    Ketones, UA Negative  Negative    Bilirubin, UA Negative Negative    Blood, UA Moderate (2+) (A) Negative    Protein, UA 30 mg/dL (1+) (A) Negative    Leuk Esterase, UA Large (3+) (A) Negative    Nitrite, UA Positive (A) Negative    Urobilinogen, UA 1.0 E.U./dL 0.2 - 1.0 E.U./dL   CBC Auto Differential    Specimen: Blood   Result Value Ref Range    WBC 20.79 (H) 3.40 - 10.80 10*3/mm3    RBC 3.69 (L) 3.77 - 5.28 10*6/mm3    Hemoglobin 6.4 (C) 12.0 - 15.9 g/dL    Hematocrit 27.0 (L) 34.0 - 46.6 %    MCV 73.2 (L) 79.0 - 97.0 fL    MCH 17.3 (L) 26.6 - 33.0 pg    MCHC 23.7 (L) 31.5 - 35.7 g/dL    RDW 24.4 (H) 12.3 - 15.4 %    RDW-SD 62.4 (H) 37.0 - 54.0 fl    MPV 10.0 6.0 - 12.0 fL    Platelets 418 140 - 450 10*3/mm3    Neutrophil % 86.5 (H) 42.7 - 76.0 %    Lymphocyte % 4.4 (L) 19.6 - 45.3 %    Monocyte % 7.7 5.0 - 12.0 %    Eosinophil % 0.0 (L) 0.3 - 6.2 %    Basophil % 0.1 0.0 - 1.5 %    Immature Grans % 1.3 (H) 0.0 - 0.5 %    Neutrophils, Absolute 17.96 (H) 1.70 - 7.00 10*3/mm3    Lymphocytes, Absolute 0.91 0.70 - 3.10 10*3/mm3    Monocytes, Absolute 1.61 (H) 0.10 - 0.90 10*3/mm3    Eosinophils, Absolute 0.00 0.00 - 0.40 10*3/mm3    Basophils, Absolute 0.03 0.00 - 0.20 10*3/mm3    Immature Grans, Absolute 0.28 (H) 0.00 - 0.05 10*3/mm3    nRBC 0.6 (H) 0.0 - 0.2 /100 WBC   Urinalysis, Microscopic Only - Straight Cath    Specimen: Straight Cath; Urine   Result Value Ref Range    RBC, UA 6-12 (A) None Seen, 0-2 /HPF    WBC, UA Too Numerous to Count (A) None Seen, 0-2 /HPF    Bacteria, UA 4+ (A) None Seen /HPF    Squamous Epithelial Cells, UA 3-6 (A) None Seen, 0-2 /HPF    Hyaline Casts, UA 3-6 None Seen /LPF    Methodology Automated Microscopy    Scan Slide    Specimen: Blood   Result Value Ref Range    Elliptocytes Slight/1+ None Seen    Hypochromia Mod/2+ None Seen    Microcytes Slight/1+ None Seen    Schistocytes Slight/1+ None Seen    Platelet Morphology Normal Normal   High Sensitivity Troponin T 2Hr    Specimen: Blood   Result Value Ref  Range    HS Troponin T 24 (H) <10 ng/L    Troponin T Delta -2 >=-4 - <+4 ng/L   ECG 12 Lead Other; Sepsis   Result Value Ref Range    QT Interval 314 ms    QTC Interval 412 ms   Type & Screen    Specimen: Arm, Right; Blood   Result Value Ref Range    ABO Type O     RH type Positive     Antibody Screen Negative     T&S Expiration Date 7/15/2023 11:59:59 PM    ABO RH Specimen Verification    Specimen: Blood   Result Value Ref Range    ABO Type O     RH type Positive    Prepare RBC, 2 Units   Result Value Ref Range    Product Code P3669E30     Unit Number [id number]     UNIT  ABO O     UNIT  RH POS     Crossmatch Interpretation Compatible     Dispense Status IS     Blood Expiration Date 202308112359     Blood Type Barcode 5100     Product Code I8218J31     Unit Number [id number]     UNIT  ABO O     UNIT  RH POS     Crossmatch Interpretation Compatible     Dispense Status XM     Blood Expiration Date 202308112359     Blood Type Barcode 5100    Green Top (Gel)   Result Value Ref Range    Extra Tube Hold for add-ons.    Lavender Top   Result Value Ref Range    Extra Tube hold for add-on    Gold Top - SST   Result Value Ref Range    Extra Tube Hold for add-ons.    Light Blue Top   Result Value Ref Range    Extra Tube Hold for add-ons.                                           Medical Decision Making  Screening positive.  Sepsis protocol initiated.  Fluids and antibiotics given.  Anemia noted on ABG.  Type and screen ordered.  Confirmed anemia on CBC with leukocytosis.  2 units of packed red blood cells ordered.  Broad-spectrum antibiotics given.  CMP unremarkable.  CT of the chest notes considerable cardiomegaly.  CT of the abdomen pelvis note ascites.  No other acute concerns noted.  CT of the head without contrast unremarkable.  Urinalysis concerning for UTI.  Likely source of sepsis.  Patient denied any concerns for blood loss such as hematemesis, hematochezia, or hematuria.  Concern for possible slight  hematuria on urinalysis is noted however.    Recommend admission for further work up and treatment.  Hospitalist team consulted and made aware of the patient.  Consults and orders placed per hospitalist request.  Patient was agreeable to admission plan.  Vitals stable on admission.    Problems Addressed:  Acute UTI: complicated acute illness or injury  Sepsis with acute hypoxic respiratory failure without septic shock, due to unspecified organism: complicated acute illness or injury  Symptomatic anemia: complicated acute illness or injury    Amount and/or Complexity of Data Reviewed  Labs: ordered.  Radiology: ordered.  ECG/medicine tests: ordered.    Risk  Prescription drug management.  Decision regarding hospitalization.        Final diagnoses:   Symptomatic anemia   Sepsis with acute hypoxic respiratory failure without septic shock, due to unspecified organism   Acute UTI       ED Disposition  ED Disposition       ED Disposition   Decision to Admit    Condition   --    Comment   Level of Care: Progressive Care [20]   Diagnosis: Symptomatic anemia [2339207]   Admitting Physician: WAYNE MONAE [[id number]]   Certification: I Certify That Inpatient Hospital Services Are Medically Necessary For Greater Than 2 Midnights                 No follow-up provider specified.       Medication List      No changes were made to your prescriptions during this visit.            Yusuf Luo DO  07/13/23 0152      Electronically signed by Yusuf Luo DO at 07/13/23 0152       Jovany Story RN at 07/12/23 2157          Patient cleaned and full bed change at this time. Patient tolerated well.     Electronically signed by Jovany Story RN at 07/12/23 2157       Blood Administration Record (From admission, onward)      Transfusions to release       Ordered     Start    07/12/23 2300  Transfuse RBC, 2 Units Infuse Each Unit Over: 2H  Transfusion      Released Time Blood Unit Number Status   07/12/23 7325    23  [id number]  [id number] Completed 23 0250       Unscheduled                     Physician Progress Notes (all)        Bernice Rosas MD at 23 0752              [facility]  PROGRESS NOTE    Patient information:  Name: Judy Lutz  Age/Sex: 63 y.o. female  :  1960        PCP: Provider, No Known  Attending: Sapphire Contreras DO  MRN:  [id number]  Visit Number:  [id number]    LOS:  LOS: 4 days     CODE STATUS:    Code Status and Medical Interventions:   Ordered at: 23 0152     Code Status (Patient has no pulse and is not breathing):    CPR (Attempt to Resuscitate)     Medical Interventions (Patient has pulse or is breathing):    Full Support       PROBLEM LIST:Principal Problem:    Symptomatic anemia      Reason for Cardiology follow-up:  Cardiomyopathy and pericardial effusion     Subjective   ADMISSION INFORMATION:  Chief Complaint   Patient presents with    Shortness of Breath       HPI:  Judy Lutz is a 63 y.o. female with no considerable past medical history noted in patient's chart.  Patient presented to [facility] ([facility] emergency room (ER) on 2023 with complaints of increased fatigue, malaise, shortness of breath, and generalized weakness for the past several weeks.  Cardiology was consulted for further evaluation and management of cardiomyopathy and pericardial effusion.     Interval History:   Patient is in room 320 and was examined by Dr. Rosas.  Telemetry reveals -110s with a couplet and frequent PVCs.  Sodium 148, potassium 2.6, chloride 93, CO2 43.8, BUN of 18, and a creatinine of 0.64.  Magnesium is 1.7.  Phosphorus is 1.6.  Hemoglobin is 7.0 which is gradually declined from status post blood transfusion earlier in admission as her hemoglobin was 6.4 on admission.  Patient had exceedingly well diuresing overnight with a -6296.48 mL noted on her I & O flow sheet.   Patient is lying in bed  resting quietly with eyes closed.  No acute distress noted at this time.  She is more lethargic and difficult to arouse.  Patient's daughter is sitting at bedside and reports that her brother stayed with the patient during the night.  He said that she did not sleep well but otherwise had done well through the night.  She has noticed that she has been more lethargic, disoriented, and difficult to wake since she has been sitting with her this morning.    Vital Signs  Temp:  [97.8 °F (36.6 °C)-99.3 °F (37.4 °C)] 98.1 °F (36.7 °C)  Heart Rate:  [] 91  Resp:  [16-25] 24  BP: (118-151)/(50-77) 151/77  Flow (L/min):  [3-5] 4.5  Vital Signs (last 72 hrs)         07/14 0700  07/15 0659 07/15 0700  07/16 0659 07/16 0700 07/17 0659 07/17 0700  07/17 0753   Most Recent      Temp (°F) 97.8 -  99    98.1 -  99.1    97.8 -  99.3       98.1 (36.7) 07/17 0649    Heart Rate 91 -  105    91 -  103    90 -  107       91 07/17 0654    Resp 11 -  26    12 -  26    16 -  25       24 07/17 0654    /68 -  137/70    121/64 -  160/83    118/60 -  153/69       151/77 07/17 0649    SpO2 (%) 87 -  96    85 -  99    84 -  97       93 07/17 0654          Body mass index is 51.88 kg/m².    Intake/Output Summary (Last 24 hours) at 7/17/2023 0753  Last data filed at 7/17/2023 0400  Gross per 24 hour   Intake 1853.52 ml   Output 8150 ml   Net -6296.48 ml       Objective     Physical Exam:      General Appearance:  Lethargic in no acute distress.  BMI 51.88.   Head:    Normocephalic, without obvious abnormality, atraumatic.   Eyes:                          Conjunctivae and sclerae normal, no icterus, no pallor, corneas clear.   Neck:   No adenopathy, supple, trachea midline, no thyromegaly, no carotid bruit, no JVD.   Lungs:     Clear to auscultation, respirations regular, even and             unlabored.    Heart:  Regular rhythm and tachycardic rate, normal S1 and S2, no          murmur, no gallop, no rub, no click   Chest Wall:    No  abnormalities observed.   Abdomen:     Normal bowel sounds, no masses, no organomegaly, soft nontender, nondistended, no guarding, no rebound tenderness.   Extremities:   Moves all extremities well, no edema, no cyanosis, no           redness.   Pulses:   Pulses palpable and equal bilaterally.   Skin:   No bleeding, bruising or rash.  Skin is pale in color.   Neurologic: Lethargic.       Results review   Results Review:   Results from last 7 days   Lab Units 07/17/23  0022 07/16/23  0426 07/15/23  0055 07/14/23  0029 07/13/23  1156 07/13/23  0516 07/13/23  0343 07/12/23  2153   WBC 10*3/mm3 19.23* 20.91* 19.00* 23.36* 21.69*  --  22.40* 20.79*   HEMOGLOBIN g/dL 7.0* 7.1* 7.1* 7.8* 8.5* 7.4* 7.4* 6.4*   PLATELETS 10*3/mm3 420 499* 398 401 503*  --  436 418     Results from last 7 days   Lab Units 07/17/23  0022 07/16/23  1830 07/16/23  0426 07/15/23  0055 07/14/23  1519 07/14/23  0029 07/13/23  1156 07/13/23  0343 07/12/23  2153   SODIUM mmol/L 148*  --  149* 144 146* 144 149* 151* 150*   POTASSIUM mmol/L 2.6* 2.6* 2.7* 3.3* 3.7 4.2 4.0 4.7 4.8   CHLORIDE mmol/L 93*  --  99 102 104 109* 109* 112* 112*   CO2 mmol/L 43.8*  --  42.8* 31.9* 32.7* 27.6 30.1* 29.2* 30.8*   BUN mg/dL 18  --  20 25* 28* 29* 31* 32* 33*   CREATININE mg/dL 0.64  --  0.76 0.77 0.83 0.90 0.95 0.96 1.04*   CALCIUM mg/dL 10.2  --  10.3 9.8 9.6 9.6 10.5 10.2 10.4   GLUCOSE mg/dL 120*  --  110* 118* 130* 138* 126* 138* 135*   ALT (SGPT) U/L 10  --  12  --   --   --   --  33 35*   AST (SGOT) U/L 11  --  12  --   --   --   --  30 36*     Results from last 7 days   Lab Units 07/15/23  1504 07/15/23  1211 07/13/23  0014 07/12/23  2153   HSTROP T ng/L 32* 29* 24* 26*     Lab Results   Component Value Date    PROBNP 26,037.0 (H) 07/13/2023     No results found.  Lab Results   Component Value Date    ABSOLUTELUNG 28 07/15/2023     Lab Results   Component Value Date    INR 1.38 (H) 07/12/2023     Lab Results   Component Value Date    MG 1.7 07/17/2023    MG  2.0 07/16/2023    MG 1.9 07/15/2023     Lab Results   Component Value Date    TSH 0.619 07/15/2023      No results found for: CHOL, TRIG, HDL, LDL  Pain Management Panel          Latest Ref Rng & Units 7/14/2023   Pain Management Panel   Creatinine, Urine mg/dL 8.5      Microbiology Results (last 10 days)       Procedure Component Value - Date/Time    Urine Culture - Urine, Urine, Clean Catch [[id number]]  (Abnormal) Collected: 07/15/23 1627    Lab Status: Preliminary result Specimen: Urine, Clean Catch Updated: 07/16/23 1614     Urine Culture >100,000 CFU/mL Gram Negative Bacilli    Narrative:      Colonization of the urinary tract without infection is common. Treatment is discouraged unless the patient is symptomatic, pregnant, or undergoing an invasive urologic procedure.    COVID-19 and FLU A/B PCR - Swab, Nasopharynx [[id number]]  (Normal) Collected: 07/13/23 0109    Lab Status: Final result Specimen: Swab from Nasopharynx Updated: 07/13/23 0138     COVID19 Not Detected     Influenza A PCR Not Detected     Influenza B PCR Not Detected    Narrative:      Fact sheet for providers: https://www.fda.gov/media/599462/download    Fact sheet for patients: https://www.fda.gov/media/612161/download    Test performed by PCR.    Blood Culture - Blood, Arm, Left [[id number]]  (Normal) Collected: 07/12/23 2220    Lab Status: Preliminary result Specimen: Blood from Arm, Left Updated: 07/16/23 2231     Blood Culture No growth at 4 days    Blood Culture - Blood, Arm, Left [[id number]]  (Normal) Collected: 07/12/23 2220    Lab Status: Preliminary result Specimen: Blood from Arm, Left Updated: 07/16/23 2231     Blood Culture No growth at 4 days    Urine Culture - Urine, Straight Cath [[id number]]  (Abnormal)  (Susceptibility) Collected: 07/12/23 2140    Lab Status: Final result Specimen: Urine from Straight Cath Updated: 07/15/23 1323     Urine Culture >100,000 CFU/mL Escherichia coli ESBL     Comment:   Consider infectious disease  consult.  Susceptibility results may not correlate to clinical outcomes.         >100,000 CFU/mL Klebsiella pneumoniae ssp pneumoniae    Narrative:      Colonization of the urinary tract without infection is common. Treatment is discouraged unless the patient is symptomatic, pregnant, or undergoing an invasive urologic procedure.  Recent outcomes data supports the use of pip/tazo in the treatment of susceptible ESBL infections for uncomplicated UTI. Consider use of pip/tazo as a carbapenem-sparing regimen in applicable patients.    Susceptibility        Escherichia coli ESBL      MANUEL      Ertapenem Susceptible      Gentamicin Susceptible      Levofloxacin Resistant      Meropenem Susceptible      Nitrofurantoin Susceptible      Piperacillin + Tazobactam Resistant      Trimethoprim + Sulfamethoxazole Resistant                       Susceptibility        Klebsiella pneumoniae ssp pneumoniae      MANUEL      Ampicillin Resistant      Ampicillin + Sulbactam Susceptible      Cefazolin Susceptible      Cefepime Susceptible      Ceftazidime Susceptible      Ceftriaxone Susceptible      Gentamicin Susceptible      Levofloxacin Susceptible      Nitrofurantoin Intermediate      Piperacillin + Tazobactam Susceptible      Trimethoprim + Sulfamethoxazole Susceptible                                  Imaging Results (Last 48 Hours)       Procedure Component Value Units Date/Time    XR Chest 1 View [[id number]] Collected: 07/15/23 1651     Updated: 07/15/23 1821    Narrative:      Procedure: Portable chest x-ray examination performed on 07/15/2023.  Single view. Semiupright position.     HISTORY: Confusion. Progressive hypoxia.     COMPARISON: None.     FINDINGS:     Enlarged heart size.  Mild central pulmonary vascular congestion.  Mild edema.  Hypoinflated lungs.  Mild elevated right hemidiaphragm.  No pleural effusion or pneumothorax.  No fracture or foreign body.       Impression:         1.  Enlarged heart size.  2.  Central  pulmonary vascular congestion with mild edema.  3.  Hypoinflated lungs.  4.  No pleural effusion or pneumothorax.     This report was finalized on 7/15/2023 4:52 PM by Sacha Syed MD.       US Liver [[id number]] Collected: 07/15/23 1058     Updated: 07/15/23 1101    Narrative:      EXAMINATION: US LIVER-      CLINICAL INDICATION: r/o cirrhosis; D64.9-Anemia, unspecified;  A41.9-Sepsis, unspecified organism; R65.20-Severe sepsis without septic  shock; J96.01-Acute respiratory failure with hypoxia; N39.0-Urinary  tract infection, site not specified        COMPARISON: None available     FINDINGS:  Sonographic imaging of the liver     Liver is homogeneous in echotexture  No intrahepatic ductal dilatation or focal hepatic mass.  Hepatic flow is hepatopedal.     Small amount of perihepatic fluid is present.       Impression:      1. Small volume ascites  2. The liver is homogeneous      This report was finalized on 7/15/2023 10:59 AM by Dr. Nicho Reeves MD.               ECHO:  Results for orders placed during the hospital encounter of 07/12/23    Adult Transthoracic Echo Complete W/ Cont if Necessary Per Protocol    Interpretation Summary  Images from the original result were not included.      Normal left ventricular cavity size noted. Left ventricular wall thickness is consistent with mild concentric hypertrophy. All left ventricular wall segments contract normally.    Left ventricular ejection fraction appears to be 51 - 55%.    Left ventricular diastolic function is consistent with (grade I) impaired relaxation.    There is a moderate (1-2cm) pericardial effusion adjacent to the left ventricle. There is no evidence of cardiac tamponade.    The aortic valve is structurally normal with no regurgitation or stenosis present.    The mitral valve is structurally normal with no significant stenosis present. Trace mitral valve regurgitation is present.    Mild tricuspid valve regurgitation is present. Estimated right  ventricular systolic pressure from tricuspid regurgitation is moderately elevated (45-55 mmHg).    Moderate pulmonary hypertension is present.    There is a moderate (1-2cm) pericardial effusion adjacent to the left ventricle. The effusion is fluid filled. There is no evidence of cardiac tamponade.      STRESS TEST:  No results found for this or any previous visit.     HEART CATH:  No results found for this or any previous visit.      TELEMETRY:  -110s with a couplet and frequent PVCs                I reviewed the patient's new clinical results.    Medication Review:   Current list of medications may not reflect those currently placed in orders that are not signed or are being held.     albumin human, 25 g, Intravenous, BID  aspirin, 81 mg, Oral, Daily  escitalopram, 10 mg, Oral, Daily  furosemide, 60 mg, Intravenous, BID  iron polysaccharides, 150 mg, Oral, Daily  meropenem, 1,000 mg, Intravenous, Q8H  metoprolol succinate XL, 25 mg, Oral, Q24H  pantoprazole, 40 mg, Oral, BID AC  potassium chloride, 40 mEq, Oral, Daily  potassium phosphate, 15 mmol, Intravenous, Q3H  rosuvastatin, 20 mg, Oral, Nightly  senna-docusate sodium, 2 tablet, Oral, BID  sodium chloride, 10 mL, Intravenous, Q12H  sodium chloride, 10 mL, Intravenous, Q12H           acetaminophen    senna-docusate sodium **AND** polyethylene glycol **AND** bisacodyl **AND** bisacodyl    Magnesium Standard Dose Replacement - Follow Nurse / BPA Driven Protocol    ondansetron    Phosphorus Replacement - Follow Nurse / BPA Driven Protocol    Potassium Replacement - Follow Nurse / BPA Driven Protocol    prochlorperazine    simethicone    sodium chloride    sodium chloride    sodium chloride    sodium chloride    sodium chloride    Assessment          1.  Acute decompensated HFpEF improving  2.  Moderate-sized pericardial effusion  3.  Mild elevated high-sensitivity troponin with a flat trend on admission, no chest pain reported most likely NSTEMI type II  secondary to heart failure  4.  Severe anemia  5.  Likely obstructive sleep apnea         Plan   1.  Patient had excellent diuresis overnight concern if she is getting a little bit more intravascular depleted we will watch closely may have to hold diuretics temporarily  2.  Mental status changes this morning no clear cause  3.  Stress testing once patient is more stable for assessment of ischemia          I have discussed the patients findings and my recommendations with patient.    Electronically signed by WINDY Roberts, 23, 11:45 AM EDT.   Electronically signed by Bernice Rosas MD, 23, 11:58 AM EDT.                        Please note that portions of this note were completed with a voice recognition program.    Please note that portions of this note were copied and has been reviewed and is accurate as of 2023 .       Electronically signed by Bernice Rosas MD at 23 1201       Candido Rodney MD at 23 1132              [facility] HOSPITALIST PROGRESS NOTE     Patient Identification:  Name:  Judy Lutz  Age:  63 y.o.  Sex:  female  :  1960  MRN:  [id number]  Visit Number:  [id number]  ROOM: [place] [id number]     Primary Care Provider:  Provider, No Known     Date of Admission: 2023    Length of stay in inpatient status:  3    Subjective     Chief Compliant:    Chief Complaint   Patient presents with    Shortness of Breath     History of Presenting Illness:  The daughter that I met yesterday is at bedside.  The patient is less confused but overall the mental status has improved.  The patient was asleep and kept her eyes closed; she did give me a thumbs up that she heard me but she did not answer any of my questions.  The daughter stated that the patient had not complained of anything and she did eat some of her food today.    Objective     Current Hospital Meds:  albumin human, 25 g, Intravenous, BID  aspirin, 81 mg, Oral, Daily  escitalopram, 10 mg,  Oral, Daily  furosemide, 60 mg, Intravenous, BID  [START ON 7/17/2023] iron polysaccharides, 150 mg, Oral, Daily  meropenem, 1,000 mg, Intravenous, Q8H  metoprolol succinate XL, 25 mg, Oral, Q24H  pantoprazole, 40 mg, Oral, BID AC  potassium chloride, 40 mEq, Oral, Daily  potassium chloride, 40 mEq, Oral, Q4H  rosuvastatin, 20 mg, Oral, Nightly  senna-docusate sodium, 2 tablet, Oral, BID  sodium chloride, 10 mL, Intravenous, Q12H  sodium chloride, 10 mL, Intravenous, Q12H       Current Antimicrobial Therapy:  Anti-Infectives (From admission, onward)      Ordered     Dose/Rate Route Frequency Start Stop    07/15/23 1915  meropenem (MERREM) 1,000 mg in sodium chloride 0.9 % 100 mL IVPB-VTB        Ordering Provider: Candido Rodney MD    1,000 mg  over 3 Hours Intravenous Every 8 Hours 07/16/23 0400 07/23/23 0359    07/15/23 1915  meropenem (MERREM) 1,000 mg in sodium chloride 0.9 % 100 mL IVPB-VTB        Ordering Provider: Candido Rodney MD    1,000 mg  over 30 Minutes Intravenous Once 07/15/23 2015 07/15/23 2056    07/12/23 2137  cefTRIAXone (ROCEPHIN) 2,000 mg in sodium chloride 0.9 % 100 mL IVPB-VTB        Ordering Provider: Yusuf Luo DO    2,000 mg  200 mL/hr over 30 Minutes Intravenous Once 07/12/23 2153 07/12/23 2330          Current Diuretic Therapy:  Diuretics (From admission, onward)      Ordered     Dose/Rate Route Frequency Start Stop    07/14/23 1026  furosemide (LASIX) injection 60 mg        Ordering Provider: Leonard Dang DO    60 mg Intravenous 2 Times Daily (Diuretics) 07/14/23 2130      07/14/23 0810  furosemide (LASIX) injection 60 mg        Ordering Provider: Leonard Dang DO    60 mg Intravenous Once 07/14/23 0900 07/14/23 0846    07/13/23 0054  furosemide (LASIX) injection 80 mg        Ordering Provider: Yusuf Luo DO    80 mg Intravenous Once 07/13/23 0110 07/13/23 0114           ----------------------------------------------------------------------------------------------------------------------  Vital Signs:  Temp:  [97.8 °F (36.6 °C)-99.1 °F (37.3 °C)] 97.8 °F (36.6 °C)  Heart Rate:  [] 96  Resp:  [12-26] 18  BP: (121-160)/(58-87) 133/70  SpO2:  [85 %-99 %] 90 %  on  Flow (L/min):  [3-4] 3;   Device (Oxygen Therapy): room air  Body mass index is 53.87 kg/m².    Wt Readings from Last 3 Encounters:   07/16/23 (!) 170 kg (375 lb 7.1 oz)     Intake & Output (last 3 days)         07/13 0701 07/14 0700 07/14 0701  07/15 0700 07/15 0701 07/16 0700 07/16 0701  07/17 0700    P.O. 2240 1240      I.V. (mL/kg)   450 (2.6)     Blood        IV Piggyback 100 370      Total Intake(mL/kg) 2340 (12.8) 1610 (9.1) 450 (2.6)     Urine (mL/kg/hr) 2300 (0.5) 5900 (1.4) 9125 (2.2) 1300 (1.7)    Stool 0 0 0 0    Total Output 2300 5900 9125 1300    Net +40 -2250 -6854 -1300            Urine Unmeasured Occurrence 5 x 1 x      Stool Unmeasured Occurrence  0 x 1 x 1 x          Diet: Cardiac Diets; Healthy Heart (2-3 Na+); Texture: Soft to Chew (NDD 3); Soft to Chew: Whole Meat; Fluid Consistency: Thin (IDDSI 0)  ----------------------------------------------------------------------------------------------------------------------  Physical Exam  Vitals and nursing note reviewed. Exam conducted with a chaperone present.   Constitutional:       General: She is not in acute distress.  She is asleep and not snoring.     Appearance: She is well-developed.   Cardiovascular:      Rate and Rhythm: Normal rate and regular rhythm.      Pulses: Normal pulses.      Heart sounds: No murmur heard.  Pulmonary:      Effort: Pulmonary effort is normal. No respiratory distress.      Breath sounds: No wheezing.   Abdominal:      General: Bowel sounds are normal. There is no distension.      Palpations: Abdomen is soft.   Musculoskeletal:         General: No swelling, deformity or signs of injury.   Skin:     Capillary  Refill: Capillary refill takes less than 2 seconds.      Coloration: Skin is not jaundiced or pale.   Urinary:     Gonzalez catheter in place with clear and yellow urine in the collection container.  ----------------------------------------------------------------------------------------------------------------------  Tele:  Sinus tachycardia 's.  I have personally reviewed/looked at the telemetry strips.  ----------------------------------------------------------------------------------------------------------------------  LABS:    CBC and coagulation:  Results from last 7 days   Lab Units 07/16/23  0426 07/16/23  0425 07/15/23  0055 07/14/23  0029 07/13/23  1156 07/13/23  0516 07/13/23  0343 07/12/23  2153   PROCALCITONIN ng/mL  --  0.27*  --   --   --   --  0.18 0.29*   LACTATE mmol/L 0.8  --   --   --   --   --   --  1.6   CRP mg/dL 20.87*  --   --   --   --   --  15.57*  --    WBC 10*3/mm3 20.91*  --  19.00* 23.36* 21.69*  --  22.40* 20.79*   HEMOGLOBIN g/dL 7.1*  --  7.1* 7.8* 8.5* 7.4* 7.4* 6.4*   HEMATOCRIT % 29.3*  --  30.4* 33.8* 35.7 29.4* 29.4* 27.0*   MCV fL 76.5*  --  77.6* 77.5* 75.6*  --  72.6* 73.2*   MCHC g/dL 24.2*  --  23.4* 23.1* 23.8*  --  25.2* 23.7*   PLATELETS 10*3/mm3 499*  --  398 401 503*  --  436 418   INR   --   --   --   --   --   --   --  1.38*     Acid/base balance:  Results from last 7 days   Lab Units 07/15/23  1951 07/15/23  1437 07/12/23  2127   PH, ARTERIAL pH units 7.398 7.376 7.388   PCO2, ARTERIAL mm Hg 76.7* 76.4* 50.9*   PO2 ART mm Hg 54.3* 56.3* 45.8*   HCO3 ART mmol/L 47.2* 44.8* 30.6*     Renal and electrolytes:  Results from last 7 days   Lab Units 07/16/23  0426 07/15/23  1504 07/15/23  0055 07/14/23  1519 07/14/23  0029 07/13/23  1156 07/13/23  0343 07/12/23  2153   SODIUM mmol/L 149*  --  144 146* 144 149* 151* 150*   POTASSIUM mmol/L 2.7*  --  3.3* 3.7 4.2 4.0 4.7 4.8   MAGNESIUM mg/dL 2.0 1.9  --   --   --   --   --   --    CHLORIDE mmol/L 99  --  102 104 109*  109* 112* 112*   CO2 mmol/L 42.8*  --  31.9* 32.7* 27.6 30.1* 29.2* 30.8*   BUN mg/dL 20  --  25* 28* 29* 31* 32* 33*   CREATININE mg/dL 0.76  --  0.77 0.83 0.90 0.95 0.96 1.04*   CALCIUM mg/dL 10.3  --  9.8 9.6 9.6 10.5 10.2 10.4   PHOSPHORUS mg/dL 2.6  --   --   --   --   --   --   --    GLUCOSE mg/dL 110*  --  118* 130* 138* 126* 138* 135*   ANION GAP mmol/L 7.2  --  10.1 9.3 7.4 9.9 9.8 7.2     Estimated Creatinine Clearance: 133.8 mL/min (by C-G formula based on SCr of 0.76 mg/dL).    Liver and pancreatic function:  Results from last 7 days   Lab Units 07/16/23  0427 07/16/23  0426 07/13/23  0343 07/12/23  2153   ALBUMIN g/dL  --  2.8* 3.0* 2.9*   BILIRUBIN mg/dL  --  0.5 0.5 0.4   ALK PHOS U/L  --  99 95 97   AST (SGOT) U/L  --  12 30 36*   ALT (SGPT) U/L  --  12 33 35*   AMMONIA umol/L 55*  --   --   --      Endocrine function:  Lab Results   Component Value Date    HGBA1C 5.80 (H) 07/13/2023     Glucose [facility] from the [facility]:  Results from last 7 days   Lab Units 07/16/23  0426 07/15/23  0055 07/14/23  1519 07/14/23  0029 07/13/23  1156 07/13/23  0343 07/12/23  2153   GLUCOSE mg/dL 110* 118* 130* 138* 126* 138* 135*     Lab Results   Component Value Date    TSH 0.619 07/15/2023     Cardiac:  Results from last 7 days   Lab Units 07/15/23  1504 07/15/23  1211 07/13/23  0014 07/12/23  2153   HSTROP T ng/L 32* 29* 24* 26*   PROBNP pg/mL  --   --  26,037.0*  --        Cultures:  Microbiology Results (last 10 days)       Procedure Component Value - Date/Time    COVID-19 and FLU A/B PCR - Swab, Nasopharynx [[id number]]  (Normal) Collected: 07/13/23 0109    Lab Status: Final result Specimen: Swab from Nasopharynx Updated: 07/13/23 0138     COVID19 Not Detected     Influenza A PCR Not Detected     Influenza B PCR Not Detected    Narrative:      Fact sheet for providers: https://www.fda.gov/media/440290/download    Fact sheet for patients: https://www.fda.gov/media/230927/download    Test performed by PCR.    Blood  Culture - Blood, Arm, Left [[id number]]  (Normal) Collected: 07/12/23 2220    Lab Status: Preliminary result Specimen: Blood from Arm, Left Updated: 07/15/23 2231     Blood Culture No growth at 3 days    Blood Culture - Blood, Arm, Left [[id number]]  (Normal) Collected: 07/12/23 2220    Lab Status: Preliminary result Specimen: Blood from Arm, Left Updated: 07/15/23 2231     Blood Culture No growth at 3 days    Urine Culture - Urine, Straight Cath [[id number]]  (Abnormal)  (Susceptibility) Collected: 07/12/23 2140    Lab Status: Final result Specimen: Urine from Straight Cath Updated: 07/15/23 1323     Urine Culture >100,000 CFU/mL Escherichia coli ESBL     Comment:   Consider infectious disease consult.  Susceptibility results may not correlate to clinical outcomes.         >100,000 CFU/mL Klebsiella pneumoniae ssp pneumoniae    Narrative:      Colonization of the urinary tract without infection is common. Treatment is discouraged unless the patient is symptomatic, pregnant, or undergoing an invasive urologic procedure.  Recent outcomes data supports the use of pip/tazo in the treatment of susceptible ESBL infections for uncomplicated UTI. Consider use of pip/tazo as a carbapenem-sparing regimen in applicable patients.    Susceptibility        Escherichia coli ESBL      MANUEL      Ertapenem Susceptible      Gentamicin Susceptible      Levofloxacin Resistant      Meropenem Susceptible      Nitrofurantoin Susceptible      Piperacillin + Tazobactam Resistant      Trimethoprim + Sulfamethoxazole Resistant                       Susceptibility        Klebsiella pneumoniae ssp pneumoniae      MANUEL      Ampicillin Resistant      Ampicillin + Sulbactam Susceptible      Cefazolin Susceptible      Cefepime Susceptible      Ceftazidime Susceptible      Ceftriaxone Susceptible      Gentamicin Susceptible      Levofloxacin Susceptible      Nitrofurantoin Intermediate      Piperacillin + Tazobactam Susceptible      Trimethoprim +  Sulfamethoxazole Susceptible                               I have personally looked at the labs and they are summarized above.  ----------------------------------------------------------------------------------------------------------------------  Detailed radiology reports for the last 24 hours:    Imaging Results (Last 24 Hours)       Procedure Component Value Units Date/Time    XR Chest 1 View [[id number]] Collected: 07/15/23 1651     Updated: 07/15/23 1821    Narrative:      Procedure: Portable chest x-ray examination performed on 07/15/2023.  Single view. Semiupright position.     HISTORY: Confusion. Progressive hypoxia.     COMPARISON: None.     FINDINGS:     Enlarged heart size.  Mild central pulmonary vascular congestion.  Mild edema.  Hypoinflated lungs.  Mild elevated right hemidiaphragm.  No pleural effusion or pneumothorax.  No fracture or foreign body.       Impression:         1.  Enlarged heart size.  2.  Central pulmonary vascular congestion with mild edema.  3.  Hypoinflated lungs.  4.  No pleural effusion or pneumothorax.     This report was finalized on 7/15/2023 4:52 PM by Sacha Syed MD.             I have personally looked at the radiology images and I have read the available final report.    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 coli and Klebsiella  pneumoniae  -Anemia noted on admission, suspect due to severe iron deficiency anemia  -Leukocytosis, present on admission, suspect secondary to bone marrow overproduction as a result of the severe iron deficiency anemia  -Hypernatremia due to free water deficit that was present on admission, now resolved  -History of major depressive disorder   -Acute on chronic debility that was present on admission    Await infectious disease consult; will continue the meropenem for now.  Will continue the IV Lasix per cardiology recommendations as the Cr is improving, as is the edema.  I suspect that the potassium is low from the diuresis and thus will continue to trend the potassium and magnesium levels and replace per our replacement protocols.  Will repeat the labs in the am.  Will move her to the telemetry once a bed is available.  Will consult inpatient rehab for possible admission.    VTE Prophylaxis:   Mechanical Order History:        Ordered        23  Place Sequential Compression Device  Once            23  Maintain Sequential Compression Device  Continuous                          Pharmalogical Order History:       None        Disposition: Attempting inpatient rehab    Candido Rodney MD  [facility] Hospitalist  23  11:32 EDT      Electronically signed by Candido Rodney MD at 23 1809       Anthony Alex MD at 23 0828               LOS: 3 days     Name: Judy Lutz  Age/Sex: 63 y.o. female  :  1960        PCP: Provider, No Known  REF: No Known Provider    Principal Problem:    Symptomatic anemia      Reason for follow-up: Cardiomyopathy and pericardial effusion    Subjective     Subjective     Judy Lutz is a 63 y.o. female with a no considerable past medical history noted in patient's chart.  Patient presented to [facility] ([facility] [place]) emergency room (ER) on 2023 with complaints of increased fatigue, malaise, shortness of breath,  "and generalized weakness for the past several weeks     Interval History: Patient awake and oriented. Reports worsening anxiety and shortness of breath. Lower extremity edema improving but still present. Is negative 8 liters with IV diuresis.  Hemoglobin 7.1 today. Receiving iron infusions.     Vital Signs  Temp:  [97.8 °F (36.6 °C)-99.1 °F (37.3 °C)] 97.8 °F (36.6 °C)  Heart Rate:  [] 93  Resp:  [12-26] 18  BP: (121-160)/(58-87) 153/69  Vital Signs (last 72 hrs)         07/13 0700  07/14 0659 07/14 0700  07/15 0659 07/15 0700  07/16 0659 07/16 0700  07/16 0828   Most Recent      Temp (°F) 97.6 -  99.2    97.8 -  99    98.1 -  99.1      97.8     97.8 (36.6) 07/16 0800    Heart Rate 91 -  105    91 -  105    91 -  103      93     93 07/16 0700    Resp 12 - 26 11 - 26 12 - 26      18     18 07/16 0700    /30 -  149/73    110/68 -  137/70    121/64 -  160/83      153/69     153/69 07/16 0700    SpO2 (%) 90 -  97    87 -  96    85 -  99      91     91 07/16 0700          Documented weights    07/12/23 2124 07/14/23 0400 07/15/23 0400 07/16/23 0400   Weight: (!) 181 kg (400 lb) (!) 183 kg (404 lb 1.7 oz) (!) 177 kg (390 lb 7 oz) (!) 170 kg (375 lb 7.1 oz)      Body mass index is 53.87 kg/m².    Intake/Output Summary (Last 24 hours) at 7/16/2023 1042  Last data filed at 7/16/2023 0400  Gross per 24 hour   Intake --   Output 6125 ml   Net -6125 ml     Objective:  Vital signs: (most recent): Blood pressure 153/69, pulse 93, temperature 97.8 °F (36.6 °C), temperature source Oral, resp. rate 18, height 177.8 cm (70\"), weight (!) 170 kg (375 lb 7.1 oz), SpO2 93 %.              Objective       Physical Exam:     General Appearance:    Alert, cooperative, in no acute distress   Head:    Normocephalic, without obvious abnormality, atraumatic   Eyes:            Conjunctivae and sclerae normal, no   icterus, no pallor, corneas clear.   Neck:   No adenopathy, supple, trachea midline, no thyromegaly, no   carotid " bruit, no JVD   Lungs:     Clear to auscultation,respirations regular, even and            unlabored    Heart:    Regular rhythm and normal rate, normal S1 and S2, no         murmur, no gallop, no rub, no click   Chest Wall:    No abnormalities observed   Abdomen:     Normal bowel sounds, no masses, no organomegaly, soft     nontender, nondistended, no guarding, no rebound                tenderness   Extremities:   Moves all extremities well, 3+ BLE edema, no cyanosis, no   redness   Pulses:   Pulses palpable and equal bilaterally   Skin:   No bleeding, bruising or rash       Neurologic:   Alert and oriented      Results review       Results Review:   Results from last 7 days   Lab Units 07/16/23  0426 07/15/23  0055 07/14/23  0029 07/13/23  1156 07/13/23  0516 07/13/23  0343 07/12/23  2153   WBC 10*3/mm3 20.91* 19.00* 23.36* 21.69*  --  22.40* 20.79*   HEMOGLOBIN g/dL 7.1* 7.1* 7.8* 8.5* 7.4* 7.4* 6.4*   PLATELETS 10*3/mm3 499* 398 401 503*  --  436 418     Results from last 7 days   Lab Units 07/16/23  0426 07/15/23  0055 07/14/23  1519 07/14/23  0029 07/13/23  1156 07/13/23  0343 07/12/23  2153   SODIUM mmol/L 149* 144 146* 144 149* 151* 150*   POTASSIUM mmol/L 2.7* 3.3* 3.7 4.2 4.0 4.7 4.8   CHLORIDE mmol/L 99 102 104 109* 109* 112* 112*   CO2 mmol/L 42.8* 31.9* 32.7* 27.6 30.1* 29.2* 30.8*   BUN mg/dL 20 25* 28* 29* 31* 32* 33*   CREATININE mg/dL 0.76 0.77 0.83 0.90 0.95 0.96 1.04*   CALCIUM mg/dL 10.3 9.8 9.6 9.6 10.5 10.2 10.4   GLUCOSE mg/dL 110* 118* 130* 138* 126* 138* 135*   ALT (SGPT) U/L 12  --   --   --   --  33 35*   AST (SGOT) U/L 12  --   --   --   --  30 36*     Results from last 7 days   Lab Units 07/15/23  1504 07/15/23  1211 07/13/23  0014 07/12/23 2153   HSTROP T ng/L 32* 29* 24* 26*     Lab Results   Component Value Date    INR 1.38 (H) 07/12/2023     Lab Results   Component Value Date    MG 2.0 07/16/2023    MG 1.9 07/15/2023     Lab Results   Component Value Date    TSH 0.619 07/15/2023       Imaging Results (Last 48 Hours)       Procedure Component Value Units Date/Time    XR Chest 1 View [[id number]] Collected: 07/15/23 1651     Updated: 07/15/23 1821    Narrative:      Procedure: Portable chest x-ray examination performed on 07/15/2023.  Single view. Semiupright position.     HISTORY: Confusion. Progressive hypoxia.     COMPARISON: None.     FINDINGS:     Enlarged heart size.  Mild central pulmonary vascular congestion.  Mild edema.  Hypoinflated lungs.  Mild elevated right hemidiaphragm.  No pleural effusion or pneumothorax.  No fracture or foreign body.       Impression:         1.  Enlarged heart size.  2.  Central pulmonary vascular congestion with mild edema.  3.  Hypoinflated lungs.  4.  No pleural effusion or pneumothorax.     This report was finalized on 7/15/2023 4:52 PM by Sacha Syed MD.       US Liver [[id number]] Collected: 07/15/23 1058     Updated: 07/15/23 1101    Narrative:      EXAMINATION: US LIVER-      CLINICAL INDICATION: r/o cirrhosis; D64.9-Anemia, unspecified;  A41.9-Sepsis, unspecified organism; R65.20-Severe sepsis without septic  shock; J96.01-Acute respiratory failure with hypoxia; N39.0-Urinary  tract infection, site not specified        COMPARISON: None available     FINDINGS:  Sonographic imaging of the liver     Liver is homogeneous in echotexture  No intrahepatic ductal dilatation or focal hepatic mass.  Hepatic flow is hepatopedal.     Small amount of perihepatic fluid is present.       Impression:      1. Small volume ascites  2. The liver is homogeneous      This report was finalized on 7/15/2023 10:59 AM by Dr. Nicho Reeves MD.             No results found for: BNP    Lab Results   Component Value Date    ABSOLUTELUNG 28 07/15/2023         Echo   Results for orders placed during the hospital encounter of 07/12/23    Adult Transthoracic Echo Complete W/ Cont if Necessary Per Protocol    Interpretation Summary  Images from the original result were not  included.      Normal left ventricular cavity size noted. Left ventricular wall thickness is consistent with mild concentric hypertrophy. All left ventricular wall segments contract normally.    Left ventricular ejection fraction appears to be 51 - 55%.    Left ventricular diastolic function is consistent with (grade I) impaired relaxation.    There is a moderate (1-2cm) pericardial effusion adjacent to the left ventricle. There is no evidence of cardiac tamponade.    The aortic valve is structurally normal with no regurgitation or stenosis present.    The mitral valve is structurally normal with no significant stenosis present. Trace mitral valve regurgitation is present.    Mild tricuspid valve regurgitation is present. Estimated right ventricular systolic pressure from tricuspid regurgitation is moderately elevated (45-55 mmHg).    Moderate pulmonary hypertension is present.    There is a moderate (1-2cm) pericardial effusion adjacent to the left ventricle. The effusion is fluid filled. There is no evidence of cardiac tamponade.       I reviewed the patient's new clinical results.    Telemetry: NSR/ Sinus tachycardia  bpm     Medication Review:   albumin human, 25 g, Intravenous, BID  aspirin, 81 mg, Oral, Daily  escitalopram, 10 mg, Oral, Daily  ferric gluconate, 250 mg, Intravenous, Daily  furosemide, 60 mg, Intravenous, BID  [START ON 7/17/2023] iron polysaccharides, 150 mg, Oral, Daily  meropenem, 1,000 mg, Intravenous, Q8H  metoprolol succinate XL, 25 mg, Oral, Q24H  pantoprazole, 40 mg, Oral, BID AC  potassium chloride, 40 mEq, Oral, Daily  potassium chloride, 40 mEq, Oral, Q4H  rosuvastatin, 20 mg, Oral, Nightly  senna-docusate sodium, 2 tablet, Oral, BID  sodium chloride, 10 mL, Intravenous, Q12H  sodium chloride, 10 mL, Intravenous, Q12H             Assessment      Assessment:  Acute HFpEF, improving   Pericardial effusion, moderate  NSTEMI, likely type II from CHF, troponin trending downward    Iron deficiency anemia, receiving iron infusions    Plan     Recommendations:  HFpEF  Lower extremity edema improving with excellent urine output -8 liters with IV diuresis. Kidney function stable therefore continue IV lasix as she still has volume overload. Continue toprol XL.   Recommend treatment of anxiety.     2. Pericardial effusion  Echo showed moderate pericardial effusion. Continue IV diuresis. Follow up with repeat echocardiogram in 4 weeks as outpatient.     3. NSTEMI  Denies any chest pain. likely type II from CHF, troponin trending downward. Can consider        ischemic evaluation later on once heart failure resolved. Continue asprin, statin, BB.     I discussed the patient's findings and my recommendations with patient and family    Electronically signed by WINDY Kaur, 23, 8:28 AM EDT.     Please note that portions of this note were completed with a voice recognition program.     Patient seen and examined with Yessi TEMPLE.  Plan was formulated in conjunction.  I agree with the above assessment and plan.    Electronically signed by Anthony Alex MD, 23, 11:14 AM EDT.      Electronically signed by Anthony Alex MD at 23 1114       Candido Rodney MD at 07/15/23 1048              [facility] HOSPITALIST PROGRESS NOTE     Patient Identification:  Name:  Judy Lutz  Age:  63 y.o.  Sex:  female  :  1960  MRN:  [id number]  Visit Number:  [id number]  ROOM: [place] [id number]     Primary Care Provider:  Provider, No Known     Date of Admission: 2023    Length of stay in inpatient status:  2    Subjective     Chief Compliant:    Chief Complaint   Patient presents with    Shortness of Breath     History of Presenting Illness:  I am covering this patient with Dr. Dang.  2 family members were at bedside and they were very concerned that the patient was acting confused.  By the time I entered the room, the patient was oriented and knew her family  members, which the family stated was not what they witnessed prior to my arrival to the patient's room.  The patient was slow to answer some questions but overall she seems to be improved compared to what the family was describing.    Objective     Current Hospital Meds:  albumin human, 25 g, Intravenous, BID  aspirin, 81 mg, Oral, Daily  cefTRIAXone, 2,000 mg, Intravenous, Daily  escitalopram, 10 mg, Oral, Daily  ferric gluconate, 250 mg, Intravenous, Daily  furosemide, 60 mg, Intravenous, BID  [START ON 7/17/2023] iron polysaccharides, 150 mg, Oral, Daily  metoprolol succinate XL, 25 mg, Oral, Q24H  pantoprazole, 40 mg, Oral, BID AC  potassium chloride, 40 mEq, Oral, Daily  rosuvastatin, 20 mg, Oral, Nightly  senna-docusate sodium, 2 tablet, Oral, BID  sodium chloride, 10 mL, Intravenous, Q12H  sodium chloride, 10 mL, Intravenous, Q12H       Current Antimicrobial Therapy:  Anti-Infectives (From admission, onward)      Ordered     Dose/Rate Route Frequency Start Stop    07/13/23 0542  cefTRIAXone (ROCEPHIN) 2,000 mg in sodium chloride 0.9 % 100 mL IVPB-VTB        Ordering Provider: Leonard Dang DO    2,000 mg  200 mL/hr over 30 Minutes Intravenous Daily 07/13/23 2300 07/21/23 0859    07/12/23 2137  cefTRIAXone (ROCEPHIN) 2,000 mg in sodium chloride 0.9 % 100 mL IVPB-VTB        Ordering Provider: Yusuf Luo DO    2,000 mg  200 mL/hr over 30 Minutes Intravenous Once 07/12/23 2153 07/12/23 2330          Current Diuretic Therapy:  Diuretics (From admission, onward)      Ordered     Dose/Rate Route Frequency Start Stop    07/14/23 1026  furosemide (LASIX) injection 60 mg        Ordering Provider: Leonard Dang DO    60 mg Intravenous 2 Times Daily (Diuretics) 07/14/23 2130      07/14/23 0810  furosemide (LASIX) injection 60 mg        Ordering Provider: Leonard Dang DO    60 mg Intravenous Once 07/14/23 0900 07/14/23 0846    07/13/23 0054  furosemide (LASIX) injection 80 mg         Ordering Provider: Yusuf Luo DO    80 mg Intravenous Once 07/13/23 0110 07/13/23 0114          ----------------------------------------------------------------------------------------------------------------------  Vital Signs:  Temp:  [98.1 °F (36.7 °C)-99 °F (37.2 °C)] 98.9 °F (37.2 °C)  Heart Rate:  [92-99] 95  Resp:  [12-24] 22  BP: (116-160)/(51-83) 160/83  SpO2:  [85 %-94 %] 91 %  on  Flow (L/min):  [3-4] 3;   Device (Oxygen Therapy): humidified;nasal cannula  Body mass index is 56.02 kg/m².    Wt Readings from Last 3 Encounters:   07/15/23 (!) 177 kg (390 lb 7 oz)     Intake & Output (last 3 days)         07/13 0701 07/14 0700 07/14 0701  07/15 0700 07/15 0701  07/16 0700    P.O. 2240 1240     I.V. (mL/kg)   450 (2.5)    Blood       IV Piggyback 100 370     Total Intake(mL/kg) 2340 (12.8) 1610 (9.1) 450 (2.5)    Urine (mL/kg/hr) 2300 (0.5) 5900 (1.4) 4750 (2.2)    Stool 0 0 0    Total Output 2300 5900 4750    Net +40 -4290 -4300           Urine Unmeasured Occurrence 5 x 1 x     Stool Unmeasured Occurrence  0 x 1 x          Diet: Cardiac Diets; Healthy Heart (2-3 Na+); Texture: Soft to Chew (NDD 3); Soft to Chew: Whole Meat; Fluid Consistency: Thin (IDDSI 0)  ----------------------------------------------------------------------------------------------------------------------  Physical Exam  Vitals and nursing note reviewed. Exam conducted with a chaperone present.   Constitutional:       General: She is not in acute distress.     Appearance: She is well-developed. She is not ill-appearing, toxic-appearing or diaphoretic.   HENT:      Head: Normocephalic and atraumatic.      Right Ear: External ear normal.      Left Ear: External ear normal.      Nose: Nose normal.   Eyes:      General: No scleral icterus.        Right eye: No discharge.         Left eye: No discharge.      Extraocular Movements: Extraocular movements intact.      Conjunctiva/sclera: Conjunctivae normal.      Pupils: Pupils are  equal, round, and reactive to light.   Cardiovascular:      Rate and Rhythm: Normal rate and regular rhythm.      Pulses: Normal pulses.      Heart sounds: No murmur heard.  Pulmonary:      Effort: Pulmonary effort is normal. No respiratory distress.      Breath sounds: No wheezing.   Abdominal:      General: Bowel sounds are normal. There is no distension.      Palpations: Abdomen is soft.   Musculoskeletal:         General: No swelling, deformity or signs of injury.   Skin:     Capillary Refill: Capillary refill takes less than 2 seconds.      Coloration: Skin is not jaundiced or pale.   Neurological:      Mental Status: She is alert and oriented to person, place, and time. Mental status is at baseline.      Cranial Nerves: No cranial nerve deficit.      Comments: Even though the patient is oriented, she does talk with a slow glenys and it takes her longer than normal to answer questions.   Psychiatric:         Mood and Affect: Mood normal.         Behavior: Behavior normal. Behavior is cooperative.     ----------------------------------------------------------------------------------------------------------------------  Tele:  NS with heart rates 80-90's.  I have personally reviewed/looked at the telemetry strips.  ----------------------------------------------------------------------------------------------------------------------  LABS:    Peripheral smear 7/13/2023:  DIAGNOSIS:  PERIPHERAL SMEAR  Neutrophilic leukocytosis with left shift.  No circulating blasts identified.  Severe microcytic anemia with moderate hypochromasia and moderate  anisopoikilocytosis including target cells and elliptocytes.  Rare schistocytes  noted.  Platelets present in adequate numbers with normal appearance.     CBC and coagulation:  Results from last 7 days   Lab Units 07/15/23  0055 07/14/23  0029 07/13/23  1156 07/13/23  0516 07/13/23  0343 07/12/23  2153   PROCALCITONIN ng/mL  --   --   --   --  0.18 0.29*   LACTATE mmol/L   --   --   --   --   --  1.6   CRP mg/dL  --   --   --   --  15.57*  --    WBC 10*3/mm3 19.00* 23.36* 21.69*  --  22.40* 20.79*   HEMOGLOBIN g/dL 7.1* 7.8* 8.5* 7.4* 7.4* 6.4*   HEMATOCRIT % 30.4* 33.8* 35.7 29.4* 29.4* 27.0*   MCV fL 77.6* 77.5* 75.6*  --  72.6* 73.2*   MCHC g/dL 23.4* 23.1* 23.8*  --  25.2* 23.7*   PLATELETS 10*3/mm3 398 401 503*  --  436 418   INR   --   --   --   --   --  1.38*     Acid/base balance:  Results from last 7 days   Lab Units 07/15/23  1437 07/12/23  2127   PH, ARTERIAL pH units 7.376 7.388   PCO2, ARTERIAL mm Hg 76.4* 50.9*   PO2 ART mm Hg 56.3* 45.8*   HCO3 ART mmol/L 44.8* 30.6*     Renal and electrolytes:  Results from last 7 days   Lab Units 07/15/23  0055 07/14/23  1519 07/14/23  0029 07/13/23  1156 07/13/23  0343 07/12/23  2153   SODIUM mmol/L 144 146* 144 149* 151* 150*   POTASSIUM mmol/L 3.3* 3.7 4.2 4.0 4.7 4.8   CHLORIDE mmol/L 102 104 109* 109* 112* 112*   CO2 mmol/L 31.9* 32.7* 27.6 30.1* 29.2* 30.8*   BUN mg/dL 25* 28* 29* 31* 32* 33*   CREATININE mg/dL 0.77 0.83 0.90 0.95 0.96 1.04*   CALCIUM mg/dL 9.8 9.6 9.6 10.5 10.2 10.4   GLUCOSE mg/dL 118* 130* 138* 126* 138* 135*   ANION GAP mmol/L 10.1 9.3 7.4 9.9 9.8 7.2     Estimated Creatinine Clearance: 135.7 mL/min (by C-G formula based on SCr of 0.77 mg/dL).    Liver and pancreatic function:  Results from last 7 days   Lab Units 07/13/23  0343 07/12/23  2153   ALBUMIN g/dL 3.0* 2.9*   BILIRUBIN mg/dL 0.5 0.4   ALK PHOS U/L 95 97   AST (SGOT) U/L 30 36*   ALT (SGPT) U/L 33 35*     Endocrine function:  Lab Results   Component Value Date    HGBA1C 5.80 (H) 07/13/2023     Glucose levels from the CMP:  Results from last 7 days   Lab Units 07/15/23  0055 07/14/23  1519 07/14/23  0029 07/13/23  1156 07/13/23  0343 07/12/23  2153   GLUCOSE mg/dL 118* 130* 138* 126* 138* 135*     Cardiac:  Results from last 7 days   Lab Units 07/15/23  1504 07/15/23  1211 07/13/23  0014 07/12/23  2153   HSTROP T ng/L 32* 29* 24* 26*   PROBNP pg/mL   --   --  26,037.0*  --        Cultures:  Lab Results   Component Value Date    COLORU Yellow 07/15/2023    CLARITYU Cloudy (A) 07/15/2023    PHUR 5.5 07/15/2023    PROTEINUR 4.3 07/14/2023    PROTEINUR 4.4 07/14/2023    GLUCOSEU Negative 07/15/2023    KETONESU Negative 07/15/2023    BLOODU Small (1+) (A) 07/15/2023    NITRITEU Positive (A) 07/15/2023    LEUKOCYTESUR Large (3+) (A) 07/15/2023    BILIRUBINUR Negative 07/15/2023    UROBILINOGEN 0.2 E.U./dL 07/15/2023    RBCUA 3-5 (A) 07/15/2023    WBCUA 13-20 (A) 07/15/2023    BACTERIA 1+ (A) 07/15/2023     Microbiology Results (last 10 days)       Procedure Component Value - Date/Time    COVID-19 and FLU A/B PCR - Swab, Nasopharynx [[id number]]  (Normal) Collected: 07/13/23 0109    Lab Status: Final result Specimen: Swab from Nasopharynx Updated: 07/13/23 0138     COVID19 Not Detected     Influenza A PCR Not Detected     Influenza B PCR Not Detected    Narrative:      Fact sheet for providers: https://www.fda.gov/media/635411/download    Fact sheet for patients: https://www.fda.gov/media/013567/download    Test performed by PCR.    Blood Culture - Blood, Arm, Left [[id number]]  (Normal) Collected: 07/12/23 2220    Lab Status: Preliminary result Specimen: Blood from Arm, Left Updated: 07/14/23 2231     Blood Culture No growth at 2 days    Blood Culture - Blood, Arm, Left [[id number]]  (Normal) Collected: 07/12/23 2220    Lab Status: Preliminary result Specimen: Blood from Arm, Left Updated: 07/14/23 2231     Blood Culture No growth at 2 days    Urine Culture - Urine, Straight Cath [[id number]]  (Abnormal)  (Susceptibility) Collected: 07/12/23 2140    Lab Status: Final result Specimen: Urine from Straight Cath Updated: 07/15/23 1323     Urine Culture >100,000 CFU/mL Escherichia coli ESBL     Comment:   Consider infectious disease consult.  Susceptibility results may not correlate to clinical outcomes.         >100,000 CFU/mL Klebsiella pneumoniae ssp pneumoniae     Narrative:      Colonization of the urinary tract without infection is common. Treatment is discouraged unless the patient is symptomatic, pregnant, or undergoing an invasive urologic procedure.  Recent outcomes data supports the use of pip/tazo in the treatment of susceptible ESBL infections for uncomplicated UTI. Consider use of pip/tazo as a carbapenem-sparing regimen in applicable patients.    Susceptibility        Escherichia coli ESBL      MANUEL      Ertapenem Susceptible      Gentamicin Susceptible      Levofloxacin Resistant      Meropenem Susceptible      Nitrofurantoin Susceptible      Piperacillin + Tazobactam Resistant      Trimethoprim + Sulfamethoxazole Resistant                       Susceptibility        Klebsiella pneumoniae ssp pneumoniae      MANUEL      Ampicillin Resistant      Ampicillin + Sulbactam Susceptible      Cefazolin Susceptible      Cefepime Susceptible      Ceftazidime Susceptible      Ceftriaxone Susceptible      Gentamicin Susceptible      Levofloxacin Susceptible      Nitrofurantoin Intermediate      Piperacillin + Tazobactam Susceptible      Trimethoprim + Sulfamethoxazole Susceptible                       I have personally looked at the labs and they are summarized above.    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 coli and Klebsiella pneumoniae  -Anemia noted on admission, suspect due  to severe iron deficiency anemia  -Leukocytosis, present on admission, suspect secondary to bone marrow overproduction as a result of the severe iron deficiency anemia  -Hypernatremia due to free water deficit that was present on admission, now resolved  -History of major depressive disorder   -Acute on chronic debility that was present on admission    Since the urine cultures have resulted with an ESBL E. coli and Klebsiella, I will change the Rocephin to meropenem.  The UTI may be the cause of the intermittent encephalopathy/delirium and thus we will continue to monitor her neurological exam closely while she receives this antibiotic.  Please note that the blood pressures are at goal and controlled we will continue to monitor these.  I have written for magnesium replacement protocol and we will continue to monitor the electrolytes closely.  We will repeat the blood work in the morning.  Since the hemoglobin level is still decreasing even after 2 units of packed red blood cells on 2022, I have opted to keep the patient in the progressive care unit overnight to evaluate how she does.  We will repeat blood work morning.    VTE Prophylaxis:   Mechanical Order History:        Ordered        23  Place Sequential Compression Device  Once            23  Maintain Sequential Compression Device  Continuous                     The patient is high risk due to the following diagnoses/reasons:  acute hypoxemic respiratory failure due to heart failure exacerbation and anasarca     Disposition: Home +/- home health services versus SNF     Candido Rodney MD  [facility]  07/15/23  19:04 EDT      Electronically signed by Candido Rodney MD at 23 1535       Anthony Alex MD at 07/15/23 0907               LOS: 2 days     Name: Judy Lutz  Age/Sex: 63 y.o. female  :  1960        PCP: Provider, No Known  REF: No Known Provider    Principal Problem:     Symptomatic anemia      Reason for follow-up: Cardiomyopathy, SVT and pericardial effusion    Subjective     Subjective     Judy Lutz is a 63 y.o. female with a no considerable past medical history noted in patient's chart.  Patient presented to [facility]) emergency room (ER) on 7/12/2023 with complaints of increased fatigue, malaise, shortness of breath, and generalized weakness for the past several weeks.     Interval History: Patient resting in bed in no acute distress on evaluation today. Has had excellent urine output with IV diuresis and is -4 liters. Mild hypokalemia today. Echocardiogram showed EF 51-55% with moderate pericardial effusion and moderate pulmonary hypertension.     Vital Signs  Temp:  [97.8 °F (36.6 °C)-99 °F (37.2 °C)] 98.1 °F (36.7 °C)  Heart Rate:  [] 97  Resp:  [11-26] 22  BP: (110-141)/(51-77) 141/69  Vital Signs (last 72 hrs)         07/12 0700 07/13 0659 07/13 0700 07/14 0659 07/14 0700  07/15 0659 07/15 0700  07/15 0907   Most Recent      Temp (°F) 98.4 -  98.9    97.6 -  99.2    97.8 -  99      98.1     98.1 (36.7) 07/15 0800    Heart Rate 87 -  108    91 -  105    91 -  105    94 -  97     97 07/15 0800    Resp 12 -  35    12 -  26    11 -  26      22     22 07/15 0700    /60 -  158/80    102/30 -  149/73    110/68 -  137/70    130/73 -  141/69     141/69 07/15 0800    SpO2 (%) 80 -  98    90 -  97    87 -  96    93 -  94     94 07/15 0800          Documented weights    07/12/23 2124 07/14/23 0400 07/15/23 0400   Weight: (!) 181 kg (400 lb) (!) 183 kg (404 lb 1.7 oz) (!) 177 kg (390 lb 7 oz)      Body mass index is 56.02 kg/m².    Intake/Output Summary (Last 24 hours) at 7/15/2023 0907  Last data filed at 7/15/2023 0400  Gross per 24 hour   Intake 1610 ml   Output 5900 ml   Net -4290 ml     Objective:  Vital signs: (most recent): Blood pressure 141/69, pulse 97, temperature 98.1 °F (36.7 °C), temperature source Axillary, resp. rate 22, height  "177.8 cm (70\"), weight (!) 177 kg (390 lb 7 oz), SpO2 94 %.              Objective       Physical Exam:     General Appearance:    Alert, cooperative, in no acute distress   Head:    Normocephalic, without obvious abnormality, atraumatic   Eyes:            Conjunctivae and sclerae normal, no   icterus, no pallor, corneas clear.   Neck:   No adenopathy, supple, trachea midline, no thyromegaly, no   carotid bruit, +JVD   Lungs:     Clear to auscultation,respirations regular, even and            unlabored    Heart:    Regular rhythm and normal rate, normal S1 and S2, no         murmur, no gallop, no rub, no click   Chest Wall:    No abnormalities observed   Abdomen:     Normal bowel sounds, no masses, no organomegaly, soft     nontender, nondistended, no guarding, no rebound    tenderness   Extremities:   Moves all extremities well, 2+ BLE edema, no cyanosis, no         redness   Pulses:   Pulses palpable and equal bilaterally   Skin:   No bleeding, bruising or rash       Neurologic:   Alert and oriented      Results review       Results Review:   Results from last 7 days   Lab Units 07/15/23  0055 07/14/23  0029 07/13/23  1156 07/13/23  0516 07/13/23  0343 07/12/23  2153   WBC 10*3/mm3 19.00* 23.36* 21.69*  --  22.40* 20.79*   HEMOGLOBIN g/dL 7.1* 7.8* 8.5* 7.4* 7.4* 6.4*   PLATELETS 10*3/mm3 398 401 503*  --  436 418     Results from last 7 days   Lab Units 07/15/23  0055 07/14/23  1519 07/14/23  0029 07/13/23  1156 07/13/23  0343 07/12/23  2153   SODIUM mmol/L 144 146* 144 149* 151* 150*   POTASSIUM mmol/L 3.3* 3.7 4.2 4.0 4.7 4.8   CHLORIDE mmol/L 102 104 109* 109* 112* 112*   CO2 mmol/L 31.9* 32.7* 27.6 30.1* 29.2* 30.8*   BUN mg/dL 25* 28* 29* 31* 32* 33*   CREATININE mg/dL 0.77 0.83 0.90 0.95 0.96 1.04*   CALCIUM mg/dL 9.8 9.6 9.6 10.5 10.2 10.4   GLUCOSE mg/dL 118* 130* 138* 126* 138* 135*   ALT (SGPT) U/L  --   --   --   --  33 35*   AST (SGOT) U/L  --   --   --   --  30 36*     Results from last 7 days   Lab " Units 07/13/23  0014 07/12/23 2153   HSTROP T ng/L 24* 26*     Lab Results   Component Value Date    INR 1.38 (H) 07/12/2023     No results found for: MG  No results found for: TSH, PSA, CHLPL, TRIG, HDL, LDL   Imaging Results (Last 48 Hours)       Procedure Component Value Units Date/Time    US Liver [[id number]] Resulted: 07/14/23 2138     Updated: 07/14/23 2138          No results found for: BNP           Echo   Results for orders placed during the hospital encounter of 07/12/23    Adult Transthoracic Echo Complete W/ Cont if Necessary Per Protocol    Interpretation Summary  Images from the original result were not included.      Normal left ventricular cavity size noted. Left ventricular wall thickness is consistent with mild concentric hypertrophy. All left ventricular wall segments contract normally.    Left ventricular ejection fraction appears to be 51 - 55%.    Left ventricular diastolic function is consistent with (grade I) impaired relaxation.    There is a moderate (1-2cm) pericardial effusion adjacent to the left ventricle. There is no evidence of cardiac tamponade.    The aortic valve is structurally normal with no regurgitation or stenosis present.    The mitral valve is structurally normal with no significant stenosis present. Trace mitral valve regurgitation is present.    Mild tricuspid valve regurgitation is present. Estimated right ventricular systolic pressure from tricuspid regurgitation is moderately elevated (45-55 mmHg).    Moderate pulmonary hypertension is present.    There is a moderate (1-2cm) pericardial effusion adjacent to the left ventricle. The effusion is fluid filled. There is no evidence of cardiac tamponade.       I reviewed the patient's new clinical results.    Telemetry: NSR  bpm      Medication Review:   albumin human, 25 g, Intravenous, BID  aspirin, 81 mg, Oral, Daily  cefTRIAXone, 2,000 mg, Intravenous, Daily  escitalopram, 10 mg, Oral, Daily  ferric gluconate, 250  mg, Intravenous, Daily  furosemide, 60 mg, Intravenous, BID  [START ON 2023] iron polysaccharides, 150 mg, Oral, Daily  metoprolol succinate XL, 25 mg, Oral, Q24H  pantoprazole, 40 mg, Oral, BID AC  potassium chloride, 40 mEq, Oral, Daily  rosuvastatin, 20 mg, Oral, Nightly  senna-docusate sodium, 2 tablet, Oral, BID  sodium chloride, 10 mL, Intravenous, Q12H  sodium chloride, 10 mL, Intravenous, Q12H             Assessment      Assessment:  Acute HFpEF, improving   Pericardial effusion, moderate   NSTEMI, likely type II from CHF, troponin trending downward    Plan     Recommendations:  HFpEF  Lower extremity edema improving. Has had excellent urine output. Continue with IV diuretics at current dosing as patient still has volume overload. Continue toprol XL.   Heart failure clinic referral placed.     2. Pericardial effusion  Echo showed moderate pericardial effusion. Continue IV diuresis. Follow up with repeat echocardiogram in 4 weeks as outpatient.     3. NSTEMI  likely type II from CHF, troponin trending downward. Can consider ischemic evaluation later on once stable. Continue asprin, statin and beta blocker.     I discussed the patient's findings and my recommendations with patient and family    Electronically signed by WINDY Kaur, 07/15/23, 9:07 AM EDT.     Please note that portions of this note were completed with a voice recognition program.     I evaluated and examined the patient with the WINDY Kaur and agree with her assessment.    Electronically signed by Anthony Alex MD, 07/15/23, 1:10 PM EDT.      Electronically signed by Anthony Alex MD at 07/15/23 1310       Leonard Dang DO at 23 1238              [facility]     Patient Identification:  Name:  Judy Lutz  Age:  63 y.o.  Sex:  female  :  1960  MRN:  [id number]  Visit Number:  [id number]  ROOM: P214/S2     Primary Care Provider:  Provider, Bernadette  Known    Length of stay in inpatient status:  1    Subjective     Chief Compliant:    Chief Complaint   Patient presents with    Shortness of Breath       History of Presenting Illness:    Patient seen in follow-up for shortness of breath, anasarca and pericardial effusion.  She is awake, alert and oriented x4 with family present at bedside.  She says she feels better this morning and not as short of breath.  She says she feels depressed as her  passed away 3 months ago and has been having issues taking care of herself since that time.  She reports overall feeling fatigue but denies any further systemic symptoms.  Denies any chest pain/pressure or tightness.  Denies any productive sputum.  Afebrile, SPO2 95% on 3 L.  No adverse events noted overnight.    Objective     Current Hospital Meds:albumin human, 25 g, Intravenous, BID  cefTRIAXone, 2,000 mg, Intravenous, Daily  escitalopram, 10 mg, Oral, Daily  ferric gluconate, 250 mg, Intravenous, Daily  furosemide, 60 mg, Intravenous, BID  [START ON 7/17/2023] iron polysaccharides, 150 mg, Oral, Daily  pantoprazole, 40 mg, Intravenous, BID AC  senna-docusate sodium, 2 tablet, Oral, BID  sodium chloride, 10 mL, Intravenous, Q12H  sodium chloride, 10 mL, Intravenous, Q12H         Current Antimicrobial Therapy:  Anti-Infectives (From admission, onward)      Ordered     Dose/Rate Route Frequency Start Stop    07/13/23 0542  cefTRIAXone (ROCEPHIN) 2,000 mg in sodium chloride 0.9 % 100 mL IVPB-VTB        Ordering Provider: Leonard Dang DO    2,000 mg  200 mL/hr over 30 Minutes Intravenous Daily 07/13/23 2300 07/21/23 0859    07/12/23 2137  cefTRIAXone (ROCEPHIN) 2,000 mg in sodium chloride 0.9 % 100 mL IVPB-VTB        Ordering Provider: Yusuf Luo DO    2,000 mg  200 mL/hr over 30 Minutes Intravenous Once 07/12/23 2153 07/12/23 2330          Current Diuretic Therapy:  Diuretics (From admission, onward)      Ordered     Dose/Rate Route Frequency Start  Stop    07/14/23 1026  furosemide (LASIX) injection 60 mg        Ordering Provider: Leonard Dang, DO    60 mg Intravenous 2 Times Daily (Diuretics) 07/14/23 2130      07/14/23 0810  furosemide (LASIX) injection 60 mg        Ordering Provider: Leonard Dang, DO    60 mg Intravenous Once 07/14/23 0900 07/14/23 0846    07/13/23 0054  furosemide (LASIX) injection 80 mg        Ordering Provider: Yusuf Luo DO    80 mg Intravenous Once 07/13/23 0110 07/13/23 0114          ----------------------------------------------------------------------------------------------------------------------  Vital Signs:  Temp:  [97.6 °F (36.4 °C)-98.6 °F (37 °C)] 98.2 °F (36.8 °C)  Heart Rate:  [] 105  Resp:  [12-26] 24  BP: (102-144)/(30-87) 130/77  SpO2:  [90 %-97 %] 95 %  on  Flow (L/min):  [3] 3;   Device (Oxygen Therapy): nasal cannula  Body mass index is 57.98 kg/m².    Wt Readings from Last 3 Encounters:   07/14/23 (!) 183 kg (404 lb 1.7 oz)     Intake & Output (last 3 days)         07/11 0701 07/12 0700 07/12 0701 07/13 0700 07/13 0701 07/14 0700 07/14 0701  07/15 0700    P.O.   2240     Blood  300      IV Piggyback   100     Total Intake(mL/kg)  300 (1.7) 2340 (12.8)     Urine (mL/kg/hr)  900 2300 (0.5) 700 (0.7)    Stool   0     Total Output  900 2300 700    Net  -600 +40 -700            Urine Unmeasured Occurrence  3 x 5 x           Diet: Cardiac Diets; Healthy Heart (2-3 Na+); Texture: Soft to Chew (NDD 3); Soft to Chew: Whole Meat; Fluid Consistency: Thin (IDDSI 0)  ----------------------------------------------------------------------------------------------------------------------  Physical exam:  Constitutional: Morbidly obese female,, Well-developed and well-nourished, resting comfortably in bed, no acute distress.      HENT:  Head:  Normocephalic and atraumatic.  Mouth:  Moist mucous membranes.    Eyes:  Conjunctivae and EOM are normal. No scleral icterus.   Cardiovascular:  Tachycardic, regular rhythm and normal heart sounds with no murmur. No JVD.   Pulmonary/Chest: Bibasilar crackles, no accessory muscle use, no wheezing unlabored. No accessory muscle use.  Abdominal: Anasarca.  Soft. No distension and no tenderness.  Bowel sounds present. No rebound or guarding.   Musculoskeletal:  No tenderness, and no deformity.  No red or swollen joints anywhere.    Neurological:  Alert and oriented to person, place, and time.  No cranial nerve deficit.   Nonfocal.   Skin:  Skin is warm and dry. No rash noted. No pallor.   Peripheral vascular:  No clubbing, no cyanosis, bilateral +3 lower extremity pitting edema up above the knee. Pedal and tibial pulses 2 out of 4 bilaterally.     ----------------------------------------------------------------------------------------------------------------------  Results from last 7 days   Lab Units 07/14/23  0029 07/13/23  1156 07/13/23  0516 07/13/23  0343 07/12/23  2153   CRP mg/dL  --   --   --  15.57*  --    LACTATE mmol/L  --   --   --   --  1.6   WBC 10*3/mm3 23.36* 21.69*  --  22.40* 20.79*   HEMOGLOBIN g/dL 7.8* 8.5* 7.4* 7.4* 6.4*   HEMATOCRIT % 33.8* 35.7 29.4* 29.4* 27.0*   MCV fL 77.5* 75.6*  --  72.6* 73.2*   MCHC g/dL 23.1* 23.8*  --  25.2* 23.7*   PLATELETS 10*3/mm3 401 503*  --  436 418   INR   --   --   --   --  1.38*     Results from last 7 days   Lab Units 07/12/23  2127   PH, ARTERIAL pH units 7.388   PO2 ART mm Hg 45.8*   PCO2, ARTERIAL mm Hg 50.9*   HCO3 ART mmol/L 30.6*     Results from last 7 days   Lab Units 07/14/23  0029 07/13/23  1156 07/13/23  0343 07/12/23 2153   SODIUM mmol/L 144 149* 151* 150*   POTASSIUM mmol/L 4.2 4.0 4.7 4.8   CHLORIDE mmol/L 109* 109* 112* 112*   CO2 mmol/L 27.6 30.1* 29.2* 30.8*   BUN mg/dL 29* 31* 32* 33*   CREATININE mg/dL 0.90 0.95 0.96 1.04*   CALCIUM mg/dL 9.6 10.5 10.2 10.4   GLUCOSE mg/dL 138* 126* 138* 135*   ALBUMIN g/dL  --   --  3.0* 2.9*   BILIRUBIN mg/dL  --   --  0.5 0.4   ALK PHOS U/L  --    --  95 97   AST (SGOT) U/L  --   --  30 36*   ALT (SGPT) U/L  --   --  33 35*   Estimated Creatinine Clearance: 118.1 mL/min (by C-G formula based on SCr of 0.9 mg/dL).  No results found for: AMMONIA  Results from last 7 days   Lab Units 07/13/23  0014 07/12/23 2153   HSTROP T ng/L 24* 26*     Results from last 7 days   Lab Units 07/13/23  0014   PROBNP pg/mL 26,037.0*         Hemoglobin A1C   Date/Time Value Ref Range Status   07/13/2023 0516 5.80 (H) 4.80 - 5.60 % Final     No results found for: TSH, FREET4  No results found for: PREGTESTUR, PREGSERUM, HCG, HCGQUANT  Pain Management Panel           No data to display              Brief Urine Lab Results  (Last result in the past 365 days)        Color   Clarity   Blood   Leuk Est   Nitrite   Protein   CREAT   Urine HCG        07/12/23 2140 Yellow   Cloudy   Moderate (2+)   Large (3+)   Positive   30 mg/dL (1+)                 Blood Culture   Date Value Ref Range Status   07/12/2023 No growth at 24 hours  Preliminary   07/12/2023 No growth at 24 hours  Preliminary     Urine Culture   Date Value Ref Range Status   07/12/2023 >100,000 CFU/mL Gram Negative Bacilli (A)  Preliminary     No results found for: WOUNDCX  No results found for: STOOLCX  No results found for: RESPCX  No results found for: AFBCX  Results from last 7 days   Lab Units 07/13/23  0343 07/12/23 2153   PROCALCITONIN ng/mL 0.18 0.29*   LACTATE mmol/L  --  1.6   CRP mg/dL 15.57*  --        I have personally looked at the labs and they are summarized above.  ----------------------------------------------------------------------------------------------------------------------  Detailed radiology reports for the last 24 hours:  Imaging Results (Last 24 Hours)       ** No results found for the last 24 hours. **          Assessment & Plan      Patient is a 63-year-old morbidly obese female with history significant for with no known chronic medical problems presented to the ER with complaints of  "fatigue/weakness, shortness of air and daughter stating intermittent confusion and speaking \"like a baby \".    #Acute hypoxemic respiratory failure  #Acute pulmonary edema  #Acute heart failure exacerbation, systolic/diastolic  #Moderate pericardial effusion without tamponade physiology  #NSTEMI, likely type II  #Suspected NANCY/OHS  --Patient presented to the ER with AMS and shortness of air, noted to have a PaO2 of 45 on room air with imaging findings suggestive of pulmonary edema.  --Admit proBNP 26 K  --Initial troponin 26, repeat 24 with a delta of -2  --CT chest without contrast noted marked cardiomegaly, small to moderate pericardial effusion with mild pulmonary vascular congestion, no focal infiltrates  --Echocardiogram noted an EF of 51 to 55%, diastolic dysfunction with a 1 to 2 cm pericardial effusion without tamponade physiology  --Add albumin twice daily, Lasix IV 60 mg twice daily, insert Gonzalez catheter for strict I's and O's (external catheter has been ineffective)  --Start metoprolol succinate, add aspirin, high intensity statin  --Patient needs outpatient sleep study and likely CPAP at bedtime  --Cardiology following, plan for ischemic work-up when more stable, appreciate assistance    #Anasarca  --Echo per above  --CT abdomen/pelvis noted moderate ascites in the abdomen, cystitis  --Urine protein/creatinine ratio sent to evaluate for nephrosis  --Liver ultrasound ordered to evaluate for potential Dawn cirrhosis  --IV diuretics per above    #Acute metabolic encephalopathy  #Urinary tract infection, POA  --Patient presented initially confused, patient's daughter says she was \"speaking like a baby \".  Leukocytosis persists despite resolution of AMS  --UA 4+ bacteria, 3+ leukocytes, moderate blood, positive nitrites  --Blood cultures NGTD, urine culture pending  --Patient has no history of MDR and no risk factors therefore continue empiric Rocephin 2 g every 24, follow-up on urine culture and " sensitivities    #Anemia, chronicity unknown  #Leukocytosis  --Initial hemoglobin 6.4, received 2 units PRBC with appropriate response with repeat hemoglobin 8.5.  She denies any melena, hematochezia or any other evidence of acute blood loss.  --Iron panel noted iron deficiency anemia vitamin B12/folate normal  --Peripheral smear pending  --Switch PPI to p.o.  --will give IV Ferrlecit x3 days followed by p.o. Niferex  --When patient is more medically stable, consider endoscopy, if she does not have any evidence of acute blood loss, endoscopy can likely be deferred to the outpatient setting    #Hypernatremia due to free water deficit  --Initial sodium 150, diuresis held but due to volume overload, will give albumin and diuretics per above, monitor sodium    #Major depressive disorder  --Patient states she has been depressed which is worsened after  passed approximately 3 months ago.  Agreeable to try medication, will start Lexapro daily, at the time of discharge, will arrange for outpatient counseling if able    #Acute on chronic debility  --patient's daughter stated she had used crutches for ambulation for over 10 years PTA, PT OT    CHECKLIST:  Abx: Rocephin  VTE: SCDs  GI ppx: PPI twice daily  Diet: Consistent carb  Code: CPR, full  Dispo: Patient is high risk due to acute hypoxemic respiratory failure due to heart failure exacerbation and anasarca.  Anticipate greater than 2 midnight stay.  Disposition expected Home +/- home health services versus SNF.    Leonard Dang DO  [facility] Hospitalist  23  12:38 EDT      Electronically signed by eLonard Dang DO at 23 0109       Bernice Rosas MD at 23 0813              [facility]  PROGRESS NOTE    Patient information:  Name: Judy Lutz  Age/Sex: 63 y.o. female  :  1960        PCP: Provider, No Known  Attending: Sapphire Contreras DO  MRN:   [id number]  Visit Number:  [id number]    LOS:  LOS: 1 day     CODE STATUS:    Code Status and Medical Interventions:   Ordered at: 07/13/23 0152     Code Status (Patient has no pulse and is not breathing):    CPR (Attempt to Resuscitate)     Medical Interventions (Patient has pulse or is breathing):    Full Support       PROBLEM LIST:Principal Problem:    Symptomatic anemia      Reason for Cardiology follow-up: Severe CM with tayla-cardial effusion     Subjective   ADMISSION INFORMATION:  Chief Complaint   Patient presents with    Shortness of Breath       HPI:  Judy Lutz is a 63 y.o. female with a no considerable past medical history noted in patient's chart.  Patient presented to [facility] ([facility] emergency room (ER) on 7/12/2023 with complaints of increased fatigue, malaise, shortness of breath, and generalized weakness for the past several weeks.  Patient reported her symptoms and gotten even worse in the last 1 to 2 days.  She denied fever, chills, cough, congestion, chest pain, or abdominal pain.  Patient reports that her symptoms are aggravated with ambulation and is relieved with rest.  EKG revealed sinus tach with no acute ST elevation noted.  CT abdomen pelvis without contrast revealed moderate ascites in the abdomen and pelvis, probable cholelithiasis, small air in the nondependent portion of the urinary bladder likely related to recent catheterization versus cystitis.  CT head without contrast revealed no acute intracranial abnormalities.  CT of the chest without contrast revealed marked cardiomegaly and small to moderate pericardial effusion with mild pulmonary vascular congestion.  Hemoglobin of 6.4 with a platelet count of 418.  Creatinine 1.04, potassium 4.8, ALT 35, AST 36, initial high sensitive troponin is 26-> 24.  Patient was admitted and Cardiology has been consulted for further evaluation and management.      No primary Cardiologist noted in her chart.    Echo revealed a EF  of 51 to 55%.     Interval History:   Patient is in room  and was examined by Dr. Rosas.   Telemetry reveals SR/ST inverted T waves 90- 100's with frequent PVCs and a brief episode of SVT around 05:39 am today.  Potassium 4.2, creatinine 0.90.  Hemoglobin 7.8 which is improvement from admission is 6.4 after blood transfusions.  Patient is lying in bed resting quietly.  She is able to carry on conversation with us today.  And her respirations have improved. No acute distress noted at this time.  Head of bed is elevated approximately 75 degrees.  Patient denies chest pain or palpitations.  Patient denies ever being diagnosed with NANCY however she does admit to snoring and having daytime naps in the last 4 weeks especially.  She denies daytime napping prior to the last 4 weeks however.  Oxygen saturations 94% on 3 L via nasal cannula.    Vital Signs  Temp:  [97.6 °F (36.4 °C)-99.2 °F (37.3 °C)] 98.6 °F (37 °C)  Heart Rate:  [] 102  Resp:  [12-26] 24  BP: (102-144)/(30-93) 122/66  Vital Signs (last 72 hrs)         07/11 0700  07/12 0659 07/12 0700  07/13 0659 07/13 0700 07/14 0659 07/14 0700 07/14 0819   Most Recent      Temp (°F)   98.4 -  98.9    97.6 -  99.2       98.6 (37) 07/14 0400    Heart Rate   87 -  108    91 -  105      102     102 07/14 0800    Resp   12 - 35    12 -  26      24     24 07/14 0800    BP   100/60 -  158/80    102/30 -  149/73    111/79 -  122/66     122/66 07/14 0800    SpO2 (%)   80 -  98    90 -  97    95 -  96     95 07/14 0800          Body mass index is 57.98 kg/m².    Intake/Output Summary (Last 24 hours) at 7/14/2023 0819  Last data filed at 7/14/2023 0600  Gross per 24 hour   Intake 2340 ml   Output 2300 ml   Net 40 ml       Objective     Physical Exam:      General Appearance:    Alert, cooperative, in no acute distress.   Head:    Normocephalic, without obvious abnormality, atraumatic.   Eyes:                          Conjunctivae and sclerae normal, no icterus, no  pallor, corneas clear.   Neck:   No adenopathy, supple, trachea midline, no thyromegaly, no carotid bruit, no JVD.   Lungs:   Decreased breath sounds bibasilar to auscultation, respirations regular, even and unlabored.    Heart:    Regular rhythm and normal rate, normal S1 and S2, no          murmur, no gallop, no rub, no click   Chest Wall:    No abnormalities observed.   Abdomen:     Normal bowel sounds, no masses, no organomegaly, soft nontender, nondistended, no guarding, no rebound tenderness.   Extremities:   Moves all extremities well, + 2-3 pitting edema, no cyanosis, no redness.   Pulses:   Pulses palpable and equal bilaterally.   Skin:   No bleeding, bruising or rash.   Neurologic:   Alert and Oriented x 3, Speech Clear & comprehensive.       Results review   Results Review:   Results from last 7 days   Lab Units 07/14/23  0029 07/13/23  1156 07/13/23  0516 07/13/23  0343 07/12/23  2153   WBC 10*3/mm3 23.36* 21.69*  --  22.40* 20.79*   HEMOGLOBIN g/dL 7.8* 8.5* 7.4* 7.4* 6.4*   PLATELETS 10*3/mm3 401 503*  --  436 418     Results from last 7 days   Lab Units 07/14/23  0029 07/13/23  1156 07/13/23 0343 07/12/23  2153   SODIUM mmol/L 144 149* 151* 150*   POTASSIUM mmol/L 4.2 4.0 4.7 4.8   CHLORIDE mmol/L 109* 109* 112* 112*   CO2 mmol/L 27.6 30.1* 29.2* 30.8*   BUN mg/dL 29* 31* 32* 33*   CREATININE mg/dL 0.90 0.95 0.96 1.04*   CALCIUM mg/dL 9.6 10.5 10.2 10.4   GLUCOSE mg/dL 138* 126* 138* 135*   ALT (SGPT) U/L  --   --  33 35*   AST (SGOT) U/L  --   --  30 36*     Results from last 7 days   Lab Units 07/13/23  0014 07/12/23  2153   HSTROP T ng/L 24* 26*     Lab Results   Component Value Date    PROBNP 26,037.0 (H) 07/13/2023     No results found.     Lab Results   Component Value Date    INR 1.38 (H) 07/12/2023     No results found for: MG  No results found for: TSH, PSA   No results found for: CHOL, TRIG, HDL, LDL  Pain Management Panel           No data to display              Microbiology Results (last  10 days)       Procedure Component Value - Date/Time    COVID-19 and FLU A/B PCR - Swab, Nasopharynx [[id number]]  (Normal) Collected: 07/13/23 0109    Lab Status: Final result Specimen: Swab from Nasopharynx Updated: 07/13/23 0138     COVID19 Not Detected     Influenza A PCR Not Detected     Influenza B PCR Not Detected    Narrative:      Fact sheet for providers: https://www.fda.gov/media/040384/download    Fact sheet for patients: https://www.fda.gov/media/858682/download    Test performed by PCR.    Blood Culture - Blood, Arm, Left [[id number]]  (Normal) Collected: 07/12/23 2220    Lab Status: Preliminary result Specimen: Blood from Arm, Left Updated: 07/13/23 2231     Blood Culture No growth at 24 hours    Blood Culture - Blood, Arm, Left [[id number]]  (Normal) Collected: 07/12/23 2220    Lab Status: Preliminary result Specimen: Blood from Arm, Left Updated: 07/13/23 2231     Blood Culture No growth at 24 hours           Imaging Results (Last 48 Hours)       Procedure Component Value Units Date/Time    CT Abdomen Pelvis Without Contrast [[id number]] Collected: 07/13/23 0026     Updated: 07/13/23 0029    Narrative:      CLINICAL INDICATION: ams abd pain. Dyspnea, chronic, unclear etiology  PROCEDURE DATE: 7/12/2023     CT ABD/PELVIS W/O IV C  Technique: Standard axial collimation through the abdomen and pelvis  without oral or intravenous contrast.  Reconstructed images were  submitted for interpretation. Limited exposure control, adjustment of  the mA and/or KV according to patient size or use of iterative  reconstruction technique was utilized.     Findings:     1.  Moderate ascites in the abdomen and pelvis.  2.  No bowel obstruction, free air, or abscess.  3.  No evidence of acute colitis or diverticulitis.  4.  The appendix is not seen.  5.  No CT evidence of acute pancreatitis.  6.  Contracted gallbladder with a questionable stone in the GB fundus.  7.  Small air in the nondependent portion of the urinary  bladder, likely  related to recent catheterization versus cystitis.  Correlate  clinically.  8.  Bilateral renal calculi without hydronephrosis.  9.  Moderate retained fecal volume in the rectum.  10.  Diffuse subcutaneous edema throughout the abdominal/pelvic wall.  11.  Mild thoracolumbar scoliosis with multilevel lumbar discogenic and  facet degenerative change.       Impression:      Impression:     1.  No bowel obstruction, free air, or abscess.  2.  Moderate ascites in the abdomen and pelvis.  3.  No evidence of acute colitis or diverticulitis.  4.  Biparietal renal calculi without hydronephrosis.  5.  Probable cholelithiasis.  6.  Small air in the nondependent portion of the urinary bladder, likely  related to recent catheterization versus cystitis.  Correlate  clinically.        This report was finalized on 7/13/2023 12:26 AM by Ricky Perkins MD.       CT Chest Without Contrast Diagnostic [[id number]] Collected: 07/13/23 0025     Updated: 07/13/23 0028    Narrative:      CT CHEST W/O  Technique: Standard axial collimation through the chest without  intravenous contrast.  Reconstructed images were submitted for  interpretation. Limited exposure control, adjustment of the mA and/or KV  according to patient size or use of iterative reconstruction technique  was utilized.     Findings:     1.  No focal infiltrate, pleural effusion or pneumothorax.  2.  A 7 mm posterior subpleural noncalcified groundglass density in the  right upper lobe, likely related to postinflammatory changes.  3.  Mild pulmonary vascular congestion.  4.  Reece cardiomegaly with small to moderate superior/posterior  pericardial effusion, 9-18 mm in thickness.  5.  No aortic aneurysm.  6.  No mediastinal lymphadenopathy.  7.  Motion artifacts degrading image quality.       Impression:      Impression:     1.  Marked cardiomegaly and small-to-moderate pericardial effusion with  mild pulmonary vascular congestion.  2.  No focal infiltrate or pleural  effusion.      This report was finalized on 7/13/2023 12:26 AM by Ricky Perkins MD.       CT Head Without Contrast [[id number]] Collected: 07/13/23 0025     Updated: 07/13/23 0027    Narrative:      CT HEAD     Technique: Standard axial collimation through the head without contrast.   Reconstructed images were submitted for interpretation. Limited  exposure control, adjustment of the mA and/or KV according to patient  size or use of iterative reconstruction technique was utilized.     Findings:     1.  No acute intracranial hemorrhage, mass effect or cerebral cortical  edema.  2.  Normal ventricular volume for the patient's age.  3.  Mild cerebral cortical atrophy.   4.  No skull fractures.  5.  The mastoid air cells are clear.  6.  The visualized paranasal sinuses are clear.       Impression:      Impression:     No acute intracranial abnormality.     This report was finalized on 7/13/2023 12:25 AM by Ricky Perkins MD.       XR Chest 1 View [[id number]] Collected: 07/12/23 2259     Updated: 07/12/23 2301    Narrative:      Single view chest     Indications: Sepsis protocol     Findings:     AP view the chest shows enlargement of the cardiac silhouette.  The  osseous structures are normal.  Lungs are free from infiltrate and  effusion.       Impression:      Impression:     Cardiomegaly.  Lungs clear.     This report was finalized on 7/12/2023 10:59 PM by Wilber Lopez MD.               ECHO:  Results for orders placed during the hospital encounter of 07/12/23    Adult Transthoracic Echo Complete W/ Cont if Necessary Per Protocol    Interpretation Summary  Images from the original result were not included.      Normal left ventricular cavity size noted. Left ventricular wall thickness is consistent with mild concentric hypertrophy. All left ventricular wall segments contract normally.    Left ventricular ejection fraction appears to be 51 - 55%.    Left ventricular diastolic function is consistent with (grade I) impaired  relaxation.    There is a moderate (1-2cm) pericardial effusion adjacent to the left ventricle. There is no evidence of cardiac tamponade.    The aortic valve is structurally normal with no regurgitation or stenosis present.    The mitral valve is structurally normal with no significant stenosis present. Trace mitral valve regurgitation is present.    Mild tricuspid valve regurgitation is present. Estimated right ventricular systolic pressure from tricuspid regurgitation is moderately elevated (45-55 mmHg).    Moderate pulmonary hypertension is present.    There is a moderate (1-2cm) pericardial effusion adjacent to the left ventricle. The effusion is fluid filled. There is no evidence of cardiac tamponade.      STRESS TEST:   No results found for this or any previous visit.    HEART CATH:  No results found for this or any previous visit.      [id number]      TELEMETRY:      SR/ST 90- 100's with frequent PVCs and a brief episode of SVT around 05:39 am today.                 I reviewed the patient's new clinical results.    Medication Review:   Current list of medications may not reflect those currently placed in orders that are not signed or are being held.     cefTRIAXone, 2,000 mg, Intravenous, Daily  furosemide, 60 mg, Intravenous, Once  iron polysaccharides, 150 mg, Oral, Daily  pantoprazole, 40 mg, Intravenous, BID AC  senna-docusate sodium, 2 tablet, Oral, BID  sodium chloride, 10 mL, Intravenous, Q12H  sodium chloride, 10 mL, Intravenous, Q12H           acetaminophen    senna-docusate sodium **AND** polyethylene glycol **AND** bisacodyl **AND** bisacodyl    ondansetron    prochlorperazine    simethicone    sodium chloride    sodium chloride    sodium chloride    sodium chloride    sodium chloride    Assessment    1.  Acute decompensated HFpEF  2.  Moderate-sized pericardial effusion with no tamponade physiology by echocardiogram  3.  Mild elevated high-sensitivity troponin with a flat trend, no chest  pain reported most likely NSTEMI type II secondary to heart failure  4.  Severe anemia with possible GI blood loss  5.  Likely obstructive sleep apnea            Plan   1.  We will continue diuresis we will try to get accurate I's and O's for assessment she needs to be negative by at least a liter per day  2.  She will require ischemic evaluation once her CHF is under control  3.  Work-up for GI blood loss as per medicine service          I have discussed the patients findings and my recommendations with patient.    Electronically signed by WINDY Roberts, 07/14/23, 12:58 PM EDT.   Electronically signed by Bernice Rosas MD, 07/14/23, 2:14 PM EDT.                      Please note that portions of this note were completed with a voice recognition program.    Please note that portions of this note were copied and has been reviewed and is accurate as of 7/14/2023 .       Electronically signed by Bernice Rosas MD at 07/14/23 2634       Leonard Dang DO at 07/13/23 1143          Patient seen in follow-up for symptomatic anemia and concerns for volume overload with pericardial effusion.  Initially hemoglobin 6.4, received 2 units PRBC with repeat hemoglobin 7.4.  No signs of ABL, denies melena or hematochezia.  Although she appears volume overloaded, she is hypernatremic therefore as she is hemodynamically stable without signs of volume overload on imaging, will hold diuretics pending cardiology evaluation.  Continue empiric cefepime, de-escalate FiO2 as tolerated, PPI twice daily, Niferex, follow-up on echo results    Electronically signed by Leonard Dang DO at 07/13/23 8193

## 2023-07-17 NOTE — PROGRESS NOTES
[facility]  PROGRESS NOTE    Patient information:  Name: Judy Lutz  Age/Sex: 63 y.o. female  :  1960        PCP: Provider, No Known  Attending: Sapphireanalisa Contreras   MRN:  [id number]  Visit Number:  [id number]    LOS:  LOS: 4 days     CODE STATUS:    Code Status and Medical Interventions:   Ordered at: 23 0152     Code Status (Patient has no pulse and is not breathing):    CPR (Attempt to Resuscitate)     Medical Interventions (Patient has pulse or is breathing):    Full Support       PROBLEM LIST:Principal Problem:    Symptomatic anemia      Reason for Cardiology follow-up:  Cardiomyopathy and pericardial effusion     Subjective   ADMISSION INFORMATION:  Chief Complaint   Patient presents with    Shortness of Breath       HPI:  Judy Lutz is a 63 y.o. female with no considerable past medical history noted in patient's chart.  Patient presented to [facility] ([facility]) emergency room (ER) on 2023 with complaints of increased fatigue, malaise, shortness of breath, and generalized weakness for the past several weeks.  Cardiology was consulted for further evaluation and management of cardiomyopathy and pericardial effusion.     Interval History:   Patient is in room 320 and was examined by Dr. Rosas.  Telemetry reveals -110s with a couplet and frequent PVCs.  Sodium 148, potassium 2.6, chloride 93, CO2 43.8, BUN of 18, and a creatinine of 0.64.  Magnesium is 1.7.  Phosphorus is 1.6.  Hemoglobin is 7.0 which is gradually declined from status post blood transfusion earlier in admission as her hemoglobin was 6.4 on admission.  Patient had exceedingly well diuresing overnight with a -6296.48 mL noted on her I & O flow sheet.   Patient is lying in bed resting quietly with eyes closed.  No acute distress noted at this time.  She is more lethargic and difficult to arouse.  Patient's daughter is sitting at bedside and reports that  her brother stayed with the patient during the night.  He said that she did not sleep well but otherwise had done well through the night.  She has noticed that she has been more lethargic, disoriented, and difficult to wake since she has been sitting with her this morning.    Vital Signs  Temp:  [97.8 °F (36.6 °C)-99.3 °F (37.4 °C)] 98.1 °F (36.7 °C)  Heart Rate:  [] 91  Resp:  [16-25] 24  BP: (118-151)/(50-77) 151/77  Flow (L/min):  [3-5] 4.5  Vital Signs (last 72 hrs)         07/14 0700  07/15 0659 07/15 0700  07/16 0659 07/16 0700 07/17 0659 07/17 0700 07/17 0753   Most Recent      Temp (°F) 97.8 -  99    98.1 -  99.1    97.8 -  99.3       98.1 (36.7) 07/17 0649    Heart Rate 91 -  105    91 -  103    90 -  107       91 07/17 0654    Resp 11 -  26    12 -  26    16 -  25       24 07/17 0654    /68 -  137/70    121/64 -  160/83    118/60 -  153/69       151/77 07/17 0649    SpO2 (%) 87 -  96    85 -  99    84 -  97       93 07/17 0654          Body mass index is 51.88 kg/m².    Intake/Output Summary (Last 24 hours) at 7/17/2023 0753  Last data filed at 7/17/2023 0400  Gross per 24 hour   Intake 1853.52 ml   Output 8150 ml   Net -6296.48 ml       Objective     Physical Exam:      General Appearance:  Lethargic in no acute distress.  BMI 51.88.   Head:    Normocephalic, without obvious abnormality, atraumatic.   Eyes:                          Conjunctivae and sclerae normal, no icterus, no pallor, corneas clear.   Neck:   No adenopathy, supple, trachea midline, no thyromegaly, no carotid bruit, no JVD.   Lungs:     Clear to auscultation, respirations regular, even and             unlabored.    Heart:  Regular rhythm and tachycardic rate, normal S1 and S2, no          murmur, no gallop, no rub, no click   Chest Wall:    No abnormalities observed.   Abdomen:     Normal bowel sounds, no masses, no organomegaly, soft nontender, nondistended, no guarding, no rebound tenderness.   Extremities:   Moves all  extremities well, no edema, no cyanosis, no           redness.   Pulses:   Pulses palpable and equal bilaterally.   Skin:   No bleeding, bruising or rash.  Skin is pale in color.   Neurologic: Lethargic.       Results review   Results Review:   Results from last 7 days   Lab Units 07/17/23  0022 07/16/23  0426 07/15/23  0055 07/14/23  0029 07/13/23  1156 07/13/23  0516 07/13/23  0343 07/12/23  2153   WBC 10*3/mm3 19.23* 20.91* 19.00* 23.36* 21.69*  --  22.40* 20.79*   HEMOGLOBIN g/dL 7.0* 7.1* 7.1* 7.8* 8.5* 7.4* 7.4* 6.4*   PLATELETS 10*3/mm3 420 499* 398 401 503*  --  436 418     Results from last 7 days   Lab Units 07/17/23  0022 07/16/23  1830 07/16/23  0426 07/15/23  0055 07/14/23  1519 07/14/23  0029 07/13/23  1156 07/13/23  0343 07/12/23  2153   SODIUM mmol/L 148*  --  149* 144 146* 144 149* 151* 150*   POTASSIUM mmol/L 2.6* 2.6* 2.7* 3.3* 3.7 4.2 4.0 4.7 4.8   CHLORIDE mmol/L 93*  --  99 102 104 109* 109* 112* 112*   CO2 mmol/L 43.8*  --  42.8* 31.9* 32.7* 27.6 30.1* 29.2* 30.8*   BUN mg/dL 18  --  20 25* 28* 29* 31* 32* 33*   CREATININE mg/dL 0.64  --  0.76 0.77 0.83 0.90 0.95 0.96 1.04*   CALCIUM mg/dL 10.2  --  10.3 9.8 9.6 9.6 10.5 10.2 10.4   GLUCOSE mg/dL 120*  --  110* 118* 130* 138* 126* 138* 135*   ALT (SGPT) U/L 10  --  12  --   --   --   --  33 35*   AST (SGOT) U/L 11  --  12  --   --   --   --  30 36*     Results from last 7 days   Lab Units 07/15/23  1504 07/15/23  1211 07/13/23  0014 07/12/23 2153   HSTROP T ng/L 32* 29* 24* 26*     Lab Results   Component Value Date    PROBNP 26,037.0 (H) 07/13/2023     No results found.  Lab Results   Component Value Date    ABSOLUTELUNG 28 07/15/2023     Lab Results   Component Value Date    INR 1.38 (H) 07/12/2023     Lab Results   Component Value Date    MG 1.7 07/17/2023    MG 2.0 07/16/2023    MG 1.9 07/15/2023     Lab Results   Component Value Date    TSH 0.619 07/15/2023      No results found for: CHOL, TRIG, HDL, LDL  Pain Management Panel           Latest Ref Rng & Units 7/14/2023   Pain Management Panel   Creatinine, Urine mg/dL 8.5      Microbiology Results (last 10 days)       Procedure Component Value - Date/Time    Urine Culture - Urine, Urine, Clean Catch [[id number]]  (Abnormal) Collected: 07/15/23 1627    Lab Status: Preliminary result Specimen: Urine, Clean Catch Updated: 07/16/23 1614     Urine Culture >100,000 CFU/mL Gram Negative Bacilli    Narrative:      Colonization of the urinary tract without infection is common. Treatment is discouraged unless the patient is symptomatic, pregnant, or undergoing an invasive urologic procedure.    COVID-19 and FLU A/B PCR - Swab, Nasopharynx [[id number]]  (Normal) Collected: 07/13/23 0109    Lab Status: Final result Specimen: Swab from Nasopharynx Updated: 07/13/23 0138     COVID19 Not Detected     Influenza A PCR Not Detected     Influenza B PCR Not Detected    Narrative:      Fact sheet for providers: https://www.fda.gov/media/101371/download    Fact sheet for patients: https://www.fda.gov/media/431310/download    Test performed by PCR.    Blood Culture - Blood, Arm, Left [[id number]]  (Normal) Collected: 07/12/23 2220    Lab Status: Preliminary result Specimen: Blood from Arm, Left Updated: 07/16/23 2231     Blood Culture No growth at 4 days    Blood Culture - Blood, Arm, Left [[id number]]  (Normal) Collected: 07/12/23 2220    Lab Status: Preliminary result Specimen: Blood from Arm, Left Updated: 07/16/23 2231     Blood Culture No growth at 4 days    Urine Culture - Urine, Straight Cath [[id number]]  (Abnormal)  (Susceptibility) Collected: 07/12/23 2140    Lab Status: Final result Specimen: Urine from Straight Cath Updated: 07/15/23 1323     Urine Culture >100,000 CFU/mL Escherichia coli ESBL     Comment:   Consider infectious disease consult.  Susceptibility results may not correlate to clinical outcomes.         >100,000 CFU/mL Klebsiella pneumoniae ssp pneumoniae    Narrative:      Colonization of the urinary  tract without infection is common. Treatment is discouraged unless the patient is symptomatic, pregnant, or undergoing an invasive urologic procedure.  Recent outcomes data supports the use of pip/tazo in the treatment of susceptible ESBL infections for uncomplicated UTI. Consider use of pip/tazo as a carbapenem-sparing regimen in applicable patients.    Susceptibility        Escherichia coli ESBL      MANUEL      Ertapenem Susceptible      Gentamicin Susceptible      Levofloxacin Resistant      Meropenem Susceptible      Nitrofurantoin Susceptible      Piperacillin + Tazobactam Resistant      Trimethoprim + Sulfamethoxazole Resistant                       Susceptibility        Klebsiella pneumoniae ssp pneumoniae      MANUEL      Ampicillin Resistant      Ampicillin + Sulbactam Susceptible      Cefazolin Susceptible      Cefepime Susceptible      Ceftazidime Susceptible      Ceftriaxone Susceptible      Gentamicin Susceptible      Levofloxacin Susceptible      Nitrofurantoin Intermediate      Piperacillin + Tazobactam Susceptible      Trimethoprim + Sulfamethoxazole Susceptible                                  Imaging Results (Last 48 Hours)       Procedure Component Value Units Date/Time    XR Chest 1 View [[id number]] Collected: 07/15/23 1651     Updated: 07/15/23 1821    Narrative:      Procedure: Portable chest x-ray examination performed on 07/15/2023.  Single view. Semiupright position.     HISTORY: Confusion. Progressive hypoxia.     COMPARISON: None.     FINDINGS:     Enlarged heart size.  Mild central pulmonary vascular congestion.  Mild edema.  Hypoinflated lungs.  Mild elevated right hemidiaphragm.  No pleural effusion or pneumothorax.  No fracture or foreign body.       Impression:         1.  Enlarged heart size.  2.  Central pulmonary vascular congestion with mild edema.  3.  Hypoinflated lungs.  4.  No pleural effusion or pneumothorax.     This report was finalized on 7/15/2023 4:52 PM by Sacha Syed  MD.       US Liver [[id number]] Collected: 07/15/23 1058     Updated: 07/15/23 1101    Narrative:      EXAMINATION: US LIVER-      CLINICAL INDICATION: r/o cirrhosis; D64.9-Anemia, unspecified;  A41.9-Sepsis, unspecified organism; R65.20-Severe sepsis without septic  shock; J96.01-Acute respiratory failure with hypoxia; N39.0-Urinary  tract infection, site not specified        COMPARISON: None available     FINDINGS:  Sonographic imaging of the liver     Liver is homogeneous in echotexture  No intrahepatic ductal dilatation or focal hepatic mass.  Hepatic flow is hepatopedal.     Small amount of perihepatic fluid is present.       Impression:      1. Small volume ascites  2. The liver is homogeneous      This report was finalized on 7/15/2023 10:59 AM by Dr. Nicho Reeves MD.               ECHO:  Results for orders placed during the hospital encounter of 07/12/23    Adult Transthoracic Echo Complete W/ Cont if Necessary Per Protocol    Interpretation Summary  Images from the original result were not included.      Normal left ventricular cavity size noted. Left ventricular wall thickness is consistent with mild concentric hypertrophy. All left ventricular wall segments contract normally.    Left ventricular ejection fraction appears to be 51 - 55%.    Left ventricular diastolic function is consistent with (grade I) impaired relaxation.    There is a moderate (1-2cm) pericardial effusion adjacent to the left ventricle. There is no evidence of cardiac tamponade.    The aortic valve is structurally normal with no regurgitation or stenosis present.    The mitral valve is structurally normal with no significant stenosis present. Trace mitral valve regurgitation is present.    Mild tricuspid valve regurgitation is present. Estimated right ventricular systolic pressure from tricuspid regurgitation is moderately elevated (45-55 mmHg).    Moderate pulmonary hypertension is present.    There is a moderate (1-2cm) pericardial  effusion adjacent to the left ventricle. The effusion is fluid filled. There is no evidence of cardiac tamponade.      STRESS TEST:  No results found for this or any previous visit.     HEART CATH:  No results found for this or any previous visit.      TELEMETRY:  -110s with a couplet and frequent PVCs                I reviewed the patient's new clinical results.    Medication Review:   Current list of medications may not reflect those currently placed in orders that are not signed or are being held.     albumin human, 25 g, Intravenous, BID  aspirin, 81 mg, Oral, Daily  escitalopram, 10 mg, Oral, Daily  furosemide, 60 mg, Intravenous, BID  iron polysaccharides, 150 mg, Oral, Daily  meropenem, 1,000 mg, Intravenous, Q8H  metoprolol succinate XL, 25 mg, Oral, Q24H  pantoprazole, 40 mg, Oral, BID AC  potassium chloride, 40 mEq, Oral, Daily  potassium phosphate, 15 mmol, Intravenous, Q3H  rosuvastatin, 20 mg, Oral, Nightly  senna-docusate sodium, 2 tablet, Oral, BID  sodium chloride, 10 mL, Intravenous, Q12H  sodium chloride, 10 mL, Intravenous, Q12H           acetaminophen    senna-docusate sodium **AND** polyethylene glycol **AND** bisacodyl **AND** bisacodyl    Magnesium Standard Dose Replacement - Follow Nurse / BPA Driven Protocol    ondansetron    Phosphorus Replacement - Follow Nurse / BPA Driven Protocol    Potassium Replacement - Follow Nurse / BPA Driven Protocol    prochlorperazine    simethicone    sodium chloride    sodium chloride    sodium chloride    sodium chloride    sodium chloride    Assessment          1.  Acute decompensated HFpEF improving  2.  Moderate-sized pericardial effusion  3.  Mild elevated high-sensitivity troponin with a flat trend on admission, no chest pain reported most likely NSTEMI type II secondary to heart failure  4.  Severe anemia  5.  Likely obstructive sleep apnea         Plan   1.  Patient had excellent diuresis overnight concern if she is getting a little bit more  intravascular depleted we will watch closely may have to hold diuretics temporarily  2.  Mental status changes this morning no clear cause  3.  Stress testing once patient is more stable for assessment of ischemia          I have discussed the patients findings and my recommendations with patient.    Electronically signed by WINDY Roberts, 07/17/23, 11:45 AM EDT.   Electronically signed by Bernice Rosas MD, 07/17/23, 11:58 AM EDT.                        Please note that portions of this note were completed with a voice recognition program.    Please note that portions of this note were copied and has been reviewed and is accurate as of 7/17/2023 .

## 2023-07-17 NOTE — PROGRESS NOTES
Liberalized diet to regular r/t minimal po intake 15-20% - increased boost supplement to tid - continue to follow

## 2023-07-17 NOTE — PLAN OF CARE
Goal Outcome Evaluation:  Pt is resting in bed at this time with no s/s of distress noted at this time. Pt has been lethargic throughout shift and has been unable to answer orientation questions. Pt is on 4L NC to maintain O2 sat of 92% or above. VSS at this time. IV access maintained throughout shift. Potassium and magnesium being replace. Will continue to follow plan of care.

## 2023-07-17 NOTE — CASE MANAGEMENT/SOCIAL WORK
Discharge Planning Assessment   [place]     Patient Name: Judy Lutz  MRN: [id number]  Today's Date: 7/17/2023    Admit Date: 7/12/2023     Discharge Plan       Row Name 07/17/23 1705       Plan    Plan SS was notified by  Inpatient Rehab that pt did not meet criteria for admission at this time. SS followed up with pt and Daughter, Nancy at bedside. Pt was not able to answer any questions. Pt's daughter, Nancy states she was able to get pt's insurance fixed and pt now has insurance on file here at the Hospital.  states she would like pt to do  Swing Bed. However, she does not feel pt is medically stable at this time. SS notified physician on this date and provided an update. SS to follow.                    SCOTT Mar

## 2023-07-17 NOTE — PROGRESS NOTES
[facility] HOSPITALIST PROGRESS NOTE     Patient Identification:  Name:  Judy Lutz  Age:  63 y.o.  Sex:  female  :  1960  MRN:  [id number]  Visit Number:  [id number]  ROOM: [address] [place] [address]     Primary Care Provider:  Provider, No Known    Length of stay in inpatient status:  4    Subjective     Chief Compliant:    Chief Complaint   Patient presents with    Shortness of Breath       History of Presenting Illness:    Patient seen and examined this morning with her daughter present at bedside. Daughter reports that patient has been more lethargic this morning, but she is not sure if it is because she didn't sleep well last night due to moving rooms in the early morning. Patient will open her eyes, smile at me, and squeeze my hands when asked, but will not answer questions.     Objective     Current Hospital Meds:albumin human, 25 g, Intravenous, BID  aspirin, 81 mg, Oral, Daily  ertapenem, 1,000 mg, Intravenous, Q24H  escitalopram, 10 mg, Oral, Daily  furosemide, 60 mg, Intravenous, BID  iron polysaccharides, 150 mg, Oral, Daily  iron sucrose, 300 mg, Intravenous, Q24H  magic barrier cream, 1 application , Topical, BID  metoprolol succinate XL, 25 mg, Oral, Q24H  pantoprazole, 40 mg, Oral, BID AC  potassium chloride, 40 mEq, Oral, Daily  potassium chloride, 10 mEq, Intravenous, Q1H  rosuvastatin, 20 mg, Oral, Nightly  senna-docusate sodium, 2 tablet, Oral, BID  sodium chloride, 10 mL, Intravenous, Q12H  sodium chloride, 10 mL, Intravenous, Q12H         Current Antimicrobial Therapy:  Anti-Infectives (From admission, onward)      Ordered     Dose/Rate Route Frequency Start Stop    23 1002  ertapenem (INVanz) 1,000 mg in sodium chloride 0.9 % 100 mL IVPB-VTB        Ordering Provider: King Broussard MD    1,000 mg  200 mL/hr over 30 Minutes Intravenous Every 24 Hours 23 1100 23 1059    07/15/23 1915  meropenem (MERREM) 1,000 mg in sodium chloride 0.9 % 100 mL IVPB-VTB         Ordering Provider: Candido Rodney MD    1,000 mg  over 30 Minutes Intravenous Once 07/15/23 2015 07/15/23 2056    07/12/23 2137  cefTRIAXone (ROCEPHIN) 2,000 mg in sodium chloride 0.9 % 100 mL IVPB-VTB        Ordering Provider: Yusuf Luo DO    2,000 mg  200 mL/hr over 30 Minutes Intravenous Once 07/12/23 2153 07/12/23 2330          Current Diuretic Therapy:  Diuretics (From admission, onward)      Ordered     Dose/Rate Route Frequency Start Stop    07/14/23 1026  furosemide (LASIX) injection 60 mg        Ordering Provider: Candido Rodney MD    60 mg Intravenous 2 Times Daily (Diuretics) 07/14/23 2130 07/14/23 0810  furosemide (LASIX) injection 60 mg        Ordering Provider: Leonard Dang DO    60 mg Intravenous Once 07/14/23 0900 07/14/23 0846    07/13/23 0054  furosemide (LASIX) injection 80 mg        Ordering Provider: Yusuf Luo DO    80 mg Intravenous Once 07/13/23 0110 07/13/23 0114          ----------------------------------------------------------------------------------------------------------------------  Vital Signs:  Temp:  [97.9 °F (36.6 °C)-99.1 °F (37.3 °C)] 97.9 °F (36.6 °C)  Heart Rate:  [] 106  Resp:  [16-25] 20  BP: (111-156)/(51-78) 116/68  SpO2:  [84 %-96 %] 95 %  on  Flow (L/min):  [3-5] 4.5;   Device (Oxygen Therapy): nasal cannula  Body mass index is 51.88 kg/m².    Wt Readings from Last 3 Encounters:   07/17/23 (!) 164 kg (361 lb 9.6 oz)     Intake & Output (last 3 days)         07/14 0701  07/15 0700 07/15 0701  07/16 0700 07/16 0701  07/17 0700 07/17 0701 07/18 0700    P.O. 1240  100 0    I.V. (mL/kg)  450 (2.6) 1153.5 (7)     Other   600     IV Piggyback 370       Total Intake(mL/kg) 1610 (9.1) 450 (2.6) 1853.5 (11.3) 0 (0)    Urine (mL/kg/hr) 5900 (1.4) 9125 (2.2) 8150 (2.1) 450 (0.3)    Stool 0 0 0     Total Output 5900 9125 8150 450    Net -4290 -8675 -6296.5 -450            Urine Unmeasured Occurrence 1 x       Stool Unmeasured Occurrence  0 x 1 x 3 x           Diet: Regular/House Diet; Texture: Soft to Chew (NDD 3); Soft to Chew: Whole Meat; Fluid Consistency: Thin (IDDSI 0)  ----------------------------------------------------------------------------------------------------------------------  Physical exam:   Constitutional:  Well-developed and well-nourished.  No acute distress.      HENT:  Head:  Normocephalic and atraumatic.    Cardiovascular:  Normal rate, regular rhythm  Pulmonary/Chest:  No respiratory distress, breath sounds clear to auscultation in anterior and lateral lung fields  Abdominal:  Soft. No distension and no tenderness.   Musculoskeletal:  No deformity.     Neurological: Wakes to verbal stimuli, smiles at me and follows simple commands, but will not answer questions.   Skin:  Skin is warm and dry. No rash noted.   Peripheral vascular:  No cyanosis. 1+ pitting edema bilateral lower extremities.  Edited by: Ginette Tarango DO at 7/17/2023 1633  ----------------------------------------------------------------------------------------------------------------------  Results from last 7 days   Lab Units 07/17/23  0022 07/16/23  0426 07/15/23  0055 07/13/23  0516 07/13/23  0343 07/12/23  1643   CRP mg/dL  --  20.87*  --   --  15.57*  --    LACTATE mmol/L  --  0.8  --   --   --  1.6   WBC 10*3/mm3 19.23* 20.91* 19.00*   < > 22.40* 20.79*   HEMOGLOBIN g/dL 7.0* 7.1* 7.1*   < > 7.4* 6.4*   HEMATOCRIT % 28.4* 29.3* 30.4*   < > 29.4* 27.0*   MCV fL 75.5* 76.5* 77.6*   < > 72.6* 73.2*   MCHC g/dL 24.6* 24.2* 23.4*   < > 25.2* 23.7*   PLATELETS 10*3/mm3 420 499* 398   < > 436 418   INR   --   --   --   --   --  1.38*    < > = values in this interval not displayed.     Results from last 7 days   Lab Units 07/15/23  1951   PH, ARTERIAL pH units 7.398   PO2 ART mm Hg 54.3*   PCO2, ARTERIAL mm Hg 76.7*   HCO3 ART mmol/L 47.2*     Results from last 7 days   Lab Units 07/17/23  1413 07/17/23  1136 07/17/23  0022 07/16/23  1830 07/16/23  0426  07/15/23  1504 07/15/23  0055 07/13/23  1156 07/13/23  0343   SODIUM mmol/L  --   --  148*  --  149*  --  144   < > 151*   POTASSIUM mmol/L  --  2.6* 2.6* 2.6* 2.7*  --  3.3*   < > 4.7   MAGNESIUM mg/dL  --   --  1.7  --  2.0 1.9  --   --   --    CHLORIDE mmol/L  --   --  93*  --  99  --  102   < > 112*   CO2 mmol/L  --   --  43.8*  --  42.8*  --  31.9*   < > 29.2*   BUN mg/dL  --   --  18  --  20  --  25*   < > 32*   CREATININE mg/dL  --   --  0.64  --  0.76  --  0.77   < > 0.96   CALCIUM mg/dL  --   --  10.2  --  10.3  --  9.8   < > 10.2   PHOSPHORUS mg/dL 4.7*  --  1.6*  --  2.6  --   --   --   --    GLUCOSE mg/dL  --   --  120*  --  110*  --  118*   < > 138*   ALBUMIN g/dL  --   --  3.1*  --  2.8*  --   --   --  3.0*   BILIRUBIN mg/dL  --   --  0.7  --  0.5  --   --   --  0.5   ALK PHOS U/L  --   --  60  --  99  --   --   --  95   AST (SGOT) U/L  --   --  11 --  12  --   --   --  30   ALT (SGPT) U/L  --   --  10  --  12  --   --   --  33    < > = values in this interval not displayed.   Estimated Creatinine Clearance: 155.9 mL/min (by C-G formula based on SCr of 0.64 mg/dL).  Ammonia   Date Value Ref Range Status   07/16/2023 55 (H) 11 - 51 umol/L Final     Results from last 7 days   Lab Units 07/15/23  1504 07/15/23  1211 07/13/23  0014   HSTROP T ng/L 32* 29* 24*     Results from last 7 days   Lab Units 07/13/23  0014   PROBNP pg/mL 26,037.0*         No results found for: HGBA1C, POCGLU  Lab Results   Component Value Date    TSH 0.619 07/15/2023     No results found for: PREGTESTUR, PREGSERUM, HCG, HCGQUANT  Pain Management Panel          Latest Ref Rng & Units 7/14/2023   Pain Management Panel   Creatinine, Urine mg/dL 8.5      Brief Urine Lab Results  (Last result in the past 365 days)        Color   Clarity   Blood   Leuk Est   Nitrite   Protein   CREAT   Urine HCG        07/15/23 1627 Yellow   Cloudy   Small (1+)   Large (3+)   Positive   30 mg/dL (1+)                 Blood Culture   Date Value Ref Range  Status   07/12/2023 No growth at 4 days  Preliminary   07/12/2023 No growth at 4 days  Preliminary     Urine Culture   Date Value Ref Range Status   07/15/2023 >100,000 CFU/mL Escherichia coli ESBL (A)  Final     Comment:       Consider infectious disease consult.  Susceptibility results may not correlate to clinical outcomes.   07/12/2023 >100,000 CFU/mL Escherichia coli ESBL (A)  Final     Comment:       Consider infectious disease consult.  Susceptibility results may not correlate to clinical outcomes.   07/12/2023 (A)  Final    >100,000 CFU/mL Klebsiella pneumoniae ssp pneumoniae     No results found for: WOUNDCX  No results found for: STOOLCX  No results found for: RESPCX  No results found for: AFBCX  Results from last 7 days   Lab Units 07/16/23  0426 07/16/23  0425 07/13/23  0343 07/12/23  2153   PROCALCITONIN ng/mL  --  0.27* 0.18 0.29*   LACTATE mmol/L 0.8  --   --  1.6   CRP mg/dL 20.87*  --  15.57*  --        I have personally looked at the labs and they are summarized above.  ----------------------------------------------------------------------------------------------------------------------  Detailed radiology reports for the last 24 hours:  Imaging Results (Last 24 Hours)       ** No results found for the last 24 hours. 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 coli and Klebsiella pneumoniae  #Anemia noted on admission, suspect due to severe iron deficiency anemia  #Leukocytosis, present on admission, suspect secondary to bone marrow overproduction as a result of the severe iron deficiency anemia  #Hypernatremia due to free water deficit that was present on admission, now resolved  #History of major depressive disorder   #Acute on chronic debility that was present on admission  #Tachycardia    - Infectious Disease saw the patient today and have adjusted antibiotics to ertapenem. Appreciate assistance. Follow for finalization of urine culture and sensitivities.  - Per patient's daughter, patient is more lethargic today than she was yesterday afternoon but has had waxing/waning lethargy during admission. Unsure if worsening today is due to not sleeping well last night because she was moved from PCU to telemetry floor in the early morning and reportedly did not sleep well. Ammonia level was 55 yesterday. Repeat ammonia level to make sure this has not risen and to rule this out as a cause of her mental status change. If the cause of her confusion/lethargy is the acute UTI then I expect it to improve within the next 24 hours as antibiotics have been adjusted to cover her infection.  - Anemia is being followed by Heme/Onc, appreciate assistance. Given evidence of severe iron deficiency anemia Heme/Onc has ordered venofer 300mg IV x3 doses. Continue to monitor with daily cbc. Transfuse for hemoglobin <7 or <8 if symptomatic and platelets <20K. No acute blood loss has been seen during this admission. Consideration of endoscopy is recommended by Heme/Onc due to persistent iron deficiency anemia, so I will request general surgery consult.   - Leukocytosis felt to be secondary to stress from severe iron deficiency as well as acute infection with UTI.   - Replace potassium, magnesium, and phosphorous per protocol and repeat labs to monitor.  -  Continue diuresis per Cardiology, appreciate assistance. Monitor intake/output.  - EKG repeated today that showed sinus tachycardia, rate 120, inverted t waves in leads I and aVL that were present on previous EKG. Suspect this is due to combination of infection and possible intravascular depletion with ongoing diuresis and poor PO intake. Continue monitoring closely and and encourage PO intake when alert. Repeat H+H this afternoon to monitor for worsening anemia that could be a cause.     Edited by: Ginette Tarango DO at 7/17/2023 1633    VTE Prophylaxis:   Mechanical Order History:        Ordered        07/13/23 0225  Place Sequential Compression Device  Once            07/13/23 0225  Maintain Sequential Compression Device  Continuous                          Pharmalogical Order History:       None            Dispo: pending clinical course     Ginette Tarango DO  [facility]  07/17/23  16:33 EDT

## 2023-07-17 NOTE — CONSULTS
INFECTIOUS DISEASE CONSULTATION REPORT        Patient Identification:  Name:  Judy Lutz  Age:  63 y.o.  Sex:  female  :  1960  MRN:  [id number]   Visit Number:  [id number]  Primary Care Physician:  Provider, No Known        Referring Provider: Dr. Tarango    Reason for consult: Sepsis, ESBL UTI       LOS: 4 days        Subjective       Subjective     History of present illness:      Thank you Dr. Tarango for allowing us to participate in the care of your patient.  As you well know, Ms. Judy Lutz is a 63 y.o. female with past medical history significant for progressive dyspnea with minimal exertion over the latest 3 months or so, who presented to [facility] Emergency Department on 2023 for progressive weakness, fatigue and shortness of air.    The patient's daughter reported that her mother had progressive dyspnea especially with minimal exertion over the last 3 months or so.  The patient's daughter stated she lives approximately 2 hours away but the patient's son does live with her.  The patient's daughter stated her father passed away in February of this year and since that time her mother has not been focused on her own health.    The patient denied having chest pain on admission.  Did not report any cough.  Denied any recent fever, vomiting, diarrhea.  No reported hematochezia or melena.  Reportedly the patient had not seen a primary care provider in approximately 30 years.  The patient's daughter did note that her mother had some intermittent cognitive changes recently with confusion.  Due to the patient's weight she has reportedly been dependent in terms of mobility for several years and has used crutches for over 10 years for ambulation.    In the ED the patient did screen positive for sepsis and protocol was initiated.  Anemia was noted on ABG with type and screen ordered.  2 units packed red blood cells were ordered.  Broad-spectrum antibiotics were initiated.  CT  of the chest noted considerable cardiomegaly.  CT of the abdomen pelvis noted a ascites.  Urinalysis was positive with too numerous to count WBCs, 4+ bacteria, moderate blood, positive nitrite, large leukocytes with culture reporting greater than 100,000 colonies E. coli ESBL and greater than 100,000 colonies Klebsiella pneumoniae.  UTI was felt to be the source of sepsis.  Repeat urinalysis on 7/15/2023 was positive with 13-20 WBCs, 1+ bacteria, small blood, positive nitrites, large leukocytes with culture preliminarily reporting greater than 100,000 colonies gram-negative bacilli.  On admission lactate was normal at 1.6.  Procalcitonin was elevated at 0.29.  Leukocytosis at 20.79.  Blood cultures report no growth at 4 days.  COVID/influenza PCR was negative.    The patient is awake, mildly drowsy.  Daughter at the bedside.  On 4 L nasal cannula with no apparent distress.  Does complain of shortness of breath.  Denies any nausea/vomiting/diarrhea, denies any abdominal pain or urinary symptoms.  Gonzalez catheter in place.  Lung exam is diminished to auscultation bilaterally.  Abdomen is soft and nontender with normoactive bowel sounds. leukocytosis slightly improved at 19.23.  Most recent CRP 20.87.  Most recent procalcitonin 0.27.  Ammonia level elevated at 55.    Infectious Disease consultation was requested for antimicrobial management.      ---------------------------------------------------------------------------------------------------------------------     Review Of Systems:    Constitutional: no fever, chills and night sweats. No appetite change or unexpected weight change. No fatigue.  Eyes: no eye drainage, itching or redness.  HEENT: no mouth sores, dysphagia or nose bleed.  Respiratory: Complains of shortness of breath, cough or production of sputum.  Cardiovascular: no chest pain, no palpitations, no orthopnea.  Gastrointestinal: no nausea, vomiting or diarrhea. No abdominal pain, hematemesis or rectal  bleeding.  Genitourinary: no dysuria or polyuria.  Hematologic/lymphatic: no lymph node abnormalities, no easy bruising or easy bleeding.  Musculoskeletal: no muscle or joint pain.  Skin: No rash and no itching.  Neurological: no loss of consciousness, no seizure, no headache.  Behavioral/Psych: no depression or suicidal ideation.  Endocrine: no hot flashes.  Immunologic: negative.    ---------------------------------------------------------------------------------------------------------------------     Past Medical History    Past Medical History:   Diagnosis Date    Symptomatic anemia 07/13/2023       Past Surgical History    History reviewed. No pertinent surgical history.    Family History    History reviewed. No pertinent family history.    Social History    Social History     Tobacco Use    Smoking status: Never    Smokeless tobacco: Never   Vaping Use    Vaping Use: Never used   Substance Use Topics    Alcohol use: Never    Drug use: Never       Allergies    Codeine  ---------------------------------------------------------------------------------------------------------------------     Home Medications:    Prior to Admission Medications       Prescriptions Last Dose Informant Patient Reported? Taking?    acetaminophen (TYLENOL) 325 MG tablet Past Week Child Yes Yes    Take 2 tablets by mouth Every 6 (Six) Hours As Needed for Mild Pain or Headache.    ibuprofen (ADVIL,MOTRIN) 200 MG tablet Past Week Child Yes Yes    Take 1 tablet by mouth Every 6 (Six) Hours As Needed for Mild Pain or Headache.          ---------------------------------------------------------------------------------------------------------------------    Objective       Objective     Hospital Scheduled Meds:  albumin human, 25 g, Intravenous, BID  aspirin, 81 mg, Oral, Daily  escitalopram, 10 mg, Oral, Daily  furosemide, 60 mg, Intravenous, BID  iron polysaccharides, 150 mg, Oral, Daily  meropenem, 1,000 mg, Intravenous, Q8H  metoprolol  succinate XL, 25 mg, Oral, Q24H  pantoprazole, 40 mg, Oral, BID AC  potassium chloride, 40 mEq, Oral, Daily  rosuvastatin, 20 mg, Oral, Nightly  senna-docusate sodium, 2 tablet, Oral, BID  sodium chloride, 10 mL, Intravenous, Q12H  sodium chloride, 10 mL, Intravenous, Q12H         ---------------------------------------------------------------------------------------------------------------------   Vital Signs:  Temp:  [98 °F (36.7 °C)-99.3 °F (37.4 °C)] 98.1 °F (36.7 °C)  Heart Rate:  [] 91  Resp:  [16-25] 24  BP: (118-151)/(50-77) 151/77  Mean Arterial Pressure (Non-Invasive) for the past 24 hrs (Last 3 readings):   Noninvasive MAP (mmHg)   07/17/23 0649 94   07/17/23 0530 85   07/17/23 0500 74     SpO2 Percentage    07/17/23 0635 07/17/23 0649 07/17/23 0654   SpO2: (!) 84% 92% 93%     SpO2:  [84 %-97 %] 93 %  on  Flow (L/min):  [3-5] 4.5;   Device (Oxygen Therapy): nasal cannula    Body mass index is 51.88 kg/m².  Wt Readings from Last 3 Encounters:   07/17/23 (!) 164 kg (361 lb 9.6 oz)     ---------------------------------------------------------------------------------------------------------------------     Physical Exam:    Constitutional: Chronically ill-appearing  female.  On 4 L nasal cannula with no apparent distress.  Does complain of mild shortness of breath.  Daughter at the bedside.  HENT:  Head: Normocephalic and atraumatic.  Mouth:  Moist mucous membranes.    Eyes:  Conjunctivae and EOM are normal.  No scleral icterus.  Neck:  Neck supple.  No JVD present.    Cardiovascular:  Normal rate, regular rhythm and normal heart sounds with no murmur. No edema.  Pulmonary/Chest: Lung exam is diminished to auscultation bilaterally.  On 4 L nasal cannula.  Abdominal:  Soft.  Bowel sounds are normal.  No distension and no tenderness.   Musculoskeletal:  No edema, no tenderness, and no deformity.  No swelling or redness of joints.  Peripheral IV to the left hand with no evidence of infection or  phlebitis.  Neurological:  Alert and oriented to person, place, and time.  No facial droop.  No slurred speech.   Skin:  Skin is warm and dry.  No rash noted.  No pallor.   Psychiatric:  Normal mood and affect.  Behavior is normal.    ---------------------------------------------------------------------------------------------------------------------    Results from last 7 days   Lab Units 07/15/23  1504 07/15/23  1211 07/13/23  0014   HSTROP T ng/L 32* 29* 24*     Results from last 7 days   Lab Units 07/13/23  0014   PROBNP pg/mL 26,037.0*       Results from last 7 days   Lab Units 07/15/23  1951   PH, ARTERIAL pH units 7.398   PO2 ART mm Hg 54.3*   PCO2, ARTERIAL mm Hg 76.7*   HCO3 ART mmol/L 47.2*     Results from last 7 days   Lab Units 07/17/23  0022 07/16/23  0426 07/15/23  0055 07/13/23  0516 07/13/23  0343 07/12/23  2153   CRP mg/dL  --  20.87*  --   --  15.57*  --    LACTATE mmol/L  --  0.8  --   --   --  1.6   WBC 10*3/mm3 19.23* 20.91* 19.00*   < > 22.40* 20.79*   HEMOGLOBIN g/dL 7.0* 7.1* 7.1*   < > 7.4* 6.4*   HEMATOCRIT % 28.4* 29.3* 30.4*   < > 29.4* 27.0*   MCV fL 75.5* 76.5* 77.6*   < > 72.6* 73.2*   MCHC g/dL 24.6* 24.2* 23.4*   < > 25.2* 23.7*   PLATELETS 10*3/mm3 420 499* 398   < > 436 418   INR   --   --   --   --   --  1.38*    < > = values in this interval not displayed.     Results from last 7 days   Lab Units 07/17/23  0022 07/16/23  1830 07/16/23  0426 07/15/23  1504 07/15/23  0055 07/13/23  1156 07/13/23  0343   SODIUM mmol/L 148*  --  149*  --  144   < > 151*   POTASSIUM mmol/L 2.6* 2.6* 2.7*  --  3.3*   < > 4.7   MAGNESIUM mg/dL 1.7  --  2.0 1.9  --   --   --    CHLORIDE mmol/L 93*  --  99  --  102   < > 112*   CO2 mmol/L 43.8*  --  42.8*  --  31.9*   < > 29.2*   BUN mg/dL 18  --  20  --  25*   < > 32*   CREATININE mg/dL 0.64  --  0.76  --  0.77   < > 0.96   CALCIUM mg/dL 10.2  --  10.3  --  9.8   < > 10.2   GLUCOSE mg/dL 120*  --  110*  --  118*   < > 138*   ALBUMIN g/dL 3.1*  --  2.8*   --   --   --  3.0*   BILIRUBIN mg/dL 0.7  --  0.5  --   --   --  0.5   ALK PHOS U/L 60  --  99  --   --   --  95   AST (SGOT) U/L 11 -- 12  --   --   --  30   ALT (SGPT) U/L 10  --  12  --   --   --  33    < > = values in this interval not displayed.   Estimated Creatinine Clearance: 155.9 mL/min (by C-G formula based on SCr of 0.64 mg/dL).  Ammonia   Date Value Ref Range Status   07/16/2023 55 (H) 11 - 51 umol/L Final       No results found for: HGBA1C, POCGLU  Lab Results   Component Value Date    HGBA1C 5.80 (H) 07/13/2023     Lab Results   Component Value Date    TSH 0.619 07/15/2023       Blood Culture   Date Value Ref Range Status   07/12/2023 No growth at 4 days  Preliminary   07/12/2023 No growth at 4 days  Preliminary     Urine Culture   Date Value Ref Range Status   07/15/2023 >100,000 CFU/mL Gram Negative Bacilli (A)  Preliminary   07/12/2023 >100,000 CFU/mL Escherichia coli ESBL (A)  Final     Comment:       Consider infectious disease consult.  Susceptibility results may not correlate to clinical outcomes.   07/12/2023 (A)  Final    >100,000 CFU/mL Klebsiella pneumoniae ssp pneumoniae     No results found for: WOUNDCX  No results found for: STOOLCX  No results found for: RESPCX  Pain Management Panel          Latest Ref Rng & Units 7/14/2023   Pain Management Panel   Creatinine, Urine mg/dL 8.5      I have personally reviewed the above laboratory results.   ---------------------------------------------------------------------------------------------------------------------  Imaging Results (Last 7 Days)       Procedure Component Value Units Date/Time    XR Chest 1 View [[id number]] Collected: 07/15/23 1651     Updated: 07/15/23 1821    Narrative:      Procedure: Portable chest x-ray examination performed on 07/15/2023.  Single view. Semiupright position.     HISTORY: Confusion. Progressive hypoxia.     COMPARISON: None.     FINDINGS:     Enlarged heart size.  Mild central pulmonary vascular  congestion.  Mild edema.  Hypoinflated lungs.  Mild elevated right hemidiaphragm.  No pleural effusion or pneumothorax.  No fracture or foreign body.       Impression:         1.  Enlarged heart size.  2.  Central pulmonary vascular congestion with mild edema.  3.  Hypoinflated lungs.  4.  No pleural effusion or pneumothorax.     This report was finalized on 7/15/2023 4:52 PM by Sacha Syed MD.       US Liver [[id number]] Collected: 07/15/23 1058     Updated: 07/15/23 1101    Narrative:      EXAMINATION: US LIVER-      CLINICAL INDICATION: r/o cirrhosis; D64.9-Anemia, unspecified;  A41.9-Sepsis, unspecified organism; R65.20-Severe sepsis without septic  shock; J96.01-Acute respiratory failure with hypoxia; N39.0-Urinary  tract infection, site not specified        COMPARISON: None available     FINDINGS:  Sonographic imaging of the liver     Liver is homogeneous in echotexture  No intrahepatic ductal dilatation or focal hepatic mass.  Hepatic flow is hepatopedal.     Small amount of perihepatic fluid is present.       Impression:      1. Small volume ascites  2. The liver is homogeneous      This report was finalized on 7/15/2023 10:59 AM by Dr. Nicho Reeves MD.       CT Abdomen Pelvis Without Contrast [[id number]] Collected: 07/13/23 0026     Updated: 07/13/23 0029    Narrative:      CLINICAL INDICATION: ams abd pain. Dyspnea, chronic, unclear etiology  PROCEDURE DATE: 7/12/2023     CT ABD/PELVIS W/O IV C  Technique: Standard axial collimation through the abdomen and pelvis  without oral or intravenous contrast.  Reconstructed images were  submitted for interpretation. Limited exposure control, adjustment of  the mA and/or KV according to patient size or use of iterative  reconstruction technique was utilized.     Findings:     1.  Moderate ascites in the abdomen and pelvis.  2.  No bowel obstruction, free air, or abscess.  3.  No evidence of acute colitis or diverticulitis.  4.  The appendix is not seen.  5.  No  CT evidence of acute pancreatitis.  6.  Contracted gallbladder with a questionable stone in the GB fundus.  7.  Small air in the nondependent portion of the urinary bladder, likely  related to recent catheterization versus cystitis.  Correlate  clinically.  8.  Bilateral renal calculi without hydronephrosis.  9.  Moderate retained fecal volume in the rectum.  10.  Diffuse subcutaneous edema throughout the abdominal/pelvic wall.  11.  Mild thoracolumbar scoliosis with multilevel lumbar discogenic and  facet degenerative change.       Impression:      Impression:     1.  No bowel obstruction, free air, or abscess.  2.  Moderate ascites in the abdomen and pelvis.  3.  No evidence of acute colitis or diverticulitis.  4.  Biparietal renal calculi without hydronephrosis.  5.  Probable cholelithiasis.  6.  Small air in the nondependent portion of the urinary bladder, likely  related to recent catheterization versus cystitis.  Correlate  clinically.        This report was finalized on 7/13/2023 12:26 AM by Ricky Perkins MD.       CT Chest Without Contrast Diagnostic [[id number]] Collected: 07/13/23 0025     Updated: 07/13/23 0028    Narrative:      CT CHEST W/O  Technique: Standard axial collimation through the chest without  intravenous contrast.  Reconstructed images were submitted for  interpretation. Limited exposure control, adjustment of the mA and/or KV  according to patient size or use of iterative reconstruction technique  was utilized.     Findings:     1.  No focal infiltrate, pleural effusion or pneumothorax.  2.  A 7 mm posterior subpleural noncalcified groundglass density in the  right upper lobe, likely related to postinflammatory changes.  3.  Mild pulmonary vascular congestion.  4.  Reece cardiomegaly with small to moderate superior/posterior  pericardial effusion, 9-18 mm in thickness.  5.  No aortic aneurysm.  6.  No mediastinal lymphadenopathy.  7.  Motion artifacts degrading image quality.       Impression:       Impression:     1.  Marked cardiomegaly and small-to-moderate pericardial effusion with  mild pulmonary vascular congestion.  2.  No focal infiltrate or pleural effusion.      This report was finalized on 7/13/2023 12:26 AM by Ricky Perkins MD.       CT Head Without Contrast [[id number]] Collected: 07/13/23 0025     Updated: 07/13/23 0027    Narrative:      CT HEAD     Technique: Standard axial collimation through the head without contrast.   Reconstructed images were submitted for interpretation. Limited  exposure control, adjustment of the mA and/or KV according to patient  size or use of iterative reconstruction technique was utilized.     Findings:     1.  No acute intracranial hemorrhage, mass effect or cerebral cortical  edema.  2.  Normal ventricular volume for the patient's age.  3.  Mild cerebral cortical atrophy.   4.  No skull fractures.  5.  The mastoid air cells are clear.  6.  The visualized paranasal sinuses are clear.       Impression:      Impression:     No acute intracranial abnormality.     This report was finalized on 7/13/2023 12:25 AM by Ricky Perkins MD.       XR Chest 1 View [[id number]] Collected: 07/12/23 2259     Updated: 07/12/23 2301    Narrative:      Single view chest     Indications: Sepsis protocol     Findings:     AP view the chest shows enlargement of the cardiac silhouette.  The  osseous structures are normal.  Lungs are free from infiltrate and  effusion.       Impression:      Impression:     Cardiomegaly.  Lungs clear.     This report was finalized on 7/12/2023 10:59 PM by Wilber Lopez MD.             I have personally reviewed the above radiology results.   ---------------------------------------------------------------------------------------------------------------------      Pertinent Infectious Disease Results    Assessment & Plan      Assessment      Sepsis  ESBL UTI        Plan      I saw and examined the patient myself this morning and discussed the plan of care with  WINDY Rosario and primary RN and here are my findings:    The patient is awake but mildly drowsy, and her daughter is present at the bedside. She is receiving oxygen through a 4 L nasal cannula and appears to be in no apparent distress. However, she does complain of shortness of breath. Notably, there are no complaints of nausea, vomiting, or diarrhea, and she denies experiencing any abdominal pain or urinary symptoms. A Gonzalez catheter is in place.    During the lung examination, breath sounds are found to be diminished bilaterally. The abdomen is soft, and there is no tenderness, with normoactive bowel sounds.    Regarding the patient's lab results, her leukocytosis has slightly improved and is now at 19.23. Her most recent CRP level is 20.87, and her most recent procalcitonin level is 0.27. However, her ammonia level is elevated, measuring 55.    In light of the situation, we will de-escalate the antibiotic treatment from meropenem to ertapenem 1 g IV every 24 hours for a duration of 7 days. This decision is pending the finalization of the urine culture report, which will provide susceptibility information. We are considering the possibility of further de-escalating the treatment to oral nitrofurantoin in the near future.        ANTIMICROBIAL THERAPY    meropenem (MERREM) 1gm IVPB in 100ml NS (VTB)       Again, thank you Dr. Tarango for allowing us to participate in the care of your patient and please feel free to call for any questions you may have.        Code Status:     Code Status and Medical Interventions:   Ordered at: 07/13/23 0152     Code Status (Patient has no pulse and is not breathing):    CPR (Attempt to Resuscitate)     Medical Interventions (Patient has pulse or is breathing):    Full Support         WINDY Hinojosa  07/17/23  09:41 EDT       Electronically signed by King Broussard MD, 07/17/23, 12:40 PM EDT.

## 2023-07-17 NOTE — NURSING NOTE
Rehab consult noted. Rehab to sign off on patient at this time as she does not meet criteria for admission.

## 2023-07-17 NOTE — NURSING NOTE
Wound consult for denuded are to the coccyx and bilateral buttocks. Area presents as moisture associated skin damage with a non blanchable tayla wound skin and the epidermis intact. New order for magic barrier cream BID and PRN

## 2023-07-17 NOTE — CONSULTS
Date:  07/17/23    Referring Provider: Dr. Leonard Dang DO    Reason for Consultation: Anemia    Patient Care Team:  Provider, No Known as PCP - General    Chief Complaint: Generalized Weakness, Fatigue    History of present illness:  Judy Lutz is a 63 y.o. year-old female seen today at the request of Dr. Leonard Dang MD for evaluation and treatment of anemia. Patient presented to the ED with complaints of generalized weakness, fatigue and shortness of breath. She has no known past medical history as she has not seen a physician in > 30 years per her daughter's report. Daughter also reports that her father (the patient's ) passed away in February and since she has noticed a decline in her mother. She was subsequently admitted with sepsis, acute encephalopathy, cardiomegaly and anemia. Since admission, she has continued to have altered mental status and confusion that daughter feels is some worse than yesterday but is unsure if this is due to them moving the patient rooms around 5 am today. Today, her daughter remains the primary historian as the patient is resting in bed with eyes closed. She does open eyes to verbal stimuli but is unable to answer questions. Her nurse is also at bedside. Daughter reports that patient has not had endoscopy in past. She denies tobacco/ETOH use. She denies obvious blood loss from any source.     History  Past Medical History:   Diagnosis Date    Symptomatic anemia 07/13/2023       History reviewed. No pertinent surgical history.    History reviewed. No pertinent family history.    Social History     Tobacco Use    Smoking status: Never    Smokeless tobacco: Never   Vaping Use    Vaping Use: Never used   Substance Use Topics    Alcohol use: Never    Drug use: Never     Allergies:  Codeine    Outpatient Medications:  Medications Prior to Admission   Medication Sig Dispense Refill Last Dose    acetaminophen (TYLENOL) 325 MG tablet Take 2 tablets by mouth Every 6  "(Six) Hours As Needed for Mild Pain or Headache.   Past Week    ibuprofen (ADVIL,MOTRIN) 200 MG tablet Take 1 tablet by mouth Every 6 (Six) Hours As Needed for Mild Pain or Headache.   Past Week     Inpatient Medications:  Scheduled Meds:  albumin human, 25 g, Intravenous, BID  aspirin, 81 mg, Oral, Daily  ertapenem, 1,000 mg, Intravenous, Q24H  escitalopram, 10 mg, Oral, Daily  furosemide, 60 mg, Intravenous, BID  iron polysaccharides, 150 mg, Oral, Daily  metoprolol succinate XL, 25 mg, Oral, Q24H  pantoprazole, 40 mg, Oral, BID AC  potassium chloride, 40 mEq, Oral, Daily  rosuvastatin, 20 mg, Oral, Nightly  senna-docusate sodium, 2 tablet, Oral, BID  sodium chloride, 10 mL, Intravenous, Q12H  sodium chloride, 10 mL, Intravenous, Q12H      Continuous Infusions:       PRN Meds:    acetaminophen    senna-docusate sodium **AND** polyethylene glycol **AND** bisacodyl **AND** bisacodyl    Magnesium Standard Dose Replacement - Follow Nurse / BPA Driven Protocol    ondansetron    Phosphorus Replacement - Follow Nurse / BPA Driven Protocol    Potassium Replacement - Follow Nurse / BPA Driven Protocol    prochlorperazine    simethicone    sodium chloride    sodium chloride    sodium chloride    sodium chloride    sodium chloride    Review of Systems  Patient unable to contribute to review of systems due to condition.    Physical Exam:  Vital Signs   Patient Vitals for the past 24 hrs:   BP Temp Temp src Pulse Resp SpO2 Height Weight   07/17/23 0654 -- -- -- 91 24 93 % -- --   07/17/23 0649 151/77 98.1 °F (36.7 °C) Oral 103 20 92 % -- --   07/17/23 0635 -- -- -- -- -- (!) 84 % -- --   07/17/23 0530 121/57 98 °F (36.7 °C) Oral 107 22 93 % 177.8 cm (70\") (!) 164 kg (361 lb 9.6 oz)   07/17/23 0500 120/59 -- -- 99 25 96 % -- --   07/17/23 0400 119/61 98 °F (36.7 °C) Oral 98 22 94 % -- (!) 167 kg (368 lb 4.8 oz)   07/17/23 0300 125/66 -- -- 105 25 94 % -- --   07/17/23 0200 132/51 -- -- 100 22 93 % -- --   07/17/23 0100 128/62 " -- -- 99 21 92 % -- --   07/17/23 0000 123/55 99.1 °F (37.3 °C) Oral 97 19 92 % -- --   07/16/23 2300 131/61 -- -- 90 16 94 % -- --   07/16/23 2200 132/61 -- -- 99 25 93 % -- --   07/16/23 2100 126/58 -- -- 98 25 92 % -- --   07/16/23 2011 132/67 -- -- 97 21 91 % -- --   07/16/23 2000 -- 98.8 °F (37.1 °C) Axillary -- -- -- -- --   07/16/23 1900 127/69 -- -- 100 22 94 % -- --   07/16/23 1800 121/56 -- -- 97 20 90 % -- --   07/16/23 1700 125/57 -- -- 96 20 94 % -- --   07/16/23 1600 -- 98.7 °F (37.1 °C) Axillary 98 -- 97 % -- --   07/16/23 1500 118/60 -- -- 96 18 94 % -- --   07/16/23 1400 123/50 -- -- 93 20 90 % -- --   07/16/23 1300 120/59 -- -- 102 22 (!) 88 % -- --   07/16/23 1200 122/56 99.3 °F (37.4 °C) Axillary 96 20 (!) 89 % -- --   07/16/23 1100 133/70 -- -- 96 18 90 % -- --       General: Morbidly obese, ill-appearing. Resting in bed with eyes closed, opens eyes to verbal stimuli. Unable to answer questions.   HEENT:  Pupils are equal, round and reactive to light and accommodation  Neck:  No JVD, thyromegaly or lymphadenopathy.  CV:  Tachycardic, regular rhythm, no murmurs, rubs or gallops.  Resp:  Lungs are diminished to auscultation bilaterally.  Abd: Abdomen round, soft, non-tender. Ascites is noted.  Ext:  No clubbing, cyanosis. 1-2+ edema to bilateral lower extremities.  Lymph:  No cervical, supraclavicular, axillary, inguinal or femoral adenopathy  Neuro: Opens eyes to verbal stimuli, unable to answer questions.     Labs / Studies:  Lab Results   Component Value Date    WBC 19.23 (H) 07/17/2023    HGB 7.0 (L) 07/17/2023    HCT 28.4 (L) 07/17/2023    MCV 75.5 (L) 07/17/2023    RDW 24.0 (H) 07/17/2023     07/17/2023    NEUTRORELPCT 87.3 (H) 07/17/2023    LYMPHORELPCT 3.1 (L) 07/17/2023    MONORELPCT 7.9 07/17/2023    EOSRELPCT 0.6 07/17/2023    BASORELPCT 0.1 07/17/2023    NEUTROABS 16.78 (H) 07/17/2023    LYMPHSABS 0.60 (L) 07/17/2023     Lab Results   Component Value Date     (H)  07/17/2023    K 2.6 (C) 07/17/2023    CO2 43.8 (H) 07/17/2023    CL 93 (L) 07/17/2023    BUN 18 07/17/2023    CREATININE 0.64 07/17/2023    GLUCOSE 120 (H) 07/17/2023    CALCIUM 10.2 07/17/2023    ALKPHOS 60 07/17/2023    AST 11 07/17/2023    ALT 10 07/17/2023    BILITOT 0.7 07/17/2023    ALBUMIN 3.1 (L) 07/17/2023    PROTEINTOT 5.1 (L) 07/17/2023    MG 1.7 07/17/2023    PHOS 1.6 (C) 07/17/2023     Imaging Results (Last 72 Hours)       Procedure Component Value Units Date/Time    XR Chest 1 View [[id number]] Collected: 07/15/23 1651     Updated: 07/15/23 1821    Narrative:      Procedure: Portable chest x-ray examination performed on 07/15/2023.  Single view. Semiupright position.     HISTORY: Confusion. Progressive hypoxia.     COMPARISON: None.     FINDINGS:     Enlarged heart size.  Mild central pulmonary vascular congestion.  Mild edema.  Hypoinflated lungs.  Mild elevated right hemidiaphragm.  No pleural effusion or pneumothorax.  No fracture or foreign body.       Impression:         1.  Enlarged heart size.  2.  Central pulmonary vascular congestion with mild edema.  3.  Hypoinflated lungs.  4.  No pleural effusion or pneumothorax.     This report was finalized on 7/15/2023 4:52 PM by Sacha Syed MD.       US Liver [[id number]] Collected: 07/15/23 1058     Updated: 07/15/23 1101    Narrative:      EXAMINATION: US LIVER-      CLINICAL INDICATION: r/o cirrhosis; D64.9-Anemia, unspecified;  A41.9-Sepsis, unspecified organism; R65.20-Severe sepsis without septic  shock; J96.01-Acute respiratory failure with hypoxia; N39.0-Urinary  tract infection, site not specified        COMPARISON: None available     FINDINGS:  Sonographic imaging of the liver     Liver is homogeneous in echotexture  No intrahepatic ductal dilatation or focal hepatic mass.  Hepatic flow is hepatopedal.     Small amount of perihepatic fluid is present.       Impression:      1. Small volume ascites  2. The liver is homogeneous      This report  "was finalized on 7/15/2023 10:59 AM by Dr. Nicho Reeves MD.             Assessment & Plan:  Judy Lutz is a 63 y.o. year-old female presenting with:    1. Microcytic Anemia  2. Iron Deficiency Anemia  3. Leukocytosis  - CBCs from admission were reviewed. Patient with ongoing microcytic anemia. CBC today with Hg 7.0, Hct 28.4, MCV 75.5. WBC elevated (19.23) with normal platelet count (420K).  - Iron profile is with iron 12, iron saturation 3%, with TIBC 359. Ferritin is also low at 25.   - Vitamin B12 and folate are replete.  - She has been started on Niferex 150 mg PO daily.  - She was also given Ferric Gluconate 250 mg IV x 3 doses.  - Peripheral smear shows severe microcytic anemia with moderate hypochromasia and moderate anisopoikilocytosis with rare schistocytes noted. These findings are all often seen with severe iron deficiency. Anisopoikilocytosis means (RBCs of) varied shape and size, elliptocytes are one of these abnormal shapes (an elongated \"pencil cell\" shaped one), hypochromic (caused by the lack of iron) have the appearance of target cells, and schistocytes, in her situation, are not due to hemolysis, but rather the fragility of the iron deficient RBCs.   - Therefore, would recommend to replace with Venofer 300 mg IV x 3 doses.  - Also would recommend evaluation for possible endoscopy.   - Leukocytosis with predominant neutrophilia is likely multifactorial and secondary to stress (caused by severe iron deficiency) as well as ongoing infection (UTI/sepsis).   - Recommend transfusional support for Hg < 7 or < 8 if symptomatic and platelets <20K.        Thank you for taking such good care of Ms. Lutz.  We will continue to follow with you. Please don't hesitate to call with questions or concerns.       WINDY Merino  07/17/23  10:08 EDT    "

## 2023-07-18 NOTE — THERAPY TREATMENT NOTE
Acute Care - Physical Therapy Treatment Note   [facility]     Patient Name: Judy Lutz  : 1960  MRN: [id number]  Today's Date: 2023   Onset of Illness/Injury or Date of Surgery: 23  Visit Dx:     ICD-10-CM ICD-9-CM   1. Symptomatic anemia  D64.9 285.9   2. Sepsis with acute hypoxic respiratory failure without septic shock, due to unspecified organism  A41.9 038.9    R65.20 995.91    J96.01 518.81   3. Acute UTI  N39.0 599.0   4. Acute heart failure with preserved ejection fraction (HFpEF)  I50.31 428.9     Patient Active Problem List   Diagnosis    Symptomatic anemia     Past Medical History:   Diagnosis Date    Symptomatic anemia 2023     History reviewed. No pertinent surgical history.  PT Assessment (last 12 hours)       PT Evaluation and Treatment       Row Name 23 1400          Physical Therapy Time and Intention    Document Type therapy note (daily note)  -     Mode of Treatment physical therapy  -     Patient Effort other (see comments)  Pt remained with eyes closed today, inability to follow commands or participate  -     Comment Increased redness noted between ankle and knee of RLE > LLE.  RN made aware.  -       Row Name 23 1400          General Information    Patient Profile Reviewed yes  -     Patient/Family/Caregiver Comments/Observations Family was present during treatment and agreeable to LE PROM to attempt to increase level of alertness and decrease further medical complications.  -     Existing Precautions/Restrictions fall  -       Row Name 23 1400          Cognition    Affect/Mental Status (Cognition) unresponsive  -     Orientation Status (Cognition) unable/difficult to assess  -       Row Name 23 1400          Hip (Therapeutic Exercise)    Hip PROM (Therapeutic Exercise) left;right;flexion;external rotation;internal rotation;supine;10 repetitions  -       Row Name 23 1400          Knee (Therapeutic Exercise)    Knee  PROM (Therapeutic Exercise) left;right;flexion;extension  -KP       Row Name 07/18/23 1400          Ankle (Therapeutic Exercise)    Ankle PROM (Therapeutic Exercise) left;right;dorsiflexion;plantarflexion;supine;10 repetitions  -       Row Name             Wound 07/13/23 0301 perineum MASD (Moisture associated skin damage)    Wound - Properties Group Placement Date: 07/13/23  -AM Placement Time: 0301  -AM Present on Hospital Admission: Y  -AM Location: perineum  -AM Primary Wound Type: MASD  -AM    Retired Wound - Properties Group Placement Date: 07/13/23  -AM Placement Time: 0301  -AM Present on Hospital Admission: Y  -AM Location: perineum  -AM Primary Wound Type: MASD  -AM    Retired Wound - Properties Group Date first assessed: 07/13/23  -AM Time first assessed: 0301  -AM Present on Hospital Admission: Y  -AM Location: perineum  -AM Primary Wound Type: MASD  -AM      Row Name             Wound 07/15/23 1143 coccyx    Wound - Properties Group Placement Date: 07/15/23  -JJ Placement Time: 1143  -JJ Location: coccyx  -JJ    Retired Wound - Properties Group Placement Date: 07/15/23  -JJ Placement Time: 1143  -JJ Location: coccyx  -JJ    Retired Wound - Properties Group Date first assessed: 07/15/23  -JJ Time first assessed: 1143  -JJ Location: coccyx  -JJ      Row Name 07/18/23 1400          Plan of Care Review    Plan of Care Reviewed With patient  -     Outcome Evaluation PT treatment completed.  PROM only to bilateral LE due to pt lethargy with inability to remain awake or follow commands.  Continue PT POC to patient tolerance.  -       Row Name 07/18/23 1400          Positioning and Restraints    Pre-Treatment Position in bed  -KP     Post Treatment Position bed  -KP     In Bed notified nsg;supine;with family/caregiver;sitting EOB;call light within reach;exit alarm on;side rails up x3;SCD pump applied  Lines in tact and needs in reach  -KP               User Key  (r) = Recorded By, (t) = Taken By, (c) =  Cosigned By      Initials Name Provider Type    Shelli Huynh, RN Registered Nurse    Carolee Kam RN Registered Nurse    Jessica Argueta, PT Physical Therapist                    Physical Therapy Education       Title: PT OT SLP Therapies (Done)       Topic: Physical Therapy (Done)       Point: Mobility training (Done)       Learning Progress Summary             Patient Acceptance, E, VU by JR at 7/17/2023 0202    Acceptance, E, VU,NR by SC at 7/15/2023 0940    Acceptance, E, VU,NR by SC at 7/14/2023 0910    Acceptance, E,TB, VU by  at 7/13/2023 1301   Family Acceptance, E,TB, VU by KS at 7/17/2023 1703    Acceptance, E, VU by JR at 7/17/2023 0202                         Point: Home exercise program (Done)       Learning Progress Summary             Patient Acceptance, E, VU by JR at 7/17/2023 0202    Acceptance, E, VU,NR by SC at 7/15/2023 0940    Acceptance, E, VU,NR by SC at 7/14/2023 0910    Acceptance, E,TB, VU by  at 7/13/2023 1301   Family Acceptance, E,TB, VU by KS at 7/17/2023 1703    Acceptance, E, VU by JR at 7/17/2023 0202                         Point: Body mechanics (Done)       Learning Progress Summary             Patient Acceptance, E, VU by JR at 7/17/2023 0202    Acceptance, E, VU,NR by SC at 7/15/2023 0940    Acceptance, E, VU,NR by SC at 7/14/2023 0910    Acceptance, E,TB, VU by  at 7/13/2023 1301   Family Acceptance, E,TB, VU by KS at 7/17/2023 1703    Acceptance, E, VU by JR at 7/17/2023 0202                         Point: Precautions (Done)       Learning Progress Summary             Patient Acceptance, E, VU by JR at 7/17/2023 0202    Acceptance, E, VU,NR by SC at 7/15/2023 0940    Acceptance, E, VU,NR by SC at 7/14/2023 0910    Acceptance, E,TB, VU by CS at 7/13/2023 1301   Family Acceptance, E,TB, VU by KS at 7/17/2023 1703    Acceptance, E, VU by  at 7/17/2023 0202                                         User Key       Initials Effective Dates Name Provider Type  Discipline    JR 05/18/22 -  Jennifer Cano, RN Registered Nurse Nurse    SC 01/24/23 -  Gabriella Forman, RN Registered Nurse Nurse    CS 05/31/23 -  Rivas Dozier, PT Physical Therapist PT    KS 06/08/23 -  Perla Kincaid, RN Registered Nurse Nurse                  PT Recommendation and Plan     Plan of Care Reviewed With: patient  Outcome Evaluation: PT treatment completed.  PROM only to bilateral LE due to pt lethargy with inability to remain awake or follow commands.  Continue PT POC to patient tolerance.       Time Calculation:    PT Charges       Row Name 07/18/23 1438             Time Calculation    PT Received On 07/18/23  -      PT Goal Re-Cert Due Date 07/27/23  -         Timed Charges    97508 - PT Therapeutic Exercise Minutes 15  -KP         Total Minutes    Timed Charges Total Minutes 15  -KP       Total Minutes 15  -KP                User Key  (r) = Recorded By, (t) = Taken By, (c) = Cosigned By      Initials Name Provider Type    KP Jessica Gr, PT Physical Therapist                  Therapy Charges for Today       Code Description Service Date Service Provider Modifiers Qty    85818949106 HC PT THER PROC EA 15 MIN 7/17/2023 Jessica Gr, PT GP 1    84143198762 HC PT THER PROC EA 15 MIN 7/18/2023 Jessica Gr, PT GP 1            PT G-Codes  AM-PAC 6 Clicks Score (PT): 6    Jessica Gr PT  7/18/2023

## 2023-07-18 NOTE — PLAN OF CARE
Goal Outcome Evaluation:      Pt has been resting this shift. Currently replacing pt's potassium. No signs and symptoms of acute distress noted. No complaints of chest pain or SOB reported.

## 2023-07-18 NOTE — PROGRESS NOTES
[facility] HOSPITALIST PROGRESS NOTE     Patient Identification:  Name:  Judy Lutz  Age:  63 y.o.  Sex:  female  :  1960  MRN:  [id number]  Visit Number:  [id number]  ROOM: [address] [place] [address]     Primary Care Provider:  Provider, No Known    Length of stay in inpatient status:  5    Subjective     Chief Compliant:    Chief Complaint   Patient presents with    Shortness of Breath       History of Presenting Illness:    Patient seen and examined this morning, and then again this afternoon with her daughter present at bedside. This morning patient would wake and open eyes, but not answer questions. After receiving lactulose enema and having moderate sized bowel movement, per daughter's report, she is now waking and making short verbal responses, but not yet back to her baseline mental status. Still not felt to be safe for PO intake due to not being consistently alert enough. Patient has complained of heartburn to her daughter and nursing staff today, and noted to be belching during exam. No other complaints noted.    Objective     Current Hospital Meds:albumin human, 25 g, Intravenous, BID  aspirin, 81 mg, Oral, Daily  ertapenem, 1,000 mg, Intravenous, Q24H  escitalopram, 10 mg, Oral, Daily  Famotidine (PF), 20 mg, Intravenous, Once  iron polysaccharides, 150 mg, Oral, Daily  iron sucrose, 300 mg, Intravenous, Q24H  magic barrier cream, 1 application , Topical, BID  metoprolol succinate XL, 25 mg, Oral, Q24H  pantoprazole, 40 mg, Intravenous, BID AC  potassium chloride, 40 mEq, Oral, Daily  rosuvastatin, 20 mg, Oral, Nightly  senna-docusate sodium, 2 tablet, Oral, BID  sodium chloride, 10 mL, Intravenous, Q12H  sodium chloride, 10 mL, Intravenous, Q12H         Current Antimicrobial Therapy:  Anti-Infectives (From admission, onward)      Ordered     Dose/Rate Route Frequency Start Stop    23 6819  gentamicin (GARAMYCIN) 540 mg in sodium chloride 0.9 % IVPB        Note to Pharmacy: Separate  Administration Time With Ampicillin and Other Penicillins (Ampicillin / Penicillins deactivate gentamicin when infused together).   Ordering Provider: King Broussard MD    5 mg/kg × 107 kg (Adjusted)  over 30 Minutes Intravenous Once 07/18/23 1400 07/18/23 1505    07/17/23 1002  ertapenem (INVanz) 1,000 mg in sodium chloride 0.9 % 100 mL IVPB-VTB        Ordering Provider: King Broussard MD    1,000 mg  200 mL/hr over 30 Minutes Intravenous Every 24 Hours 07/17/23 1100 07/24/23 1059    07/15/23 1915  meropenem (MERREM) 1,000 mg in sodium chloride 0.9 % 100 mL IVPB-VTB        Ordering Provider: Candido Rodney MD    1,000 mg  over 30 Minutes Intravenous Once 07/15/23 2015 07/15/23 2056    07/12/23 2137  cefTRIAXone (ROCEPHIN) 2,000 mg in sodium chloride 0.9 % 100 mL IVPB-VTB        Ordering Provider: Yusuf Luo DO    2,000 mg  200 mL/hr over 30 Minutes Intravenous Once 07/12/23 2153 07/12/23 2330          Current Diuretic Therapy:  Diuretics (From admission, onward)      Ordered     Dose/Rate Route Frequency Start Stop    07/14/23 0810  furosemide (LASIX) injection 60 mg        Ordering Provider: Leonard Dang DO    60 mg Intravenous Once 07/14/23 0900 07/14/23 0846    07/13/23 0054  furosemide (LASIX) injection 80 mg        Ordering Provider: Yusuf Luo DO    80 mg Intravenous Once 07/13/23 0110 07/13/23 0114          ----------------------------------------------------------------------------------------------------------------------  Vital Signs:  Temp:  [97.8 °F (36.6 °C)-98.2 °F (36.8 °C)] 98.1 °F (36.7 °C)  Heart Rate:  [] 104  Resp:  [20] 20  BP: (105-127)/(58-73) 111/61  SpO2:  [90 %-97 %] 90 %  on  Flow (L/min):  [4-5] 5;   Device (Oxygen Therapy): nasal cannula  Body mass index is 51.84 kg/m².    Wt Readings from Last 3 Encounters:   07/18/23 (!) 164 kg (361 lb 4.8 oz)     Intake & Output (last 3 days)         07/15 0701 07/16 0700 07/16 0701 07/17 0700 07/17  0701  07/18 0700 07/18 0701  07/19 0700    P.O.  100 0 0    I.V. (mL/kg) 450 (2.6) 1153.5 (7)      Other  600      IV Piggyback        Total Intake(mL/kg) 450 (2.6) 1853.5 (11.3) 0 (0) 0 (0)    Urine (mL/kg/hr) 9125 (2.2) 8150 (2.1) 1150 (0.3) 450 (0.3)    Stool 0 0      Total Output 9125 8150 1150 450    Net -8675 -6296.5 -1150 -450            Stool Unmeasured Occurrence 1 x 3 x            Diet: Regular/House Diet; Texture: Soft to Chew (NDD 3); Soft to Chew: Whole Meat; Fluid Consistency: Thin (IDDSI 0)  ----------------------------------------------------------------------------------------------------------------------  Physical exam:   Constitutional:  Well-developed and well-nourished.  No acute distress.      HENT:  Head:  Normocephalic and atraumatic.    Cardiovascular:  Mild tachycardia, regular rhythm  Pulmonary/Chest:  No respiratory distress, breath sounds clear to auscultation in anterior and lateral lung fields  Abdominal:  Soft. No distension and no tenderness.   Musculoskeletal:  No deformity.     Neurological: Opens eyes spontaneously, makes one word responses, follows simple commands.   Skin:  Skin is warm and dry. No rash noted.   Peripheral vascular:  No cyanosis. 1+ pitting edema bilateral lower extremities.  Edited by: Ginette Tarango DO at 7/18/2023 6700  ----------------------------------------------------------------------------------------------------------------------  Results from last 7 days   Lab Units 07/18/23  0052 07/17/23  1715 07/17/23  0022 07/16/23  0426 07/13/23  0516 07/13/23  0343 07/12/23  2153   CRP mg/dL 24.42*  --   --  20.87*  --  15.57*  --    LACTATE mmol/L  --   --   --  0.8  --   --  1.6   WBC 10*3/mm3 27.48*  --  19.23* 20.91*   < > 22.40* 20.79*   HEMOGLOBIN g/dL 7.7* 8.1* 7.0* 7.1*   < > 7.4* 6.4*   HEMATOCRIT % 34.4 35.1 28.4* 29.3*   < > 29.4* 27.0*   MCV fL 83.5  --  75.5* 76.5*   < > 72.6* 73.2*   MCHC g/dL 22.4*  --  24.6* 24.2*   < > 25.2* 23.7*   PLATELETS  10*3/mm3 665*  --  420 499*   < > 436 418   INR   --   --   --   --   --   --  1.38*    < > = values in this interval not displayed.     Results from last 7 days   Lab Units 07/15/23  1951   PH, ARTERIAL pH units 7.398   PO2 ART mm Hg 54.3*   PCO2, ARTERIAL mm Hg 76.7*   HCO3 ART mmol/L 47.2*     Results from last 7 days   Lab Units 07/18/23  0857 07/18/23  0052 07/17/23  1413 07/17/23  1136 07/17/23  0022 07/16/23  1830 07/16/23  0426   SODIUM mmol/L  --  148*  --   --  148*  --  149*   POTASSIUM mmol/L 3.7 3.4*  3.4*  --  2.6* 2.6*   < > 2.7*   MAGNESIUM mg/dL  --  2.3  --   --  1.7  --  2.0   CHLORIDE mmol/L  --  93*  --   --  93*  --  99   CO2 mmol/L  --  46.2*  --   --  43.8*  --  42.8*   BUN mg/dL  --  27*  --   --  18  --  20   CREATININE mg/dL  --  1.31*  --   --  0.64  --  0.76   CALCIUM mg/dL  --  10.9*  --   --  10.2  --  10.3   PHOSPHORUS mg/dL  --  5.6* 4.7*  --  1.6*  --  2.6   GLUCOSE mg/dL  --  133*  --   --  120*  --  110*   ALBUMIN g/dL  --  3.4*  --   --  3.1*  --  2.8*   BILIRUBIN mg/dL  --  0.6  --   --  0.7  --  0.5   ALK PHOS U/L  --  68  --   --  60  --  99   AST (SGOT) U/L  --  12  --   --  11  --  12   ALT (SGPT) U/L  --  10  --   --  10  --  12    < > = values in this interval not displayed.   Estimated Creatinine Clearance: 76.2 mL/min (A) (by C-G formula based on SCr of 1.31 mg/dL (H)).  Ammonia   Date Value Ref Range Status   07/18/2023 67 (H) 11 - 51 umol/L Final   07/17/2023 79 (H) 11 - 51 umol/L Final   07/16/2023 55 (H) 11 - 51 umol/L Final     Results from last 7 days   Lab Units 07/15/23  1504 07/15/23  1211 07/13/23  0014   HSTROP T ng/L 32* 29* 24*     Results from last 7 days   Lab Units 07/18/23  0052 07/13/23  0014   PROBNP pg/mL 35,507.0* 26,037.0*         No results found for: HGBA1C, POCGLU  Lab Results   Component Value Date    TSH 0.619 07/15/2023     No results found for: PREGTESTUR, PREGSERUM, HCG, HCGQUANT  Pain Management Panel          Latest Ref Rng & Units  7/14/2023   Pain Management Panel   Creatinine, Urine mg/dL 8.5      Brief Urine Lab Results  (Last result in the past 365 days)        Color   Clarity   Blood   Leuk Est   Nitrite   Protein   CREAT   Urine HCG        07/15/23 1627 Yellow   Cloudy   Small (1+)   Large (3+)   Positive   30 mg/dL (1+)                 Blood Culture   Date Value Ref Range Status   07/12/2023 No growth at 5 days  Final   07/12/2023 No growth at 5 days  Final     Urine Culture   Date Value Ref Range Status   07/15/2023 >100,000 CFU/mL Escherichia coli ESBL (A)  Final     Comment:       Consider infectious disease consult.  Susceptibility results may not correlate to clinical outcomes.   07/12/2023 >100,000 CFU/mL Escherichia coli ESBL (A)  Final     Comment:       Consider infectious disease consult.  Susceptibility results may not correlate to clinical outcomes.   07/12/2023 (A)  Final    >100,000 CFU/mL Klebsiella pneumoniae ssp pneumoniae     No results found for: WOUNDCX  No results found for: STOOLCX  No results found for: RESPCX  No results found for: AFBCX  Results from last 7 days   Lab Units 07/18/23  0052 07/16/23  0426 07/16/23  0425 07/13/23  0343 07/12/23  2153   PROCALCITONIN ng/mL 0.72*  --  0.27* 0.18 0.29*   LACTATE mmol/L  --  0.8  --   --  1.6   CRP mg/dL 24.42* 20.87*  --  15.57*  --        I have personally looked at the labs and they are summarized above.  ----------------------------------------------------------------------------------------------------------------------  Detailed radiology reports for the last 24 hours:  Imaging Results (Last 24 Hours)       ** No results found for the last 24 hours. 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 coli and Klebsiella pneumoniae  #Anemia noted on admission, suspect due to severe iron deficiency anemia  #Leukocytosis, present on admission, suspect secondary to bone marrow overproduction as a result of the severe iron deficiency anemia  #Hypernatremia due to free water deficit that was present on admission, now resolved  #History of major depressive disorder   #Acute on chronic debility that was present on admission    - Infectious Disease are following and have adjusted antibiotics to ertapenem. A one time dose of gentamicin was also given today. Appreciate assistance. Urine culture has resulted with growth of ESBL E coli sensitive to ertapenem.  - Patient has had waxing/waning lethargy during admission, but this appears to be improving today. Ammonia level was elevated at 79 yesterday. Repeat ammonia level improved to 67 after a lactulose enema. Additional lactulose enema given today since she was improving but still not able to take PO medications. She does not have a history of liver disease and liver ultrasound did not show evidence of cirrhosis, so hepatic encephalopathy is unlikely. Acute infection has been documented in literature as a cause of nonhepatic hyperammonemia and altered mental status, which may be part of her current presentation.  - Anemia is being followed by Heme/Onc, appreciate assistance. Given evidence of severe iron deficiency anemia Heme/Onc has ordered venofer 300mg IV x3 doses. Continue to monitor with daily cbc. Transfuse for hemoglobin <7 or <8 if symptomatic and platelets  <20K.  - Heme/Onc recommended evaluation for endoscopy given severe iron deficiency anemia. Surgery evaluated patient today and have deferred endoscopy for now as it is not emergent and patient has multiple other acute medical problems being treated.  - Leukocytosis felt to be secondary to stress from severe iron deficiency as well as acute infection with UTI.   - Replace potassium, magnesium, and phosphorous per protocol and repeat labs to monitor.  - Continue diuresis per Cardiology, appreciate assistance. Monitor intake/output.  - Patient has not had very much oral intake during admission, and has not been safe for oral intake at all the past 2 days. I discussed with her daughter possible NG tube placement for tube feeds if she has not improved enough for safe PO intake by tomorrow morning, but since she has improved quite a bit today it is possible she will be alert enough to eat and drink tomorrow.   - Continue protonix 40mg IV BID, will give a one time dose of famotidine 20mg IV due to complaint of heartburn today despite PPI  Edited by: Ginette Tarango DO at 7/18/2023 1711    VTE Prophylaxis:   Mechanical Order History:        Ordered        07/13/23 0225  Place Sequential Compression Device  Once            07/13/23 0225  Maintain Sequential Compression Device  Continuous                          Pharmalogical Order History:       None            Dispo: pending clinical course     Ginette Tarango DO  [facility]  07/18/23  17:12 EDT

## 2023-07-18 NOTE — PROGRESS NOTES
PROGRESS NOTE         Patient Identification:  Name:  Judy Lutz  Age:  63 y.o.  Sex:  female  :  1960  MRN:  [id number]  Visit Number:  [id number]  Primary Care Provider:  Provider, No Known         LOS: 5 days       ----------------------------------------------------------------------------------------------------------------------  Subjective       Chief Complaints:    Shortness of Breath        Interval History:      The patient seems to be more awake and alert this morning, though slightly confused.  Remains on 4 L nasal cannula with no apparent distress.  Afebrile.  No reports of diarrhea.  Daughter remains at the bedside.  No new events noted overnight.  Lung exam remains diminished to auscultation bilaterally.  Abdomen soft and rounded with normoactive bowel sounds.  Peripheral IVs in place to bilateral hands without any evidence of infection or phlebitis.  CRP is worsened at 24.42.  Procalcitonin 0.72.  Leukocytosis worsened to 27.48.    Review of Systems:    Constitutional: no fever, chills and night sweats.  Generalized fatigue.  Eyes: no eye drainage, itching or redness.  HEENT: no mouth sores, dysphagia or nose bleed.  Respiratory: no for shortness of breath, cough or production of sputum.  Cardiovascular: no chest pain, no palpitations, no orthopnea.  Gastrointestinal: no nausea, vomiting or diarrhea. No abdominal pain, hematemesis or rectal bleeding.  Genitourinary: no dysuria or polyuria.  Hematologic/lymphatic: no lymph node abnormalities, no easy bruising or easy bleeding.  Musculoskeletal: no muscle or joint pain.  Skin: No rash and no itching.  Neurological: no loss of consciousness, no seizure, no headache.  Behavioral/Psych: no depression or suicidal ideation.  Endocrine: no hot flashes.  Immunologic: negative.    ----------------------------------------------------------------------------------------------------------------------      Objective       Current  [facility] Meds:  albumin human, 25 g, Intravenous, BID  aspirin, 81 mg, Oral, Daily  ertapenem, 1,000 mg, Intravenous, Q24H  escitalopram, 10 mg, Oral, Daily  iron polysaccharides, 150 mg, Oral, Daily  iron sucrose, 300 mg, Intravenous, Q24H  magic barrier cream, 1 application , Topical, BID  metoprolol succinate XL, 25 mg, Oral, Q24H  pantoprazole, 40 mg, Intravenous, BID AC  potassium chloride, 40 mEq, Oral, Daily  rosuvastatin, 20 mg, Oral, Nightly  senna-docusate sodium, 2 tablet, Oral, BID  sodium chloride, 10 mL, Intravenous, Q12H  sodium chloride, 10 mL, Intravenous, Q12H         ----------------------------------------------------------------------------------------------------------------------    Vital Signs:  Temp:  [97.8 °F (36.6 °C)-98.1 °F (36.7 °C)] 98 °F (36.7 °C)  Heart Rate:  [] 101  Resp:  [20] 20  BP: (105-127)/(58-73) 107/58  Mean Arterial Pressure (Non-Invasive) for the past 24 hrs (Last 3 readings):   Noninvasive MAP (mmHg)   07/18/23 0630 77   07/18/23 0349 94   07/18/23 0035 72     SpO2 Percentage    07/18/23 0035 07/18/23 0349 07/18/23 0630   SpO2: 97% 97% 95%     SpO2:  [95 %-97 %] 95 %  on  Flow (L/min):  [4] 4;   Device (Oxygen Therapy): nasal cannula    Body mass index is 51.84 kg/m².  Wt Readings from Last 3 Encounters:   07/18/23 (!) 164 kg (361 lb 4.8 oz)        Intake/Output Summary (Last 24 hours) at 7/18/2023 1117  Last data filed at 7/18/2023 0900  Gross per 24 hour   Intake 0 ml   Output 1150 ml   Net -1150 ml     Diet: Regular/House Diet; Texture: Soft to Chew (NDD 3); Soft to Chew: Whole Meat; Fluid Consistency: Thin (IDDSI 0)  ----------------------------------------------------------------------------------------------------------------------      Physical Exam:    Constitutional: Chronically ill-appearing  female.  On 4 L nasal cannula.  Somewhat confused.  Daughter at the bedside.  No respiratory distress.      HENT:  Head: Normocephalic and atraumatic.   Mouth:  Moist mucous membranes.    Eyes:  Conjunctivae and EOM are normal.  No scleral icterus.  Neck:  Neck supple.  No JVD present.    Cardiovascular:  Normal rate, regular rhythm and normal heart sounds with no murmur. No edema.  Pulmonary/Chest: Lung exam is diminished to auscultation bilaterally.  On 4 L nasal cannula.  Abdominal:  Soft.  Bowel sounds are normal.  No distension and no tenderness.   Musculoskeletal:  No edema, no tenderness, and no deformity.  No swelling or redness of joints.  Neurological:  Alert and oriented to person, place, and time.  No facial droop.  No slurred speech.   Skin:  Skin is warm and dry.  No rash noted.  No pallor.  Peripheral IVs to the bilateral hands with no evidence of infection or phlebitis.  Psychiatric:  Normal mood and affect.  Behavior is normal.        ----------------------------------------------------------------------------------------------------------------------  Results from last 7 days   Lab Units 07/15/23  1504 07/15/23  1211 07/13/23  0014   HSTROP T ng/L 32* 29* 24*     Results from last 7 days   Lab Units 07/18/23  0052 07/13/23  0014   PROBNP pg/mL 35,507.0* 26,037.0*       Results from last 7 days   Lab Units 07/15/23  1951   PH, ARTERIAL pH units 7.398   PO2 ART mm Hg 54.3*   PCO2, ARTERIAL mm Hg 76.7*   HCO3 ART mmol/L 47.2*     Results from last 7 days   Lab Units 07/18/23  0052 07/17/23  1715 07/17/23  0022 07/16/23  0426 07/13/23  0516 07/13/23  0343 07/12/23  2153   CRP mg/dL 24.42*  --   --  20.87*  --  15.57*  --    LACTATE mmol/L  --   --   --  0.8  --   --  1.6   WBC 10*3/mm3 27.48*  --  19.23* 20.91*   < > 22.40* 20.79*   HEMOGLOBIN g/dL 7.7* 8.1* 7.0* 7.1*   < > 7.4* 6.4*   HEMATOCRIT % 34.4 35.1 28.4* 29.3*   < > 29.4* 27.0*   MCV fL 83.5  --  75.5* 76.5*   < > 72.6* 73.2*   MCHC g/dL 22.4*  --  24.6* 24.2*   < > 25.2* 23.7*   PLATELETS 10*3/mm3 665*  --  420 499*   < > 436 418   INR   --   --   --   --   --   --  1.38*    < > = values in  this interval not displayed.     Results from last 7 days   Lab Units 07/18/23  0857 07/18/23  0052 07/17/23  1136 07/17/23  0022 07/16/23  1830 07/16/23  0426   SODIUM mmol/L  --  148*  --  148*  --  149*   POTASSIUM mmol/L 3.7 3.4*  3.4* 2.6* 2.6*   < > 2.7*   MAGNESIUM mg/dL  --  2.3  --  1.7  --  2.0   CHLORIDE mmol/L  --  93*  --  93*  --  99   CO2 mmol/L  --  46.2*  --  43.8*  --  42.8*   BUN mg/dL  --  27*  --  18  --  20   CREATININE mg/dL  --  1.31*  --  0.64  --  0.76   CALCIUM mg/dL  --  10.9*  --  10.2  --  10.3   GLUCOSE mg/dL  --  133*  --  120*  --  110*   ALBUMIN g/dL  --  3.4*  --  3.1*  --  2.8*   BILIRUBIN mg/dL  --  0.6  --  0.7  --  0.5   ALK PHOS U/L  --  68  --  60  --  99   AST (SGOT) U/L  --  12  --  11  --  12   ALT (SGPT) U/L  --  10  --  10  --  12    < > = values in this interval not displayed.   Estimated Creatinine Clearance: 76.2 mL/min (A) (by C-G formula based on SCr of 1.31 mg/dL (H)).  Ammonia   Date Value Ref Range Status   07/18/2023 67 (H) 11 - 51 umol/L Final   07/17/2023 79 (H) 11 - 51 umol/L Final   07/16/2023 55 (H) 11 - 51 umol/L Final       No results found for: HGBA1C, POCGLU  Lab Results   Component Value Date    HGBA1C 5.80 (H) 07/13/2023     Lab Results   Component Value Date    TSH 0.619 07/15/2023       Blood Culture   Date Value Ref Range Status   07/12/2023 No growth at 5 days  Final   07/12/2023 No growth at 5 days  Final     Urine Culture   Date Value Ref Range Status   07/15/2023 >100,000 CFU/mL Escherichia coli ESBL (A)  Final     Comment:       Consider infectious disease consult.  Susceptibility results may not correlate to clinical outcomes.   07/12/2023 >100,000 CFU/mL Escherichia coli ESBL (A)  Final     Comment:       Consider infectious disease consult.  Susceptibility results may not correlate to clinical outcomes.   07/12/2023 (A)  Final    >100,000 CFU/mL Klebsiella pneumoniae ssp pneumoniae     No results found for: WOUNDCX  No results found for:  STOOLCX  No results found for: RESPCX  Pain Management Panel          Latest Ref Rng & Units 7/14/2023   Pain Management Panel   Creatinine, Urine mg/dL 8.5          ----------------------------------------------------------------------------------------------------------------------  Imaging Results (Last 24 Hours)       ** No results found for the last 24 hours. **            ----------------------------------------------------------------------------------------------------------------------    Pertinent Infectious Disease Results                Assessment/Plan       Assessment     Sepsis  ESBL UTI      Plan      The patient seems to be more awake and alert this morning, though slightly confused.  Remains on 4 L nasal cannula with no apparent distress.  Afebrile.  No reports of diarrhea.  Daughter remains at the bedside.  No new events noted overnight.  Lung exam remains diminished to auscultation bilaterally.  Abdomen soft and rounded with normoactive bowel sounds.  Peripheral IVs in place to bilateral hands without any evidence of infection or phlebitis.  CRP is worsened at 24.42.  Procalcitonin 0.72.  Leukocytosis worsened to 27.48.    In the setting of worsening leukocytosis, CRP and elevated procalcitonin we will add a one-time dose of gentamicin 5 mg/kg today and continue with ertapenem 1 g IV daily.  We will continue to monitor closely and adjust antibiotic coverage as appropriate.        ANTIMICROBIAL THERAPY    ertapenem (INVanz) 1000 mg in 100ml NS VTB     Code Status:   Code Status and Medical Interventions:   Ordered at: 07/13/23 0152     Code Status (Patient has no pulse and is not breathing):    CPR (Attempt to Resuscitate)     Medical Interventions (Patient has pulse or is breathing):    Full Support       WINDY Hinojosa  07/18/23  11:17 EDT     Electronically signed by WINDY Hinojosa, 07/18/23, 11:23 AM EDT.

## 2023-07-18 NOTE — CASE MANAGEMENT/SOCIAL WORK
Discharge Planning Assessment   [facility]     Patient Name: Judy Lutz  MRN: [id number]  Today's Date: 7/18/2023    Admit Date: 7/12/2023    Plan: SS discussed pt in MD rounds on this date that pt is not medically stable for d/c at this time. Pt's family states they would like to see how pt can work with PT when more alert. SS to follow.       Discharge Plan       Row Name 07/18/23 1649       Plan    Plan SS discussed pt in MD rounds on this date that pt is not medically stable for d/c at this time. Pt's family states they would like to see how pt can work with PT when more alert. SS to follow.          SCOTT Mar

## 2023-07-18 NOTE — PROGRESS NOTES
[facility]  PROGRESS NOTE    Patient information:  Name: Judy Lutz  Age/Sex: 63 y.o. female  :  1960        PCP: Provider, No Known  Attending: Sapphire Lanre Contreras   MRN:  [id number]  Visit Number:  [id number]    LOS:  LOS: 5 days     CODE STATUS:    Code Status and Medical Interventions:   Ordered at: 23 0152     Code Status (Patient has no pulse and is not breathing):    CPR (Attempt to Resuscitate)     Medical Interventions (Patient has pulse or is breathing):    Full Support       PROBLEM LIST:Principal Problem:    Symptomatic anemia      Reason for Cardiology follow-up:  Cardiomyopathy and pericardial effusion      Subjective   ADMISSION INFORMATION:  Chief Complaint   Patient presents with    Shortness of Breath       HPI:  Judy Lutz is a 63 y.o. female with no considerable past medical history noted in patient's chart.  Patient presented to [facility] ([facility] [place]) emergency room (ER) on 2023 with complaints of increased fatigue, malaise, shortness of breath, and generalized weakness for the past several weeks.  Cardiology was consulted for further evaluation and management of cardiomyopathy and pericardial effusion.     Interval History:   Patient is in room 320 and was examined by Dr. Jett.  Telemetry reveals SR/ST 's with occasional PVCs and a couple episodes of 5-6  aberrant conduction beats.  Please see strips attached below.  Patient did diurese excellent overnight with a -1150 mL noted on her I & O flow sheet. Sodium 148, potassium 3.4, chloride 93, CO2 46.2, BUN of 27, and creatinine has slightly increased to 1.31.  proBNP 35,507.0. HGB 7.7.  Patient is lying in bed resting quietly.  No acute distress noted at this time.  Patient does appear to be more alert and is tracking with her eyes.  However, she is only verbally responding with single words that mostly her daughter can understand.  Patient's  "daughter does report, \"she was able to read my shirt earlier and I have been able to understand her more than anyone else.  I guess it is because of her daughter\".     ORDERS: Stopped Lasix.     Vital Signs  Temp:  [97.8 °F (36.6 °C)-98.2 °F (36.8 °C)] 98.2 °F (36.8 °C)  Heart Rate:  [] 105  Resp:  [20] 20  BP: (105-127)/(58-73) 121/64  Flow (L/min):  [4] 4  Vital Signs (last 72 hrs)         07/15 0700  07/16 0659 07/16 0700  07/17 0659 07/17 0700  07/18 0659 07/18 0700  07/18 0732   Most Recent      Temp (°F) 98.1 -  99.1    97.8 -  99.3    97.8 -  98.1       98 (36.7) 07/18 0630    Heart Rate 91 -  103    90 -  107    94 -  107       101 07/18 0630    Resp 12 -  26    16 -  25      20       20 07/18 0630    /64 -  160/83    118/60 -  153/69    105/58 -  156/78       107/58 07/18 0630    SpO2 (%) 85 -  99    84 -  97    92 -  97       95 07/18 0630          Body mass index is 51.84 kg/m².    Intake/Output Summary (Last 24 hours) at 7/18/2023 1138  Last data filed at 7/18/2023 0900  Gross per 24 hour   Intake 0 ml   Output 1150 ml   Net -1150 ml       Objective     Physical Exam:      General Appearance:    Alert, cooperative, in no acute distress.  BMI 51.84.   Head:    Normocephalic, without obvious abnormality, atraumatic.   Eyes:                          Conjunctivae and sclerae normal, no icterus, no pallor, corneas clear.  Tracks well with her eyes.    Neck:   No adenopathy, supple, trachea midline, no thyromegaly, no carotid bruit, no JVD.   Lungs:     Clear to auscultation anteriorly, respirations regular, even and unlabored.    Heart:    Regular rhythm and normal rate, normal S1 and S2, no          murmur, no gallop, no rub, no click   Chest Wall:    No abnormalities observed.   Abdomen:     Normal bowel sounds, no masses, no organomegaly, soft nontender, nondistended, no guarding, no rebound tenderness.   Extremities:   Moves all extremities well, +1-2 edema, no cyanosis, no      redness. SCUDS " in use BLE.   Pulses:   Pulses palpable and equal bilaterally.   Skin:   No bleeding, bruising or rash.   Neurologic:   Alert, one-word verbal responses mostly understood by her daughter.        Results review   Results Review:   Results from last 7 days   Lab Units 07/18/23  0052 07/17/23  1715 07/17/23  0022 07/16/23  0426 07/15/23  0055 07/14/23  0029 07/13/23  1156 07/13/23  0516 07/13/23  0343   WBC 10*3/mm3 27.48*  --  19.23* 20.91* 19.00* 23.36* 21.69*  --  22.40*   HEMOGLOBIN g/dL 7.7* 8.1* 7.0* 7.1* 7.1* 7.8* 8.5*   < > 7.4*   PLATELETS 10*3/mm3 665*  --  420 499* 398 401 503*  --  436    < > = values in this interval not displayed.     Results from last 7 days   Lab Units 07/18/23  0857 07/18/23  0052 07/17/23  1136 07/17/23  0022 07/16/23  1830 07/16/23  0426 07/15/23  0055 07/14/23  1519 07/14/23  0029 07/13/23  1156 07/13/23  0343 07/12/23  2153   SODIUM mmol/L  --  148*  --  148*  --  149* 144 146* 144 149* 151* 150*   POTASSIUM mmol/L 3.7 3.4*  3.4* 2.6* 2.6* 2.6* 2.7* 3.3* 3.7 4.2 4.0 4.7 4.8   CHLORIDE mmol/L  --  93*  --  93*  --  99 102 104 109* 109* 112* 112*   CO2 mmol/L  --  46.2*  --  43.8*  --  42.8* 31.9* 32.7* 27.6 30.1* 29.2* 30.8*   BUN mg/dL  --  27*  --  18  --  20 25* 28* 29* 31* 32* 33*   CREATININE mg/dL  --  1.31*  --  0.64  --  0.76 0.77 0.83 0.90 0.95 0.96 1.04*   CALCIUM mg/dL  --  10.9*  --  10.2  --  10.3 9.8 9.6 9.6 10.5 10.2 10.4   GLUCOSE mg/dL  --  133*  --  120*  --  110* 118* 130* 138* 126* 138* 135*   ALT (SGPT) U/L  --  10  --  10  --  12  --   --   --   --  33 35*   AST (SGOT) U/L  --  12  --  11  --  12  --   --   --   --  30 36*     Results from last 7 days   Lab Units 07/15/23  1504 07/15/23  1211 07/13/23  0014 07/12/23  2153   HSTROP T ng/L 32* 29* 24* 26*     Lab Results   Component Value Date    PROBNP 35,507.0 (H) 07/18/2023    PROBNP 26,037.0 (H) 07/13/2023     No results found.  Lab Results   Component Value Date    ABSOLUTELUNG 28 07/15/2023     Lab Results    Component Value Date    INR 1.38 (H) 07/12/2023     Lab Results   Component Value Date    MG 2.3 07/18/2023    MG 1.7 07/17/2023    MG 2.0 07/16/2023     Lab Results   Component Value Date    TSH 0.619 07/15/2023      No results found for: CHOL, TRIG, HDL, LDL  Pain Management Panel          Latest Ref Rng & Units 7/14/2023   Pain Management Panel   Creatinine, Urine mg/dL 8.5      Microbiology Results (last 10 days)       Procedure Component Value - Date/Time    Urine Culture - Urine, Urine, Clean Catch [[id number]]  (Abnormal)  (Susceptibility) Collected: 07/15/23 1627    Lab Status: Final result Specimen: Urine, Clean Catch Updated: 07/17/23 1159     Urine Culture >100,000 CFU/mL Escherichia coli ESBL     Comment:   Consider infectious disease consult.  Susceptibility results may not correlate to clinical outcomes.       Narrative:      Colonization of the urinary tract without infection is common. Treatment is discouraged unless the patient is symptomatic, pregnant, or undergoing an invasive urologic procedure.  Recent outcomes data supports the use of pip/tazo in the treatment of susceptible ESBL infections for uncomplicated UTI. Consider use of pip/tazo as a carbapenem-sparing regimen in applicable patients.    Susceptibility        Escherichia coli ESBL      MANUEL      Ertapenem Susceptible      Gentamicin Susceptible      Levofloxacin Resistant      Meropenem Susceptible      Nitrofurantoin Susceptible      Piperacillin + Tazobactam Resistant      Trimethoprim + Sulfamethoxazole Resistant                           COVID-19 and FLU A/B PCR - Swab, Nasopharynx [[id number]]  (Normal) Collected: 07/13/23 0109    Lab Status: Final result Specimen: Swab from Nasopharynx Updated: 07/13/23 0138     COVID19 Not Detected     Influenza A PCR Not Detected     Influenza B PCR Not Detected    Narrative:      Fact sheet for providers: https://www.fda.gov/media/153257/download    Fact sheet for patients:  https://www.fda.gov/media/274919/download    Test performed by PCR.    Blood Culture - Blood, Arm, Left [[id number]]  (Normal) Collected: 07/12/23 2220    Lab Status: Final result Specimen: Blood from Arm, Left Updated: 07/17/23 2230     Blood Culture No growth at 5 days    Blood Culture - Blood, Arm, Left [[id number]]  (Normal) Collected: 07/12/23 2220    Lab Status: Final result Specimen: Blood from Arm, Left Updated: 07/17/23 2230     Blood Culture No growth at 5 days    Urine Culture - Urine, Straight Cath [[id number]]  (Abnormal)  (Susceptibility) Collected: 07/12/23 2140    Lab Status: Final result Specimen: Urine from Straight Cath Updated: 07/15/23 1323     Urine Culture >100,000 CFU/mL Escherichia coli ESBL     Comment:   Consider infectious disease consult.  Susceptibility results may not correlate to clinical outcomes.         >100,000 CFU/mL Klebsiella pneumoniae ssp pneumoniae    Narrative:      Colonization of the urinary tract without infection is common. Treatment is discouraged unless the patient is symptomatic, pregnant, or undergoing an invasive urologic procedure.  Recent outcomes data supports the use of pip/tazo in the treatment of susceptible ESBL infections for uncomplicated UTI. Consider use of pip/tazo as a carbapenem-sparing regimen in applicable patients.    Susceptibility        Escherichia coli ESBL      MANUEL      Ertapenem Susceptible      Gentamicin Susceptible      Levofloxacin Resistant      Meropenem Susceptible      Nitrofurantoin Susceptible      Piperacillin + Tazobactam Resistant      Trimethoprim + Sulfamethoxazole Resistant                       Susceptibility        Klebsiella pneumoniae ssp pneumoniae      MANUEL      Ampicillin Resistant      Ampicillin + Sulbactam Susceptible      Cefazolin Susceptible      Cefepime Susceptible      Ceftazidime Susceptible      Ceftriaxone Susceptible      Gentamicin Susceptible      Levofloxacin Susceptible      Nitrofurantoin Intermediate       Piperacillin + Tazobactam Susceptible      Trimethoprim + Sulfamethoxazole Susceptible                                  Imaging Results (Last 48 Hours)       ** No results found for the last 48 hours. **            ECHO:  Results for orders placed during the hospital encounter of 07/12/23    Adult Transthoracic Echo Complete W/ Cont if Necessary Per Protocol    Interpretation Summary  Images from the original result were not included.      Normal left ventricular cavity size noted. Left ventricular wall thickness is consistent with mild concentric hypertrophy. All left ventricular wall segments contract normally.    Left ventricular ejection fraction appears to be 51 - 55%.    Left ventricular diastolic function is consistent with (grade I) impaired relaxation.    There is a moderate (1-2cm) pericardial effusion adjacent to the left ventricle. There is no evidence of cardiac tamponade.    The aortic valve is structurally normal with no regurgitation or stenosis present.    The mitral valve is structurally normal with no significant stenosis present. Trace mitral valve regurgitation is present.    Mild tricuspid valve regurgitation is present. Estimated right ventricular systolic pressure from tricuspid regurgitation is moderately elevated (45-55 mmHg).    Moderate pulmonary hypertension is present.    There is a moderate (1-2cm) pericardial effusion adjacent to the left ventricle. The effusion is fluid filled. There is no evidence of cardiac tamponade.      STRESS TEST:  No results found for this or any previous visit.    HEART CATH:  No results found for this or any previous visit.    TELEMETRY:       SR/ST 's with occasional PVCs and a couple episodes of 5-6  aberrant conduction beats                                  I reviewed the patient's new clinical results.    Medication Review:   Current list of medications may not reflect those currently placed in orders that are not signed or are being held.      albumin human, 25 g, Intravenous, BID  aspirin, 81 mg, Oral, Daily  ertapenem, 1,000 mg, Intravenous, Q24H  escitalopram, 10 mg, Oral, Daily  iron polysaccharides, 150 mg, Oral, Daily  iron sucrose, 300 mg, Intravenous, Q24H  magic barrier cream, 1 application , Topical, BID  metoprolol succinate XL, 25 mg, Oral, Q24H  pantoprazole, 40 mg, Intravenous, BID AC  potassium chloride, 40 mEq, Oral, Daily  rosuvastatin, 20 mg, Oral, Nightly  senna-docusate sodium, 2 tablet, Oral, BID  sodium chloride, 10 mL, Intravenous, Q12H  sodium chloride, 10 mL, Intravenous, Q12H           acetaminophen    senna-docusate sodium **AND** polyethylene glycol **AND** bisacodyl **AND** bisacodyl    Magnesium Standard Dose Replacement - Follow Nurse / BPA Driven Protocol    ondansetron    Phosphorus Replacement - Follow Nurse / BPA Driven Protocol    Potassium Replacement - Follow Nurse / BPA Driven Protocol    prochlorperazine    simethicone    sodium chloride    sodium chloride    sodium chloride    sodium chloride    sodium chloride    Assessment      Acute decompensated heart failure (possibly HFpEF), improving, patient currently appears volume depleted.  Hypernatremia, metabolic alkalosis and prerenal azotemia indicated of intravascular volume depletion.  Moderate size pericardial effusion adjacent to the left ventricle with no echocardiographic evidence of cardiac tamponade.  Mildly elevated high-sensitivity troponin with a flat trend which is probably a type II MI from acute heart failure.  Moderate pulmonary hypertension with possibly some degree of underlying sleep apnea which needs to be evaluated later on as an outpatient  Severe anemia, being followed by hematology services.  Awaiting surgical evaluation for GI bleed.  Hypokalemia being corrected.  Significant leukocytosis probably from UTI being managed by hospitalist services and infectious disease.    Plan     Since patient appears to be volume depleted, will hold off on  the diuretics for now.  Continue to monitor her fluid status clinically and consider further therapy with IV fluids or diuretics as needed.  Consider ischemic evaluation later on when her medical status improves adequately and if there is no evidence of acute GI bleed.  Continue to monitor the pericardial effusion with periodical echo Doppler studies.  Consider referral to CT surgery if the pericardial effusion gets progressively worse.    I have discussed the patients findings and my recommendations with patient and her daughter at bedside.    Electronically signed by WINDY Roberts, 07/18/23, 11:45 AM EDT.     Electronically signed by Israel Jett MD, 07/18/23, 5:42 PM EDT.          Please note that portions of this note were completed with a voice recognition program.    Please note that portions of this note were copied and has been reviewed and is accurate as of 7/18/2023 .

## 2023-07-18 NOTE — PLAN OF CARE
Goal Outcome Evaluation:  Pt is resting in bed at this moment with no acute s/s of distress noted. VSS. IV access maintained throughout shift. Pt is more alert today and has been slightly verbal with one word responses. Pt is on 5L NC to maintain O2 sat 92% or above. No requests from pt family at this time. Will continue with plan of care.

## 2023-07-18 NOTE — CONSULTS
[facility]  CONSULT    Patient Identification:  Name:  Judy Lutz  :  1960  MRN:  [id number]   Admit Date: 2023   Referring Physician:  Provider, No Known    Subjective     Chief complaint weakness, fatigue    Subjective     Patient is a 63 y.o. female who presents with anemia.  Patient's daughter is at bedside who is providing history.  She reports that about 4 weeks ago she began having weakness, shortness of breath and fatigue.  She reports that she has possibly been anemic in the past and had iron issues.  She reports that years ago she had a blood transfusion that was related to delivery during childbirth.  Daughter reports no dark tarry stools.  However, nurse at bedside states that she disimpacted patient and noticed dark stools yesterday.  Patient's daughter reports that she likely takes Motrin and naproxen very frequently due to knee pain.        ---------------------------------------------------------------------------------------------------------------------   Review of Systems:    Review of Systems unable to obtain due to mental status    ---------------------------------------------------------------------------------------------------------------------   History  Past Medical History:   Diagnosis Date    Symptomatic anemia 2023     History reviewed. No pertinent surgical history.  History reviewed. No pertinent family history.  Social History     Tobacco Use    Smoking status: Never    Smokeless tobacco: Never   Vaping Use    Vaping Use: Never used   Substance Use Topics    Alcohol use: Never    Drug use: Never     Medications Prior to Admission   Medication Sig Dispense Refill Last Dose    acetaminophen (TYLENOL) 325 MG tablet Take 2 tablets by mouth Every 6 (Six) Hours As Needed for Mild Pain or Headache.   Past Week    ibuprofen (ADVIL,MOTRIN) 200 MG tablet Take 1 tablet by mouth Every 6 (Six) Hours As Needed for Mild Pain or Headache.   Past Week      Allergies:  Codeine    Objective     Objective     Vital Signs:  Temp:  [97.8 °F (36.6 °C)-98.2 °F (36.8 °C)] 98.2 °F (36.8 °C)  Heart Rate:  [] 105  Resp:  [20] 20  BP: (105-127)/(58-73) 121/64      07/17/23  0400 07/17/23  0530 07/18/23  0500   Weight: (!) 167 kg (368 lb 4.8 oz) (!) 164 kg (361 lb 9.6 oz) (!) 164 kg (361 lb 4.8 oz)     Body mass index is 51.84 kg/m².  ---------------------------------------------------------------------------------------------------------------------       Physical Exam  Physical Exam  Constitutional:       Appearance: She is obese. She is ill-appearing.   HENT:      Head: Normocephalic and atraumatic.      Right Ear: External ear normal.      Left Ear: External ear normal.   Eyes:      Conjunctiva/sclera: Conjunctivae normal.   Cardiovascular:      Rate and Rhythm: Normal rate and regular rhythm.      Pulses: Normal pulses.      Heart sounds: Normal heart sounds.   Pulmonary:      Effort: Pulmonary effort is normal.      Breath sounds: Normal breath sounds.   Abdominal:      General: Abdomen is flat. Bowel sounds are normal. There is no distension.      Palpations: Abdomen is soft.      Tenderness: There is no abdominal tenderness.   Musculoskeletal:         General: Normal range of motion.      Cervical back: Normal range of motion and neck supple.   Skin:     General: Skin is warm and dry.      Capillary Refill: Capillary refill takes 2 to 3 seconds.      Coloration: Skin is pale.   Neurological:      Mental Status: She is disoriented.   ---------------------------------------------------------------------------------------------------------------------   Results Review:   Results from last 7 days   Lab Units 07/15/23  1504 07/15/23  1211 07/13/23  0014   HSTROP T ng/L 32* 29* 24*     Results from last 7 days   Lab Units 07/18/23  0052 07/17/23  1715 07/17/23  0022 07/16/23  0426 07/13/23  0516 07/13/23  0343 07/12/23  2153   CRP mg/dL 24.42*  --   --  20.87*  --   15.57*  --    LACTATE mmol/L  --   --   --  0.8  --   --  1.6   WBC 10*3/mm3 27.48*  --  19.23* 20.91*   < > 22.40* 20.79*   HEMOGLOBIN g/dL 7.7* 8.1* 7.0* 7.1*   < > 7.4* 6.4*   HEMATOCRIT % 34.4 35.1 28.4* 29.3*   < > 29.4* 27.0*   PLATELETS 10*3/mm3 665*  --  420 499*   < > 436 418   INR   --   --   --   --   --   --  1.38*    < > = values in this interval not displayed.     Results from last 7 days   Lab Units 07/15/23  1951   PH, ARTERIAL pH units 7.398   PO2 ART mm Hg 54.3*   PCO2, ARTERIAL mm Hg 76.7*   HCO3 ART mmol/L 47.2*     Results from last 7 days   Lab Units 07/18/23  0857 07/18/23  0052 07/17/23  1136 07/17/23  0022 07/16/23  1830 07/16/23  0426   SODIUM mmol/L  --  148*  --  148*  --  149*   POTASSIUM mmol/L 3.7 3.4*  3.4* 2.6* 2.6*   < > 2.7*   MAGNESIUM mg/dL  --  2.3  --  1.7  --  2.0   CHLORIDE mmol/L  --  93*  --  93*  --  99   CO2 mmol/L  --  46.2*  --  43.8*  --  42.8*   BUN mg/dL  --  27*  --  18  --  20   CREATININE mg/dL  --  1.31*  --  0.64  --  0.76   CALCIUM mg/dL  --  10.9*  --  10.2  --  10.3   GLUCOSE mg/dL  --  133*  --  120*  --  110*   ALBUMIN g/dL  --  3.4*  --  3.1*  --  2.8*   BILIRUBIN mg/dL  --  0.6  --  0.7  --  0.5   ALK PHOS U/L  --  68  --  60  --  99   AST (SGOT) U/L  --  12  --  11  --  12   ALT (SGPT) U/L  --  10  --  10  --  12    < > = values in this interval not displayed.   Estimated Creatinine Clearance: 76.2 mL/min (A) (by C-G formula based on SCr of 1.31 mg/dL (H)).  Ammonia   Date Value Ref Range Status   07/18/2023 67 (H) 11 - 51 umol/L Final   07/17/2023 79 (H) 11 - 51 umol/L Final   07/16/2023 55 (H) 11 - 51 umol/L Final         Blood Culture   Date Value Ref Range Status   07/12/2023 No growth at 5 days  Final   07/12/2023 No growth at 5 days  Final     Urine Culture   Date Value Ref Range Status   07/15/2023 >100,000 CFU/mL Escherichia coli ESBL (A)  Final     Comment:       Consider infectious disease consult.  Susceptibility results may not correlate to  clinical outcomes.   07/12/2023 >100,000 CFU/mL Escherichia coli ESBL (A)  Final     Comment:       Consider infectious disease consult.  Susceptibility results may not correlate to clinical outcomes.   07/12/2023 (A)  Final    >100,000 CFU/mL Klebsiella pneumoniae ssp pneumoniae     No results found for: WOUNDCX  No results found for: STOOLCX    ---------------------------------------------------------------------------------------------------------------------  Assessment / Plan     Impression: 63-year-old female with anemia  Patient Active Problem List   Diagnosis Code    Symptomatic anemia D64.9     Plan:  Defer EGD at this time.  Patient will need to clinically stabilize or require multiple transfusions that would indicate active bleeding.  Patient has chronic medical conditions including pulmonary hypertension, ascites, heart failure, requiring supplemental oxygen.  At this time, we feel that this would be unsafe for patient to undergo anesthesia.      Discussion:  I discussed this case with Dr. Aguayo.        Wyalon Tse, WINDY  07/18/23  12:32 EDT  ---------------------------------------------------------------------------------------------------------------------     Please note that portions of this note were completed with a voice recognition program.

## 2023-07-19 NOTE — PROGRESS NOTES
[facility]  PROGRESS NOTE    Patient information:  Name: Judy Lutz  Age/Sex: 63 y.o. female  :  1960        PCP: Provider, No Known  Attending: Sapphire Lanre Contreras   MRN:  [id number]  Visit Number:  [id number]    LOS:  LOS: 6 days     CODE STATUS:    Code Status and Medical Interventions:   Ordered at: 23 0152     Code Status (Patient has no pulse and is not breathing):    CPR (Attempt to Resuscitate)     Medical Interventions (Patient has pulse or is breathing):    Full Support       PROBLEM LIST:Principal Problem:    Symptomatic anemia      Reason for Cardiology follow-up: Cardiomyopathy and pericardial effusion       Subjective   ADMISSION INFORMATION:  Chief Complaint   Patient presents with    Shortness of Breath       HPI:  Judy Lutz is a 63 y.o. female with no considerable past medical history noted in patient's chart.  Patient presented to [facility] ([facility] emergency room (ER) on 2023 with complaints of increased fatigue, malaise, shortness of breath, and generalized weakness for the past several weeks.  Cardiology was consulted for further evaluation and management of cardiomyopathy and pericardial effusion.      No primary Cardiologist noted in her chart.     Interval History:   Patient been moved to CCU and is in room CCU for and was examined by Dr. Jett.  Telemetry reveals ST 100s with occasional PVCs.  No episodes of aberrant beats noted in the last 24 hours.  Patient did diurese 850 mL per her I & O flow sheet overnight.  Sodium is 147, potassium 3.6, chloride is 94, CO2 is 43.2, BUN is 42, and creatinine 2.08 which is increased from 1.31.  Calcium level is 11.1, magnesium is 2.5, and phosphorus level was elevated on 2023 at 5.6.  Ammonia level was 55 which is improved from 79 on 2023.  WBC is 27.03, hemoglobin is 7.2, hematocrit is 30.9, and platelet count of 592.  Patient is lying in bed  resting quietly.  Earlier this a.m. patient had episode of her oxygen dropping to 80 percents and during this time she had episode of V. tach on telemetry.  Patient was stabilized and transferred to CCU.  Patient was on BiPAP upon evaluation.  Daughter was at bedside.  Patient was not responsive during her exam.    Vital Signs  Temp:  [97.3 °F (36.3 °C)-99.6 °F (37.6 °C)] 97.3 °F (36.3 °C)  Heart Rate:  [] 95  Resp:  [19-26] 21  BP: ()/(53-71) 115/62  Flow (L/min):  [5.5] 5.5  Vital Signs (last 72 hrs)         07/16 0700  07/17 0659 07/17 0700 07/18 0659 07/18 0700  07/19 0659 07/19 0700  07/19 0749   Most Recent      Temp (°F) 97.8 -  99.3    97.8 -  98.1    98.1 -  99.6       98.1 (36.7) 07/19 0428    Heart Rate 90 -  107    94 -  107    104 -  109       107 07/19 0428    Resp 16 -  25      20      20       20 07/19 0428    /60 -  153/69    105/58 -  156/78    104/60 -  121/64       104/60 07/19 0428    SpO2 (%) 84 -  97    92 -  97    90 -  92       90 07/19 0428          Body mass index is 53.38 kg/m².    Intake/Output Summary (Last 24 hours) at 7/19/2023 1314  Last data filed at 7/19/2023 0811  Gross per 24 hour   Intake 0 ml   Output 850 ml   Net -850 ml       Objective     I have seen and examined Ms. Lutz today.  Physical Exam:      General Appearance:  Critically ill looking female who is on BiPAP and is in mild to moderate respiratory distress and lethargic.    Head:    Normocephalic, without obvious abnormality, atraumatic.   Eyes:                          Conjunctivae and sclerae normal, no icterus, no pallor, corneas clear.   Neck:   No adenopathy, supple, trachea midline, no thyromegaly, no carotid bruit, no JVD.   Lungs:   BiPAP in use and lungs were decreased to auscultation, respirations regular, with tachypnea and using some accessory muscles of respiration.  Has bilateral scattered rales    Heart:    Regular rhythm and tachycardic rate, normal S1 and S2, no  murmur, no  gallop, no rub, no click   Chest Wall:    No abnormalities observed.   Abdomen:     Normal bowel sounds, no masses, no organomegaly, soft nontender, nondistended, no guarding, no rebound tenderness.   Extremities:    +1-2 edema, no cyanosis, no redness. SCUDS in use.    Pulses:   Pulses palpable and equal bilaterally.   Skin:   No bleeding, bruising or rash.   Neurologic: Lethargic.       Results review   Results Review:   Results from last 7 days   Lab Units 07/19/23  0207 07/18/23  0052 07/17/23  1715 07/17/23  0022 07/16/23  0426 07/15/23  0055 07/14/23  0029 07/13/23  1156   WBC 10*3/mm3 27.03* 27.48*  --  19.23* 20.91* 19.00* 23.36* 21.69*   HEMOGLOBIN g/dL 7.2* 7.7* 8.1* 7.0* 7.1* 7.1* 7.8* 8.5*   PLATELETS 10*3/mm3 592* 665*  --  420 499* 398 401 503*     Results from last 7 days   Lab Units 07/19/23  0207 07/18/23  0857 07/18/23  0052 07/17/23  1136 07/17/23  0022 07/16/23  1830 07/16/23  0426 07/15/23  0055 07/14/23  1519 07/14/23  0029 07/13/23  1156 07/13/23  0343 07/12/23  2153   SODIUM mmol/L 147*  --  148*  --  148*  --  149* 144 146* 144   < > 151* 150*   POTASSIUM mmol/L 3.6 3.7 3.4*  3.4* 2.6* 2.6* 2.6* 2.7* 3.3* 3.7 4.2   < > 4.7 4.8   CHLORIDE mmol/L 94*  --  93*  --  93*  --  99 102 104 109*   < > 112* 112*   CO2 mmol/L 43.2*  --  46.2*  --  43.8*  --  42.8* 31.9* 32.7* 27.6   < > 29.2* 30.8*   BUN mg/dL 42*  --  27*  --  18  --  20 25* 28* 29*   < > 32* 33*   CREATININE mg/dL 2.08*  --  1.31*  --  0.64  --  0.76 0.77 0.83 0.90   < > 0.96 1.04*   CALCIUM mg/dL 11.1*  --  10.9*  --  10.2  --  10.3 9.8 9.6 9.6   < > 10.2 10.4   GLUCOSE mg/dL 121*  --  133*  --  120*  --  110* 118* 130* 138*   < > 138* 135*   ALT (SGPT) U/L 8  --  10  --  10  --  12  --   --   --   --  33 35*   AST (SGOT) U/L 15  --  12  --  11  --  12  --   --   --   --  30 36*    < > = values in this interval not displayed.     Results from last 7 days   Lab Units 07/15/23  1504 07/15/23  1211 07/13/23  0014 07/12/23  2150    HSTROP T ng/L 32* 29* 24* 26*     Lab Results   Component Value Date    PROBNP 35,507.0 (H) 07/18/2023    PROBNP 26,037.0 (H) 07/13/2023     No results found.  Lab Results   Component Value Date    ABSOLUTELUNG 28 07/15/2023     Lab Results   Component Value Date    INR 1.38 (H) 07/12/2023     Lab Results   Component Value Date    MG 2.5 (H) 07/19/2023    MG 2.3 07/18/2023    MG 1.7 07/17/2023     Lab Results   Component Value Date    TSH 0.619 07/15/2023      No results found for: CHOL, TRIG, HDL, LDL  Pain Management Panel          Latest Ref Rng & Units 7/14/2023   Pain Management Panel   Creatinine, Urine mg/dL 8.5      Microbiology Results (last 10 days)       Procedure Component Value - Date/Time    Urine Culture - Urine, Urine, Clean Catch [[id number]]  (Abnormal)  (Susceptibility) Collected: 07/15/23 1627    Lab Status: Final result Specimen: Urine, Clean Catch Updated: 07/17/23 1159     Urine Culture >100,000 CFU/mL Escherichia coli ESBL     Comment:   Consider infectious disease consult.  Susceptibility results may not correlate to clinical outcomes.       Narrative:      Colonization of the urinary tract without infection is common. Treatment is discouraged unless the patient is symptomatic, pregnant, or undergoing an invasive urologic procedure.  Recent outcomes data supports the use of pip/tazo in the treatment of susceptible ESBL infections for uncomplicated UTI. Consider use of pip/tazo as a carbapenem-sparing regimen in applicable patients.    Susceptibility        Escherichia coli ESBL      MANUEL      Ertapenem Susceptible      Gentamicin Susceptible      Levofloxacin Resistant      Meropenem Susceptible      Nitrofurantoin Susceptible      Piperacillin + Tazobactam Resistant      Trimethoprim + Sulfamethoxazole Resistant                           COVID-19 and FLU A/B PCR - Swab, Nasopharynx [[id number]]  (Normal) Collected: 07/13/23 0109    Lab Status: Final result Specimen: Swab from Nasopharynx  Updated: 07/13/23 0138     COVID19 Not Detected     Influenza A PCR Not Detected     Influenza B PCR Not Detected    Narrative:      Fact sheet for providers: https://www.fda.gov/media/108319/download    Fact sheet for patients: https://www.fda.gov/media/494826/download    Test performed by PCR.    Blood Culture - Blood, Arm, Left [[id number]]  (Normal) Collected: 07/12/23 2220    Lab Status: Final result Specimen: Blood from Arm, Left Updated: 07/17/23 2230     Blood Culture No growth at 5 days    Blood Culture - Blood, Arm, Left [[id number]]  (Normal) Collected: 07/12/23 2220    Lab Status: Final result Specimen: Blood from Arm, Left Updated: 07/17/23 2230     Blood Culture No growth at 5 days    Urine Culture - Urine, Straight Cath [[id number]]  (Abnormal)  (Susceptibility) Collected: 07/12/23 2140    Lab Status: Final result Specimen: Urine from Straight Cath Updated: 07/15/23 1323     Urine Culture >100,000 CFU/mL Escherichia coli ESBL     Comment:   Consider infectious disease consult.  Susceptibility results may not correlate to clinical outcomes.         >100,000 CFU/mL Klebsiella pneumoniae ssp pneumoniae    Narrative:      Colonization of the urinary tract without infection is common. Treatment is discouraged unless the patient is symptomatic, pregnant, or undergoing an invasive urologic procedure.  Recent outcomes data supports the use of pip/tazo in the treatment of susceptible ESBL infections for uncomplicated UTI. Consider use of pip/tazo as a carbapenem-sparing regimen in applicable patients.    Susceptibility        Escherichia coli ESBL      MANUEL      Ertapenem Susceptible      Gentamicin Susceptible      Levofloxacin Resistant      Meropenem Susceptible      Nitrofurantoin Susceptible      Piperacillin + Tazobactam Resistant      Trimethoprim + Sulfamethoxazole Resistant                       Susceptibility        Klebsiella pneumoniae ssp pneumoniae      MANUEL      Ampicillin Resistant      Ampicillin +  Sulbactam Susceptible      Cefazolin Susceptible      Cefepime Susceptible      Ceftazidime Susceptible      Ceftriaxone Susceptible      Gentamicin Susceptible      Levofloxacin Susceptible      Nitrofurantoin Intermediate      Piperacillin + Tazobactam Susceptible      Trimethoprim + Sulfamethoxazole Susceptible                                  Imaging Results (Last 48 Hours)       Procedure Component Value Units Date/Time    CT Chest Without Contrast Diagnostic [[id number]] Collected: 07/19/23 1018     Updated: 07/19/23 1022    Narrative:      EXAM:    CT Chest Without Intravenous Contrast     EXAM DATE:    7/19/2023 9:45 AM     CLINICAL HISTORY:    worsening oxygen requirement, concern for fluid overload vs  development of aspiration pneumonia; D64.9-Anemia, unspecified;  A41.9-Sepsis, unspecified organism; R65.20-Severe sepsis without septic  shock; J96.01-Acute respiratory failure with hypoxia; N39.0-Urinary  tract infection, site not specified; I50.31-Acute diastolic (congestive)  heart failure     TECHNIQUE:    Axial computed tomography images of the chest without intravenous  contrast.  Sagittal and coronal reformatted images were created and  reviewed.  This CT exam was performed using one or more of the following  dose reduction techniques:  automated exposure control, adjustment of  the mA and/or kV according to patient size, and/or use of iterative  reconstruction technique.     COMPARISON:    07/12/2023     FINDINGS:    Lungs and pleural spaces:  Development of bilateral consolidative  airspace disease more pronounced in the upper lobes but also present in  the lower lobes consistent with pneumonia.  Miniscule pleural effusions  which are nonloculated.  Interstitial thickening has developed  compatible with edema.    Heart:  Very marked cardiac enlargement is stable from previous.  No  significant pericardial effusion.  No significant coronary artery  calcifications.    Mediastinum:  Hiatal hernia,  stable.    Bones/joints:  Unremarkable.  No acute fracture.  No dislocation.    Soft tissues:  Diffuse anasarca has slightly improved.    Vasculature:  Pulmonary venous hypertension is noted.  No thoracic  aortic aneurysm.    Lymph nodes:  Reactive and/or congested lymph nodes in the mediastinum  and hilar stations but no bulky adenopathy identified.    Liver:  Liver cirrhosis.    Stomach and bowel:  Gastroesophageal reflux.    Intraperitoneal space:  Upper abdominal ascites which appears stable.       Impression:      1.  Development of bilateral multilobar partially consolidative  pneumonia. Combination atelectasis may be considered.  2.  Slight increase in changes of CHF now with interstitial edema.  Miniscule nonloculated pleural effusions with stable marked cardiac  enlargement.  3.  Questionable decrease in the degree of anasarca with persistent  upper abdominal ascites noted.  4.  Other incidental/nonacute findings as above.     This report was finalized on 7/19/2023 10:20 AM by Dr. Kvng Tijerina MD.       CT Head Without Contrast [[id number]] Collected: 07/19/23 1016     Updated: 07/19/23 1020    Narrative:      EXAM:    CT Head Without Intravenous Contrast     EXAM DATE:    7/19/2023 9:41 AM     CLINICAL HISTORY:    Mental status change, unknown cause; D64.9-Anemia, unspecified;  A41.9-Sepsis, unspecified organism; R65.20-Severe sepsis without septic  shock; J96.01-Acute respiratory failure with hypoxia; N39.0-Urinary  tract infection, site not specified; I50.31-Acute diastolic (congestive)  heart failure     TECHNIQUE:    Axial computed tomography images of the head/brain without intravenous  contrast.  Sagittal and coronal reformatted images were created and  reviewed.  This CT exam was performed using one or more of the following  dose reduction techniques:  automated exposure control, adjustment of  the mA and/or kV according to patient size, and/or use of iterative  reconstruction technique.      COMPARISON:    No relevant prior studies available.     FINDINGS:    Brain and extra-axial spaces:  Unremarkable.  No hemorrhage.  No  significant white matter disease.  No edema.  No ventriculomegaly.    Bones/joints:  Unremarkable.  No acute fracture.    Soft tissues:  Unremarkable.    Sinuses:  Unremarkable as visualized.  No acute sinusitis.    Mastoid air cells:  Unremarkable as visualized.  No mastoid effusion.       Impression:        Unremarkable exam demonstrating no CT evidence of acute intracranial  findings.     This report was finalized on 7/19/2023 10:17 AM by Dr. Kvng Tijerina MD.               ECHO:  Results for orders placed during the hospital encounter of 07/12/23    Adult Transthoracic Echo Complete W/ Cont if Necessary Per Protocol    Interpretation Summary  Images from the original result were not included.      Normal left ventricular cavity size noted. Left ventricular wall thickness is consistent with mild concentric hypertrophy. All left ventricular wall segments contract normally.    Left ventricular ejection fraction appears to be 51 - 55%.    Left ventricular diastolic function is consistent with (grade I) impaired relaxation.    There is a moderate (1-2cm) pericardial effusion adjacent to the left ventricle. There is no evidence of cardiac tamponade.    The aortic valve is structurally normal with no regurgitation or stenosis present.    The mitral valve is structurally normal with no significant stenosis present. Trace mitral valve regurgitation is present.    Mild tricuspid valve regurgitation is present. Estimated right ventricular systolic pressure from tricuspid regurgitation is moderately elevated (45-55 mmHg).    Moderate pulmonary hypertension is present.    There is a moderate (1-2cm) pericardial effusion adjacent to the left ventricle. The effusion is fluid filled. There is no evidence of cardiac tamponade.      STRESS TEST:  No results found for this or any previous  visit.      HEART CATH:  No results found for this or any previous visit.      TELEMETRY:      's with occasional PVCs          I reviewed the patient's new clinical results.    Medication Review:   Current list of medications may not reflect those currently placed in orders that are not signed or are being held.     albumin human, 25 g, Intravenous, BID  aspirin, 81 mg, Oral, Daily  ertapenem, 1,000 mg, Intravenous, Q24H  escitalopram, 10 mg, Oral, Daily  heparin (porcine), 5,000 Units, Subcutaneous, Q8H  iron polysaccharides, 150 mg, Oral, Daily  iron sucrose, 300 mg, Intravenous, Q24H  magic barrier cream, 1 application , Topical, BID  metoprolol succinate XL, 25 mg, Oral, Q24H  pantoprazole, 40 mg, Intravenous, BID AC  potassium chloride, 40 mEq, Oral, Daily  rosuvastatin, 20 mg, Oral, Nightly  senna-docusate sodium, 2 tablet, Oral, BID  sodium chloride, 10 mL, Intravenous, Q12H  sodium chloride, 10 mL, Intravenous, Q12H           acetaminophen    acetaminophen    senna-docusate sodium **AND** polyethylene glycol **AND** bisacodyl **AND** bisacodyl    Magnesium Standard Dose Replacement - Follow Nurse / BPA Driven Protocol    ondansetron    Phosphorus Replacement - Follow Nurse / BPA Driven Protocol    Potassium Replacement - Follow Nurse / BPA Driven Protocol    prochlorperazine    simethicone    sodium chloride    sodium chloride    sodium chloride    sodium chloride    sodium chloride    Assessment      Acute hypercapnic, hypoxic respiratory failure with multifactorial etiology including bilateral pneumonia, obesity hypoventilation and element of heart failure.  Acute HFpEF at admission that seems to have improved as patient currently is hyponatremic, with a metabolic alkalosis and a prerenal azotemia with progressive worsening of kidney function over the last 48 hours indicating of intravascular volume depletion.  Moderate-sized pericardial effusion noted at admission with no echocardiographic evidence  of cardiac tamponade.  Mildly elevated high-sensitivity troponin which peaked at 32 so far (possible type II MI from respiratory failure).  Significant leukocytosis possibly due to pneumonia and physiological stress.  Moderate pulmonary hypertension, multifactorial including possibly some underlying undiagnosed sleep apnea.  Severe anemia with a hemoglobin of 7.2, being followed by hematological services and awaiting endoscopic evaluation for any occult GI bleed.  Hypokalemia, improved.      Plan     From cardiac standpoint, with regards to heart failure, patient appears to be volume depleted at this time given her hyponatremia, metabolic alkalosis and progressive worsening of renal function (prerenal azotemia).  Hence would continue to hold diuretics.  We will reevaluate the pericardial effusion in a.m. with a limited study.  Continue with low-dose aspirin, metoprolol and rosuvastatin for suspected CAD.    I have discussed the patients findings and my recommendations with patient's nurse at bedside.    Electronically signed by WINDY Roberts, 07/19/23, 1:15 PM EDT.     Electronically signed by Israel Jett MD, 07/19/23, 5:40 PM EDT.        Please note that portions of this note were completed with a voice recognition program.    Please note that portions of this note were copied and has been reviewed and is accurate as of 7/19/2023 .

## 2023-07-19 NOTE — PROGRESS NOTES
Date:  07/19/23    CC: Generalized Weakness, Fatigue    Subjective:  Ms. Lutz remains resting in her hospital bed this morning. Her daughter is at bedside and provides the history. Patient remains lethargic and with altered mental status. Her daughter states that she feels that she has had some increasing periods of alertness but at time of examination this is not evident. General surgery saw patient yesterday for possible endoscopy/colonoscopy but states that patient needs to be more alert to drink prep and will try to perform both procedures at one time due to risk of anesthesia secondary to her health conditions. The daughter is without any specific complaints today.    Objective:  Medications:  Scheduled Meds:albumin human, 25 g, Intravenous, BID  aspirin, 81 mg, Oral, Daily  ertapenem, 1,000 mg, Intravenous, Q24H  escitalopram, 10 mg, Oral, Daily  iron polysaccharides, 150 mg, Oral, Daily  iron sucrose, 300 mg, Intravenous, Q24H  magic barrier cream, 1 application , Topical, BID  metoprolol succinate XL, 25 mg, Oral, Q24H  pantoprazole, 40 mg, Intravenous, BID AC  potassium chloride, 40 mEq, Oral, Daily  rosuvastatin, 20 mg, Oral, Nightly  senna-docusate sodium, 2 tablet, Oral, BID  sodium chloride, 10 mL, Intravenous, Q12H  sodium chloride, 10 mL, Intravenous, Q12H    Continuous Infusions:   PRN Meds:.  acetaminophen    acetaminophen    senna-docusate sodium **AND** polyethylene glycol **AND** bisacodyl **AND** bisacodyl    Magnesium Standard Dose Replacement - Follow Nurse / BPA Driven Protocol    ondansetron    Phosphorus Replacement - Follow Nurse / BPA Driven Protocol    Potassium Replacement - Follow Nurse / BPA Driven Protocol    prochlorperazine    simethicone    sodium chloride    sodium chloride    sodium chloride    sodium chloride    sodium chloride    Physical Exam:  Vital Signs     Patient Vitals for the past 24 hrs:   BP Temp Temp src Pulse Resp SpO2 Weight   07/19/23 0811 113/62 99.4 °F  (37.4 °C) Axillary 108 20 97 % --   07/19/23 0500 -- -- -- -- -- -- (!) 163 kg (360 lb 3.2 oz)   07/19/23 0428 104/60 98.1 °F (36.7 °C) Axillary 107 20 90 % --   07/19/23 0004 113/67 98.9 °F (37.2 °C) Axillary 109 20 90 % --   07/18/23 2021 120/71 99.6 °F (37.6 °C) Axillary 105 20 90 % --   07/18/23 1449 111/61 98.1 °F (36.7 °C) Axillary 104 20 90 % --   07/18/23 1112 121/64 98.2 °F (36.8 °C) Axillary 105 20 92 % --     General: Morbidly obese, ill-appearing. Resting in bed with eyes closed, opens eyes to verbal stimuli. Unable to answer questions.   HEENT:  Pupils are equal, round and reactive to light and accommodation  Neck:  No JVD, thyromegaly or lymphadenopathy.  CV:  Tachycardic, regular rhythm, no murmurs, rubs or gallops.  Resp:  Lungs are diminished to auscultation bilaterally.  Abd: Abdomen round, soft, non-tender. Mild-moderate ascites.  Ext:  No clubbing, cyanosis. 1-2+ edema to bilateral lower extremities.  Neuro: Opens eyes to verbal stimuli, unable to answer questions.     Labs / Studies:    Lab Results   Component Value Date    WBC 27.03 (H) 07/19/2023    HGB 7.2 (L) 07/19/2023    HCT 30.9 (L) 07/19/2023    MCV 83.7 07/19/2023    RDW 25.5 (H) 07/19/2023     (H) 07/19/2023    NEUTRORELPCT 90.3 (H) 07/18/2023    LYMPHORELPCT 1.4 (L) 07/18/2023    MONORELPCT 6.1 07/18/2023    EOSRELPCT 0.2 (L) 07/18/2023    BASORELPCT 0.2 07/18/2023    NEUTROABS 24.83 (H) 07/18/2023    LYMPHSABS 0.38 (L) 07/18/2023     Lab Results   Component Value Date     (H) 07/19/2023    K 3.6 07/19/2023    CO2 43.2 (H) 07/19/2023    CL 94 (L) 07/19/2023    BUN 42 (H) 07/19/2023    CREATININE 2.08 (H) 07/19/2023    GLUCOSE 121 (H) 07/19/2023    CALCIUM 11.1 (H) 07/19/2023    ALKPHOS 59 07/19/2023    AST 15 07/19/2023    ALT 8 07/19/2023    BILITOT 0.6 07/19/2023    ALBUMIN 3.6 07/19/2023    PROTEINTOT 6.2 07/19/2023    MG 2.5 (H) 07/19/2023    PHOS 5.6 (H) 07/18/2023     Imaging Results (Last 72 Hours)       ** No  "results found for the last 72 hours. **            Assessment & Plan:  Judy Lutz is a 63 y.o. year-old female with     1. Microcytic Anemia  2. Iron Deficiency Anemia  3. Leukocytosis  4. Thrombocytosis  - CBCs from admission were reviewed. Patient with ongoing microcytic anemia.  - Iron profile is with iron 12, iron saturation 3%, with TIBC 359. Ferritin is also low at 25.   - Vitamin B12 and folate are replete.  - Peripheral smear shows severe microcytic anemia with moderate hypochromasia and moderate anisopoikilocytosis with rare schistocytes noted. These findings are all often seen with severe iron deficiency. Anisopoikilocytosis means (RBCs of) varied shape and size, elliptocytes are one of these abnormal shapes (an elongated \"pencil cell\" shaped one), hypochromic (caused by the lack of iron) have the appearance of target cells, and schistocytes, in her situation, are not due to hemolysis, but rather the fragility of the iron deficient RBCs.   - She has been started on Niferex 150 mg PO daily.  - She was also given Ferric Gluconate 250 mg IV x 3 doses.   - Also ordered for Venofer 300 mg IV x 3 doses. She should complete this today.   - CBC today with ongoing microcytic anemia with Hg 7.2, Hct 30.9. WBC remains elevated (27.03) with elevated platelet count (592K).   - General surgery evaluated patient for possible endoscopy/colonoscopy but feel that it is best to defer this as patient in her current altered state is not able to participate in oral bowel preparation, as well as would prefer to do both procedures at the same time due to increased risk of anesthesia secondary to multiple health conditions.  - Will order repeat reticulocyte count in AM.   - Leukocytosis with predominant neutrophilia is likely multifactorial and secondary to stress (caused by severe iron deficiency) as well as ongoing infection (UTI/sepsis).   - Thrombocytosis likely reactive and secondary to iron deficiency. Will " monitor.  - Continue to recommend transfusional support for Hg < 7 or < 8 if symptomatic and platelets <20K.      Thank you for taking such good care of Ms. Lutz.  We will continue to follow with you. Please don't hesitate to call with questions or concerns.       Leighann Parkinson, WINDY  07/19/23  08:58 EDT    A total of 35 minutes were spent helping to coordinate this patient’s care today; ~20 minutes of this time were spent face-to-face with the patient in her hospital room this morning, reviewing her interim medical history and counseling on the current treatment and follow up plans. All questions were answered to her satisfaction.

## 2023-07-19 NOTE — PROGRESS NOTES
[facility] HOSPITALIST PROGRESS NOTE     Patient Identification:  Name:  Judy Lutz  Age:  63 y.o.  Sex:  female  :  1960  MRN:  [id number]  Visit Number:  [id number]  ROOM: [address] [place] [address]     Primary Care Provider:  Provider, No Known    Length of stay in inpatient status:  6    Subjective     Chief Compliant:    Chief Complaint   Patient presents with    Shortness of Breath       History of Presenting Illness:    Patient seen and examined this morning with her daughter and a.m. nurse, JOHN Saenz, present at bedside. Daughter reports that Melina was awake much of the night because the air mattress she had on her bed deflated, and she was uncomfortable. After being moved to a different bed she has been more difficult to arouse. No definite complaints voiced. She groans in response to me during exam but is not able to provide history/ROS. As patient was being prepared to move to PCU telemetry called and notified that she had 3 runs of Vtach and O2 dropped to mid 80s.    Objective     Current Hospital Meds:albumin human, 25 g, Intravenous, BID  aspirin, 81 mg, Oral, Daily  ertapenem, 1,000 mg, Intravenous, Q24H  escitalopram, 10 mg, Oral, Daily  iron polysaccharides, 150 mg, Oral, Daily  iron sucrose, 300 mg, Intravenous, Q24H  magic barrier cream, 1 application , Topical, BID  metoprolol succinate XL, 25 mg, Oral, Q24H  pantoprazole, 40 mg, Intravenous, BID AC  potassium chloride, 40 mEq, Oral, Daily  rosuvastatin, 20 mg, Oral, Nightly  senna-docusate sodium, 2 tablet, Oral, BID  sodium chloride, 10 mL, Intravenous, Q12H  sodium chloride, 10 mL, Intravenous, Q12H         Current Antimicrobial Therapy:  Anti-Infectives (From admission, onward)      Ordered     Dose/Rate Route Frequency Start Stop    23 1259  gentamicin (GARAMYCIN) 540 mg in sodium chloride 0.9 % IVPB        Note to Pharmacy: Separate Administration Time With Ampicillin and Other Penicillins (Ampicillin / Penicillins  deactivate gentamicin when infused together).   Ordering Provider: King Broussard MD    5 mg/kg × 107 kg (Adjusted)  over 30 Minutes Intravenous Once 07/18/23 1400 07/18/23 1505    07/17/23 1002  ertapenem (INVanz) 1,000 mg in sodium chloride 0.9 % 100 mL IVPB-VTB        Ordering Provider: King Broussard MD    1,000 mg  200 mL/hr over 30 Minutes Intravenous Every 24 Hours 07/17/23 1100 07/24/23 1059    07/15/23 1915  meropenem (MERREM) 1,000 mg in sodium chloride 0.9 % 100 mL IVPB-VTB        Ordering Provider: Candido Rodney MD    1,000 mg  over 30 Minutes Intravenous Once 07/15/23 2015 07/15/23 2056    07/12/23 2137  cefTRIAXone (ROCEPHIN) 2,000 mg in sodium chloride 0.9 % 100 mL IVPB-VTB        Ordering Provider: Yusuf Luo DO    2,000 mg  200 mL/hr over 30 Minutes Intravenous Once 07/12/23 2153 07/12/23 2330          Current Diuretic Therapy:  Diuretics (From admission, onward)      Ordered     Dose/Rate Route Frequency Start Stop    07/14/23 0810  furosemide (LASIX) injection 60 mg        Ordering Provider: Leonard Dang DO    60 mg Intravenous Once 07/14/23 0900 07/14/23 0846    07/13/23 0054  furosemide (LASIX) injection 80 mg        Ordering Provider: Yusuf Luo DO    80 mg Intravenous Once 07/13/23 0110 07/13/23 0114          ----------------------------------------------------------------------------------------------------------------------  Vital Signs:  Temp:  [98.1 °F (36.7 °C)-99.6 °F (37.6 °C)] 99.4 °F (37.4 °C)  Heart Rate:  [104-109] 108  Resp:  [20] 20  BP: (104-121)/(60-71) 113/62  SpO2:  [90 %-97 %] 97 %  on  Flow (L/min):  [5.5] 5.5;   Device (Oxygen Therapy): nasal cannula;humidified  Body mass index is 51.68 kg/m².    Wt Readings from Last 3 Encounters:   07/19/23 (!) 163 kg (360 lb 3.2 oz)     Intake & Output (last 3 days)         07/16 0701  07/17 0700 07/17 0701  07/18 0700 07/18 0701  07/19 0700 07/19 0701 07/20 0700    P.O. 100 0 0 0    I.V.  "(mL/kg) 1153.5 (7)       Other 600       Total Intake(mL/kg) 1853.5 (11.3) 0 (0) 0 (0) 0 (0)    Urine (mL/kg/hr) 8150 (2.1) 1150 (0.3) 850 (0.2)     Stool 0       Total Output 8150 1150 850     Net -6296.5 -1150 -850 0            Stool Unmeasured Occurrence 3 x             Diet: Regular/House Diet; Texture: Soft to Chew (NDD 3); Soft to Chew: Whole Meat; Fluid Consistency: Thin (IDDSI 0)  ----------------------------------------------------------------------------------------------------------------------  Physical exam:   Constitutional:  Well-developed and well-nourished.  No acute distress.      HENT:  Head:  Normocephalic and atraumatic.    Cardiovascular:  Mild tachycardia, regular rhythm  Pulmonary/Chest:  No respiratory distress, breath sounds diminished, faint crackles in lateral lung fields bilaterally  Abdominal:  Soft. No distension and no tenderness.   Musculoskeletal:  No deformity.     Neurological: Lethargic. Groans and moves around in the bed aimlessly, but doesn't follow commands. No definite seizure-like activity.  Skin:  Skin is warm and dry. No rash noted.   Peripheral vascular:  No cyanosis. 1-2+ pitting edema bilateral lower extremities.  Edited by: Ginette Tarango DO at 7/19/2023 5596    Addendum: reevaluated patient in CCU after transfer and CTs. She is protecting her airway. Initially would only groan to sternal rub but tried to push my hand away when yankauer placed in mouth to check gag reflex, then began making verbal responses (mostly \"Ow\" or \"oh\"), and moving her extremities but does not follow commands. 07/19/23 10:11 EDT   ----------------------------------------------------------------------------------------------------------------------  Results from last 7 days   Lab Units 07/19/23  0207 07/18/23  0052 07/17/23  1715 07/17/23  0022 07/16/23  0426 07/13/23  0343 07/12/23  2153   CRP mg/dL 22.62* 24.42*  --   --  20.87*   < >  --    LACTATE mmol/L  --   --   --   --  0.8  --  1.6 "   WBC 10*3/mm3 27.03* 27.48*  --  19.23* 20.91*   < > 20.79*   HEMOGLOBIN g/dL 7.2* 7.7* 8.1* 7.0* 7.1*   < > 6.4*   HEMATOCRIT % 30.9* 34.4 35.1 28.4* 29.3*   < > 27.0*   MCV fL 83.7 83.5  --  75.5* 76.5*   < > 73.2*   MCHC g/dL 23.3* 22.4*  --  24.6* 24.2*   < > 23.7*   PLATELETS 10*3/mm3 592* 665*  --  420 499*   < > 418   INR   --   --   --   --   --   --  1.38*    < > = values in this interval not displayed.     Results from last 7 days   Lab Units 07/15/23  1951   PH, ARTERIAL pH units 7.398   PO2 ART mm Hg 54.3*   PCO2, ARTERIAL mm Hg 76.7*   HCO3 ART mmol/L 47.2*     Results from last 7 days   Lab Units 07/19/23  0207 07/18/23  0857 07/18/23  0052 07/17/23  1413 07/17/23  1136 07/17/23  0022   SODIUM mmol/L 147*  --  148*  --   --  148*   POTASSIUM mmol/L 3.6 3.7 3.4*  3.4*  --    < > 2.6*   MAGNESIUM mg/dL 2.5*  --  2.3  --   --  1.7   CHLORIDE mmol/L 94*  --  93*  --   --  93*   CO2 mmol/L 43.2*  --  46.2*  --   --  43.8*   BUN mg/dL 42*  --  27*  --   --  18   CREATININE mg/dL 2.08*  --  1.31*  --   --  0.64   CALCIUM mg/dL 11.1*  --  10.9*  --   --  10.2   PHOSPHORUS mg/dL  --   --  5.6* 4.7*  --  1.6*   GLUCOSE mg/dL 121*  --  133*  --   --  120*   ALBUMIN g/dL 3.6  --  3.4*  --   --  3.1*   BILIRUBIN mg/dL 0.6  --  0.6  --   --  0.7   ALK PHOS U/L 59  --  68  --   --  60   AST (SGOT) U/L 15  --  12  --   --  11   ALT (SGPT) U/L 8  --  10  --   --  10    < > = values in this interval not displayed.   Estimated Creatinine Clearance: 47.5 mL/min (A) (by C-G formula based on SCr of 2.08 mg/dL (H)).  Ammonia   Date Value Ref Range Status   07/19/2023 55 (H) 11 - 51 umol/L Final   07/18/2023 67 (H) 11 - 51 umol/L Final   07/17/2023 79 (H) 11 - 51 umol/L Final     Results from last 7 days   Lab Units 07/15/23  1504 07/15/23  1211 07/13/23  0014   HSTROP T ng/L 32* 29* 24*     Results from last 7 days   Lab Units 07/18/23  0052 07/13/23  0014   PROBNP pg/mL 35,507.0* 26,037.0*         No results found for:  HGBA1C, POCGLU  Lab Results   Component Value Date    TSH 0.619 07/15/2023     No results found for: PREGTESTUR, PREGSERUM, HCG, HCGQUANT  Pain Management Panel          Latest Ref Rng & Units 7/14/2023   Pain Management Panel   Creatinine, Urine mg/dL 8.5      Brief Urine Lab Results  (Last result in the past 365 days)        Color   Clarity   Blood   Leuk Est   Nitrite   Protein   CREAT   Urine HCG        07/15/23 1627 Yellow   Cloudy   Small (1+)   Large (3+)   Positive   30 mg/dL (1+)                 Blood Culture   Date Value Ref Range Status   07/12/2023 No growth at 5 days  Final   07/12/2023 No growth at 5 days  Final     Urine Culture   Date Value Ref Range Status   07/15/2023 >100,000 CFU/mL Escherichia coli ESBL (A)  Final     Comment:       Consider infectious disease consult.  Susceptibility results may not correlate to clinical outcomes.   07/12/2023 >100,000 CFU/mL Escherichia coli ESBL (A)  Final     Comment:       Consider infectious disease consult.  Susceptibility results may not correlate to clinical outcomes.   07/12/2023 (A)  Final    >100,000 CFU/mL Klebsiella pneumoniae ssp pneumoniae     No results found for: WOUNDCX  No results found for: STOOLCX  No results found for: RESPCX  No results found for: AFBCX  Results from last 7 days   Lab Units 07/19/23  0207 07/18/23  0052 07/16/23  0426 07/16/23  0425 07/13/23  0343 07/12/23  2153   PROCALCITONIN ng/mL  --  0.72*  --  0.27* 0.18 0.29*   LACTATE mmol/L  --   --  0.8  --   --  1.6   CRP mg/dL 22.62* 24.42* 20.87*  --  15.57*  --        I have personally looked at the labs and they are summarized above.  ----------------------------------------------------------------------------------------------------------------------  Detailed radiology reports for the last 24 hours:  Imaging Results (Last 24 Hours)       ** No results found for the last 24 hours. 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 coli and Klebsiella pneumoniae  #Anemia noted on admission, suspect due to severe iron deficiency anemia  #Leukocytosis, present on admission, suspect secondary to bone marrow overproduction as a result of the severe iron deficiency anemia  #Hypernatremia due to free water deficit that was present on admission, now resolved  #History of major depressive disorder   #Acute on chronic debility that was present on admission  #Acute kidney injury  #Nonsustained VTach    - Infectious Disease are following and have adjusted antibiotics to ertapenem. A one time dose of gentamicin was also given yesterday. Appreciate assistance. Urine culture has resulted with growth of ESBL E coli sensitive to ertapenem.  - Patient has had waxing/waning lethargy during admission. This appeared to be improving yesterday, but has worsened again this morning despite treatment of her UTI and hyperammonemia. Ammonia level was elevated at 79 initially, but improved to 55 after lactulose enemas. She does not have a history of liver disease and liver ultrasound did not show evidence of cirrhosis, so hepatic encephalopathy is unlikely. Acute infection has been documented in literature as a cause of nonhepatic hyperammonemia and altered mental status, which may be part of her  current presentation.  - Patient's mental status is worse today, raising concern for either new/worsening infectious process or intracranial process. CT head without contrast ordered. CT chest without contrast ordered to evaluate for development of aspiration pneumonia or worsening fluid overload (cannot give IV contrast today due to her renal function). EEG ordered. Neurology consult requested, appreciate assistance. I initially placed transfer orders to PCU due to the need for closer monitoring, but patient developed worsening hypoxic respiratory failure. Now stabilized with non-rebreather in place and SpO2 >92%. Will obtain ABG. Due to high risk for progressing to need intubation, which I discussed with her daughter at bedside, we will transfer to CCU.  - Anemia is being followed by Heme/Onc, appreciate assistance. Given evidence of severe iron deficiency anemia Heme/Onc has ordered venofer 300mg IV x3 doses. Continue to monitor with daily cbc. Transfuse for hemoglobin <7 or <8 if symptomatic and platelets <20K.  - Heme/Onc recommended evaluation for endoscopy given severe iron deficiency anemia. Surgery evaluated patient and have deferred endoscopy for now as it is not emergent and patient has multiple other acute medical problems being treated.  - Leukocytosis felt to be secondary to stress from severe iron deficiency as well as acute infection with UTI.   - Replace potassium, magnesium, and phosphorous per protocol and repeat labs to monitor.  - Continue diuresis per Cardiology, appreciate assistance. Monitor intake/output.  - Patient has not had very much oral intake during admission, and has not been safe for oral intake at all the past 3 days due to fluctuating mental status. I discussed with her daughter possible NG tube placement for tube feeds if she has not improved enough for safe PO intake by today and she was agreeable. Pending the above workup and Neurology evaluation plan is to place NG tube for  enteral nutrition and access for oral medications.  - Creatinine increased to 2.08 today (baseline 0.6-0.8), likely due to ongoing diuresis and lack of PO intake. BUN/Cr ratio is 20 indicating prerenal etiology. If this does not improve quickly after starting enteral nutrition/fluids then plan would be to consult Nephrology for assistance.   - EKG obtained which showed sinus rhythm, rate 105, t wave inversions in leads I and aVL which appear unchanged from prior EKG. We will continue monitoring closely on telemetry.      Edited by: Ginette Tarango DO at 7/19/2023 0939     VTE Prophylaxis:   Mechanical Order History:        Ordered        07/13/23 0225  Place Sequential Compression Device  Once            07/13/23 0225  Maintain Sequential Compression Device  Continuous                          Pharmalogical Order History:       None            Dispo: transfer to CCU. Patient is high risk due to worsening of hypoxic respiratory failure, encephalopathy, and development of acute kidney injury..    Ginette Tarango DO  [facility]  07/19/23  09:17 EDT

## 2023-07-19 NOTE — PROGRESS NOTES
Gas with some improvement, she is more arousable now  Will increase rate on nippv. Continue observation on nippv

## 2023-07-19 NOTE — CONSULTS
Neurology Consult Note    Patient Name: Judy Lutz   MRN: [id number]  Age: 63 y.o.  Sex: female  : 1960    Primary Care Physician: Provider, No Known  Referring Physician:  Provider, No Known    Chief Complaint/Reason for Consultation: AMS/Worsening lethargy despite treatment of suspected metabolic causes    Subjective .  HPI: 63-year-old white female with no significant known PMHx presenting presented to emergency department on 2023 with complaints of progressive weakness, fatigue and shortness of breath.  She was admitted for symptomatic anemia, hypoxemic respiratory failure, sepsis due to UTI, and acute encephalitis among other things.  Patient unable to provide history which has been gathered from chart review and speaking with the daughter at bedside.  The daughter tells us that she had been somewhat confused a few days prior to admission but Monday she became minimally responsive, Tuesday was able to communicate some, then this morning again with very little response, nonverbal, was not following commands.  Daughter reports also that the patient has had some hallucinations, was asking her about rabbits she was seen.  Recent CT chest showed evidence of pneumonia.  Tele-Neurology was consulted for the mental status changes.  Patient was seen in CCU on BiPAP.       Review of Systems   Unable to perform ROS: Mental status change    Past Medical History:   Diagnosis Date    Symptomatic anemia 2023     History reviewed. No pertinent surgical history.  History reviewed. No pertinent family history.  Social History     Socioeconomic History    Marital status:    Tobacco Use    Smoking status: Never    Smokeless tobacco: Never   Vaping Use    Vaping Use: Never used   Substance and Sexual Activity    Alcohol use: Never    Drug use: Never    Sexual activity: Defer     Allergies   Allergen Reactions    Codeine GI Intolerance     Prior to Admission medications    Medication Sig Start Date  End Date Taking? Authorizing Provider   acetaminophen (TYLENOL) 325 MG tablet Take 2 tablets by mouth Every 6 (Six) Hours As Needed for Mild Pain or Headache.   Yes Corin Miller MD   ibuprofen (ADVIL,MOTRIN) 200 MG tablet Take 1 tablet by mouth Every 6 (Six) Hours As Needed for Mild Pain or Headache.   Yes Corin Miller MD             Objective     Temp:  [97.3 °F (36.3 °C)-99.6 °F (37.6 °C)] 97.3 °F (36.3 °C)  Heart Rate:  [104-109] 108  Resp:  [19-26] 26  BP: ()/(53-71) 115/62  Neurological Exam  Mental Status  Arousable to verbal stimuli. Patient is nonverbal.  Patient appeared to attempt to open her eyes with verbal and light tactile stimulation.  She follows simple commands such as squeezing fingers and wiggling toes.  She appeared anxious..    Cranial Nerves  CN II: Blink to visual threat.  CN III, IV, VI: Normal lids and orbits bilaterally. Pupils equal round and reactive to light bilaterally.  CN V: Unable to assess.    Motor    Follow simple commands.    Physical Exam  Vitals and nursing note reviewed.   Constitutional:       Appearance: She is obese. She is ill-appearing.   HENT:      Head: Normocephalic.      Mouth/Throat:      Mouth: Mucous membranes are dry.      Pharynx: Oropharynx is clear.   Eyes:      General: Lids are normal.      Pupils: Pupils are equal, round, and reactive to light.   Cardiovascular:      Rate and Rhythm: Tachycardia present.   Pulmonary:      Comments: BiPAP  Skin:     General: Skin is warm and dry.   Neurological:      General: No focal deficit present.       Hospital Meds:  Scheduled- albumin human, 25 g, Intravenous, BID  aspirin, 81 mg, Oral, Daily  ertapenem, 1,000 mg, Intravenous, Q24H  escitalopram, 10 mg, Oral, Daily  heparin (porcine), 5,000 Units, Subcutaneous, Q8H  iron polysaccharides, 150 mg, Oral, Daily  iron sucrose, 300 mg, Intravenous, Q24H  magic barrier cream, 1 application , Topical, BID  metoprolol succinate XL, 25 mg, Oral,  Q24H  pantoprazole, 40 mg, Intravenous, BID AC  potassium chloride, 40 mEq, Oral, Daily  rosuvastatin, 20 mg, Oral, Nightly  senna-docusate sodium, 2 tablet, Oral, BID  sodium chloride, 10 mL, Intravenous, Q12H  sodium chloride, 10 mL, Intravenous, Q12H      Infusions-     PRNs-   acetaminophen    acetaminophen    senna-docusate sodium **AND** polyethylene glycol **AND** bisacodyl **AND** bisacodyl    Magnesium Standard Dose Replacement - Follow Nurse / BPA Driven Protocol    ondansetron    Phosphorus Replacement - Follow Nurse / BPA Driven Protocol    Potassium Replacement - Follow Nurse / BPA Driven Protocol    prochlorperazine    simethicone    sodium chloride    sodium chloride    sodium chloride    sodium chloride    sodium chloride      Results Reviewed:  I have personally reviewed current labs, radiology, and data.    Lab Results   Component Value Date    HGBA1C 5.80 (H) 07/13/2023     Lab Results   Component Value Date    WBC 27.03 (H) 07/19/2023    HGB 7.2 (L) 07/19/2023    HCT 30.9 (L) 07/19/2023    MCV 83.7 07/19/2023     (H) 07/19/2023      Lab Results   Component Value Date    GLUCOSE 121 (H) 07/19/2023    BUN 42 (H) 07/19/2023    CREATININE 2.08 (H) 07/19/2023    BCR 20.2 07/19/2023    K 3.6 07/19/2023    CO2 43.2 (H) 07/19/2023    CALCIUM 11.1 (H) 07/19/2023    ALBUMIN 3.6 07/19/2023    AST 15 07/19/2023    ALT 8 07/19/2023      No results found for: CHOL, CHLPL  No results found for: TRIG  No results found for: HDL  No results found for: LDL, LDLDIRECT   Imaging Results (Last 24 Hours)       Procedure Component Value Units Date/Time    CT Chest Without Contrast Diagnostic [[id number]] Collected: 07/19/23 1018     Updated: 07/19/23 1022    Narrative:      EXAM:    CT Chest Without Intravenous Contrast     EXAM DATE:    7/19/2023 9:45 AM     CLINICAL HISTORY:    worsening oxygen requirement, concern for fluid overload vs  development of aspiration pneumonia; D64.9-Anemia,  unspecified;  A41.9-Sepsis, unspecified organism; R65.20-Severe sepsis without septic  shock; J96.01-Acute respiratory failure with hypoxia; N39.0-Urinary  tract infection, site not specified; I50.31-Acute diastolic (congestive)  heart failure     TECHNIQUE:    Axial computed tomography images of the chest without intravenous  contrast.  Sagittal and coronal reformatted images were created and  reviewed.  This CT exam was performed using one or more of the following  dose reduction techniques:  automated exposure control, adjustment of  the mA and/or kV according to patient size, and/or use of iterative  reconstruction technique.     COMPARISON:    07/12/2023     FINDINGS:    Lungs and pleural spaces:  Development of bilateral consolidative  airspace disease more pronounced in the upper lobes but also present in  the lower lobes consistent with pneumonia.  Miniscule pleural effusions  which are nonloculated.  Interstitial thickening has developed  compatible with edema.    Heart:  Very marked cardiac enlargement is stable from previous.  No  significant pericardial effusion.  No significant coronary artery  calcifications.    Mediastinum:  Hiatal hernia, stable.    Bones/joints:  Unremarkable.  No acute fracture.  No dislocation.    Soft tissues:  Diffuse anasarca has slightly improved.    Vasculature:  Pulmonary venous hypertension is noted.  No thoracic  aortic aneurysm.    Lymph nodes:  Reactive and/or congested lymph nodes in the mediastinum  and hilar stations but no bulky adenopathy identified.    Liver:  Liver cirrhosis.    Stomach and bowel:  Gastroesophageal reflux.    Intraperitoneal space:  Upper abdominal ascites which appears stable.       Impression:      1.  Development of bilateral multilobar partially consolidative  pneumonia. Combination atelectasis may be considered.  2.  Slight increase in changes of CHF now with interstitial edema.  Miniscule nonloculated pleural effusions with stable marked  cardiac  enlargement.  3.  Questionable decrease in the degree of anasarca with persistent  upper abdominal ascites noted.  4.  Other incidental/nonacute findings as above.     This report was finalized on 7/19/2023 10:20 AM by Dr. Kvng Tijerina MD.       CT Head Without Contrast [[id number]] Collected: 07/19/23 1016     Updated: 07/19/23 1020    Narrative:      EXAM:    CT Head Without Intravenous Contrast     EXAM DATE:    7/19/2023 9:41 AM     CLINICAL HISTORY:    Mental status change, unknown cause; D64.9-Anemia, unspecified;  A41.9-Sepsis, unspecified organism; R65.20-Severe sepsis without septic  shock; J96.01-Acute respiratory failure with hypoxia; N39.0-Urinary  tract infection, site not specified; I50.31-Acute diastolic (congestive)  heart failure     TECHNIQUE:    Axial computed tomography images of the head/brain without intravenous  contrast.  Sagittal and coronal reformatted images were created and  reviewed.  This CT exam was performed using one or more of the following  dose reduction techniques:  automated exposure control, adjustment of  the mA and/or kV according to patient size, and/or use of iterative  reconstruction technique.     COMPARISON:    No relevant prior studies available.     FINDINGS:    Brain and extra-axial spaces:  Unremarkable.  No hemorrhage.  No  significant white matter disease.  No edema.  No ventriculomegaly.    Bones/joints:  Unremarkable.  No acute fracture.    Soft tissues:  Unremarkable.    Sinuses:  Unremarkable as visualized.  No acute sinusitis.    Mastoid air cells:  Unremarkable as visualized.  No mastoid effusion.       Impression:        Unremarkable exam demonstrating no CT evidence of acute intracranial  findings.     This report was finalized on 7/19/2023 10:17 AM by Dr. Kvng Tijreina MD.             Results for orders placed during the hospital encounter of 07/12/23    Adult Transthoracic Echo Complete W/ Cont if Necessary Per Protocol    Interpretation  Summary  Images from the original result were not included.      Normal left ventricular cavity size noted. Left ventricular wall thickness is consistent with mild concentric hypertrophy. All left ventricular wall segments contract normally.    Left ventricular ejection fraction appears to be 51 - 55%.    Left ventricular diastolic function is consistent with (grade I) impaired relaxation.    There is a moderate (1-2cm) pericardial effusion adjacent to the left ventricle. There is no evidence of cardiac tamponade.    The aortic valve is structurally normal with no regurgitation or stenosis present.    The mitral valve is structurally normal with no significant stenosis present. Trace mitral valve regurgitation is present.    Mild tricuspid valve regurgitation is present. Estimated right ventricular systolic pressure from tricuspid regurgitation is moderately elevated (45-55 mmHg).    Moderate pulmonary hypertension is present.    There is a moderate (1-2cm) pericardial effusion adjacent to the left ventricle. The effusion is fluid filled. There is no evidence of cardiac tamponade.            Assessment/Plan:  63-year-old white female apparently with no significant medical care prior to admission presented to ED with weakness, fatigue and shortness of breath.  Patient also reports some on and off confusion in a couple days leading up to the hospitalization.  Remained with waxing waning mentation during stay, seem to be worsening since Monday.  Initially being treated for septic UTI, now noted to have pneumonia.  ABG showed PCO2 of 101 and she was placed on BiPAP.  CT head performed this morning was negative for any acute changes.    #Encephalopathy, this was present on admission and felt to be related to her UTI and respiratory failure.  Encephalopathy had worsened today but was also noted to be hypercapnic, also pneumonia recently noted on CT chest      -We will continue to follow and monitor her mental status as  hypercapnia improves  -Initial CT head negative for any acute changes  -EEG pending  -May need MRI brain once off BiPAP      The case was discussed with the patient, her family at bedside, and nursing staff.    Thank you for the consultation, please feel free to call with any questions.    Kvng Matson, APRN  July 19, 2023  11:36 EDT    Verbal consent taken.  Patient agreeable to be seen via telemedicine. Dr. Cross present during encounter via telemedicine.      Please note that portions of this note were completed with a voice recognition program. Efforts were made to edit dictation, but occasionally words are mistranscribed.

## 2023-07-19 NOTE — PLAN OF CARE
Problem: Adult Inpatient Plan of Care  Goal: Plan of Care Review  7/19/2023 1859 by Torey Bledsoe RN  Outcome: Ongoing, Progressing  Flowsheets (Taken 7/19/2023 1859)  Outcome Evaluation: transfer from  today. lethargic and minimal responses to stimulation. pt placed on bipap d/t abg results. at this time pt is starting to open eyes spontaneously and moan. receiving 1 unit PRBCs at this time  7/19/2023 1858 by Torey Bledsoe RN  Outcome: Ongoing, Progressing  7/19/2023 1856 by Torey Bledsoe RN  Outcome: Ongoing, Progressing  Flowsheets (Taken 7/19/2023 1856)  Outcome Evaluation: transfer from PCU today. lethargic and minimal responses to stimulation. pt placed on bipap d/t abg results. pt now opening eyes spontaneously and moaning. receiving 1 unit PRBCs at this time  Goal: Patient-Specific Goal (Individualized)  7/19/2023 1859 by Torey Bledsoe RN  Outcome: Ongoing, Progressing  7/19/2023 1858 by Torey Bledsoe RN  Outcome: Ongoing, Progressing  7/19/2023 1856 by Torey Bldesoe RN  Outcome: Ongoing, Progressing  Goal: Absence of Hospital-Acquired Illness or Injury  7/19/2023 1859 by Torey Bledsoe RN  Outcome: Ongoing, Progressing  7/19/2023 1858 by Torey Bledsoe RN  Outcome: Ongoing, Progressing  7/19/2023 1856 by Torey Bledsoe RN  Outcome: Ongoing, Progressing  Intervention: Identify and Manage Fall Risk  Recent Flowsheet Documentation  Taken 7/19/2023 1700 by Torey Bledsoe, RN  Safety Promotion/Fall Prevention: safety round/check completed  Taken 7/19/2023 1600 by Torey Bledsoe RN  Safety Promotion/Fall Prevention: safety round/check completed  Taken 7/19/2023 1500 by Torey Bledsoe, RN  Safety Promotion/Fall Prevention: safety round/check completed  Taken 7/19/2023 1400 by Torey Bledsoe, RN  Safety Promotion/Fall Prevention: safety round/check completed  Taken 7/19/2023 1300 by Torey Bledsoe RN  Safety  Promotion/Fall Prevention: safety round/check completed  Taken 7/19/2023 1200 by Torey Bledsoe RN  Safety Promotion/Fall Prevention: safety round/check completed  Taken 7/19/2023 1100 by Torey Bledsoe RN  Safety Promotion/Fall Prevention: safety round/check completed  Taken 7/19/2023 1030 by Torey Bledsoe RN  Safety Promotion/Fall Prevention: safety round/check completed  Taken 7/19/2023 1000 by Torey Bledsoe RN  Safety Promotion/Fall Prevention: safety round/check completed  Intervention: Prevent Skin Injury  Recent Flowsheet Documentation  Taken 7/19/2023 1600 by Torey Bledsoe RN  Body Position:   turned   left  Taken 7/19/2023 1400 by Torey Bledsoe RN  Body Position:   turned   right  Skin Protection: adhesive use limited  Taken 7/19/2023 1200 by Torey Bledsoe RN  Body Position:   turned   left  Taken 7/19/2023 1030 by Torey Bledsoe RN  Skin Protection: adhesive use limited  Taken 7/19/2023 1000 by Torey Bledsoe RN  Body Position:   turned   right  Intervention: Prevent and Manage VTE (Venous Thromboembolism) Risk  Recent Flowsheet Documentation  Taken 7/19/2023 1400 by Torey Bledsoe RN  Activity Management: bedrest  Taken 7/19/2023 1030 by Torey Bledsoe RN  Activity Management: bedrest  Intervention: Prevent Infection  Recent Flowsheet Documentation  Taken 7/19/2023 1400 by Torey Bledsoe RN  Infection Prevention: environmental surveillance performed  Taken 7/19/2023 1030 by Torey Bledsoe RN  Infection Prevention: environmental surveillance performed  Goal: Optimal Comfort and Wellbeing  7/19/2023 1859 by Torey Bledsoe RN  Outcome: Ongoing, Progressing  7/19/2023 1858 by Torey Bledsoe RN  Outcome: Ongoing, Progressing  7/19/2023 1856 by Torey Bledsoe RN  Outcome: Ongoing, Progressing  Intervention: Provide Person-Centered Care  Recent Flowsheet Documentation  Taken 7/19/2023 1400 by  Torey Bledsoe, RN  Trust Relationship/Rapport:   care explained   reassurance provided  Taken 7/19/2023 1030 by Torey Bledsoe, JOHN  Trust Relationship/Rapport:   care explained   reassurance provided  Goal: Readiness for Transition of Care  7/19/2023 1859 by Torey Bledsoe, RN  Outcome: Ongoing, Progressing  7/19/2023 1858 by Torey Bledsoe, RN  Outcome: Ongoing, Progressing  7/19/2023 1856 by Torey Bledsoe, RN  Outcome: Ongoing, Progressing

## 2023-07-19 NOTE — PAYOR COMM NOTE
"[facility]  NPI:[id number]    Utilization Review  Contact: Tracie Canchola RN  Phone: [phone]  Fax:[phone]    CLINICAL UPDATE  Judy Lutz (63 y.o. Female)       Date of Birth   1960    Social Security Number       Address   [address] [place], [place] [address]    Home Phone   [phone]    MRN   [id number]       [facility]    Marital Status                               Admission Date   7/12/23    Admission Type   Emergency    Admitting Provider   Sapphire Contreras DO    Attending Provider   Ginette Tarango DO    Department, Room/Bed   [facility] CRITICAL CARE, CC04/1C       Discharge Date       Discharge Disposition       Discharge Destination                                 Attending Provider: Ginette Tarango DO    Allergies: Codeine    Isolation: Contact   Infection: MDRO (07/15/23), ESBL E coli (07/15/23)   Code Status: CPR    Ht: 177.8 cm (70\")   Wt: 163 kg (360 lb 3.2 oz)    Admission Cmt: None   Principal Problem: Symptomatic anemia [D64.9]                   Active Insurance as of 7/12/2023       Primary Coverage       Payor Plan Insurance Group Employer/Plan Group    [employer]HEM BLUE CROSS ANTHEM BLUE CROSS BLUE SHIELD PPO [id number]       Payor Plan Address Payor Plan Phone Number Payor Plan Fax Number Effective Dates    [address] [phone]  1/1/2020 - None Entered    [place] [address]         Subscriber Name Subscriber Birth Date Member ID       JUDY LUTZ 1960 [id number]                     Emergency Contacts        (Rel.) Home Phone Work Phone Mobile Phone    RALF LUTZ (Son) [phone] -- --    TuscaroraNancy brown (Daughter) -- -- [phone]                 Physician Progress Notes (last 24 hours)        Ginette Tarango DO at 07/19/23 0917              [facility] HOSPITALIST PROGRESS NOTE     Patient Identification:  Name:  Judy uLtz  Age:  63 y.o.  Sex:  " female  :  1960  MRN:  [id number]  Visit Number:  [id number]  ROOM: [address] [place] [address]     Primary Care Provider:  Provider, No Known    Length of stay in inpatient status:  6    Subjective     Chief Compliant:    Chief Complaint   Patient presents with    Shortness of Breath       History of Presenting Illness:    Patient seen and examined this morning with her daughter and aTommiemTommie nurse, JOHN Saenz, present at bedside. Daughter reports that Melina was awake much of the night because the air mattress she had on her bed deflated, and she was uncomfortable. After being moved to a different bed she has been more difficult to arouse. No definite complaints voiced. She groans in response to me during exam but is not able to provide history/ROS. As patient was being prepared to move to U telemetry called and notified that she had 3 runs of Vtach and O2 dropped to mid 80s.    Objective     Current Hospital Meds:albumin human, 25 g, Intravenous, BID  aspirin, 81 mg, Oral, Daily  ertapenem, 1,000 mg, Intravenous, Q24H  escitalopram, 10 mg, Oral, Daily  iron polysaccharides, 150 mg, Oral, Daily  iron sucrose, 300 mg, Intravenous, Q24H  magic barrier cream, 1 application , Topical, BID  metoprolol succinate XL, 25 mg, Oral, Q24H  pantoprazole, 40 mg, Intravenous, BID AC  potassium chloride, 40 mEq, Oral, Daily  rosuvastatin, 20 mg, Oral, Nightly  senna-docusate sodium, 2 tablet, Oral, BID  sodium chloride, 10 mL, Intravenous, Q12H  sodium chloride, 10 mL, Intravenous, Q12H         Current Antimicrobial Therapy:  Anti-Infectives (From admission, onward)      Ordered     Dose/Rate Route Frequency Start Stop    23 1259  gentamicin (GARAMYCIN) 540 mg in sodium chloride 0.9 % IVPB        Note to Pharmacy: Separate Administration Time With Ampicillin and Other Penicillins (Ampicillin / Penicillins deactivate gentamicin when infused together).   Ordering Provider: King Broussard MD    5 mg/kg × 107 kg (Adjusted)  over 30  Minutes Intravenous Once 07/18/23 1400 07/18/23 1505    07/17/23 1002  ertapenem (INVanz) 1,000 mg in sodium chloride 0.9 % 100 mL IVPB-VTB        Ordering Provider: King Broussard MD    1,000 mg  200 mL/hr over 30 Minutes Intravenous Every 24 Hours 07/17/23 1100 07/24/23 1059    07/15/23 1915  meropenem (MERREM) 1,000 mg in sodium chloride 0.9 % 100 mL IVPB-VTB        Ordering Provider: Candido Rodney MD    1,000 mg  over 30 Minutes Intravenous Once 07/15/23 2015 07/15/23 2056    07/12/23 2137  cefTRIAXone (ROCEPHIN) 2,000 mg in sodium chloride 0.9 % 100 mL IVPB-VTB        Ordering Provider: Yusuf Luo DO    2,000 mg  200 mL/hr over 30 Minutes Intravenous Once 07/12/23 2153 07/12/23 2330          Current Diuretic Therapy:  Diuretics (From admission, onward)      Ordered     Dose/Rate Route Frequency Start Stop    07/14/23 0810  furosemide (LASIX) injection 60 mg        Ordering Provider: Leonard Dang DO    60 mg Intravenous Once 07/14/23 0900 07/14/23 0846    07/13/23 0054  furosemide (LASIX) injection 80 mg        Ordering Provider: Yusuf Luo DO    80 mg Intravenous Once 07/13/23 0110 07/13/23 0114          ----------------------------------------------------------------------------------------------------------------------  Vital Signs:  Temp:  [98.1 °F (36.7 °C)-99.6 °F (37.6 °C)] 99.4 °F (37.4 °C)  Heart Rate:  [104-109] 108  Resp:  [20] 20  BP: (104-121)/(60-71) 113/62  SpO2:  [90 %-97 %] 97 %  on  Flow (L/min):  [5.5] 5.5;   Device (Oxygen Therapy): nasal cannula;humidified  Body mass index is 51.68 kg/m².    Wt Readings from Last 3 Encounters:   07/19/23 (!) 163 kg (360 lb 3.2 oz)     Intake & Output (last 3 days)         07/16 0701 07/17 0700 07/17 0701  07/18 0700 07/18 0701 07/19 0700 07/19 0701  07/20 0700    P.O. 100 0 0 0    I.V. (mL/kg) 1153.5 (7)       Other 600       Total Intake(mL/kg) 1853.5 (11.3) 0 (0) 0 (0) 0 (0)    Urine (mL/kg/hr) 8150 (2.1) 1150 (0.3)  "850 (0.2)     Stool 0       Total Output 8150 1150 850     Net -6296.5 -1150 -850 0            Stool Unmeasured Occurrence 3 x             Diet: Regular/House Diet; Texture: Soft to Chew (NDD 3); Soft to Chew: Whole Meat; Fluid Consistency: Thin (IDDSI 0)  ----------------------------------------------------------------------------------------------------------------------  Physical exam:   Constitutional:  Well-developed and well-nourished.  No acute distress.      HENT:  Head:  Normocephalic and atraumatic.    Cardiovascular:  Mild tachycardia, regular rhythm  Pulmonary/Chest:  No respiratory distress, breath sounds diminished, faint crackles in lateral lung fields bilaterally  Abdominal:  Soft. No distension and no tenderness.   Musculoskeletal:  No deformity.     Neurological: Lethargic. Groans and moves around in the bed aimlessly, but doesn't follow commands. No definite seizure-like activity.  Skin:  Skin is warm and dry. No rash noted.   Peripheral vascular:  No cyanosis. 1-2+ pitting edema bilateral lower extremities.  Edited by: Ginette Tarango DO at 7/19/2023 1556    Addendum: reevaluated patient in CCU after transfer and CTs. She is protecting her airway. Initially would only groan to sternal rub but tried to push my hand away when yankauer placed in mouth to check gag reflex, then began making verbal responses (mostly \"Ow\" or \"oh\"), and moving her extremities but does not follow commands. 07/19/23 10:11 EDT   ----------------------------------------------------------------------------------------------------------------------  Results from last 7 days   Lab Units 07/19/23  0207 07/18/23  0052 07/17/23  1715 07/17/23  0022 07/16/23  0426 07/13/23  0343 07/12/23  2153   CRP mg/dL 22.62* 24.42*  --   --  20.87*   < >  --    LACTATE mmol/L  --   --   --   --  0.8  --  1.6   WBC 10*3/mm3 27.03* 27.48*  --  19.23* 20.91*   < > 20.79*   HEMOGLOBIN g/dL 7.2* 7.7* 8.1* 7.0* 7.1*   < > 6.4*   HEMATOCRIT % 30.9* " 34.4 35.1 28.4* 29.3*   < > 27.0*   MCV fL 83.7 83.5  --  75.5* 76.5*   < > 73.2*   MCHC g/dL 23.3* 22.4*  --  24.6* 24.2*   < > 23.7*   PLATELETS 10*3/mm3 592* 665*  --  420 499*   < > 418   INR   --   --   --   --   --   --  1.38*    < > = values in this interval not displayed.     Results from last 7 days   Lab Units 07/15/23  1951   PH, ARTERIAL pH units 7.398   PO2 ART mm Hg 54.3*   PCO2, ARTERIAL mm Hg 76.7*   HCO3 ART mmol/L 47.2*     Results from last 7 days   Lab Units 07/19/23  0207 07/18/23  0857 07/18/23  0052 07/17/23  1413 07/17/23  1136 07/17/23  0022   SODIUM mmol/L 147*  --  148*  --   --  148*   POTASSIUM mmol/L 3.6 3.7 3.4*  3.4*  --    < > 2.6*   MAGNESIUM mg/dL 2.5*  --  2.3  --   --  1.7   CHLORIDE mmol/L 94*  --  93*  --   --  93*   CO2 mmol/L 43.2*  --  46.2*  --   --  43.8*   BUN mg/dL 42*  --  27*  --   --  18   CREATININE mg/dL 2.08*  --  1.31*  --   --  0.64   CALCIUM mg/dL 11.1*  --  10.9*  --   --  10.2   PHOSPHORUS mg/dL  --   --  5.6* 4.7*  --  1.6*   GLUCOSE mg/dL 121*  --  133*  --   --  120*   ALBUMIN g/dL 3.6  --  3.4*  --   --  3.1*   BILIRUBIN mg/dL 0.6  --  0.6  --   --  0.7   ALK PHOS U/L 59  --  68  --   --  60   AST (SGOT) U/L 15  --  12  --   --  11   ALT (SGPT) U/L 8  --  10  --   --  10    < > = values in this interval not displayed.   Estimated Creatinine Clearance: 47.5 mL/min (A) (by C-G formula based on SCr of 2.08 mg/dL (H)).  Ammonia   Date Value Ref Range Status   07/19/2023 55 (H) 11 - 51 umol/L Final   07/18/2023 67 (H) 11 - 51 umol/L Final   07/17/2023 79 (H) 11 - 51 umol/L Final     Results from last 7 days   Lab Units 07/15/23  1504 07/15/23  1211 07/13/23  0014   HSTROP T ng/L 32* 29* 24*     Results from last 7 days   Lab Units 07/18/23  0052 07/13/23  0014   PROBNP pg/mL 35,507.0* 26,037.0*         No results found for: HGBA1C, POCGLU  Lab Results   Component Value Date    TSH 0.619 07/15/2023     No results found for: PREGTESTUR, PREGSERUM, HCG,  HCGQUANT  Pain Management Panel          Latest Ref Rng & Units 7/14/2023   Pain Management Panel   Creatinine, Urine mg/dL 8.5      Brief Urine Lab Results  (Last result in the past 365 days)        Color   Clarity   Blood   Leuk Est   Nitrite   Protein   CREAT   Urine HCG        07/15/23 1627 Yellow   Cloudy   Small (1+)   Large (3+)   Positive   30 mg/dL (1+)                 Blood Culture   Date Value Ref Range Status   07/12/2023 No growth at 5 days  Final   07/12/2023 No growth at 5 days  Final     Urine Culture   Date Value Ref Range Status   07/15/2023 >100,000 CFU/mL Escherichia coli ESBL (A)  Final     Comment:       Consider infectious disease consult.  Susceptibility results may not correlate to clinical outcomes.   07/12/2023 >100,000 CFU/mL Escherichia coli ESBL (A)  Final     Comment:       Consider infectious disease consult.  Susceptibility results may not correlate to clinical outcomes.   07/12/2023 (A)  Final    >100,000 CFU/mL Klebsiella pneumoniae ssp pneumoniae     No results found for: WOUNDCX  No results found for: STOOLCX  No results found for: RESPCX  No results found for: AFBCX  Results from last 7 days   Lab Units 07/19/23  0207 07/18/23  0052 07/16/23  0426 07/16/23  0425 07/13/23  0343 07/12/23  2153   PROCALCITONIN ng/mL  --  0.72*  --  0.27* 0.18 0.29*   LACTATE mmol/L  --   --  0.8  --   --  1.6   CRP mg/dL 22.62* 24.42* 20.87*  --  15.57*  --        I have personally looked at the labs and they are summarized above.  ----------------------------------------------------------------------------------------------------------------------  Detailed radiology reports for the last 24 hours:  Imaging Results (Last 24 Hours)       ** No results found for the last 24 hours. **          Assessment & Plan      #Acute hypoxemic respiratory failure that was present on admission and is due to acute exacerbation of combined systolic and diastolic congestive heart failure, hypoxia worse today  #Type  II non-ST elevation MI that was present on admission  #Moderate pericardial effusion without tamponade physiology that was present on admission  #Acute hypokalemia, hypomagnesemia, and hypophosphatemia  #Suspected NANCY/OHS  #Anasarca and bilateral pleural effusions, suspect due to the combined heart failure  #Acute metabolic encephalopathy that was present on admission, suspected due to the acute hypoxic respiratory failure and the acute urinary tract infection, worse again today  #Acute urinary tract infection, POA, with urine culture growing ESBL E. coli and Klebsiella pneumoniae  #Anemia noted on admission, suspect due to severe iron deficiency anemia  #Leukocytosis, present on admission, suspect secondary to bone marrow overproduction as a result of the severe iron deficiency anemia  #Hypernatremia due to free water deficit that was present on admission, now resolved  #History of major depressive disorder   #Acute on chronic debility that was present on admission  #Acute kidney injury  #Nonsustained VTach    - Infectious Disease are following and have adjusted antibiotics to ertapenem. A one time dose of gentamicin was also given yesterday. Appreciate assistance. Urine culture has resulted with growth of ESBL E coli sensitive to ertapenem.  - Patient has had waxing/waning lethargy during admission. This appeared to be improving yesterday, but has worsened again this morning despite treatment of her UTI and hyperammonemia. Ammonia level was elevated at 79 initially, but improved to 55 after lactulose enemas. She does not have a history of liver disease and liver ultrasound did not show evidence of cirrhosis, so hepatic encephalopathy is unlikely. Acute infection has been documented in literature as a cause of nonhepatic hyperammonemia and altered mental status, which may be part of her current presentation.  - Patient's mental status is worse today, raising concern for either new/worsening infectious process or  intracranial process. CT head without contrast ordered. CT chest without contrast ordered to evaluate for development of aspiration pneumonia or worsening fluid overload (cannot give IV contrast today due to her renal function). EEG ordered. Neurology consult requested, appreciate assistance. I initially placed transfer orders to PCU due to the need for closer monitoring, but patient developed worsening hypoxic respiratory failure. Now stabilized with non-rebreather in place and SpO2 >92%. Will obtain ABG. Due to high risk for progressing to need intubation, which I discussed with her daughter at bedside, we will transfer to CCU.  - Anemia is being followed by Heme/Onc, appreciate assistance. Given evidence of severe iron deficiency anemia Heme/Onc has ordered venofer 300mg IV x3 doses. Continue to monitor with daily cbc. Transfuse for hemoglobin <7 or <8 if symptomatic and platelets <20K.  - Heme/Onc recommended evaluation for endoscopy given severe iron deficiency anemia. Surgery evaluated patient and have deferred endoscopy for now as it is not emergent and patient has multiple other acute medical problems being treated.  - Leukocytosis felt to be secondary to stress from severe iron deficiency as well as acute infection with UTI.   - Replace potassium, magnesium, and phosphorous per protocol and repeat labs to monitor.  - Continue diuresis per Cardiology, appreciate assistance. Monitor intake/output.  - Patient has not had very much oral intake during admission, and has not been safe for oral intake at all the past 3 days due to fluctuating mental status. I discussed with her daughter possible NG tube placement for tube feeds if she has not improved enough for safe PO intake by today and she was agreeable. Pending the above workup and Neurology evaluation plan is to place NG tube for enteral nutrition and access for oral medications.  - Creatinine increased to 2.08 today (baseline 0.6-0.8), likely due to ongoing  diuresis and lack of PO intake. BUN/Cr ratio is 20 indicating prerenal etiology. If this does not improve quickly after starting enteral nutrition/fluids then plan would be to consult Nephrology for assistance.   - EKG obtained which showed sinus rhythm, rate 105, t wave inversions in leads I and aVL which appear unchanged from prior EKG. We will continue monitoring closely on telemetry.      Edited by: Ginette Tarango DO at 7/19/2023 0939     VTE Prophylaxis:   Mechanical Order History:        Ordered        07/13/23 0225  Place Sequential Compression Device  Once            07/13/23 0225  Maintain Sequential Compression Device  Continuous                          Pharmalogical Order History:       None            Dispo: transfer to CCU. Patient is high risk due to worsening of hypoxic respiratory failure, encephalopathy, and development of acute kidney injury..    Ginette Tarango DO  [facility]  07/19/23  09:17 EDT     Electronically signed by Ginette Tarango DO at 07/19/23 1012       Leighann Parkinson APRN at 07/19/23 0857          Date:  07/19/23    CC: Generalized Weakness, Fatigue    Subjective:  Ms. Lutz remains resting in her hospital bed this morning. Her daughter is at bedside and provides the history. Patient remains lethargic and with altered mental status. Her daughter states that she feels that she has had some increasing periods of alertness but at time of examination this is not evident. General surgery saw patient yesterday for possible endoscopy/colonoscopy but states that patient needs to be more alert to drink prep and will try to perform both procedures at one time due to risk of anesthesia secondary to her health conditions. The daughter is without any specific complaints today.    Objective:  Medications:  Scheduled Meds:albumin human, 25 g, Intravenous, BID  aspirin, 81 mg, Oral, Daily  ertapenem, 1,000 mg, Intravenous, Q24H  escitalopram, 10 mg, Oral,  Daily  iron polysaccharides, 150 mg, Oral, Daily  iron sucrose, 300 mg, Intravenous, Q24H  magic barrier cream, 1 application , Topical, BID  metoprolol succinate XL, 25 mg, Oral, Q24H  pantoprazole, 40 mg, Intravenous, BID AC  potassium chloride, 40 mEq, Oral, Daily  rosuvastatin, 20 mg, Oral, Nightly  senna-docusate sodium, 2 tablet, Oral, BID  sodium chloride, 10 mL, Intravenous, Q12H  sodium chloride, 10 mL, Intravenous, Q12H    Continuous Infusions:   PRN Meds:.  acetaminophen    acetaminophen    senna-docusate sodium **AND** polyethylene glycol **AND** bisacodyl **AND** bisacodyl    Magnesium Standard Dose Replacement - Follow Nurse / BPA Driven Protocol    ondansetron    Phosphorus Replacement - Follow Nurse / BPA Driven Protocol    Potassium Replacement - Follow Nurse / BPA Driven Protocol    prochlorperazine    simethicone    sodium chloride    sodium chloride    sodium chloride    sodium chloride    sodium chloride    Physical Exam:  Vital Signs     Patient Vitals for the past 24 hrs:   BP Temp Temp src Pulse Resp SpO2 Weight   07/19/23 0811 113/62 99.4 °F (37.4 °C) Axillary 108 20 97 % --   07/19/23 0500 -- -- -- -- -- -- (!) 163 kg (360 lb 3.2 oz)   07/19/23 0428 104/60 98.1 °F (36.7 °C) Axillary 107 20 90 % --   07/19/23 0004 113/67 98.9 °F (37.2 °C) Axillary 109 20 90 % --   07/18/23 2021 120/71 99.6 °F (37.6 °C) Axillary 105 20 90 % --   07/18/23 1449 111/61 98.1 °F (36.7 °C) Axillary 104 20 90 % --   07/18/23 1112 121/64 98.2 °F (36.8 °C) Axillary 105 20 92 % --     General: Morbidly obese, ill-appearing. Resting in bed with eyes closed, opens eyes to verbal stimuli. Unable to answer questions.   HEENT:  Pupils are equal, round and reactive to light and accommodation  Neck:  No JVD, thyromegaly or lymphadenopathy.  CV:  Tachycardic, regular rhythm, no murmurs, rubs or gallops.  Resp:  Lungs are diminished to auscultation bilaterally.  Abd: Abdomen round, soft, non-tender. Mild-moderate ascites.  Ext:  " No clubbing, cyanosis. 1-2+ edema to bilateral lower extremities.  Neuro: Opens eyes to verbal stimuli, unable to answer questions.     Labs / Studies:    Lab Results   Component Value Date    WBC 27.03 (H) 07/19/2023    HGB 7.2 (L) 07/19/2023    HCT 30.9 (L) 07/19/2023    MCV 83.7 07/19/2023    RDW 25.5 (H) 07/19/2023     (H) 07/19/2023    NEUTRORELPCT 90.3 (H) 07/18/2023    LYMPHORELPCT 1.4 (L) 07/18/2023    MONORELPCT 6.1 07/18/2023    EOSRELPCT 0.2 (L) 07/18/2023    BASORELPCT 0.2 07/18/2023    NEUTROABS 24.83 (H) 07/18/2023    LYMPHSABS 0.38 (L) 07/18/2023     Lab Results   Component Value Date     (H) 07/19/2023    K 3.6 07/19/2023    CO2 43.2 (H) 07/19/2023    CL 94 (L) 07/19/2023    BUN 42 (H) 07/19/2023    CREATININE 2.08 (H) 07/19/2023    GLUCOSE 121 (H) 07/19/2023    CALCIUM 11.1 (H) 07/19/2023    ALKPHOS 59 07/19/2023    AST 15 07/19/2023    ALT 8 07/19/2023    BILITOT 0.6 07/19/2023    ALBUMIN 3.6 07/19/2023    PROTEINTOT 6.2 07/19/2023    MG 2.5 (H) 07/19/2023    PHOS 5.6 (H) 07/18/2023     Imaging Results (Last 72 Hours)       ** No results found for the last 72 hours. **            Assessment & Plan:  Judy Lutz is a 63 y.o. year-old female with     1. Microcytic Anemia  2. Iron Deficiency Anemia  3. Leukocytosis  4. Thrombocytosis  - CBCs from admission were reviewed. Patient with ongoing microcytic anemia.  - Iron profile is with iron 12, iron saturation 3%, with TIBC 359. Ferritin is also low at 25.   - Vitamin B12 and folate are replete.  - Peripheral smear shows severe microcytic anemia with moderate hypochromasia and moderate anisopoikilocytosis with rare schistocytes noted. These findings are all often seen with severe iron deficiency. Anisopoikilocytosis means (RBCs of) varied shape and size, elliptocytes are one of these abnormal shapes (an elongated \"pencil cell\" shaped one), hypochromic (caused by the lack of iron) have the appearance of target cells, and schistocytes, " in her situation, are not due to hemolysis, but rather the fragility of the iron deficient RBCs.   - She has been started on Niferex 150 mg PO daily.  - She was also given Ferric Gluconate 250 mg IV x 3 doses.   - Also ordered for Venofer 300 mg IV x 3 doses. She should complete this today.   - CBC today with ongoing microcytic anemia with Hg 7.2, Hct 30.9. WBC remains elevated (27.03) with elevated platelet count (592K).   - General surgery evaluated patient for possible endoscopy/colonoscopy but feel that it is best to defer this as patient in her current altered state is not able to participate in oral bowel preparation, as well as would prefer to do both procedures at the same time due to increased risk of anesthesia secondary to multiple health conditions.  - Will order repeat reticulocyte count in AM.   - Leukocytosis with predominant neutrophilia is likely multifactorial and secondary to stress (caused by severe iron deficiency) as well as ongoing infection (UTI/sepsis).   - Thrombocytosis likely reactive and secondary to iron deficiency. Will monitor.  - Continue to recommend transfusional support for Hg < 7 or < 8 if symptomatic and platelets <20K.      Thank you for taking such good care of Ms. Lutz.  We will continue to follow with you. Please don't hesitate to call with questions or concerns.       WINDY Merino  07/19/23  08:58 EDT    A total of 35 minutes were spent helping to coordinate this patient’s care today; ~20 minutes of this time were spent face-to-face with the patient in her hospital room this morning, reviewing her interim medical history and counseling on the current treatment and follow up plans. All questions were answered to her satisfaction.       Electronically signed by Leighann Parkinson APRN at 07/19/23 0912       King Broussard MD at 07/19/23 0761                     PROGRESS NOTE         Patient Identification:  Name:  Judy Lutz  Age:  63 y.o.  Sex:   female  :  1960  MRN:  [id number]  Visit Number:  [id number]  Primary Care Provider:  Provider, No Known         LOS: 6 days       ----------------------------------------------------------------------------------------------------------------------  Subjective       Chief Complaints:    Shortness of Breath        Interval History:      Overall patient seems to be doing fairly well from infectious disease standpoint but continues to have significant change of mental status nonverbal fairly sedated this morning with persistent generalized edema.  Daughter is at bedside tearful.    Review of Systems:    Unable to obtain due to mental status and significant sedation.    ----------------------------------------------------------------------------------------------------------------------      Objective       Current Hospital Meds:  albumin human, 25 g, Intravenous, BID  aspirin, 81 mg, Oral, Daily  ertapenem, 1,000 mg, Intravenous, Q24H  escitalopram, 10 mg, Oral, Daily  iron polysaccharides, 150 mg, Oral, Daily  iron sucrose, 300 mg, Intravenous, Q24H  magic barrier cream, 1 application , Topical, BID  metoprolol succinate XL, 25 mg, Oral, Q24H  pantoprazole, 40 mg, Intravenous, BID AC  potassium chloride, 40 mEq, Oral, Daily  potassium chloride, 10 mEq, Intravenous, Q1H  rosuvastatin, 20 mg, Oral, Nightly  senna-docusate sodium, 2 tablet, Oral, BID  sodium chloride, 10 mL, Intravenous, Q12H  sodium chloride, 10 mL, Intravenous, Q12H         ----------------------------------------------------------------------------------------------------------------------    Vital Signs:  Temp:  [98.1 °F (36.7 °C)-99.6 °F (37.6 °C)] 98.1 °F (36.7 °C)  Heart Rate:  [104-109] 107  Resp:  [20] 20  BP: (104-121)/(60-71) 104/60  Mean Arterial Pressure (Non-Invasive) for the past 24 hrs (Last 3 readings):   Noninvasive MAP (mmHg)   23 0428 72   23 0004 82   23 92       SpO2 Percentage    23  07/19/23 0004 07/19/23 0428   SpO2: 90% 90% 90%     SpO2:  [90 %-92 %] 90 %  on  Flow (L/min):  [5-5.5] 5.5;   Device (Oxygen Therapy): nasal cannula;humidified    Body mass index is 51.68 kg/m².  Wt Readings from Last 3 Encounters:   07/19/23 (!) 163 kg (360 lb 3.2 oz)        Intake/Output Summary (Last 24 hours) at 7/19/2023 0797  Last data filed at 7/19/2023 0400  Gross per 24 hour   Intake 0 ml   Output 850 ml   Net -850 ml       Diet: Regular/House Diet; Texture: Soft to Chew (NDD 3); Soft to Chew: Whole Meat; Fluid Consistency: Thin (IDDSI 0)  ----------------------------------------------------------------------------------------------------------------------      Physical Exam:    Constitutional: Daughter at bedside who seems to be very supportive and caring.  Tearful.  Patient is fairly sedated not answering questions hard to arouse.  Remains on 5.5 L nasal cannula.  Neck: Unable to assess neck rigidity  Cardiovascular:  Normal rate, regular rhythm and normal heart sounds with no murmur. No edema.  Pulmonary/Chest: Lung exam is diminished to auscultation bilaterally.  On 5 L nasal cannula.  Abdominal:  Soft.  Bowel sounds are normal.  No distension and no tenderness.   Musculoskeletal:  No edema, no tenderness, and no deformity.  No swelling or redness of joints.  Neurological: Patient is fairly confused and sedated not following commands.  Skin:  Skin is warm and dry.  No rash noted.  No pallor.  Peripheral IVs to the bilateral hands with no evidence of infection or phlebitis.  Psychiatric: Unable to assess.        ----------------------------------------------------------------------------------------------------------------------  Results from last 7 days   Lab Units 07/15/23  1504 07/15/23  1211 07/13/23  0014   HSTROP T ng/L 32* 29* 24*       Results from last 7 days   Lab Units 07/18/23  0052 07/13/23  0014   PROBNP pg/mL 35,507.0* 26,037.0*         Results from last 7 days   Lab Units 07/15/23  1951    PH, ARTERIAL pH units 7.398   PO2 ART mm Hg 54.3*   PCO2, ARTERIAL mm Hg 76.7*   HCO3 ART mmol/L 47.2*       Results from last 7 days   Lab Units 07/19/23  0207 07/18/23  0052 07/17/23  1715 07/17/23  0022 07/16/23  0426 07/13/23  0343 07/12/23  2153   CRP mg/dL 22.62* 24.42*  --   --  20.87*   < >  --    LACTATE mmol/L  --   --   --   --  0.8  --  1.6   WBC 10*3/mm3 27.03* 27.48*  --  19.23* 20.91*   < > 20.79*   HEMOGLOBIN g/dL 7.2* 7.7* 8.1* 7.0* 7.1*   < > 6.4*   HEMATOCRIT % 30.9* 34.4 35.1 28.4* 29.3*   < > 27.0*   MCV fL 83.7 83.5  --  75.5* 76.5*   < > 73.2*   MCHC g/dL 23.3* 22.4*  --  24.6* 24.2*   < > 23.7*   PLATELETS 10*3/mm3 592* 665*  --  420 499*   < > 418   INR   --   --   --   --   --   --  1.38*    < > = values in this interval not displayed.       Results from last 7 days   Lab Units 07/19/23  0207 07/18/23  0857 07/18/23  0052 07/17/23  1136 07/17/23  0022   SODIUM mmol/L 147*  --  148*  --  148*   POTASSIUM mmol/L 3.6 3.7 3.4*  3.4*   < > 2.6*   MAGNESIUM mg/dL 2.5*  --  2.3  --  1.7   CHLORIDE mmol/L 94*  --  93*  --  93*   CO2 mmol/L 43.2*  --  46.2*  --  43.8*   BUN mg/dL 42*  --  27*  --  18   CREATININE mg/dL 2.08*  --  1.31*  --  0.64   CALCIUM mg/dL 11.1*  --  10.9*  --  10.2   GLUCOSE mg/dL 121*  --  133*  --  120*   ALBUMIN g/dL 3.6  --  3.4*  --  3.1*   BILIRUBIN mg/dL 0.6  --  0.6  --  0.7   ALK PHOS U/L 59  --  68  --  60   AST (SGOT) U/L 15  --  12  --  11   ALT (SGPT) U/L 8  --  10  --  10    < > = values in this interval not displayed.     Estimated Creatinine Clearance: 47.5 mL/min (A) (by C-G formula based on SCr of 2.08 mg/dL (H)).  Ammonia   Date Value Ref Range Status   07/19/2023 55 (H) 11 - 51 umol/L Final   07/18/2023 67 (H) 11 - 51 umol/L Final   07/17/2023 79 (H) 11 - 51 umol/L Final       No results found for: HGBA1C, POCGLU  Lab Results   Component Value Date    HGBA1C 5.80 (H) 07/13/2023     Lab Results   Component Value Date    TSH 0.619 07/15/2023       Blood  Culture   Date Value Ref Range Status   07/12/2023 No growth at 5 days  Final   07/12/2023 No growth at 5 days  Final     Urine Culture   Date Value Ref Range Status   07/15/2023 >100,000 CFU/mL Escherichia coli ESBL (A)  Final     Comment:       Consider infectious disease consult.  Susceptibility results may not correlate to clinical outcomes.   07/12/2023 >100,000 CFU/mL Escherichia coli ESBL (A)  Final     Comment:       Consider infectious disease consult.  Susceptibility results may not correlate to clinical outcomes.   07/12/2023 (A)  Final    >100,000 CFU/mL Klebsiella pneumoniae ssp pneumoniae     No results found for: WOUNDCX  No results found for: STOOLCX  No results found for: RESPCX  Pain Management Panel          Latest Ref Rng & Units 7/14/2023   Pain Management Panel   Creatinine, Urine mg/dL 8.5        ----------------------------------------------------------------------------------------------------------------------  Imaging Results (Last 24 Hours)       ** No results found for the last 24 hours. **            ----------------------------------------------------------------------------------------------------------------------    Pertinent Infectious Disease Results                Assessment/Plan       Assessment     Sepsis  ESBL UTI      Plan      Overall patient seems to be doing okay from infectious disease standpoint with persistent low-grade fever but her oxygenation had to be increased to 5.5 L nasal cannula last night.  Her leukocytosis is stable and her CRP level has improved slightly down to 22.62 from 24.42.    Urine culture collected on 7/15/2023 showed growth of over 100,000 colonies of ESBL E. coli and patient received lactulose due to elevated ammonia level with subsequent diarrhea and is showing rising creatinine level up to 2.08.    I am mostly concerned about the patient's mental status that surely could be related to her worsening renal failure and elevated ammonia level and  would recommend neurology evaluation as patient is still very far from her baseline mental status.    Patient is showing evidence of congestive heart failure on her chest x-ray but definitely is at high risk for persistent aspiration pneumonia and would recommend to keep patient in aspiration precautions at all times.    For now would continue with ertapenem as it would provide adequate ESBL coverage as well as aspiration pneumonia coverage and would consider initiating vancomycin if chest x-ray shows any new onset pneumonia or worsening infiltrates.      ANTIMICROBIAL THERAPY    ertapenem (INVanz) 1000 mg in 100ml NS VTB     Code Status:   Code Status and Medical Interventions:   Ordered at: 23 0152     Code Status (Patient has no pulse and is not breathing):    CPR (Attempt to Resuscitate)     Medical Interventions (Patient has pulse or is breathing):    Full Support       King Broussard MD  23  07:23 EDT     Electronically signed by WINDY Hinojosa, 23, 11:23 AM EDT.     Electronically signed by King Broussard MD at 23 0734       Ginette Tarango DO at 23 1711               [facility] HOSPITALIST PROGRESS NOTE     Patient Identification:  Name:  Judy Lutz  Age:  63 y.o.  Sex:  female  :  1960  MRN:  [id number]  Visit Number:  [id number]  ROOM: [address] [place] [address]     Primary Care Provider:  Provider, No Known    Length of stay in inpatient status:  5    Subjective     Chief Compliant:    Chief Complaint   Patient presents with    Shortness of Breath       History of Presenting Illness:    Patient seen and examined this morning, and then again this afternoon with her daughter present at bedside. This morning patient would wake and open eyes, but not answer questions. After receiving lactulose enema and having moderate sized bowel movement, per daughter's report, she is now waking and making short verbal responses, but not yet back to her baseline mental  status. Still not felt to be safe for PO intake due to not being consistently alert enough. Patient has complained of heartburn to her daughter and nursing staff today, and noted to be belching during exam. No other complaints noted.    Objective     Current Hospital Meds:albumin human, 25 g, Intravenous, BID  aspirin, 81 mg, Oral, Daily  ertapenem, 1,000 mg, Intravenous, Q24H  escitalopram, 10 mg, Oral, Daily  Famotidine (PF), 20 mg, Intravenous, Once  iron polysaccharides, 150 mg, Oral, Daily  iron sucrose, 300 mg, Intravenous, Q24H  magic barrier cream, 1 application , Topical, BID  metoprolol succinate XL, 25 mg, Oral, Q24H  pantoprazole, 40 mg, Intravenous, BID AC  potassium chloride, 40 mEq, Oral, Daily  rosuvastatin, 20 mg, Oral, Nightly  senna-docusate sodium, 2 tablet, Oral, BID  sodium chloride, 10 mL, Intravenous, Q12H  sodium chloride, 10 mL, Intravenous, Q12H         Current Antimicrobial Therapy:  Anti-Infectives (From admission, onward)      Ordered     Dose/Rate Route Frequency Start Stop    07/18/23 1259  gentamicin (GARAMYCIN) 540 mg in sodium chloride 0.9 % IVPB        Note to Pharmacy: Separate Administration Time With Ampicillin and Other Penicillins (Ampicillin / Penicillins deactivate gentamicin when infused together).   Ordering Provider: King Broussard MD    5 mg/kg × 107 kg (Adjusted)  over 30 Minutes Intravenous Once 07/18/23 1400 07/18/23 1505    07/17/23 1002  ertapenem (INVanz) 1,000 mg in sodium chloride 0.9 % 100 mL IVPB-VTB        Ordering Provider: King Broussard MD    1,000 mg  200 mL/hr over 30 Minutes Intravenous Every 24 Hours 07/17/23 1100 07/24/23 1059    07/15/23 1915  meropenem (MERREM) 1,000 mg in sodium chloride 0.9 % 100 mL IVPB-VTB        Ordering Provider: Candido Rodney MD    1,000 mg  over 30 Minutes Intravenous Once 07/15/23 2015 07/15/23 2056    07/12/23 2137  cefTRIAXone (ROCEPHIN) 2,000 mg in sodium chloride 0.9 % 100 mL IVPB-VTB        Ordering  Provider: Yusuf Luo DO    2,000 mg  200 mL/hr over 30 Minutes Intravenous Once 07/12/23 2153 07/12/23 2330          Current Diuretic Therapy:  Diuretics (From admission, onward)      Ordered     Dose/Rate Route Frequency Start Stop    07/14/23 0810  furosemide (LASIX) injection 60 mg        Ordering Provider: Leonard Dang, DO    60 mg Intravenous Once 07/14/23 0900 07/14/23 0846    07/13/23 0054  furosemide (LASIX) injection 80 mg        Ordering Provider: Yusuf Luo DO    80 mg Intravenous Once 07/13/23 0110 07/13/23 0114          ----------------------------------------------------------------------------------------------------------------------  Vital Signs:  Temp:  [97.8 °F (36.6 °C)-98.2 °F (36.8 °C)] 98.1 °F (36.7 °C)  Heart Rate:  [] 104  Resp:  [20] 20  BP: (105-127)/(58-73) 111/61  SpO2:  [90 %-97 %] 90 %  on  Flow (L/min):  [4-5] 5;   Device (Oxygen Therapy): nasal cannula  Body mass index is 51.84 kg/m².    Wt Readings from Last 3 Encounters:   07/18/23 (!) 164 kg (361 lb 4.8 oz)     Intake & Output (last 3 days)         07/15 0701  07/16 0700 07/16 0701  07/17 0700 07/17 0701 07/18 0700 07/18 0701 07/19 0700    P.O.  100 0 0    I.V. (mL/kg) 450 (2.6) 1153.5 (7)      Other  600      IV Piggyback        Total Intake(mL/kg) 450 (2.6) 1853.5 (11.3) 0 (0) 0 (0)    Urine (mL/kg/hr) 9125 (2.2) 8150 (2.1) 1150 (0.3) 450 (0.3)    Stool 0 0      Total Output 9125 8150 1150 450    Net -8675 -6296.5 -1150 -450            Stool Unmeasured Occurrence 1 x 3 x            Diet: Regular/House Diet; Texture: Soft to Chew (NDD 3); Soft to Chew: Whole Meat; Fluid Consistency: Thin (IDDSI 0)  ----------------------------------------------------------------------------------------------------------------------  Physical exam:   Constitutional:  Well-developed and well-nourished.  No acute distress.      HENT:  Head:  Normocephalic and atraumatic.    Cardiovascular:  Mild tachycardia, regular  rhythm  Pulmonary/Chest:  No respiratory distress, breath sounds clear to auscultation in anterior and lateral lung fields  Abdominal:  Soft. No distension and no tenderness.   Musculoskeletal:  No deformity.     Neurological: Opens eyes spontaneously, makes one word responses, follows simple commands.   Skin:  Skin is warm and dry. No rash noted.   Peripheral vascular:  No cyanosis. 1+ pitting edema bilateral lower extremities.  Edited by: Ginette Tarango DO at 7/18/2023 1710  ----------------------------------------------------------------------------------------------------------------------  Results from last 7 days   Lab Units 07/18/23  0052 07/17/23  1715 07/17/23  0022 07/16/23  0426 07/13/23  0516 07/13/23  0343 07/12/23  2153   CRP mg/dL 24.42*  --   --  20.87*  --  15.57*  --    LACTATE mmol/L  --   --   --  0.8  --   --  1.6   WBC 10*3/mm3 27.48*  --  19.23* 20.91*   < > 22.40* 20.79*   HEMOGLOBIN g/dL 7.7* 8.1* 7.0* 7.1*   < > 7.4* 6.4*   HEMATOCRIT % 34.4 35.1 28.4* 29.3*   < > 29.4* 27.0*   MCV fL 83.5  --  75.5* 76.5*   < > 72.6* 73.2*   MCHC g/dL 22.4*  --  24.6* 24.2*   < > 25.2* 23.7*   PLATELETS 10*3/mm3 665*  --  420 499*   < > 436 418   INR   --   --   --   --   --   --  1.38*    < > = values in this interval not displayed.     Results from last 7 days   Lab Units 07/15/23  1951   PH, ARTERIAL pH units 7.398   PO2 ART mm Hg 54.3*   PCO2, ARTERIAL mm Hg 76.7*   HCO3 ART mmol/L 47.2*     Results from last 7 days   Lab Units 07/18/23  0857 07/18/23  0052 07/17/23  1413 07/17/23  1136 07/17/23  0022 07/16/23  1830 07/16/23  0426   SODIUM mmol/L  --  148*  --   --  148*  --  149*   POTASSIUM mmol/L 3.7 3.4*  3.4*  --  2.6* 2.6*   < > 2.7*   MAGNESIUM mg/dL  --  2.3  --   --  1.7  --  2.0   CHLORIDE mmol/L  --  93*  --   --  93*  --  99   CO2 mmol/L  --  46.2*  --   --  43.8*  --  42.8*   BUN mg/dL  --  27*  --   --  18  --  20   CREATININE mg/dL  --  1.31*  --   --  0.64  --  0.76   CALCIUM mg/dL   --  10.9*  --   --  10.2  --  10.3   PHOSPHORUS mg/dL  --  5.6* 4.7*  --  1.6*  --  2.6   GLUCOSE mg/dL  --  133*  --   --  120*  --  110*   ALBUMIN g/dL  --  3.4*  --   --  3.1*  --  2.8*   BILIRUBIN mg/dL  --  0.6  --   --  0.7  --  0.5   ALK PHOS U/L  --  68  --   --  60  --  99   AST (SGOT) U/L  --  12  --   --  11  --  12   ALT (SGPT) U/L  --  10  --   --  10  --  12    < > = values in this interval not displayed.   Estimated Creatinine Clearance: 76.2 mL/min (A) (by C-G formula based on SCr of 1.31 mg/dL (H)).  Ammonia   Date Value Ref Range Status   07/18/2023 67 (H) 11 - 51 umol/L Final   07/17/2023 79 (H) 11 - 51 umol/L Final   07/16/2023 55 (H) 11 - 51 umol/L Final     Results from last 7 days   Lab Units 07/15/23  1504 07/15/23  1211 07/13/23  0014   HSTROP T ng/L 32* 29* 24*     Results from last 7 days   Lab Units 07/18/23  0052 07/13/23  0014   PROBNP pg/mL 35,507.0* 26,037.0*         No results found for: HGBA1C, POCGLU  Lab Results   Component Value Date    TSH 0.619 07/15/2023     No results found for: PREGTESTUR, PREGSERUM, HCG, HCGQUANT  Pain Management Panel          Latest Ref Rng & Units 7/14/2023   Pain Management Panel   Creatinine, Urine mg/dL 8.5      Brief Urine Lab Results  (Last result in the past 365 days)        Color   Clarity   Blood   Leuk Est   Nitrite   Protein   CREAT   Urine HCG        07/15/23 1627 Yellow   Cloudy   Small (1+)   Large (3+)   Positive   30 mg/dL (1+)                 Blood Culture   Date Value Ref Range Status   07/12/2023 No growth at 5 days  Final   07/12/2023 No growth at 5 days  Final     Urine Culture   Date Value Ref Range Status   07/15/2023 >100,000 CFU/mL Escherichia coli ESBL (A)  Final     Comment:       Consider infectious disease consult.  Susceptibility results may not correlate to clinical outcomes.   07/12/2023 >100,000 CFU/mL Escherichia coli ESBL (A)  Final     Comment:       Consider infectious disease consult.  Susceptibility results may not  correlate to clinical outcomes.   07/12/2023 (A)  Final    >100,000 CFU/mL Klebsiella pneumoniae ssp pneumoniae     No results found for: WOUNDCX  No results found for: STOOLCX  No results found for: RESPCX  No results found for: AFBCX  Results from last 7 days   Lab Units 07/18/23  0052 07/16/23  0426 07/16/23  0425 07/13/23  0343 07/12/23  2153   PROCALCITONIN ng/mL 0.72*  --  0.27* 0.18 0.29*   LACTATE mmol/L  --  0.8  --   --  1.6   CRP mg/dL 24.42* 20.87*  --  15.57*  --        I have personally looked at the labs and they are summarized above.  ----------------------------------------------------------------------------------------------------------------------  Detailed radiology reports for the last 24 hours:  Imaging Results (Last 24 Hours)       ** No results found for the last 24 hours. 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 coli and Klebsiella pneumoniae  #Anemia noted on admission, suspect due to severe iron deficiency anemia  #Leukocytosis, present on admission, suspect secondary to bone marrow overproduction as a result of the severe iron deficiency anemia  #Hypernatremia due to free water deficit that was present on admission, now resolved  #History of major depressive disorder   #Acute on chronic debility that was present  on admission    - Infectious Disease are following and have adjusted antibiotics to ertapenem. A one time dose of gentamicin was also given today. Appreciate assistance. Urine culture has resulted with growth of ESBL E coli sensitive to ertapenem.  - Patient has had waxing/waning lethargy during admission, but this appears to be improving today. Ammonia level was elevated at 79 yesterday. Repeat ammonia level improved to 67 after a lactulose enema. Additional lactulose enema given today since she was improving but still not able to take PO medications. She does not have a history of liver disease and liver ultrasound did not show evidence of cirrhosis, so hepatic encephalopathy is unlikely. Acute infection has been documented in literature as a cause of nonhepatic hyperammonemia and altered mental status, which may be part of her current presentation.  - Anemia is being followed by Heme/Onc, appreciate assistance. Given evidence of severe iron deficiency anemia Heme/Onc has ordered venofer 300mg IV x3 doses. Continue to monitor with daily cbc. Transfuse for hemoglobin <7 or <8 if symptomatic and platelets <20K.  - Heme/Onc recommended evaluation for endoscopy given severe iron deficiency anemia. Surgery evaluated patient today and have deferred endoscopy for now as it is not emergent and patient has multiple other acute medical problems being treated.  - Leukocytosis felt to be secondary to stress from severe iron deficiency as well as acute infection with UTI.   - Replace potassium, magnesium, and phosphorous per protocol and repeat labs to monitor.  - Continue diuresis per Cardiology, appreciate assistance. Monitor intake/output.  - Patient has not had very much oral intake during admission, and has not been safe for oral intake at all the past 2 days. I discussed with her daughter possible NG tube placement for tube feeds if she has not improved enough for safe PO intake by tomorrow morning, but since she has  improved quite a bit today it is possible she will be alert enough to eat and drink tomorrow.   - Continue protonix 40mg IV BID, will give a one time dose of famotidine 20mg IV due to complaint of heartburn today despite PPI  Edited by: Ginette Tarango DO at 2023 1711    VTE Prophylaxis:   Mechanical Order History:        Ordered        23  Place Sequential Compression Device  Once            23  Maintain Sequential Compression Device  Continuous                          Pharmalogical Order History:       None            Dispo: pending clinical course     Ginette Tarango DO  [facility]  23  17:12 EDT    Electronically signed by Ginette Tarango DO at 23       Nikki Tarango, WINDY at 23 1117                     PROGRESS NOTE         Patient Identification:  Name:  Judy Lutz  Age:  63 y.o.  Sex:  female  :  1960  MRN:  [id number]  Visit Number:  [id number]  Primary Care Provider:  Provider, Bernadette Known         LOS: 5 days       ----------------------------------------------------------------------------------------------------------------------  Subjective       Chief Complaints:    Shortness of Breath        Interval History:      The patient seems to be more awake and alert this morning, though slightly confused.  Remains on 4 L nasal cannula with no apparent distress.  Afebrile.  No reports of diarrhea.  Daughter remains at the bedside.  No new events noted overnight.  Lung exam remains diminished to auscultation bilaterally.  Abdomen soft and rounded with normoactive bowel sounds.  Peripheral IVs in place to bilateral hands without any evidence of infection or phlebitis.  CRP is worsened at 24.42.  Procalcitonin 0.72.  Leukocytosis worsened to 27.48.    Review of Systems:    Constitutional: no fever, chills and night sweats.  Generalized fatigue.  Eyes: no eye drainage, itching or redness.  HEENT: no mouth sores, dysphagia or  nose bleed.  Respiratory: no for shortness of breath, cough or production of sputum.  Cardiovascular: no chest pain, no palpitations, no orthopnea.  Gastrointestinal: no nausea, vomiting or diarrhea. No abdominal pain, hematemesis or rectal bleeding.  Genitourinary: no dysuria or polyuria.  Hematologic/lymphatic: no lymph node abnormalities, no easy bruising or easy bleeding.  Musculoskeletal: no muscle or joint pain.  Skin: No rash and no itching.  Neurological: no loss of consciousness, no seizure, no headache.  Behavioral/Psych: no depression or suicidal ideation.  Endocrine: no hot flashes.  Immunologic: negative.    ----------------------------------------------------------------------------------------------------------------------      Objective       [facility] Meds:  albumin human, 25 g, Intravenous, BID  aspirin, 81 mg, Oral, Daily  ertapenem, 1,000 mg, Intravenous, Q24H  escitalopram, 10 mg, Oral, Daily  iron polysaccharides, 150 mg, Oral, Daily  iron sucrose, 300 mg, Intravenous, Q24H  magic barrier cream, 1 application , Topical, BID  metoprolol succinate XL, 25 mg, Oral, Q24H  pantoprazole, 40 mg, Intravenous, BID AC  potassium chloride, 40 mEq, Oral, Daily  rosuvastatin, 20 mg, Oral, Nightly  senna-docusate sodium, 2 tablet, Oral, BID  sodium chloride, 10 mL, Intravenous, Q12H  sodium chloride, 10 mL, Intravenous, Q12H         ----------------------------------------------------------------------------------------------------------------------    Vital Signs:  Temp:  [97.8 °F (36.6 °C)-98.1 °F (36.7 °C)] 98 °F (36.7 °C)  Heart Rate:  [] 101  Resp:  [20] 20  BP: (105-127)/(58-73) 107/58  Mean Arterial Pressure (Non-Invasive) for the past 24 hrs (Last 3 readings):   Noninvasive MAP (mmHg)   07/18/23 0630 77   07/18/23 0349 94   07/18/23 0035 72     SpO2 Percentage    07/18/23 0035 07/18/23 0349 07/18/23 0630   SpO2: 97% 97% 95%     SpO2:  [95 %-97 %] 95 %  on  Flow (L/min):  [4] 4;   Device  (Oxygen Therapy): nasal cannula    Body mass index is 51.84 kg/m².  Wt Readings from Last 3 Encounters:   07/18/23 (!) 164 kg (361 lb 4.8 oz)        Intake/Output Summary (Last 24 hours) at 7/18/2023 1117  Last data filed at 7/18/2023 0900  Gross per 24 hour   Intake 0 ml   Output 1150 ml   Net -1150 ml     Diet: Regular/House Diet; Texture: Soft to Chew (NDD 3); Soft to Chew: Whole Meat; Fluid Consistency: Thin (IDDSI 0)  ----------------------------------------------------------------------------------------------------------------------      Physical Exam:    Constitutional: Chronically ill-appearing  female.  On 4 L nasal cannula.  Somewhat confused.  Daughter at the bedside.  No respiratory distress.      HENT:  Head: Normocephalic and atraumatic.  Mouth:  Moist mucous membranes.    Eyes:  Conjunctivae and EOM are normal.  No scleral icterus.  Neck:  Neck supple.  No JVD present.    Cardiovascular:  Normal rate, regular rhythm and normal heart sounds with no murmur. No edema.  Pulmonary/Chest: Lung exam is diminished to auscultation bilaterally.  On 4 L nasal cannula.  Abdominal:  Soft.  Bowel sounds are normal.  No distension and no tenderness.   Musculoskeletal:  No edema, no tenderness, and no deformity.  No swelling or redness of joints.  Neurological:  Alert and oriented to person, place, and time.  No facial droop.  No slurred speech.   Skin:  Skin is warm and dry.  No rash noted.  No pallor.  Peripheral IVs to the bilateral hands with no evidence of infection or phlebitis.  Psychiatric:  Normal mood and affect.  Behavior is normal.        ----------------------------------------------------------------------------------------------------------------------  Results from last 7 days   Lab Units 07/15/23  1504 07/15/23  1211 07/13/23  0014   HSTROP T ng/L 32* 29* 24*     Results from last 7 days   Lab Units 07/18/23  0052 07/13/23  0014   PROBNP pg/mL 35,507.0* 26,037.0*       Results from last 7  days   Lab Units 07/15/23  1951   PH, ARTERIAL pH units 7.398   PO2 ART mm Hg 54.3*   PCO2, ARTERIAL mm Hg 76.7*   HCO3 ART mmol/L 47.2*     Results from last 7 days   Lab Units 07/18/23  0052 07/17/23  1715 07/17/23  0022 07/16/23  0426 07/13/23  0516 07/13/23  0343 07/12/23  2153   CRP mg/dL 24.42*  --   --  20.87*  --  15.57*  --    LACTATE mmol/L  --   --   --  0.8  --   --  1.6   WBC 10*3/mm3 27.48*  --  19.23* 20.91*   < > 22.40* 20.79*   HEMOGLOBIN g/dL 7.7* 8.1* 7.0* 7.1*   < > 7.4* 6.4*   HEMATOCRIT % 34.4 35.1 28.4* 29.3*   < > 29.4* 27.0*   MCV fL 83.5  --  75.5* 76.5*   < > 72.6* 73.2*   MCHC g/dL 22.4*  --  24.6* 24.2*   < > 25.2* 23.7*   PLATELETS 10*3/mm3 665*  --  420 499*   < > 436 418   INR   --   --   --   --   --   --  1.38*    < > = values in this interval not displayed.     Results from last 7 days   Lab Units 07/18/23  0857 07/18/23  0052 07/17/23  1136 07/17/23  0022 07/16/23  1830 07/16/23  0426   SODIUM mmol/L  --  148*  --  148*  --  149*   POTASSIUM mmol/L 3.7 3.4*  3.4* 2.6* 2.6*   < > 2.7*   MAGNESIUM mg/dL  --  2.3  --  1.7  --  2.0   CHLORIDE mmol/L  --  93*  --  93*  --  99   CO2 mmol/L  --  46.2*  --  43.8*  --  42.8*   BUN mg/dL  --  27*  --  18  --  20   CREATININE mg/dL  --  1.31*  --  0.64  --  0.76   CALCIUM mg/dL  --  10.9*  --  10.2  --  10.3   GLUCOSE mg/dL  --  133*  --  120*  --  110*   ALBUMIN g/dL  --  3.4*  --  3.1*  --  2.8*   BILIRUBIN mg/dL  --  0.6  --  0.7  --  0.5   ALK PHOS U/L  --  68  --  60  --  99   AST (SGOT) U/L  --  12  --  11  --  12   ALT (SGPT) U/L  --  10  --  10  --  12    < > = values in this interval not displayed.   Estimated Creatinine Clearance: 76.2 mL/min (A) (by C-G formula based on SCr of 1.31 mg/dL (H)).  Ammonia   Date Value Ref Range Status   07/18/2023 67 (H) 11 - 51 umol/L Final   07/17/2023 79 (H) 11 - 51 umol/L Final   07/16/2023 55 (H) 11 - 51 umol/L Final       No results found for: HGBA1C, POCGLU  Lab Results   Component Value Date     HGBA1C 5.80 (H) 07/13/2023     Lab Results   Component Value Date    TSH 0.619 07/15/2023       Blood Culture   Date Value Ref Range Status   07/12/2023 No growth at 5 days  Final   07/12/2023 No growth at 5 days  Final     Urine Culture   Date Value Ref Range Status   07/15/2023 >100,000 CFU/mL Escherichia coli ESBL (A)  Final     Comment:       Consider infectious disease consult.  Susceptibility results may not correlate to clinical outcomes.   07/12/2023 >100,000 CFU/mL Escherichia coli ESBL (A)  Final     Comment:       Consider infectious disease consult.  Susceptibility results may not correlate to clinical outcomes.   07/12/2023 (A)  Final    >100,000 CFU/mL Klebsiella pneumoniae ssp pneumoniae     No results found for: WOUNDCX  No results found for: STOOLCX  No results found for: RESPCX  Pain Management Panel          Latest Ref Rng & Units 7/14/2023   Pain Management Panel   Creatinine, Urine mg/dL 8.5          ----------------------------------------------------------------------------------------------------------------------  Imaging Results (Last 24 Hours)       ** No results found for the last 24 hours. **            ----------------------------------------------------------------------------------------------------------------------    Pertinent Infectious Disease Results                Assessment/Plan       Assessment     Sepsis  ESBL UTI      Plan      The patient seems to be more awake and alert this morning, though slightly confused.  Remains on 4 L nasal cannula with no apparent distress.  Afebrile.  No reports of diarrhea.  Daughter remains at the bedside.  No new events noted overnight.  Lung exam remains diminished to auscultation bilaterally.  Abdomen soft and rounded with normoactive bowel sounds.  Peripheral IVs in place to bilateral hands without any evidence of infection or phlebitis.  CRP is worsened at 24.42.  Procalcitonin 0.72.  Leukocytosis worsened to 27.48.    In the setting of  worsening leukocytosis, CRP and elevated procalcitonin we will add a one-time dose of gentamicin 5 mg/kg today and continue with ertapenem 1 g IV daily.  We will continue to monitor closely and adjust antibiotic coverage as appropriate.        ANTIMICROBIAL THERAPY    ertapenem (INVanz) 1000 mg in 100ml NS VTB     Code Status:   Code Status and Medical Interventions:   Ordered at: 07/13/23 0152     Code Status (Patient has no pulse and is not breathing):    CPR (Attempt to Resuscitate)     Medical Interventions (Patient has pulse or is breathing):    Full Support       WINDY Hinojosa  07/18/23  11:17 EDT     Electronically signed by WINDY Hinojosa, 07/18/23, 11:23 AM EDT.     Electronically signed by Nikki Tarango APRN at 07/18/23 1320          Consult Notes (last 24 hours)        Waylon Tse APRN at 07/18/23 1232        Consult Orders    1. Inpatient General Surgery Consult [[id number]] ordered by Ginette Tarango DO at 07/17/23 1639              Attestation signed by Jorge Aguyao MD at 07/18/23 1524    I have reviewed this documentation and agree.  I have evaluated the patient separately from the APRN.  Majority of the history was obtained from the patient's daughter at bedside.  The patient is unable to provide HPI due to altered mental status.  It appears she has multiple medical issues including pulmonary hypertension, congestive heart failure, fluid overload, possible underlying cirrhosis.  She has anemia of unknown chronicity.  She is never had endoscopy    I feel that she could benefit from upper and lower endoscopy but she needs to be more medically stable.  It does not appear that she is having a brisk GI bleed.  This may be more subacute or chronic issue.  Once she is aware enough to drink a bowel prep, we can perform an EGD and colonoscopy, rather than multiple separate procedures due to the increased risk of anesthesia.                      [facility]  Surgery  CONSULT    Patient Identification:  Name:  Judy Lutz  :  1960  MRN:  [id number]   Admit Date: 2023   Referring Physician:  Provider, No Known    Subjective     Chief complaint weakness, fatigue    Subjective     Patient is a 63 y.o. female who presents with anemia.  Patient's daughter is at bedside who is providing history.  She reports that about 4 weeks ago she began having weakness, shortness of breath and fatigue.  She reports that she has possibly been anemic in the past and had iron issues.  She reports that years ago she had a blood transfusion that was related to delivery during childbirth.  Daughter reports no dark tarry stools.  However, nurse at bedside states that she disimpacted patient and noticed dark stools yesterday.  Patient's daughter reports that she likely takes Motrin and naproxen very frequently due to knee pain.        ---------------------------------------------------------------------------------------------------------------------   Review of Systems:    Review of Systems unable to obtain due to mental status    ---------------------------------------------------------------------------------------------------------------------   History  Past Medical History:   Diagnosis Date    Symptomatic anemia 2023     History reviewed. No pertinent surgical history.  History reviewed. No pertinent family history.  Social History     Tobacco Use    Smoking status: Never    Smokeless tobacco: Never   Vaping Use    Vaping Use: Never used   Substance Use Topics    Alcohol use: Never    Drug use: Never     Medications Prior to Admission   Medication Sig Dispense Refill Last Dose    acetaminophen (TYLENOL) 325 MG tablet Take 2 tablets by mouth Every 6 (Six) Hours As Needed for Mild Pain or Headache.   Past Week    ibuprofen (ADVIL,MOTRIN) 200 MG tablet Take 1 tablet by mouth Every 6 (Six) Hours As Needed for Mild Pain or Headache.   Past Week     Allergies:   Codeine    Objective     Objective     Vital Signs:  Temp:  [97.8 °F (36.6 °C)-98.2 °F (36.8 °C)] 98.2 °F (36.8 °C)  Heart Rate:  [] 105  Resp:  [20] 20  BP: (105-127)/(58-73) 121/64      07/17/23  0400 07/17/23  0530 07/18/23  0500   Weight: (!) 167 kg (368 lb 4.8 oz) (!) 164 kg (361 lb 9.6 oz) (!) 164 kg (361 lb 4.8 oz)     Body mass index is 51.84 kg/m².  ---------------------------------------------------------------------------------------------------------------------       Physical Exam  Physical Exam  Constitutional:       Appearance: She is obese. She is ill-appearing.   HENT:      Head: Normocephalic and atraumatic.      Right Ear: External ear normal.      Left Ear: External ear normal.   Eyes:      Conjunctiva/sclera: Conjunctivae normal.   Cardiovascular:      Rate and Rhythm: Normal rate and regular rhythm.      Pulses: Normal pulses.      Heart sounds: Normal heart sounds.   Pulmonary:      Effort: Pulmonary effort is normal.      Breath sounds: Normal breath sounds.   Abdominal:      General: Abdomen is flat. Bowel sounds are normal. There is no distension.      Palpations: Abdomen is soft.      Tenderness: There is no abdominal tenderness.   Musculoskeletal:         General: Normal range of motion.      Cervical back: Normal range of motion and neck supple.   Skin:     General: Skin is warm and dry.      Capillary Refill: Capillary refill takes 2 to 3 seconds.      Coloration: Skin is pale.   Neurological:      Mental Status: She is disoriented.   ---------------------------------------------------------------------------------------------------------------------   Results Review:   Results from last 7 days   Lab Units 07/15/23  1504 07/15/23  1211 07/13/23  0014   HSTROP T ng/L 32* 29* 24*     Results from last 7 days   Lab Units 07/18/23  0052 07/17/23  1715 07/17/23  0022 07/16/23  0426 07/13/23  0516 07/13/23  0343 07/12/23  2153   CRP mg/dL 24.42*  --   --  20.87*  --  15.57*  --     LACTATE mmol/L  --   --   --  0.8  --   --  1.6   WBC 10*3/mm3 27.48*  --  19.23* 20.91*   < > 22.40* 20.79*   HEMOGLOBIN g/dL 7.7* 8.1* 7.0* 7.1*   < > 7.4* 6.4*   HEMATOCRIT % 34.4 35.1 28.4* 29.3*   < > 29.4* 27.0*   PLATELETS 10*3/mm3 665*  --  420 499*   < > 436 418   INR   --   --   --   --   --   --  1.38*    < > = values in this interval not displayed.     Results from last 7 days   Lab Units 07/15/23  1951   PH, ARTERIAL pH units 7.398   PO2 ART mm Hg 54.3*   PCO2, ARTERIAL mm Hg 76.7*   HCO3 ART mmol/L 47.2*     Results from last 7 days   Lab Units 07/18/23  0857 07/18/23  0052 07/17/23  1136 07/17/23  0022 07/16/23  1830 07/16/23  0426   SODIUM mmol/L  --  148*  --  148*  --  149*   POTASSIUM mmol/L 3.7 3.4*  3.4* 2.6* 2.6*   < > 2.7*   MAGNESIUM mg/dL  --  2.3  --  1.7  --  2.0   CHLORIDE mmol/L  --  93*  --  93*  --  99   CO2 mmol/L  --  46.2*  --  43.8*  --  42.8*   BUN mg/dL  --  27*  --  18  --  20   CREATININE mg/dL  --  1.31*  --  0.64  --  0.76   CALCIUM mg/dL  --  10.9*  --  10.2  --  10.3   GLUCOSE mg/dL  --  133*  --  120*  --  110*   ALBUMIN g/dL  --  3.4*  --  3.1*  --  2.8*   BILIRUBIN mg/dL  --  0.6  --  0.7  --  0.5   ALK PHOS U/L  --  68  --  60  --  99   AST (SGOT) U/L  --  12  --  11  --  12   ALT (SGPT) U/L  --  10  --  10  --  12    < > = values in this interval not displayed.   Estimated Creatinine Clearance: 76.2 mL/min (A) (by C-G formula based on SCr of 1.31 mg/dL (H)).  Ammonia   Date Value Ref Range Status   07/18/2023 67 (H) 11 - 51 umol/L Final   07/17/2023 79 (H) 11 - 51 umol/L Final   07/16/2023 55 (H) 11 - 51 umol/L Final         Blood Culture   Date Value Ref Range Status   07/12/2023 No growth at 5 days  Final   07/12/2023 No growth at 5 days  Final     Urine Culture   Date Value Ref Range Status   07/15/2023 >100,000 CFU/mL Escherichia coli ESBL (A)  Final     Comment:       Consider infectious disease consult.  Susceptibility results may not correlate to clinical  outcomes.   07/12/2023 >100,000 CFU/mL Escherichia coli ESBL (A)  Final     Comment:       Consider infectious disease consult.  Susceptibility results may not correlate to clinical outcomes.   07/12/2023 (A)  Final    >100,000 CFU/mL Klebsiella pneumoniae ssp pneumoniae     No results found for: WOUNDCX  No results found for: STOOLCX    ---------------------------------------------------------------------------------------------------------------------  Assessment / Plan     Impression: 63-year-old female with anemia  Patient Active Problem List   Diagnosis Code    Symptomatic anemia D64.9     Plan:  Defer EGD at this time.  Patient will need to clinically stabilize or require multiple transfusions that would indicate active bleeding.  Patient has chronic medical conditions including pulmonary hypertension, ascites, heart failure, requiring supplemental oxygen.  At this time, we feel that this would be unsafe for patient to undergo anesthesia.      Discussion:  I discussed this case with Dr. Aguayo.        WINDY Gama  07/18/23  12:32 EDT  ---------------------------------------------------------------------------------------------------------------------     Please note that portions of this note were completed with a voice recognition program.    Electronically signed by Jorge Aguayo MD at 07/18/23 2552

## 2023-07-19 NOTE — PLAN OF CARE
Goal Outcome Evaluation:      Pt has been resting this shift. Pt did appear to be in pain this shift. PRN medication was administered, see MAR. No signs and symptoms of acute distress noted. No complaints of chest pain or SOB reported.

## 2023-07-19 NOTE — PROGRESS NOTES
PROGRESS NOTE         Patient Identification:  Name:  Judy Lutz  Age:  63 y.o.  Sex:  female  :  1960  MRN:  [id number]  Visit Number:  [id number]  Primary Care Provider:  Provider, No Known         LOS: 6 days       ----------------------------------------------------------------------------------------------------------------------  Subjective       Chief Complaints:    Shortness of Breath        Interval History:      Overall patient seems to be doing fairly well from infectious disease standpoint but continues to have significant change of mental status nonverbal fairly sedated this morning with persistent generalized edema.  Daughter is at bedside tearful.    Review of Systems:    Unable to obtain due to mental status and significant sedation.    ----------------------------------------------------------------------------------------------------------------------      Objective       Current [facility] Meds:  albumin human, 25 g, Intravenous, BID  aspirin, 81 mg, Oral, Daily  ertapenem, 1,000 mg, Intravenous, Q24H  escitalopram, 10 mg, Oral, Daily  iron polysaccharides, 150 mg, Oral, Daily  iron sucrose, 300 mg, Intravenous, Q24H  magic barrier cream, 1 application , Topical, BID  metoprolol succinate XL, 25 mg, Oral, Q24H  pantoprazole, 40 mg, Intravenous, BID AC  potassium chloride, 40 mEq, Oral, Daily  potassium chloride, 10 mEq, Intravenous, Q1H  rosuvastatin, 20 mg, Oral, Nightly  senna-docusate sodium, 2 tablet, Oral, BID  sodium chloride, 10 mL, Intravenous, Q12H  sodium chloride, 10 mL, Intravenous, Q12H         ----------------------------------------------------------------------------------------------------------------------    Vital Signs:  Temp:  [98.1 °F (36.7 °C)-99.6 °F (37.6 °C)] 98.1 °F (36.7 °C)  Heart Rate:  [104-109] 107  Resp:  [20] 20  BP: (104-121)/(60-71) 104/60  Mean Arterial Pressure (Non-Invasive) for the past 24 hrs (Last 3 readings):   Noninvasive MAP  (mmHg)   07/19/23 0428 72   07/19/23 0004 82   07/18/23 2021 92       SpO2 Percentage    07/18/23 2021 07/19/23 0004 07/19/23 0428   SpO2: 90% 90% 90%     SpO2:  [90 %-92 %] 90 %  on  Flow (L/min):  [5-5.5] 5.5;   Device (Oxygen Therapy): nasal cannula;humidified    Body mass index is 51.68 kg/m².  Wt Readings from Last 3 Encounters:   07/19/23 (!) 163 kg (360 lb 3.2 oz)        Intake/Output Summary (Last 24 hours) at 7/19/2023 0723  Last data filed at 7/19/2023 0400  Gross per 24 hour   Intake 0 ml   Output 850 ml   Net -850 ml       Diet: Regular/House Diet; Texture: Soft to Chew (NDD 3); Soft to Chew: Whole Meat; Fluid Consistency: Thin (IDDSI 0)  ----------------------------------------------------------------------------------------------------------------------      Physical Exam:    Constitutional: Daughter at bedside who seems to be very supportive and caring.  Tearful.  Patient is fairly sedated not answering questions hard to arouse.  Remains on 5.5 L nasal cannula.  Neck: Unable to assess neck rigidity  Cardiovascular:  Normal rate, regular rhythm and normal heart sounds with no murmur. No edema.  Pulmonary/Chest: Lung exam is diminished to auscultation bilaterally.  On 5 L nasal cannula.  Abdominal:  Soft.  Bowel sounds are normal.  No distension and no tenderness.   Musculoskeletal:  No edema, no tenderness, and no deformity.  No swelling or redness of joints.  Neurological: Patient is fairly confused and sedated not following commands.  Skin:  Skin is warm and dry.  No rash noted.  No pallor.  Peripheral IVs to the bilateral hands with no evidence of infection or phlebitis.  Psychiatric: Unable to assess.        ----------------------------------------------------------------------------------------------------------------------  Results from last 7 days   Lab Units 07/15/23  1504 07/15/23  1211 07/13/23  0014   HSTROP T ng/L 32* 29* 24*       Results from last 7 days   Lab Units 07/18/23  0052  07/13/23  0014   PROBNP pg/mL 35,507.0* 26,037.0*         Results from last 7 days   Lab Units 07/15/23  1951   PH, ARTERIAL pH units 7.398   PO2 ART mm Hg 54.3*   PCO2, ARTERIAL mm Hg 76.7*   HCO3 ART mmol/L 47.2*       Results from last 7 days   Lab Units 07/19/23  0207 07/18/23  0052 07/17/23  1715 07/17/23  0022 07/16/23  0426 07/13/23  0343 07/12/23  2153   CRP mg/dL 22.62* 24.42*  --   --  20.87*   < >  --    LACTATE mmol/L  --   --   --   --  0.8  --  1.6   WBC 10*3/mm3 27.03* 27.48*  --  19.23* 20.91*   < > 20.79*   HEMOGLOBIN g/dL 7.2* 7.7* 8.1* 7.0* 7.1*   < > 6.4*   HEMATOCRIT % 30.9* 34.4 35.1 28.4* 29.3*   < > 27.0*   MCV fL 83.7 83.5  --  75.5* 76.5*   < > 73.2*   MCHC g/dL 23.3* 22.4*  --  24.6* 24.2*   < > 23.7*   PLATELETS 10*3/mm3 592* 665*  --  420 499*   < > 418   INR   --   --   --   --   --   --  1.38*    < > = values in this interval not displayed.       Results from last 7 days   Lab Units 07/19/23  0207 07/18/23  0857 07/18/23  0052 07/17/23  1136 07/17/23  0022   SODIUM mmol/L 147*  --  148*  --  148*   POTASSIUM mmol/L 3.6 3.7 3.4*  3.4*   < > 2.6*   MAGNESIUM mg/dL 2.5*  --  2.3  --  1.7   CHLORIDE mmol/L 94*  --  93*  --  93*   CO2 mmol/L 43.2*  --  46.2*  --  43.8*   BUN mg/dL 42*  --  27*  --  18   CREATININE mg/dL 2.08*  --  1.31*  --  0.64   CALCIUM mg/dL 11.1*  --  10.9*  --  10.2   GLUCOSE mg/dL 121*  --  133*  --  120*   ALBUMIN g/dL 3.6  --  3.4*  --  3.1*   BILIRUBIN mg/dL 0.6  --  0.6  --  0.7   ALK PHOS U/L 59  --  68  --  60   AST (SGOT) U/L 15  --  12  --  11   ALT (SGPT) U/L 8  --  10  --  10    < > = values in this interval not displayed.     Estimated Creatinine Clearance: 47.5 mL/min (A) (by C-G formula based on SCr of 2.08 mg/dL (H)).  Ammonia   Date Value Ref Range Status   07/19/2023 55 (H) 11 - 51 umol/L Final   07/18/2023 67 (H) 11 - 51 umol/L Final   07/17/2023 79 (H) 11 - 51 umol/L Final       No results found for: HGBA1C, POCGLU  Lab Results   Component Value  Date    HGBA1C 5.80 (H) 07/13/2023     Lab Results   Component Value Date    TSH 0.619 07/15/2023       Blood Culture   Date Value Ref Range Status   07/12/2023 No growth at 5 days  Final   07/12/2023 No growth at 5 days  Final     Urine Culture   Date Value Ref Range Status   07/15/2023 >100,000 CFU/mL Escherichia coli ESBL (A)  Final     Comment:       Consider infectious disease consult.  Susceptibility results may not correlate to clinical outcomes.   07/12/2023 >100,000 CFU/mL Escherichia coli ESBL (A)  Final     Comment:       Consider infectious disease consult.  Susceptibility results may not correlate to clinical outcomes.   07/12/2023 (A)  Final    >100,000 CFU/mL Klebsiella pneumoniae ssp pneumoniae     No results found for: WOUNDCX  No results found for: STOOLCX  No results found for: RESPCX  Pain Management Panel          Latest Ref Rng & Units 7/14/2023   Pain Management Panel   Creatinine, Urine mg/dL 8.5        ----------------------------------------------------------------------------------------------------------------------  Imaging Results (Last 24 Hours)       ** No results found for the last 24 hours. **            ----------------------------------------------------------------------------------------------------------------------    Pertinent Infectious Disease Results                Assessment/Plan       Assessment     Sepsis  ESBL UTI      Plan      Overall patient seems to be doing okay from infectious disease standpoint with persistent low-grade fever but her oxygenation had to be increased to 5.5 L nasal cannula last night.  Her leukocytosis is stable and her CRP level has improved slightly down to 22.62 from 24.42.    Urine culture collected on 7/15/2023 showed growth of over 100,000 colonies of ESBL E. coli and patient received lactulose due to elevated ammonia level with subsequent diarrhea and is showing rising creatinine level up to 2.08.    I am mostly concerned about the  patient's mental status that surely could be related to her worsening renal failure and elevated ammonia level and would recommend neurology evaluation as patient is still very far from her baseline mental status.    Patient is showing evidence of congestive heart failure on her chest x-ray but definitely is at high risk for persistent aspiration pneumonia and would recommend to keep patient in aspiration precautions at all times.    For now would continue with ertapenem as it would provide adequate ESBL coverage as well as aspiration pneumonia coverage and would consider initiating vancomycin if chest x-ray shows any new onset pneumonia or worsening infiltrates.      ANTIMICROBIAL THERAPY    ertapenem (INVanz) 1000 mg in 100ml NS VTB     Code Status:   Code Status and Medical Interventions:   Ordered at: 07/13/23 0152     Code Status (Patient has no pulse and is not breathing):    CPR (Attempt to Resuscitate)     Medical Interventions (Patient has pulse or is breathing):    Full Support       King Broussard MD  07/19/23  07:23 EDT     Electronically signed by WINDY Hinojosa, 07/18/23, 11:23 AM EDT.

## 2023-07-19 NOTE — CONSULTS
Requesting: Dr. Tarango  Reason: Acute hypercapnic and hypoxemic respiratory failure    HPI:    Mrs. Lutz is a 62yo female admitted 7/12/2023 with chief complaint of three month history of progressive weakness and shortness of breath. It does not appear she got much routine medical care prior to admission.  She was found to have significant anemia on admission, cardiomegaly, pulmonary hypertension, anasarca and UTI.  Since that time she has had continued encephalopathy.  She has been on antibiotics and diuresis inititated but has been held here lately for concern of intravascular volume depletion. She has  had some mildly elevated ammonia levels without evidence of cirrhosis.  She has continued leukcytosis, anemia and thrombocytosis with minimally elevated procalcitonin.  Her renal function has worsend considerably over the last few days.  She appears to have some degree of hypercapnia that has remain compensated up until blood gas obtatined today and her mental status was worse so she was transferred to ICU at this point. Patient currently unable to provide any history.     Past Medical History:   Diagnosis Date    Symptomatic anemia 07/13/2023     Social History     Socioeconomic History    Marital status:    Tobacco Use    Smoking status: Never    Smokeless tobacco: Never   Vaping Use    Vaping Use: Never used   Substance and Sexual Activity    Alcohol use: Never    Drug use: Never    Sexual activity: Defer     History reviewed. No pertinent family history.  MEDS: reviewed as per chart  ALL: codeine  ROS: UTO    Vital Sign Min/Max for last 24 hours  Temp  Min: 97.3 °F (36.3 °C)  Max: 99.6 °F (37.6 °C)   BP  Min: 96/53  Max: 121/64   Pulse  Min: 104  Max: 109   Resp  Min: 19  Max: 26   SpO2  Min: 90 %  Max: 97 %   Flow (L/min)  Min: 5.5  Max: 5.5   Weight  Min: 163 kg (360 lb 3.2 oz)  Max: 163 kg (360 lb 3.2 oz)   In/out listed as -21 liters since admit; wt down 40lbs since admission  GEN:   appears ill,  obese, doesn't arouse much, grunts some  HEENT: PERRL, EOMI, no icterus, mmm, no JVD, trachea midline, neck supple  CHEST: decreased ae bilat, no wheezes, no crackles, no use of accessory muscles  CV: tachy, regular, no m/g/r  ABD: soft, nt, nd +bs, no hepatosplenomegaly --exam limited by obesity  EXT: no c/c/ +edema  SKIN: no rashes, no xanthomas, increased turgor  LYMPH: no palpable cervical or supraclavicular lymphadenopathy  NEURO: CN 2-12 intact and symmetric bilaterally --non focal, exam limited by NIPPV and patient cooperation  PSYCH: deferred, unable to assess  MSK: normal muscular development    Labs: 7/19: reviewed:  7.27/101/67 on nasal cannula  Wbc 27  Hgb 7.2  Plts 592  Glucose 121  Bun 42  Cr 2.08  Na 147  Bicarb 43  Calcium 11.1 (7/17 albumin 3.1)    CT chest 7/19: reviewed: some bilateral upper lobe infiltrates, consolidative in nature. New from prior images. Cardiomegaly appears quite striking on ct imaging     A/P:  Acute on chronic hypercapnic respiratory failure -- will trial NIPPV. Suspect her decompensation is multifactorial.  If no improvement will have resort to intubation and mechanical ventilation.   Pneumonia - will defer antibiotics to infectious disease  TRACIE -- miles catheter in place, repeat urine lytes.  Agree given wt is down 40lbs since admission that likely have intravascular depletion with peripheral edema. Avoid nephrotoxins, support bp. Consider nephrology consultation  Cardiomegaly -- quite striking on CT imaging, severity on echo seems less?  Pericardial effusion -- I don't see any pulsus alternans, no hypotension, no tamponade physio seen on echo in past  Pulmonary hypertension -- suspect group II and III.  I can't r/o IV and can't get ct angio or v/q at this point -- will check venous doppler, d dimer will undoubtably be high.   Anemia -- if no active bleeding has been noted, it would be reasonable to place patient on prophylactic heparin  Leukocytosis  Thrombocytosis  Morbid  obesity - suspect patient has underlying OHV and NANCY both, she will need to continue NIPPV at least at HS even if gas improves here in the short term  NSTEMI type II  Encephalopathy -- suspect worsening today is due to hypercapnia    D/w RT and with daughter at bedside.  Will repeat gas shortly.     CC 40 mins excluding any future billable procedures.

## 2023-07-19 NOTE — SIGNIFICANT NOTE
07/19/23 1002   OTHER   Discipline physical therapist   Rehab Time/Intention   Session Not Performed unable to treat, medical status change  (pt. transferred to CCU 2/2 medical decline.  Will d/c from skilled PT at this time.  Please reconsult if appropriate.)

## 2023-07-19 NOTE — CASE MANAGEMENT/SOCIAL WORK
Discharge Planning Assessment   [facility]     Patient Name: Judy Lutz  MRN: [id number]  Today's Date: 7/19/2023    Admit Date: 7/12/2023     Discharge Plan       Row Name 07/19/23 1308       Plan    Plan Pt not medically stable at this time. Pt transferred from [address] [place] [address] to CCU on this date. SS to follow.          SCOTT Mar

## 2023-07-19 NOTE — PROGRESS NOTES
Patient reevaluated this afternoon after being on BiPAP most of the day. She is still sleepy, but is now waking up more and trying to answer questions and follow commands. CO2 has improved slowly on ABGs earlier today. Continue BiPAP and repeat ABG in a.m. CT chest showed new bilateral pneumonia and MRSA screen was positive, so vancomycin was added to patient's antibiotic regimen. She has been oliguric today and renal function has worsened so I have requested Nephrology consult as well.    Ginette Tarango DO  07/19/23   19:07 EDT

## 2023-07-20 NOTE — PROGRESS NOTES
Neurology Progress Note       Chief Complaint:  AMS    Subjective    Subjective     Subjective:  Patient with waxing waning mental status yesterday afternoon while on BiPAP.  Per family and nursing staff patient not following commands not responding symptomatic 830 last night.  This morning noted to be still poorly responsive, blood gases worsening, and she was intubated per pulmonology.  She was seen this morning on propofol infusion.    Review of Systems   Unable to perform ROS: Intubated          Objective    Objective      Temp:  [98.3 °F (36.8 °C)-99.1 °F (37.3 °C)] 98.7 °F (37.1 °C)  Heart Rate:  [] 82  Resp:  [21-31] 28  BP: (100-145)/(38-90) 100/50  FiO2 (%):  [55 %-100 %] 60 %        Neurological Exam  Mental Status  Unresponsive to painful stimuli. Patient is nonverbal.  Patient intubated on mechanical ventilator with propofol infusing.    Cranial Nerves  CN III, IV, VI: Pupils equal round and reactive to light bilaterally.  CN VII: No obvious facial droop.  CN IX, X:  Right: Gag is reduced.  Left: Gag is reduced.    Motor  Normal muscle bulk throughout. Decreased muscle tone. No abnormal involuntary movements.    Physical Exam  Vitals and nursing note reviewed.   Constitutional:       Appearance: She is obese. She is ill-appearing.   HENT:      Head: Normocephalic.      Mouth/Throat:      Comments: ET tube in place  Eyes:      Pupils: Pupils are equal, round, and reactive to light.   Cardiovascular:      Rate and Rhythm: Normal rate.   Pulmonary:      Comments: Intubated on ventilator  Skin:     General: Skin is warm and dry.       Hospital Meds:  Scheduled- aspirin, 81 mg, Oral, Daily  ertapenem, 1,000 mg, Intravenous, Q24H  escitalopram, 10 mg, Oral, Daily  heparin (porcine), 5,000 Units, Subcutaneous, Q8H  iron polysaccharides, 150 mg, Oral, Daily  magic barrier cream, 1 application , Topical, BID  metoprolol succinate XL, 25 mg, Oral, Q24H  pantoprazole, 40 mg, Intravenous, BID AC  potassium  chloride, 40 mEq, Oral, Daily  potassium chloride, 10 mEq, Intravenous, Q1H  rosuvastatin, 20 mg, Oral, Nightly  senna-docusate sodium, 2 tablet, Oral, BID  sodium chloride, 10 mL, Intravenous, Q12H  sodium chloride, 10 mL, Intravenous, Q12H      Infusions- norepinephrine, 0.01-0.07 mcg/kg/min, Last Rate: Stopped (07/20/23 0244)  propofol, 5-50 mcg/kg/min, Last Rate: 40 mcg/kg/min (07/20/23 1021)  sodium chloride, 75 mL/hr, Last Rate: Stopped (07/20/23 0918)       PRNs-   acetaminophen    acetaminophen    senna-docusate sodium **AND** polyethylene glycol **AND** bisacodyl **AND** bisacodyl    [DISCONTINUED] senna-docusate sodium **AND** [DISCONTINUED] polyethylene glycol **AND** [DISCONTINUED] bisacodyl **AND** bisacodyl    fentaNYL citrate (PF)    Magnesium Standard Dose Replacement - Follow Nurse / BPA Driven Protocol    ondansetron    Phosphorus Replacement - Follow Nurse / BPA Driven Protocol    Potassium Replacement - Follow Nurse / BPA Driven Protocol    prochlorperazine    simethicone    sodium chloride    sodium chloride    sodium chloride    sodium chloride    sodium chloride    Results Review:    I reviewed the patient's new clinical results.    Imaging Results (Last 24 Hours)       Procedure Component Value Units Date/Time    XR Chest 1 View [[id number]] Collected: 07/20/23 0847     Updated: 07/20/23 0851    Narrative:      EXAM:    XR Chest, 1 View     EXAM DATE:    7/20/2023 8:23 AM     CLINICAL HISTORY:    respiratory failure; D64.9-Anemia, unspecified; A41.9-Sepsis,  unspecified organism; R65.20-Severe sepsis without septic shock;  J96.01-Acute respiratory failure with hypoxia; N39.0-Urinary tract  infection, site not specified; I50.31-Acute diastolic (congestive) heart  failure     TECHNIQUE:    Frontal view of the chest.     COMPARISON:    07/15/2023     FINDINGS:    Lungs and pleural spaces:  Left basal consolidation.  Mild pulmonary  vascular congestion.  No pneumothorax.    Heart:  Cardiomegaly.     Mediastinum:  Unremarkable.    Bones/joints:  Unremarkable.    Tubes, lines and devices:  ET tube with tip at level of clavicles.  NG  tube extends into the distal stomach.       Impression:      1.  Support devices as above.  2.  Cardiomegaly and pulmonary vascular congestion.  3.  Left basilar airspace disease.     This report was finalized on 7/20/2023 8:48 AM by Dr. Kvng Tijerina MD.             Results for orders placed during the hospital encounter of 07/12/23    Adult Transthoracic Echo Complete W/ Cont if Necessary Per Protocol    Interpretation Summary  Images from the original result were not included.      Normal left ventricular cavity size noted. Left ventricular wall thickness is consistent with mild concentric hypertrophy. All left ventricular wall segments contract normally.    Left ventricular ejection fraction appears to be 51 - 55%.    Left ventricular diastolic function is consistent with (grade I) impaired relaxation.    There is a moderate (1-2cm) pericardial effusion adjacent to the left ventricle. There is no evidence of cardiac tamponade.    The aortic valve is structurally normal with no regurgitation or stenosis present.    The mitral valve is structurally normal with no significant stenosis present. Trace mitral valve regurgitation is present.    Mild tricuspid valve regurgitation is present. Estimated right ventricular systolic pressure from tricuspid regurgitation is moderately elevated (45-55 mmHg).    Moderate pulmonary hypertension is present.    There is a moderate (1-2cm) pericardial effusion adjacent to the left ventricle. The effusion is fluid filled. There is no evidence of cardiac tamponade.    EEG    Result Date: 7/19/2023  Diffuse cerebral dysfunction of at least mild degree but nonspecific This finding is most commonly seen with encephalopathy due to toxic/metabolic cause No evidence for epilepsy is seen This report is transcribed using the Dragon dictation system.      CT  Abdomen Pelvis Without Contrast    Result Date: 7/13/2023  Impression:  1.  No bowel obstruction, free air, or abscess. 2.  Moderate ascites in the abdomen and pelvis. 3.  No evidence of acute colitis or diverticulitis. 4.  Biparietal renal calculi without hydronephrosis. 5.  Probable cholelithiasis. 6.  Small air in the nondependent portion of the urinary bladder, likely related to recent catheterization versus cystitis.  Correlate clinically.   This report was finalized on 7/13/2023 12:26 AM by Ricky Perkins MD.      CT Head Without Contrast    Result Date: 7/19/2023    Unremarkable exam demonstrating no CT evidence of acute intracranial findings.  This report was finalized on 7/19/2023 10:17 AM by Dr. Kvng Tijerina MD.      CT Head Without Contrast    Result Date: 7/13/2023  Impression:  No acute intracranial abnormality.  This report was finalized on 7/13/2023 12:25 AM by Ricky Perkins MD.      CT Chest Without Contrast Diagnostic    Result Date: 7/19/2023  1.  Development of bilateral multilobar partially consolidative pneumonia. Combination atelectasis may be considered. 2.  Slight increase in changes of CHF now with interstitial edema. Miniscule nonloculated pleural effusions with stable marked cardiac enlargement. 3.  Questionable decrease in the degree of anasarca with persistent upper abdominal ascites noted. 4.  Other incidental/nonacute findings as above.  This report was finalized on 7/19/2023 10:20 AM by Dr. Kvng Tijerina MD.      CT Chest Without Contrast Diagnostic    Result Date: 7/13/2023  Impression:  1.  Marked cardiomegaly and small-to-moderate pericardial effusion with mild pulmonary vascular congestion. 2.  No focal infiltrate or pleural effusion.  This report was finalized on 7/13/2023 12:26 AM by Ricky Perkins MD.      US Liver    Result Date: 7/15/2023  1. Small volume ascites 2. The liver is homogeneous  This report was finalized on 7/15/2023 10:59 AM by Dr. Nicho Reeves MD.      XR Chest 1  View    Result Date: 7/20/2023  1.  Support devices as above. 2.  Cardiomegaly and pulmonary vascular congestion. 3.  Left basilar airspace disease.  This report was finalized on 7/20/2023 8:48 AM by Dr. Kvng Tijerina MD.      XR Chest 1 View    Result Date: 7/15/2023   1.  Enlarged heart size. 2.  Central pulmonary vascular congestion with mild edema. 3.  Hypoinflated lungs. 4.  No pleural effusion or pneumothorax.  This report was finalized on 7/15/2023 4:52 PM by Sacha Syed MD.      XR Chest 1 View    Result Date: 7/12/2023  Impression:  Cardiomegaly.  Lungs clear.  This report was finalized on 7/12/2023 10:59 PM by Wilber Lopez MD.        Lab Results   Component Value Date    HGBA1C 5.80 (H) 07/13/2023      No results found for: CHOL, CHLPL, TRIG, HDL, LDL, LDLDIRECT   Lab Results   Component Value Date    WBC 18.35 (H) 07/20/2023    HGB 7.2 (L) 07/20/2023    HCT 29.9 (L) 07/20/2023    MCV 84.5 07/20/2023     (H) 07/20/2023      Lab Results   Component Value Date    GLUCOSE 111 (H) 07/20/2023    BUN 56 (H) 07/20/2023    CREATININE 2.65 (H) 07/20/2023    BCR 21.1 07/20/2023    K 3.5 07/20/2023    CO2 45.2 (H) 07/20/2023    CALCIUM 11.2 (H) 07/20/2023    ALBUMIN 3.3 (L) 07/20/2023    AST 14 07/20/2023    ALT 8 07/20/2023      Lab Results   Component Value Date    TSH 0.619 07/15/2023     Lab Results   Component Value Date    RWMGKJUO62 1,133 (H) 07/13/2023     Lab Results   Component Value Date    FOLATE 9.36 07/13/2023             Assessment/Plan     Assessment/Plan:  63-year-old white female apparently with no significant medical care prior to admission presented to ED with weakness, fatigue and shortness of breath.  Patient also reports some on and off confusion in a couple days leading up to the hospitalization.  Remained with waxing waning mentation during stay, seem to be worsening since Monday.  Initially being treated for septic UTI, now noted to have pneumonia.  ABG showed worsening gases  and she was placed on BiPAP, worsening again this morning and she was intubated, now sedated on mechanical ventilator.  CT head performed 07/19/2023 was negative for any acute changes.    EEG showed mild/mod nonspecific generalized slowing, no evidence of epilepsy  Initial CT head negative for acute changes     #Encephalopathy, this was present on admission and felt to be related to her UTI and respiratory failure. Encephalopathy had worsened during her stay but was also noted to be hypercapnic, also pneumonia recently noted on CT chest.  #Acute on chronic hypercapnic respiratory failure  #Pneumonia          -We will continue to follow and monitor her mental status as hypercapnia improves, and sedation is weaned, consider if LP if mental status not improving off sedation  -Initial CT head negative for any acute changes  -May need MRI brain once off vent  -Antibiotics per infectious disease     The case was discussed with the patient, Dr. Cross, her family at bedside, and nursing staff.      Kvng Matson, WINDY  07/20/23  11:08 EDT

## 2023-07-20 NOTE — PROCEDURES
Patient Name:  Judy Lutz  YOB: 1960  [id number]      Brief Operative Note    Pre-Op Diagnosis: Sepsis, respiratory failure    Post-Op Diagnosis: same    Procedure: Central line placement    Provider: WINDY Gama    Anes: Local    Specimens: None      Tubes/Lines: 7 Fr triple lumen catheter at the 15 cm level in the right internal jugular    DESCRIPTION OF PROCEDURE:      After obtaining informed consent, the patient remained in their room and was placed in the Trendelenburg position.  The right neck and chest were prepped and draped in standard sterile fashion.  Ultrasound was used to identify the right internal jugular vein and carotid artery.  Local anesthetic was administered to the skin.  Finder needle advanced  into the right internal jugular vein with ultrasound guidance.  A guidewire was placed through the needle.  Stab incision was made in the skin with an 11 blade scalpel. The needle was removed over the guidewire and the tract dilated.  Using the Seldinger technique a preflushed 7 Haitian triple  lumen catheter was placed. The guidewire was removed.  All 3 ports viktor blood easily and were flushed with normal saline.  The catheter was sutured to the skin, dressed in standard sterile fashion and covered with a dry sterile dressing.  There were no  immediate complications.    Complications: None    Disposition: Stable. Post - procedure CXR pending            WINDY Gama  7/20/2023  14:00 EDT

## 2023-07-20 NOTE — PLAN OF CARE
Goal Outcome Evaluation:              Outcome Evaluation: Pt intubated/sedated this shift. Propofol infusing. Minimal UOP. No BM. VSS. Remains in SR with PVCs and PACs. OG to suction-700 mL out of bilious fluid. CVC to RIJ.

## 2023-07-20 NOTE — PROGRESS NOTES
[facility] HOSPITALIST PROGRESS NOTE     Patient Identification:  Name:  Judy Lutz  Age:  63 y.o.  Sex:  female  :  1960  MRN:  [id number]  Visit Number:  [id number]  ROOM: [address]     Primary Care Provider:  Provider, No Known    Length of stay in inpatient status:  7    Subjective     Chief Compliant:    Chief Complaint   Patient presents with    Shortness of Breath       History of Presenting Illness:    Patient with persistent hypercapnia overnight resulting in respiratory acidosis. Patient has also been minimally responsive since last night. Repeat ABG ordered this AM with worsening acidosis. Patient lethargic on exam. Decision made to intubate patient. I talked about this with patient's daughter who was bedside. Dr. Mathis intubated patient. Dark colored material in oropharynx. Patient desaturated quickly but came up once ventilated via placed endotracheal tube.      ROS:  Otherwise 10 point ROS negative other than documented above in HPI.     Objective     Current Hospital Meds:aspirin, 81 mg, Oral, Daily  ertapenem, 1,000 mg, Intravenous, Q24H  escitalopram, 10 mg, Oral, Daily  heparin (porcine), 5,000 Units, Subcutaneous, Q8H  iron polysaccharides, 150 mg, Oral, Daily  magic barrier cream, 1 application , Topical, BID  metoprolol succinate XL, 25 mg, Oral, Q24H  pantoprazole, 40 mg, Intravenous, BID AC  potassium chloride, 40 mEq, Oral, Daily  potassium chloride, 10 mEq, Intravenous, Q1H  rosuvastatin, 20 mg, Oral, Nightly  senna-docusate sodium, 2 tablet, Oral, BID  sodium chloride, 10 mL, Intravenous, Q12H  sodium chloride, 10 mL, Intravenous, Q12H  vancomycin, 1,000 mg, Intravenous, Q24H    norepinephrine, 0.01-0.07 mcg/kg/min, Last Rate: Stopped (23 0244)  sodium chloride, 75 mL/hr        Current Antimicrobial Therapy:  Anti-Infectives (From admission, onward)      Ordered     Dose/Rate Route Frequency Start Stop    23 1612  vancomycin (VANCOCIN) 1,000 mg in  sodium chloride 0.9 % 250 mL IVPB-VTB        Ordering Provider: Ginette Tarango DO    1,000 mg  250 mL/hr over 60 Minutes Intravenous Every 24 Hours 07/20/23 1700 07/27/23 1659    07/19/23 1607  vancomycin 2500 mg/500 mL 0.9% NS IVPB ([facility])        Ordering Provider: Ginette Tarango DO    2,500 mg  over 180 Minutes Intravenous Once 07/19/23 1700 07/19/23 2116    07/18/23 1259  gentamicin (GARAMYCIN) 540 mg in sodium chloride 0.9 % IVPB        Note to Pharmacy: Separate Administration Time With Ampicillin and Other Penicillins (Ampicillin / Penicillins deactivate gentamicin when infused together).   Ordering Provider: King Broussard MD    5 mg/kg × 107 kg (Adjusted)  over 30 Minutes Intravenous Once 07/18/23 1400 07/18/23 1505    07/17/23 1002  ertapenem (INVanz) 1,000 mg in sodium chloride 0.9 % 100 mL IVPB-VTB        Ordering Provider: Ginette Tarango DO    1,000 mg  200 mL/hr over 30 Minutes Intravenous Every 24 Hours 07/17/23 1100 07/24/23 1059    07/15/23 1915  meropenem (MERREM) 1,000 mg in sodium chloride 0.9 % 100 mL IVPB-VTB        Ordering Provider: Candido Rodney MD    1,000 mg  over 30 Minutes Intravenous Once 07/15/23 2015 07/15/23 2056    07/12/23 2137  cefTRIAXone (ROCEPHIN) 2,000 mg in sodium chloride 0.9 % 100 mL IVPB-VTB        Ordering Provider: Yusuf Luo DO    2,000 mg  200 mL/hr over 30 Minutes Intravenous Once 07/12/23 2153 07/12/23 2330          Current Diuretic Therapy:  Diuretics (From admission, onward)      Ordered     Dose/Rate Route Frequency Start Stop    07/14/23 0810  furosemide (LASIX) injection 60 mg        Ordering Provider: Leonard Dang DO    60 mg Intravenous Once 07/14/23 0900 07/14/23 0846    07/13/23 0054  furosemide (LASIX) injection 80 mg        Ordering Provider: Yusuf Luo DO    80 mg Intravenous Once 07/13/23 0110 07/13/23 0114           ----------------------------------------------------------------------------------------------------------------------  Vital Signs:  Temp:  [97.3 °F (36.3 °C)-99.1 °F (37.3 °C)] 98.7 °F (37.1 °C)  Heart Rate:  [] 107  Resp:  [19-31] 28  BP: ()/(38-90) 108/64  SpO2:  [89 %-98 %] 95 %  on  Flow (L/min):  [6] 6;   Device (Oxygen Therapy): NPPV/NIV  Body mass index is 53.23 kg/m².    Wt Readings from Last 3 Encounters:   07/20/23 (!) 168 kg (371 lb)     Intake & Output (last 3 days)         07/17 0701 07/18 0700 07/18 0701 07/19 0700 07/19 0701 07/20 0700 07/20 0701 07/21 0700    P.O. 0 0 0     I.V. (mL/kg)        Blood   300     Other        IV Piggyback   850     Total Intake(mL/kg) 0 (0) 0 (0) 1150 (6.8)     Urine (mL/kg/hr) 1150 (0.3) 850 (0.2) 186 (0)     Stool   0     Total Output 1150 850 186     Net -1150 -850 +964             Stool Unmeasured Occurrence   0 x           Diet: Regular/House Diet; Texture: Soft to Chew (NDD 3); Soft to Chew: Whole Meat; Fluid Consistency: Thin (IDDSI 0)  ----------------------------------------------------------------------------------------------------------------------  Physical exam:  Constitutional:  Obese. Lethargic.   HENT:  Head:  Normocephalic and atraumatic.  Mouth:  Moist mucous membranes.    Eyes:  Conjunctivae and EOM are normal. No scleral icterus.    Neck:  Neck supple.  No JVD present.    Cardiovascular:  Normal rate, regular rhythm and normal heart sounds with no murmur.  Pulmonary/Chest:  on BiPAP, decreased breath sounds bilaterally.   Abdominal:  Soft.  Bowel sounds are normal.  No distension and no tenderness.   Musculoskeletal:  No edema, no tenderness, and no deformity.  No red or swollen joints anywhere.    Neurological:  Lethargic, only grimace to pain.   Skin:  Skin is warm and dry. No rash noted. No pallor.   Peripheral vascular:  Pulses in all 4 extremities with no clubbing, no cyanosis, no  edema.  ----------------------------------------------------------------------------------------------------------------------  Tele:    ----------------------------------------------------------------------------------------------------------------------  Results from last 7 days   Lab Units 07/20/23  0010 07/19/23  1545 07/19/23  0207 07/18/23  0052 07/17/23  0022 07/16/23  0426   CRP mg/dL  --   --  22.62* 24.42*  --  20.87*   LACTATE mmol/L  --   --   --   --   --  0.8   WBC 10*3/mm3  --  21.02* 27.03* 27.48*   < > 20.91*   HEMOGLOBIN g/dL 7.2* 6.0* 7.2* 7.7*   < > 7.1*   HEMATOCRIT % 28.2* 26.5* 30.9* 34.4   < > 29.3*   MCV fL  --  83.3 83.7 83.5   < > 76.5*   MCHC g/dL  --  22.6* 23.3* 22.4*   < > 24.2*   PLATELETS 10*3/mm3  --  548* 592* 665*   < > 499*    < > = values in this interval not displayed.     Results from last 7 days   Lab Units 07/20/23  0721   PH, ARTERIAL pH units 7.257*   PO2 ART mm Hg 93.7   PCO2, ARTERIAL mm Hg >104.0*   HCO3 ART mmol/L 47.8*     Results from last 7 days   Lab Units 07/20/23  0559 07/19/23  1323 07/19/23  0207 07/18/23  0857 07/18/23  0052 07/17/23  1413 07/17/23  1136 07/17/23  0022   SODIUM mmol/L 150* 149* 147*  --  148*  --   --  148*   POTASSIUM mmol/L 3.5 4.1  4.0 3.6   < > 3.4*  3.4*  --    < > 2.6*   MAGNESIUM mg/dL  --   --  2.5*  --  2.3  --   --  1.7   CHLORIDE mmol/L 97* 94* 94*  --  93*  --   --  93*   CO2 mmol/L 45.2* 33.5* 43.2*  --  46.2*  --   --  43.8*   BUN mg/dL 56* 50* 42*  --  27*  --   --  18   CREATININE mg/dL 2.65* 2.45* 2.08*  --  1.31*  --   --  0.64   CALCIUM mg/dL 11.2* 10.2 11.1*  --  10.9*  --   --  10.2   PHOSPHORUS mg/dL  --   --   --   --  5.6* 4.7*  --  1.6*   GLUCOSE mg/dL 111* 118* 121*  --  133*  --   --  120*   ALBUMIN g/dL 3.3*  --  3.6  --  3.4*  --   --  3.1*   BILIRUBIN mg/dL 0.6  --  0.6  --  0.6  --   --  0.7   ALK PHOS U/L 51  --  59  --  68  --   --  60   AST (SGOT) U/L 14  --  15  --  12  --   --  11   ALT (SGPT) U/L 8  --  8   --  10  --   --  10    < > = values in this interval not displayed.   Estimated Creatinine Clearance: 38 mL/min (A) (by C-G formula based on SCr of 2.65 mg/dL (H)).  Ammonia   Date Value Ref Range Status   07/19/2023 55 (H) 11 - 51 umol/L Final   07/18/2023 67 (H) 11 - 51 umol/L Final   07/17/2023 79 (H) 11 - 51 umol/L Final     Results from last 7 days   Lab Units 07/15/23  1504 07/15/23  1211   HSTROP T ng/L 32* 29*     Results from last 7 days   Lab Units 07/18/23  0052   PROBNP pg/mL 35,507.0*         Glucose   Date/Time Value Ref Range Status   07/19/2023 0925 133 (H) 70 - 130 mg/dL Final     Comment:     Meter: [id number] : [id number] SHADY WHITMAN     Lab Results   Component Value Date    TSH 0.619 07/15/2023     No results found for: PREGTESTUR, PREGSERUM, HCG, HCGQUANT  Pain Management Panel  More data may exist         Latest Ref Rng & Units 7/19/2023 7/14/2023   Pain Management Panel   Creatinine, Urine mg/dL 139.1  8.5      Brief Urine Lab Results  (Last result in the past 365 days)        Color   Clarity   Blood   Leuk Est   Nitrite   Protein   CREAT   Urine HCG        07/19/23 1414             139.1               No results found for: BLOODCX  Urine Culture   Date Value Ref Range Status   07/15/2023 >100,000 CFU/mL Escherichia coli ESBL (A)  Final     Comment:       Consider infectious disease consult.  Susceptibility results may not correlate to clinical outcomes.     No results found for: WOUNDCX  No results found for: STOOLCX  No results found for: RESPCX  No results found for: AFBCX  Results from last 7 days   Lab Units 07/19/23  0207 07/18/23  0052 07/16/23  0426 07/16/23  0425   PROCALCITONIN ng/mL  --  0.72*  --  0.27*   LACTATE mmol/L  --   --  0.8  --    CRP mg/dL 22.62* 24.42* 20.87*  --        I have personally looked at the labs and they are summarized  above.  ----------------------------------------------------------------------------------------------------------------------  Detailed radiology reports for the last 24 hours:    Imaging Results (Last 24 Hours)       Procedure Component Value Units Date/Time    CT Chest Without Contrast Diagnostic [[id number]] Collected: 07/19/23 1018     Updated: 07/19/23 1022    Narrative:      EXAM:    CT Chest Without Intravenous Contrast     EXAM DATE:    7/19/2023 9:45 AM     CLINICAL HISTORY:    worsening oxygen requirement, concern for fluid overload vs  development of aspiration pneumonia; D64.9-Anemia, unspecified;  A41.9-Sepsis, unspecified organism; R65.20-Severe sepsis without septic  shock; J96.01-Acute respiratory failure with hypoxia; N39.0-Urinary  tract infection, site not specified; I50.31-Acute diastolic (congestive)  heart failure     TECHNIQUE:    Axial computed tomography images of the chest without intravenous  contrast.  Sagittal and coronal reformatted images were created and  reviewed.  This CT exam was performed using one or more of the following  dose reduction techniques:  automated exposure control, adjustment of  the mA and/or kV according to patient size, and/or use of iterative  reconstruction technique.     COMPARISON:    07/12/2023     FINDINGS:    Lungs and pleural spaces:  Development of bilateral consolidative  airspace disease more pronounced in the upper lobes but also present in  the lower lobes consistent with pneumonia.  Miniscule pleural effusions  which are nonloculated.  Interstitial thickening has developed  compatible with edema.    Heart:  Very marked cardiac enlargement is stable from previous.  No  significant pericardial effusion.  No significant coronary artery  calcifications.    Mediastinum:  Hiatal hernia, stable.    Bones/joints:  Unremarkable.  No acute fracture.  No dislocation.    Soft tissues:  Diffuse anasarca has slightly improved.    Vasculature:  Pulmonary venous  hypertension is noted.  No thoracic  aortic aneurysm.    Lymph nodes:  Reactive and/or congested lymph nodes in the mediastinum  and hilar stations but no bulky adenopathy identified.    Liver:  Liver cirrhosis.    Stomach and bowel:  Gastroesophageal reflux.    Intraperitoneal space:  Upper abdominal ascites which appears stable.       Impression:      1.  Development of bilateral multilobar partially consolidative  pneumonia. Combination atelectasis may be considered.  2.  Slight increase in changes of CHF now with interstitial edema.  Miniscule nonloculated pleural effusions with stable marked cardiac  enlargement.  3.  Questionable decrease in the degree of anasarca with persistent  upper abdominal ascites noted.  4.  Other incidental/nonacute findings as above.     This report was finalized on 7/19/2023 10:20 AM by Dr. Kvng Tijerina MD.       CT Head Without Contrast [[id number]] Collected: 07/19/23 1016     Updated: 07/19/23 1020    Narrative:      EXAM:    CT Head Without Intravenous Contrast     EXAM DATE:    7/19/2023 9:41 AM     CLINICAL HISTORY:    Mental status change, unknown cause; D64.9-Anemia, unspecified;  A41.9-Sepsis, unspecified organism; R65.20-Severe sepsis without septic  shock; J96.01-Acute respiratory failure with hypoxia; N39.0-Urinary  tract infection, site not specified; I50.31-Acute diastolic (congestive)  heart failure     TECHNIQUE:    Axial computed tomography images of the head/brain without intravenous  contrast.  Sagittal and coronal reformatted images were created and  reviewed.  This CT exam was performed using one or more of the following  dose reduction techniques:  automated exposure control, adjustment of  the mA and/or kV according to patient size, and/or use of iterative  reconstruction technique.     COMPARISON:    No relevant prior studies available.     FINDINGS:    Brain and extra-axial spaces:  Unremarkable.  No hemorrhage.  No  significant white matter disease.  No  edema.  No ventriculomegaly.    Bones/joints:  Unremarkable.  No acute fracture.    Soft tissues:  Unremarkable.    Sinuses:  Unremarkable as visualized.  No acute sinusitis.    Mastoid air cells:  Unremarkable as visualized.  No mastoid effusion.       Impression:        Unremarkable exam demonstrating no CT evidence of acute intracranial  findings.     This report was finalized on 7/19/2023 10:17 AM by Dr. Kvng Tijerina MD.             Assessment & Plan      #Acute hypercapnic respiratory failure   #Obesity hypoventilation   #Atelectasis   #Acute decompensated diastolic heart failure   #Pleural effusions   - Patient with worsening hypercapnic respiratory failure requiring endotracheal intubation on 7/20  - Unclear what causes worsening. No history of COPD. Volume status appears to be intravascularly depleted. Possibly from being in supine position causing worsening atelectasis from obesity hypoventilation syndrome.   - New pneumonia also a possibility. Abx broadened to ertapenem/vanc. Respiratory culture obtained.   - Repeat ABG pending   - Pulmonology following, appreciate recs     #NSTEMI II  - Troponin peaked at 32, suspect 2/2 respiratory failure   - Cardiology following     #TRACIE  - Suspect prerenal.   - Given worsening Cr, net negative status, worsening hypernatremia, will start 1/2NS at 75 cc/hr   - Will consult nephrology     #Moderate pericardial effusion   #Cardiomegatly   - Repeat limited echo ordered per cardiology     #Hypernatremia  - 1/2 NS started as above     #ESBL UTI  - ID following. Continue ertapenem/vancomycin    #Acute iron deficiency anemia   - Iron studies consistent of ABDIFATAH  - Patient received 1 unit pRBC on 7/12 and 7/19.   - Hemoglobin stable this AM   - Continue IV protonix and monitor for signs of bleeding     #NSVT  - Continue metoprolol   - Cardiology following     F: Tfs, nutrition consulted.   A: PRN fentanyl  S: Propofol   T: SubQ heparin   H: HOB elevated   U: IV protonix   G:  PRN  S: Not ready   B: PRN  I: PIVs, ETT 7/20  D: Ertapenem/vanc    Code status: Full     Dispo: Continue CCU level care     VTE Prophylaxis:   Mechanical Order History:        Ordered        07/13/23 0225  Place Sequential Compression Device  Once            07/13/23 0225  Maintain Sequential Compression Device  Continuous                          Pharmalogical Order History:        Ordered     Dose Route Frequency Stop    07/19/23 1134  heparin (porcine) 5000 UNIT/ML injection 5,000 Units         5,000 Units SC Every 8 Hours Scheduled --                      Gurmeet Almanza MD  [facility] Hospitalist  07/20/23  08:11 EDT

## 2023-07-20 NOTE — PROGRESS NOTES
[facility]  PROGRESS NOTE    Patient information:  Name: Judy Lutz  Age/Sex: 63 y.o. female  :  1960        PCP: Provider, No Known  Attending: Sapphireanalisa Contreras   MRN:  [id number]  Visit Number:  [id number]    LOS:  LOS: 7 days     CODE STATUS:    Code Status and Medical Interventions:   Ordered at: 23 0152     Code Status (Patient has no pulse and is not breathing):    CPR (Attempt to Resuscitate)     Medical Interventions (Patient has pulse or is breathing):    Full Support       PROBLEM LIST:Principal Problem:    Symptomatic anemia      Reason for Cardiology follow-up: Cardiomyopathy and pericardial effusion      Subjective   ADMISSION INFORMATION:  Chief Complaint   Patient presents with    Shortness of Breath       HPI:  Judy Lutz is a 63 y.o. female with no considerable past medical history noted in patient's chart.  Patient presented to [facility] ([facility]) emergency room (ER) on 2023 with complaints of increased fatigue, malaise, shortness of breath, and generalized weakness for the past several weeks.  Cardiology was consulted for further evaluation and management of cardiomyopathy and pericardial effusion.       No primary Cardiologist noted in her chart.     Interval History:   Patient is in room CCU for and was examined by Dr. Rosas.  Telemetry reveals ST 100s with frequent PVCs and occasional couplets and episodes pf bigeminy.  Nephrology has been consulted.  Planning for a limited echo today to further assess the pericardial effusion.  Sodium is 150, potassium 3.5, chloride 97, CO2 45.2, creatinine has increased to 2.65, magnesium was 2.5 on 2023, WBC has slightly improved to 21.02 from 27.48 at worst, on 2023 at 1540 PM patient's hemoglobin was 6.0 and it has improved 7.2 this AM.  Of note, patient was intubated around 8:25 AM today.  Patient is sedated.  Patient's daughter and one male visitor  is is at bedside.     Vital Signs  Temp:  [98.7 °F (37.1 °C)-99.8 °F (37.7 °C)] 99.1 °F (37.3 °C)  Heart Rate:  [] 85  Resp:  [26-31] 28  BP: ()/(35-90) 104/55  Flow (L/min):  [15] 15  FiO2 (%):  [55 %-100 %] 60 %  Vital Signs (last 72 hrs)         07/17 0700  07/18 0659 07/18 0700  07/19 0659 07/19 0700 07/20 0659 07/20 0700  07/20 0745   Most Recent      Temp (°F) 97.8 -  98.1    98.1 -  99.6    97.3 -  99.4       98.7 (37.1) 07/20 0400    Heart Rate 94 -  107    104 -  109    88 -  115      107     107 07/20 0700    Resp   20 20 19 -  31      28     28 07/20 0700    /58 -  156/78    104/60 -  121/64    96/53 -  145/70      108/64     108/64 07/20 0700    SpO2 (%) 92 -  97    90 -  92    89 -  98      95     95 07/20 0700          Body mass index is 53.23 kg/m².    Intake/Output Summary (Last 24 hours) at 7/20/2023 1625  Last data filed at 7/20/2023 1559  Gross per 24 hour   Intake 1126.55 ml   Output 181 ml   Net 945.55 ml       Objective     Physical Exam:      General Appearance:  Intubated and sedated.   Head:    Normocephalic, without obvious abnormality, atraumatic.   Eyes:                             Neck:   No adenopathy, supple, trachea midline, no thyromegaly, no carotid bruit, no JVD.   Lungs:   Mechanical ventilation with breath sounds clear to auscultation, respirations regular, even and unlabored.    Heart:    Regular rhythm and normal rate, normal S1 and S2, no          murmur, no gallop, no rub, no click   Chest Wall:    No abnormalities observed.   Abdomen:     Normal bowel sounds, no masses, no organomegaly, soft nontender, nondistended, no guarding, no rebound tenderness.   Extremities: Sedated, + 2 edema, no cyanosis, no redness.   Pulses:   Pulses palpable and equal bilaterally.   Skin:   No bleeding, bruising or rash. SCUDS in use BLE.   Neurologic: Intubated and sedated.       Results review   Results Review:   Results from last 7 days   Lab Units 07/20/23  0534  07/20/23  0010 07/19/23  1545 07/19/23  0207 07/18/23  0052 07/17/23  1715 07/17/23  0022 07/16/23  0426 07/15/23  0055   WBC 10*3/mm3 18.35*  --  21.02* 27.03* 27.48*  --  19.23* 20.91* 19.00*   HEMOGLOBIN g/dL 7.2* 7.2* 6.0* 7.2* 7.7* 8.1* 7.0* 7.1* 7.1*   PLATELETS 10*3/mm3 494*  --  548* 592* 665*  --  420 499* 398     Results from last 7 days   Lab Units 07/20/23  0559 07/19/23  1323 07/19/23  0207 07/18/23  0857 07/18/23  0052 07/17/23  1136 07/17/23  0022 07/16/23  1830 07/16/23  0426 07/15/23  0055   SODIUM mmol/L 150* 149* 147*  --  148*  --  148*  --  149* 144   POTASSIUM mmol/L 3.5 4.1  4.0 3.6 3.7 3.4*  3.4* 2.6* 2.6*   < > 2.7* 3.3*   CHLORIDE mmol/L 97* 94* 94*  --  93*  --  93*  --  99 102   CO2 mmol/L 45.2* 33.5* 43.2*  --  46.2*  --  43.8*  --  42.8* 31.9*   BUN mg/dL 56* 50* 42*  --  27*  --  18  --  20 25*   CREATININE mg/dL 2.65* 2.45* 2.08*  --  1.31*  --  0.64  --  0.76 0.77   CALCIUM mg/dL 11.2* 10.2 11.1*  --  10.9*  --  10.2  --  10.3 9.8   GLUCOSE mg/dL 111* 118* 121*  --  133*  --  120*  --  110* 118*   ALT (SGPT) U/L 8  --  8  --  10  --  10  --  12  --    AST (SGOT) U/L 14  --  15  --  12  --  11  --  12  --     < > = values in this interval not displayed.     Results from last 7 days   Lab Units 07/15/23  1504 07/15/23  1211   HSTROP T ng/L 32* 29*     Lab Results   Component Value Date    PROBNP 35,507.0 (H) 07/18/2023    PROBNP 26,037.0 (H) 07/13/2023     No results found.  Lab Results   Component Value Date    ABSOLUTELUNG 28 07/15/2023     Lab Results   Component Value Date    INR 1.38 (H) 07/12/2023     Lab Results   Component Value Date    MG 2.5 (H) 07/19/2023    MG 2.3 07/18/2023    MG 1.7 07/17/2023     Lab Results   Component Value Date    TSH 0.619 07/15/2023      No results found for: CHOL, TRIG, HDL, LDL  Pain Management Panel  More data may exist         Latest Ref Rng & Units 7/19/2023 7/14/2023   Pain Management Panel   Creatinine, Urine mg/dL 139.1  8.5       Microbiology Results (last 10 days)       Procedure Component Value - Date/Time    Respiratory Culture - Aspirate, ET Suction [[id number]] Collected: 07/20/23 0843    Lab Status: Preliminary result Specimen: Aspirate from ET Suction Updated: 07/20/23 0942     Gram Stain Many (4+) WBCs seen      Few (2+) Epithelial cells seen      Mixed stephanie      Yeast with hyphae seen    MRSA Screen, PCR (Inpatient) - Swab, Nares [[id number]]  (Abnormal) Collected: 07/19/23 1306    Lab Status: Final result Specimen: Swab from Nares Updated: 07/19/23 1557     MRSA PCR MRSA Detected    Narrative:      The negative predictive value of this diagnostic test is high and should only be used to consider de-escalating anti-MRSA therapy. A positive result may indicate colonization with MRSA and must be correlated clinically.    Urine Culture - Urine, Urine, Clean Catch [[id number]]  (Abnormal)  (Susceptibility) Collected: 07/15/23 1627    Lab Status: Final result Specimen: Urine, Clean Catch Updated: 07/17/23 1159     Urine Culture >100,000 CFU/mL Escherichia coli ESBL     Comment:   Consider infectious disease consult.  Susceptibility results may not correlate to clinical outcomes.       Narrative:      Colonization of the urinary tract without infection is common. Treatment is discouraged unless the patient is symptomatic, pregnant, or undergoing an invasive urologic procedure.  Recent outcomes data supports the use of pip/tazo in the treatment of susceptible ESBL infections for uncomplicated UTI. Consider use of pip/tazo as a carbapenem-sparing regimen in applicable patients.    Susceptibility        Escherichia coli ESBL      MANUEL      Ertapenem Susceptible      Gentamicin Susceptible      Levofloxacin Resistant      Meropenem Susceptible      Nitrofurantoin Susceptible      Piperacillin + Tazobactam Resistant      Trimethoprim + Sulfamethoxazole Resistant                           COVID-19 and FLU A/B PCR - Swab, Nasopharynx [[id number]]   (Normal) Collected: 07/13/23 0109    Lab Status: Final result Specimen: Swab from Nasopharynx Updated: 07/13/23 0138     COVID19 Not Detected     Influenza A PCR Not Detected     Influenza B PCR Not Detected    Narrative:      Fact sheet for providers: https://www.fda.gov/media/186415/download    Fact sheet for patients: https://www.fda.gov/media/780327/download    Test performed by PCR.    Blood Culture - Blood, Arm, Left [[id number]]  (Normal) Collected: 07/12/23 2220    Lab Status: Final result Specimen: Blood from Arm, Left Updated: 07/17/23 2230     Blood Culture No growth at 5 days    Blood Culture - Blood, Arm, Left [[id number]]  (Normal) Collected: 07/12/23 2220    Lab Status: Final result Specimen: Blood from Arm, Left Updated: 07/17/23 2230     Blood Culture No growth at 5 days    Urine Culture - Urine, Straight Cath [[id number]]  (Abnormal)  (Susceptibility) Collected: 07/12/23 2140    Lab Status: Final result Specimen: Urine from Straight Cath Updated: 07/15/23 1323     Urine Culture >100,000 CFU/mL Escherichia coli ESBL     Comment:   Consider infectious disease consult.  Susceptibility results may not correlate to clinical outcomes.         >100,000 CFU/mL Klebsiella pneumoniae ssp pneumoniae    Narrative:      Colonization of the urinary tract without infection is common. Treatment is discouraged unless the patient is symptomatic, pregnant, or undergoing an invasive urologic procedure.  Recent outcomes data supports the use of pip/tazo in the treatment of susceptible ESBL infections for uncomplicated UTI. Consider use of pip/tazo as a carbapenem-sparing regimen in applicable patients.    Susceptibility        Escherichia coli ESBL      MANUEL      Ertapenem Susceptible      Gentamicin Susceptible      Levofloxacin Resistant      Meropenem Susceptible      Nitrofurantoin Susceptible      Piperacillin + Tazobactam Resistant      Trimethoprim + Sulfamethoxazole Resistant                       Susceptibility         Klebsiella pneumoniae ssp pneumoniae      MANUEL      Ampicillin Resistant      Ampicillin + Sulbactam Susceptible      Cefazolin Susceptible      Cefepime Susceptible      Ceftazidime Susceptible      Ceftriaxone Susceptible      Gentamicin Susceptible      Levofloxacin Susceptible      Nitrofurantoin Intermediate      Piperacillin + Tazobactam Susceptible      Trimethoprim + Sulfamethoxazole Susceptible                                  Imaging Results (Last 48 Hours)       Procedure Component Value Units Date/Time    US Renal Bilateral [[id number]] Resulted: 07/20/23 1611     Updated: 07/20/23 1611    XR Chest 1 View [[id number]] Collected: 07/20/23 1528     Updated: 07/20/23 1531    Narrative:      EXAM:    XR Chest, 1 View     EXAM DATE:    7/20/2023 2:45 PM     CLINICAL HISTORY:    Central line; D64.9-Anemia, unspecified; A41.9-Sepsis, unspecified  organism; R65.20-Severe sepsis without septic shock; J96.01-Acute  respiratory failure with hypoxia; N39.0-Urinary tract infection, site  not specified; I50.31-Acute diastolic (congestive) heart failure     TECHNIQUE:    Frontal view of the chest.     COMPARISON:    07/20/2023     FINDINGS:    Lungs and pleural spaces:  No pneumothorax.    Heart:  Cardiomegaly and left basilar airspace disease.    Mediastinum:  Unremarkable.    Bones/joints:  Unremarkable.    Vasculature:  Right IJ deep line noted with tip in the distal SVC.    Tubes, lines and devices:  ET tube with tip just below clavicles.  NG  tube extends into the stomach.       Impression:      1.  Support devices detailed above. No evidence of pneumothorax.  2.  Otherwise stable chest.     This report was finalized on 7/20/2023 3:28 PM by Dr. Kvng Tijerina MD.       US Venous Doppler Lower Extremity Bilateral (duplex) [[id number]] Collected: 07/20/23 1317     Updated: 07/20/23 1319    Narrative:      US VENOUS DOPPLER LOWER EXTREMITY BILATERAL (DUPLEX)-     CLINICAL INDICATION: r/o dvt; D64.9-Anemia,  unspecified; A41.9-Sepsis,  unspecified organism; R65.20-Severe sepsis without septic shock;  J96.01-Acute respiratory failure with hypoxia; N39.0-Urinary tract  infection, site not specified; I50.31-Acute diastolic (congestive) heart  failure        COMPARISON: None available      TECHNIQUE: Color Doppler imaging was used with compression and  augmentation to evaluate the lower extremity deep venous system.     FINDINGS:   There is patent spontaneous flow from the common femoral vein through  the posterior tibial veins.  There was no internal clot or area of noncompressibility.  Normal augmentation was elicited where applicable.       Impression:      No DVT in the lower extremities on today's exam.      This report was finalized on 7/20/2023 1:17 PM by Dr. Nicho Reeves MD.       XR Chest 1 View [[id number]] Collected: 07/20/23 0847     Updated: 07/20/23 0851    Narrative:      EXAM:    XR Chest, 1 View     EXAM DATE:    7/20/2023 8:23 AM     CLINICAL HISTORY:    respiratory failure; D64.9-Anemia, unspecified; A41.9-Sepsis,  unspecified organism; R65.20-Severe sepsis without septic shock;  J96.01-Acute respiratory failure with hypoxia; N39.0-Urinary tract  infection, site not specified; I50.31-Acute diastolic (congestive) heart  failure     TECHNIQUE:    Frontal view of the chest.     COMPARISON:    07/15/2023     FINDINGS:    Lungs and pleural spaces:  Left basal consolidation.  Mild pulmonary  vascular congestion.  No pneumothorax.    Heart:  Cardiomegaly.    Mediastinum:  Unremarkable.    Bones/joints:  Unremarkable.    Tubes, lines and devices:  ET tube with tip at level of clavicles.  NG  tube extends into the distal stomach.       Impression:      1.  Support devices as above.  2.  Cardiomegaly and pulmonary vascular congestion.  3.  Left basilar airspace disease.     This report was finalized on 7/20/2023 8:48 AM by Dr. Kvng Tijerina MD.       CT Chest Without Contrast Diagnostic [[id number]] Collected:  07/19/23 1018     Updated: 07/19/23 1022    Narrative:      EXAM:    CT Chest Without Intravenous Contrast     EXAM DATE:    7/19/2023 9:45 AM     CLINICAL HISTORY:    worsening oxygen requirement, concern for fluid overload vs  development of aspiration pneumonia; D64.9-Anemia, unspecified;  A41.9-Sepsis, unspecified organism; R65.20-Severe sepsis without septic  shock; J96.01-Acute respiratory failure with hypoxia; N39.0-Urinary  tract infection, site not specified; I50.31-Acute diastolic (congestive)  heart failure     TECHNIQUE:    Axial computed tomography images of the chest without intravenous  contrast.  Sagittal and coronal reformatted images were created and  reviewed.  This CT exam was performed using one or more of the following  dose reduction techniques:  automated exposure control, adjustment of  the mA and/or kV according to patient size, and/or use of iterative  reconstruction technique.     COMPARISON:    07/12/2023     FINDINGS:    Lungs and pleural spaces:  Development of bilateral consolidative  airspace disease more pronounced in the upper lobes but also present in  the lower lobes consistent with pneumonia.  Miniscule pleural effusions  which are nonloculated.  Interstitial thickening has developed  compatible with edema.    Heart:  Very marked cardiac enlargement is stable from previous.  No  significant pericardial effusion.  No significant coronary artery  calcifications.    Mediastinum:  Hiatal hernia, stable.    Bones/joints:  Unremarkable.  No acute fracture.  No dislocation.    Soft tissues:  Diffuse anasarca has slightly improved.    Vasculature:  Pulmonary venous hypertension is noted.  No thoracic  aortic aneurysm.    Lymph nodes:  Reactive and/or congested lymph nodes in the mediastinum  and hilar stations but no bulky adenopathy identified.    Liver:  Liver cirrhosis.    Stomach and bowel:  Gastroesophageal reflux.    Intraperitoneal space:  Upper abdominal ascites which appears  stable.       Impression:      1.  Development of bilateral multilobar partially consolidative  pneumonia. Combination atelectasis may be considered.  2.  Slight increase in changes of CHF now with interstitial edema.  Miniscule nonloculated pleural effusions with stable marked cardiac  enlargement.  3.  Questionable decrease in the degree of anasarca with persistent  upper abdominal ascites noted.  4.  Other incidental/nonacute findings as above.     This report was finalized on 7/19/2023 10:20 AM by Dr. Kvng Tijerina MD.       CT Head Without Contrast [[id number]] Collected: 07/19/23 1016     Updated: 07/19/23 1020    Narrative:      EXAM:    CT Head Without Intravenous Contrast     EXAM DATE:    7/19/2023 9:41 AM     CLINICAL HISTORY:    Mental status change, unknown cause; D64.9-Anemia, unspecified;  A41.9-Sepsis, unspecified organism; R65.20-Severe sepsis without septic  shock; J96.01-Acute respiratory failure with hypoxia; N39.0-Urinary  tract infection, site not specified; I50.31-Acute diastolic (congestive)  heart failure     TECHNIQUE:    Axial computed tomography images of the head/brain without intravenous  contrast.  Sagittal and coronal reformatted images were created and  reviewed.  This CT exam was performed using one or more of the following  dose reduction techniques:  automated exposure control, adjustment of  the mA and/or kV according to patient size, and/or use of iterative  reconstruction technique.     COMPARISON:    No relevant prior studies available.     FINDINGS:    Brain and extra-axial spaces:  Unremarkable.  No hemorrhage.  No  significant white matter disease.  No edema.  No ventriculomegaly.    Bones/joints:  Unremarkable.  No acute fracture.    Soft tissues:  Unremarkable.    Sinuses:  Unremarkable as visualized.  No acute sinusitis.    Mastoid air cells:  Unremarkable as visualized.  No mastoid effusion.       Impression:        Unremarkable exam demonstrating no CT evidence of  acute intracranial  findings.     This report was finalized on 2023 10:17 AM by Dr. Kvng Tijerina MD.               ECHO:  Results for orders placed during the hospital encounter of 23    Adult Transthoracic Echo Limited W/ Cont if Necessary Per Protocol    Interpretation Summary    Left ventricular systolic function is normal. Left ventricular ejection fraction appears to be 66 - 70%.    Left ventricular diastolic dysfunction is noted.    The right ventricular cavity is mild to moderately dilated, D-shaped septum was noted however no assessment of the PA pressure was done right ventricular pressure not estimated.    There is a large (>2cm) circumferential pericardial effusion. There is no evidence of cardiac tamponade.    IVC is dilated and collapsible.    This is a technically limited study.      STRESS TEST:   No results found for this or any previous visit.      HEART CATH:  No results found for this or any previous visit.      [id number]                  TELEMETRY:     SR 80's with Bigeminy              ST 100s with frequent PVCs and occasional couplets             I reviewed the patient's new clinical results.    Medication Review:   Current list of medications may not reflect those currently placed in orders that are not signed or are being held.     aspirin, 81 mg, Oral, Daily  doxycycline, 100 mg, Intravenous, Q12H  ertapenem, 1,000 mg, Intravenous, Q24H  escitalopram, 10 mg, Oral, Daily  heparin (porcine), 5,000 Units, Subcutaneous, Q8H  iron polysaccharides, 150 mg, Oral, Daily  magic barrier cream, 1 application , Topical, BID  metoprolol succinate XL, 25 mg, Oral, Q24H  midodrine, 5 mg, Oral, TID AC  pantoprazole, 40 mg, Intravenous, BID AC  potassium chloride, 40 mEq, Oral, Daily  rosuvastatin, 20 mg, Oral, Nightly  senna-docusate sodium, 2 tablet, Oral, BID  sodium chloride, 10 mL, Intravenous, Q12H  sodium chloride, 10 mL, Intravenous, Q12H  sodium chloride, 10 mL, Intravenous,  Q12H  sodium chloride, 10 mL, Intravenous, Q12H  sodium chloride, 10 mL, Intravenous, Q12H      norepinephrine, 0.01-0.07 mcg/kg/min, Last Rate: Stopped (07/20/23 0244)  propofol, 5-50 mcg/kg/min, Last Rate: 40 mcg/kg/min (07/20/23 1512)  sodium chloride, 75 mL/hr, Last Rate: Stopped (07/20/23 0955)        acetaminophen    acetaminophen    senna-docusate sodium **AND** polyethylene glycol **AND** bisacodyl **AND** bisacodyl    [DISCONTINUED] senna-docusate sodium **AND** [DISCONTINUED] polyethylene glycol **AND** [DISCONTINUED] bisacodyl **AND** bisacodyl    fentaNYL citrate (PF)    Magnesium Standard Dose Replacement - Follow Nurse / BPA Driven Protocol    ondansetron    Phosphorus Replacement - Follow Nurse / BPA Driven Protocol    Potassium Replacement - Follow Nurse / BPA Driven Protocol    prochlorperazine    simethicone    sodium chloride    sodium chloride    sodium chloride    sodium chloride    sodium chloride    sodium chloride    sodium chloride    sodium chloride    Assessment    1.  Acute hypercapnic/hypoxic respiratory failure, with multifactorial etiology including pneumonia, obesity with hypoventilation and with element of heart failure earlier currently patient is intubated on mechanical ventilation and sedated  2.  Acute HFpEF on admission currently seems to be mildly hypovolemic intravascularly  3.  Moderate-sized pericardial effusion with no tamponade seen on echocardiogram today with enlarged right ventricle side  4.  Elevated troponin on admission likely type II NSTEMI  5.  Anemia  6.  TRACIE  7.  Likely undiagnosed sleep apnea  8.  Hypernatremia          Plan     1.  Echocardiogram with no significant tamponade but was enlarged right ventricular size no PA pressure assessed, this is multifactorial with the new onset respiratory failure and pneumonia continue respiratory support  2.  We will hold diuresis for now  3.  Subsequently when patient recovers she probably require ischemia work-up as well  as sleep apnea assessment, currently patient is critically ill  4.  Case discussed with the family in detail        I have discussed the patients findings and my recommendations with patient.    Electronically signed by WINDY Roberts, 07/20/23, 4:17 PM EDT.   Electronically signed by Bernice Rosas MD, 07/20/23, 4:18 PM EDT.                      Please note that portions of this note were completed with a voice recognition program.    Please note that portions of this note were copied and has been reviewed and is accurate as of 7/20/2023 .

## 2023-07-20 NOTE — PROGRESS NOTES
[facility] Physician - Brief Progress Note  PERMANENT  07/20/2023 04:34    [facility] [place] [facility] [place] - CCU - [address] - [place], [place] ([facility])    LISANDRA MCARTHUR    Date of Service 07/20/2023 04:34    HPI/Events of Note [facility] Provider Intervention Note    Comments:  RN reporting ABG 7.30 / 97 / 84 / 47 / 96%    Minima change in ventilation.  Oxygenation appears improved.  No additional orders    _____   Video Assessment performed            Contact [facility] for any needs if bedside physician is not present.      Interventions Intermediate-Diagnostic test evaluation        Electronically Signed by: Matthias Gandhi) on 07/20/2023 04:36

## 2023-07-20 NOTE — PROGRESS NOTES
THC Physician - Brief Progress Note  PERMANENT  07/20/2023 05:10    [place] [facility] [place] [facility] [place])    LISANDRA MCARTHUR    Date of Service 07/20/2023 05:10    HPI/Events of Note [facility] Provider Intervention Note    Comments:  Rn expressing concern of pt status. ABG worsened, pt still not following commands. RN seeking intervention    Case discussed, chart reviewed, video assessed.    Change BiPAP settings - AVAPS , rate 28, EPAP 12    Oxygenation is decent but she's quite large and increasing recruitment may improve her ventilation.  Mask seal is not ideal - will attempt to reseat, find a new mask.  Lubrication and/or foam tape for shoring up leaks.    She may end up intubated for mental status during the day - can attempt to pre-oxygenate and optimize if nothing else.     _____   Video Assessment performed            Contact [facility] for any needs if bedside physician is not present.      Interventions Major-Change in mental status - evaluation and management, Hypercarbia - evaluation and management, Hypoxemia - evaluation and management, Respiratory failure - evaluation and management        Electronically Signed by: Matthias Gandhi) on 07/20/2023 05:12

## 2023-07-20 NOTE — PROGRESS NOTES
THC Physician - Brief Progress Note  PERMANENT  07/20/2023 00:37    [place] [facility] [place] [place])    LISANDRA MCARTHUR    Date of Service 07/20/2023 00:37    HPI/Events of Note [facility] Provider Intervention Note    Comments:  ABG 7.31 / 92 / 65 / 47 / 92% bipap 50%    Case discussed - settings acceptable.  Integral MAP low on monitor, calculated acceptable, but will titrate based on integral.  Add norepinephrine via peripheral.  MaX rate via peripheral 0.07 mcg/kg/min    _____   Video Assessment performed    Contact [facility] for any needs if bedside physician is not present.      Interventions Major-Change in mental status - evaluation and management, Shock - evaluation and management  Intermediate-Diagnostic test evaluation        Electronically Signed by: Matthias Gandhi) on 07/20/2023 00:41

## 2023-07-20 NOTE — PLAN OF CARE
Goal Outcome Evaluation:           Progress: declining  Outcome Evaluation: Pt remains minimally responsive to stimuli. Remains on bipap with no improvement. Dr. Ricks made aware.  VSS. UOP remains minimal. Nephro to see patient today. Family at bedside. Will continue with plan of care.

## 2023-07-20 NOTE — PROGRESS NOTES
PROGRESS NOTE         Patient Identification:  Name:  Judy Lutz  Age:  63 y.o.  Sex:  female  :  1960  MRN:  [id number]  Visit Number:  [id number]  Primary Care Provider:  Provider, No Known         LOS: 7 days       ----------------------------------------------------------------------------------------------------------------------  Subjective       Chief Complaints:    Shortness of Breath        Interval History:      Patient intubated this morning in the setting of significantly elevated carbon dioxide levels.  Currently intubated and sedated at 80% FiO2.  Daughter at bedside and tearful.  Lung sounds diminished bilaterally.  Abdomen soft, nontender.  WBC improved at 18.35.  Procalcitonin from 2023 elevated at 0.53.  Urinalysis from 2023 positive however contaminated with 7-12 squamous cells, culture pending.  Respiratory culture from 2023 currently in process.    Review of Systems:    Unable to obtain.  Intubated and sedated.    ----------------------------------------------------------------------------------------------------------------------      Objective       Current Hospital Meds:  aspirin, 81 mg, Oral, Daily  ertapenem, 1,000 mg, Intravenous, Q24H  escitalopram, 10 mg, Oral, Daily  heparin (porcine), 5,000 Units, Subcutaneous, Q8H  iron polysaccharides, 150 mg, Oral, Daily  magic barrier cream, 1 application , Topical, BID  metoprolol succinate XL, 25 mg, Oral, Q24H  midodrine, 5 mg, Oral, TID AC  pantoprazole, 40 mg, Intravenous, BID AC  potassium chloride, 40 mEq, Oral, Daily  rosuvastatin, 20 mg, Oral, Nightly  senna-docusate sodium, 2 tablet, Oral, BID  sodium chloride, 10 mL, Intravenous, Q12H  sodium chloride, 10 mL, Intravenous, Q12H      norepinephrine, 0.01-0.07 mcg/kg/min, Last Rate: Stopped (23 0244)  propofol, 5-50 mcg/kg/min, Last Rate: 40 mcg/kg/min (23 1227)  sodium chloride, 75 mL/hr, Last Rate: Stopped (23  0955)    ----------------------------------------------------------------------------------------------------------------------    Vital Signs:  Temp:  [98.7 °F (37.1 °C)-99.1 °F (37.3 °C)] 98.7 °F (37.1 °C)  Heart Rate:  [] 91  Resp:  [26-31] 28  BP: (100-145)/(38-90) 100/50  FiO2 (%):  [55 %-100 %] 60 %  Mean Arterial Pressure (Non-Invasive) for the past 24 hrs (Last 3 readings):   Noninvasive MAP (mmHg)   07/20/23 0700 80   07/20/23 0630 79   07/20/23 0600 74       SpO2 Percentage    07/20/23 1018 07/20/23 1038 07/20/23 1235   SpO2: 91% 92% 94%     SpO2:  [72 %-100 %] 94 %  on  Flow (L/min):  [15] 15;   Device (Oxygen Therapy): ventilator    Body mass index is 53.23 kg/m².  Wt Readings from Last 3 Encounters:   07/20/23 (!) 168 kg (371 lb)        Intake/Output Summary (Last 24 hours) at 7/20/2023 1411  Last data filed at 7/20/2023 0955  Gross per 24 hour   Intake 1276.55 ml   Output 186 ml   Net 1090.55 ml       No diet orders on file  ----------------------------------------------------------------------------------------------------------------------      Physical Exam:    Constitutional: Chronically ill-appearing.  Currently intubated and sedated 80% FiO2.  Daughter and primary RN at bedside.  Neck: Unable to assess neck rigidity  Cardiovascular:  Normal rate, regular rhythm and normal heart sounds with no murmur. No edema.  Pulmonary/Chest: Lung exam is diminished to auscultation bilaterally.  Intubated and sedated.  Abdominal:  Soft.  Bowel sounds are normal.  No distension and no tenderness.   Musculoskeletal:  No edema, no tenderness, and no deformity.  No swelling or redness of joints.  Neurological: Intubated and sedated.  Skin:  Skin is warm and dry.  No rash noted.  No pallor.  Peripheral IVs to the bilateral hands with no evidence of infection or phlebitis.  Psychiatric: Unable to  assess.        ----------------------------------------------------------------------------------------------------------------------  Results from last 7 days   Lab Units 07/15/23  1504 07/15/23  1211   HSTROP T ng/L 32* 29*       Results from last 7 days   Lab Units 07/18/23  0052   PROBNP pg/mL 35,507.0*         Results from last 7 days   Lab Units 07/20/23  1028   PH, ARTERIAL pH units 7.538*   PO2 ART mm Hg 38.3*   PCO2, ARTERIAL mm Hg 54.6*   HCO3 ART mmol/L 46.5*       Results from last 7 days   Lab Units 07/20/23  0559 07/20/23  0010 07/19/23  1545 07/19/23  0207 07/18/23  0052 07/17/23  0022 07/16/23  0426   CRP mg/dL  --   --   --  22.62* 24.42*  --  20.87*   LACTATE mmol/L  --   --   --   --   --   --  0.8   WBC 10*3/mm3 18.35*  --  21.02* 27.03* 27.48*   < > 20.91*   HEMOGLOBIN g/dL 7.2* 7.2* 6.0* 7.2* 7.7*   < > 7.1*   HEMATOCRIT % 29.9* 28.2* 26.5* 30.9* 34.4   < > 29.3*   MCV fL 84.5  --  83.3 83.7 83.5   < > 76.5*   MCHC g/dL 24.1*  --  22.6* 23.3* 22.4*   < > 24.2*   PLATELETS 10*3/mm3 494*  --  548* 592* 665*   < > 499*    < > = values in this interval not displayed.       Results from last 7 days   Lab Units 07/20/23  0559 07/19/23  1323 07/19/23  0207 07/18/23  0857 07/18/23  0052 07/17/23  1136 07/17/23  0022   SODIUM mmol/L 150* 149* 147*  --  148*  --  148*   POTASSIUM mmol/L 3.5 4.1  4.0 3.6   < > 3.4*  3.4*   < > 2.6*   MAGNESIUM mg/dL  --   --  2.5*  --  2.3  --  1.7   CHLORIDE mmol/L 97* 94* 94*  --  93*  --  93*   CO2 mmol/L 45.2* 33.5* 43.2*  --  46.2*  --  43.8*   BUN mg/dL 56* 50* 42*  --  27*  --  18   CREATININE mg/dL 2.65* 2.45* 2.08*  --  1.31*  --  0.64   CALCIUM mg/dL 11.2* 10.2 11.1*  --  10.9*  --  10.2   GLUCOSE mg/dL 111* 118* 121*  --  133*  --  120*   ALBUMIN g/dL 3.3*  --  3.6  --  3.4*  --  3.1*   BILIRUBIN mg/dL 0.6  --  0.6  --  0.6  --  0.7   ALK PHOS U/L 51  --  59  --  68  --  60   AST (SGOT) U/L 14  --  15  --  12  --  11   ALT (SGPT) U/L 8  --  8  --  10  --  10     < > = values in this interval not displayed.     Estimated Creatinine Clearance: 38 mL/min (A) (by C-G formula based on SCr of 2.65 mg/dL (H)).  Ammonia   Date Value Ref Range Status   07/19/2023 55 (H) 11 - 51 umol/L Final   07/18/2023 67 (H) 11 - 51 umol/L Final   07/17/2023 79 (H) 11 - 51 umol/L Final       Glucose   Date/Time Value Ref Range Status   07/19/2023 0925 133 (H) 70 - 130 mg/dL Final     Comment:     Meter: [id number] : [id number] SHADY WHITMAN     Lab Results   Component Value Date    HGBA1C 5.80 (H) 07/13/2023     Lab Results   Component Value Date    TSH 0.619 07/15/2023       Blood Culture   Date Value Ref Range Status   07/12/2023 No growth at 5 days  Final   07/12/2023 No growth at 5 days  Final     Urine Culture   Date Value Ref Range Status   07/15/2023 >100,000 CFU/mL Escherichia coli ESBL (A)  Final     Comment:       Consider infectious disease consult.  Susceptibility results may not correlate to clinical outcomes.   07/12/2023 >100,000 CFU/mL Escherichia coli ESBL (A)  Final     Comment:       Consider infectious disease consult.  Susceptibility results may not correlate to clinical outcomes.   07/12/2023 (A)  Final    >100,000 CFU/mL Klebsiella pneumoniae ssp pneumoniae     No results found for: WOUNDCX  No results found for: STOOLCX  No results found for: RESPCX  Pain Management Panel  More data may exist         Latest Ref Rng & Units 7/19/2023 7/14/2023   Pain Management Panel   Creatinine, Urine mg/dL 139.1  8.5      ----------------------------------------------------------------------------------------------------------------------  Imaging Results (Last 24 Hours)       Procedure Component Value Units Date/Time    US Venous Doppler Lower Extremity Bilateral (duplex) [[id number]] Collected: 07/20/23 1317     Updated: 07/20/23 1319    Narrative:      US VENOUS DOPPLER LOWER EXTREMITY BILATERAL (DUPLEX)-     CLINICAL INDICATION: r/o dvt; D64.9-Anemia, unspecified;  A41.9-Sepsis,  unspecified organism; R65.20-Severe sepsis without septic shock;  J96.01-Acute respiratory failure with hypoxia; N39.0-Urinary tract  infection, site not specified; I50.31-Acute diastolic (congestive) heart  failure        COMPARISON: None available      TECHNIQUE: Color Doppler imaging was used with compression and  augmentation to evaluate the lower extremity deep venous system.     FINDINGS:   There is patent spontaneous flow from the common femoral vein through  the posterior tibial veins.  There was no internal clot or area of noncompressibility.  Normal augmentation was elicited where applicable.       Impression:      No DVT in the lower extremities on today's exam.      This report was finalized on 7/20/2023 1:17 PM by Dr. Nicho Reeves MD.       XR Chest 1 View [[id number]] Collected: 07/20/23 0847     Updated: 07/20/23 0851    Narrative:      EXAM:    XR Chest, 1 View     EXAM DATE:    7/20/2023 8:23 AM     CLINICAL HISTORY:    respiratory failure; D64.9-Anemia, unspecified; A41.9-Sepsis,  unspecified organism; R65.20-Severe sepsis without septic shock;  J96.01-Acute respiratory failure with hypoxia; N39.0-Urinary tract  infection, site not specified; I50.31-Acute diastolic (congestive) heart  failure     TECHNIQUE:    Frontal view of the chest.     COMPARISON:    07/15/2023     FINDINGS:    Lungs and pleural spaces:  Left basal consolidation.  Mild pulmonary  vascular congestion.  No pneumothorax.    Heart:  Cardiomegaly.    Mediastinum:  Unremarkable.    Bones/joints:  Unremarkable.    Tubes, lines and devices:  ET tube with tip at level of clavicles.  NG  tube extends into the distal stomach.       Impression:      1.  Support devices as above.  2.  Cardiomegaly and pulmonary vascular congestion.  3.  Left basilar airspace disease.     This report was finalized on 7/20/2023 8:48 AM by Dr. Kvng Tijerina MD.                ----------------------------------------------------------------------------------------------------------------------    Pertinent Infectious Disease Results                Assessment/Plan       Assessment     Sepsis  ESBL UTI      Plan        Patient intubated this morning in the setting of significantly elevated carbon dioxide levels.  Currently intubated and sedated at 80% FiO2.  Daughter at bedside and tearful.  Lung sounds diminished bilaterally.  Abdomen soft, nontender.  WBC improved at 18.35.  Procalcitonin from 7/20/2023 elevated at 0.53.  Urinalysis from 7/20/2023 positive however contaminated with 7-12 squamous cells, culture pending.  Respiratory culture from 7/20/2023 currently in process.    Urine culture collected on 7/15/2023 showed growth of over 100,000 colonies of ESBL E. Coli.    Vancomycin was initiated yesterday afternoon due to CT of the chest from 7/19/2023 reporting development of bilateral multilobar or partially consolidative pneumonia.  Per nephrology recommendations vancomycin has been discontinued.  Doxycycline 100 mg IV every 12 hours initiated to continue with ertapenem to allow coverage for ESBL E. coli UTI as well as pneumonia.  We will continue to follow closely and adjust antibiotic therapy as needed.     ANTIMICROBIAL THERAPY    ertapenem (INVanz) 1000 mg in 100ml NS VTB     Code Status:   Code Status and Medical Interventions:   Ordered at: 07/13/23 0152     Code Status (Patient has no pulse and is not breathing):    CPR (Attempt to Resuscitate)     Medical Interventions (Patient has pulse or is breathing):    Full Support       Anna Maldonado, APRN  07/20/23  14:11 EDT

## 2023-07-20 NOTE — NURSING NOTE
Intubation note  0814: 5mL Propofol  0815: 5mL Propofol  0816: 7.5 @ 24 at the lip  Intubated by Dr. Mathis

## 2023-07-20 NOTE — PROGRESS NOTES
Nutrition Services    Patient Name:  Judy Lutz  YOB: 1960  MRN: [id number]  Admit Date:  7/12/2023    Consult received for TF.  Per RN OG tube to suction today.  Will follow and initiate when medical status allows.     Electronically signed by:  Sissy Haley RD  07/20/23 13:44 EDT

## 2023-07-20 NOTE — PROGRESS NOTES
THC Physician - Brief Progress Note  PERMANENT  07/19/2023 23:50    Formerly [facility] [place] [facility] - [address] - [place], [place] ([facility])    LISANDRA MCARTHUR    Date of Service 07/19/2023 23:50    HPI/Events of Note [facility] Provider Intervention Note    Comments:  RN reporting pt on Bipap @5 0%. PT no longer following simple commands. Last ABG @ 3pm. RN requesting ABG    Chart reviewed, order placed.    _____   Video Assessment performed            Contact [facility] for any needs if bedside physician is not present.      Interventions Major-Respiratory failure - evaluation and management        Electronically Signed by: Matthias Gandhi) on 07/19/2023 23:51

## 2023-07-20 NOTE — PROGRESS NOTES
[facility]     Mar/chart reviewed  Follow up Acute hypoxemic and hypercapnic respiratory failure, cardiomegaly, TRACIE  Patient poorly responsive, unable to provide subjective, gas worse again, proceeded with intubation, see separate procedure note    Vital Sign Min/Max for last 24 hours  Temp  Min: 97.3 °F (36.3 °C)  Max: 99.1 °F (37.3 °C)   BP  Min: 96/53  Max: 145/70   Pulse  Min: 88  Max: 115   Resp  Min: 19  Max: 31   SpO2  Min: 89 %  Max: 98 %   Flow (L/min)  Min: 6  Max: 6   Weight  Min: 168 kg (371 lb)  Max: 169 kg (372 lb)     Appears ill, poorly responsive   perrl, normal sclera  Mm dry, no jvd, trachea midline, neck supple,  chest decreased ae bilaterally, no crackles, no wheezes,   tachy  soft, nt, nd +bs,  no c/c/ 1+ edema  Skin warm, dry no rashes    Labs: 7/20: reviewed:  7.25/104/93  Glucose 111  Bun 56  Cr 2.65  Na 150  Bicarb 45  Calcium 11.1  (albumin 3.3)  Cbc pending  CXR: reviewed, increased infiltrate/volume loss on the left side, film is rotated somewhat.       A/P:  Acute on chronic hypercapnic respiratory failure -- had brief rally with NIPPV then needed intubation this morning. She has very small lung volumes on CT chest due to obesity and cardiomegaly.  She has some pneumonia and/or compressive atelectasis on ct that likely tipped her over the edge.   Pneumonia - will defer antibiotics to infectious disease --check ET aspirate for culture  TRACIE -- miles catheter in place, fena 0.5.  Agree given wt is down 40lbs since admission that likely have intravascular depletion with peripheral edema. Avoid nephrotoxins, support bp. Trial gentle iv hydration now that patient on vent.  Cardiomegaly -- quite striking on CT imaging, severity on echo seems less? --repeat echo done yesterday, read pending  Pericardial effusion -- I don't see any pulsus alternans, no hypotension, no tamponade physio seen on echo in past  Pulmonary hypertension -- suspect group II and III.  I can't r/o IV and  can't get ct angio or v/q at this point --  venous doppler read pending, d dimer will undoubtably be high.   Anemia --  no active bleeding has been noted but she did have to get transfusion again 7/19, hold prophylactic heparin if further transufion needed or if she has observed bleeding  Leukocytosis  Thrombocytosis  Morbid obesity - suspect patient has underlying OHV and NANCY both,   NSTEMI type II  Encephalopathy -- suspect worsening was due to hypercapnia  13. Hypernatremia -- start hypotonic fluids until nephrology can see   14. Hypercalcemia    Repeat gas here shortly, adjust vent as needed. Going to be difficult for her I'm afraid, her lung volumes appear quite small on ct, wouldn't take much to compromise her respirations.  Cardiomegaly is really quite striking on cxr and ct, await repeat echo    Discussed with RN, RT and with Dr. Almanza    CC 50 mins excluding billable procedures.

## 2023-07-20 NOTE — PROCEDURES
Endotracheal Intubation Procedure Note    Indication for endotracheal intubation: respiratory failure.  Sedation:  propofol .  Equipment: A video GlideScope was used and Helena 4 laryngoscope blade.  Number of attempts: 1.  ETT location confirmed by ETCO2 monitor.    Devin Mathis MD  7/20/2023

## 2023-07-21 NOTE — PROGRESS NOTES
[facility]  PROGRESS NOTE    Patient information:  Name: Judy Lutz  Age/Sex: 63 y.o. female  :  1960        PCP: Provider, No Known  Attending: Sapphire Lanre Contreras   MRN:  [id number]  Visit Number:  [id number]    LOS:  LOS: 8 days     CODE STATUS:    Code Status and Medical Interventions:   Ordered at: 23 0152     Code Status (Patient has no pulse and is not breathing):    CPR (Attempt to Resuscitate)     Medical Interventions (Patient has pulse or is breathing):    Full Support       PROBLEM LIST:Principal Problem:    Symptomatic anemia      Reason for Cardiology follow-up: Cardiomyopathy and pericardial effusion    Subjective   ADMISSION INFORMATION:  Chief Complaint   Patient presents with    Shortness of Breath       HPI:  Judy Lutz is a 63 y.o. female with no considerable past medical history noted in patient's chart.  Patient presented to [facility] ([facility]) emergency room (ER) on 2023 with complaints of increased fatigue, malaise, shortness of breath, and generalized weakness for the past several weeks.  Cardiology was consulted for further evaluation and management of cardiomyopathy and pericardial effusion.       No primary Cardiologist noted in her chart.     Interval History:   Patient is in room CCU for and was examined by Dr. Dr. Jett.  Telemetry reveals SR 90s with 2 episodes of brief V. tach since 1:47 PM on 2023.  See strips attached below.  Nephrology is following.  Sodium 149, potassium 3.7, chloride 97, CO2 39.8, BUN 60, and creatinine 3.06.  Magnesium 2.3.  Hemoglobin 7.0. Patient is intubated and sedated.  No acute distress noted at this time.  Patient's daughter and son n law are at bedside.    Vital Signs  Temp:  [99.1 °F (37.3 °C)-100.5 °F (38.1 °C)] 100.4 °F (38 °C)  Heart Rate:  [] 87  Resp:  [16] 16  BP: ()/(35-63) 108/56  Flow (L/min):  [15] 15  FiO2 (%):  [50 %-100 %] 50  %  Vital Signs (last 72 hrs)         07/18 0700 07/19 0659 07/19 0700  07/20 0659 07/20 0700 07/21 0659 07/21 0700  07/21 0746   Most Recent      Temp (°F) 98.1 -  99.6    97.3 -  99.4    99.1 -  100.5       100.4 (38) 07/21 0531    Heart Rate 104 -  109    88 -  115    75 -  107      87     87 07/21 0700    Resp   20    19 -  31    16 -  28       16 07/21 0659    /60 -  121/64    96/53 -  145/70    96/38 -  127/63      108/56     108/56 07/21 0700    SpO2 (%) 90 -  92    89 -  98    72 -  100      94     94 07/21 0700          Body mass index is 52.63 kg/m².    Intake/Output Summary (Last 24 hours) at 7/21/2023 0746  Last data filed at 7/21/2023 0521  Gross per 24 hour   Intake 1021.62 ml   Output 1015 ml   Net 6.62 ml       Objective     I have seen and examined Ms. Lutz today.  Physical Exam:      General Appearance:  Intubated and sedated with no acute distress noted.   Head:    Normocephalic, without obvious abnormality, atraumatic.   Eyes:                             Neck:   No adenopathy, supple, trachea midline, no thyromegaly, no carotid bruit, no JVD.   Lungs:   Mechanical ventilation with crackles bibasilar to auscultation, respirations regular, even and unlabored.    Heart:    Regular rhythm and normal rate, normal S1 and S2, no          murmur, no gallop, no rub, no click   Chest Wall:    No abnormalities observed.   Abdomen:     Normal bowel sounds, no masses, no organomegaly, soft nontender, nondistended, no guarding, no rebound tenderness.   Extremities: Sedated, +1-2 edema in calves bilaterally, no cyanosis, no redness.    Pulses:   Pulses palpable and equal bilaterally.   Skin:   No bleeding, bruising or rash.   Neurologic: Intubated and sedated.       Results review   Results Review:   Results from last 7 days   Lab Units 07/21/23  0012 07/20/23  0559 07/20/23  0010 07/19/23  1545 07/19/23  0207 07/18/23  0052 07/17/23  1715 07/17/23  0022 07/16/23  0426   WBC 10*3/mm3 18.78* 18.35*   --  21.02* 27.03* 27.48*  --  19.23* 20.91*   HEMOGLOBIN g/dL 7.0* 7.2* 7.2* 6.0* 7.2* 7.7* 8.1* 7.0* 7.1*   PLATELETS 10*3/mm3 457* 494*  --  548* 592* 665*  --  420 499*     Results from last 7 days   Lab Units 07/21/23  0012 07/20/23  1740 07/20/23  0559 07/19/23  1323 07/19/23  0207 07/18/23  0857 07/18/23  0052 07/17/23  1136 07/17/23  0022 07/16/23  1830 07/16/23  0426   SODIUM mmol/L 149*  --  150* 149* 147*  --  148*  --  148*  --  149*   POTASSIUM mmol/L 3.7 3.7 3.5 4.1  4.0 3.6 3.7 3.4*  3.4*   < > 2.6*   < > 2.7*   CHLORIDE mmol/L 97*  --  97* 94* 94*  --  93*  --  93*  --  99   CO2 mmol/L 39.8*  --  45.2* 33.5* 43.2*  --  46.2*  --  43.8*  --  42.8*   BUN mg/dL 60*  --  56* 50* 42*  --  27*  --  18  --  20   CREATININE mg/dL 3.06*  --  2.65* 2.45* 2.08*  --  1.31*  --  0.64  --  0.76   CALCIUM mg/dL 10.7*  --  11.2* 10.2 11.1*  --  10.9*  --  10.2  --  10.3   GLUCOSE mg/dL 107*  --  111* 118* 121*  --  133*  --  120*  --  110*   ALT (SGPT) U/L 6  --  8  --  8  --  10  --  10  --  12   AST (SGOT) U/L 12  --  14  --  15  --  12  --  11  --  12    < > = values in this interval not displayed.     Results from last 7 days   Lab Units 07/15/23  1504 07/15/23  1211   HSTROP T ng/L 32* 29*     Lab Results   Component Value Date    PROBNP 35,507.0 (H) 07/18/2023    PROBNP 26,037.0 (H) 07/13/2023     No results found.  Lab Results   Component Value Date    ABSOLUTELUNG 28 07/15/2023     Lab Results   Component Value Date    INR 1.38 (H) 07/12/2023     Lab Results   Component Value Date    MG 2.3 07/21/2023    MG 2.5 (H) 07/19/2023    MG 2.3 07/18/2023     Lab Results   Component Value Date    TSH 0.619 07/15/2023      No results found for: CHOL, TRIG, HDL, LDL  Pain Management Panel  More data may exist         Latest Ref Rng & Units 7/19/2023 7/14/2023   Pain Management Panel   Creatinine, Urine mg/dL 139.1  8.5      Microbiology Results (last 10 days)       Procedure Component Value - Date/Time    Respiratory  Culture - Aspirate, ET Suction [[id number]] Collected: 07/20/23 0843    Lab Status: Preliminary result Specimen: Aspirate from ET Suction Updated: 07/20/23 0942     Gram Stain Many (4+) WBCs seen      Few (2+) Epithelial cells seen      Mixed stephanie      Yeast with hyphae seen    MRSA Screen, PCR (Inpatient) - Swab, Nares [[id number]]  (Abnormal) Collected: 07/19/23 1306    Lab Status: Final result Specimen: Swab from Nares Updated: 07/19/23 1557     MRSA PCR MRSA Detected    Narrative:      The negative predictive value of this diagnostic test is high and should only be used to consider de-escalating anti-MRSA therapy. A positive result may indicate colonization with MRSA and must be correlated clinically.    Urine Culture - Urine, Urine, Clean Catch [[id number]]  (Abnormal)  (Susceptibility) Collected: 07/15/23 1627    Lab Status: Final result Specimen: Urine, Clean Catch Updated: 07/17/23 1159     Urine Culture >100,000 CFU/mL Escherichia coli ESBL     Comment:   Consider infectious disease consult.  Susceptibility results may not correlate to clinical outcomes.       Narrative:      Colonization of the urinary tract without infection is common. Treatment is discouraged unless the patient is symptomatic, pregnant, or undergoing an invasive urologic procedure.  Recent outcomes data supports the use of pip/tazo in the treatment of susceptible ESBL infections for uncomplicated UTI. Consider use of pip/tazo as a carbapenem-sparing regimen in applicable patients.    Susceptibility        Escherichia coli ESBL      MANUEL      Ertapenem Susceptible      Gentamicin Susceptible      Levofloxacin Resistant      Meropenem Susceptible      Nitrofurantoin Susceptible      Piperacillin + Tazobactam Resistant      Trimethoprim + Sulfamethoxazole Resistant                           COVID-19 and FLU A/B PCR - Swab, Nasopharynx [[id number]]  (Normal) Collected: 07/13/23 0109    Lab Status: Final result Specimen: Swab from Nasopharynx  Updated: 07/13/23 0138     COVID19 Not Detected     Influenza A PCR Not Detected     Influenza B PCR Not Detected    Narrative:      Fact sheet for providers: https://www.fda.gov/media/099084/download    Fact sheet for patients: https://www.fda.gov/media/200521/download    Test performed by PCR.    Blood Culture - Blood, Arm, Left [[id number]]  (Normal) Collected: 07/12/23 2220    Lab Status: Final result Specimen: Blood from Arm, Left Updated: 07/17/23 2230     Blood Culture No growth at 5 days    Blood Culture - Blood, Arm, Left [[id number]]  (Normal) Collected: 07/12/23 2220    Lab Status: Final result Specimen: Blood from Arm, Left Updated: 07/17/23 2230     Blood Culture No growth at 5 days    Urine Culture - Urine, Straight Cath [[id number]]  (Abnormal)  (Susceptibility) Collected: 07/12/23 2140    Lab Status: Final result Specimen: Urine from Straight Cath Updated: 07/15/23 1323     Urine Culture >100,000 CFU/mL Escherichia coli ESBL     Comment:   Consider infectious disease consult.  Susceptibility results may not correlate to clinical outcomes.         >100,000 CFU/mL Klebsiella pneumoniae ssp pneumoniae    Narrative:      Colonization of the urinary tract without infection is common. Treatment is discouraged unless the patient is symptomatic, pregnant, or undergoing an invasive urologic procedure.  Recent outcomes data supports the use of pip/tazo in the treatment of susceptible ESBL infections for uncomplicated UTI. Consider use of pip/tazo as a carbapenem-sparing regimen in applicable patients.    Susceptibility        Escherichia coli ESBL      MANUEL      Ertapenem Susceptible      Gentamicin Susceptible      Levofloxacin Resistant      Meropenem Susceptible      Nitrofurantoin Susceptible      Piperacillin + Tazobactam Resistant      Trimethoprim + Sulfamethoxazole Resistant                       Susceptibility        Klebsiella pneumoniae ssp pneumoniae      MANUEL      Ampicillin Resistant      Ampicillin +  Sulbactam Susceptible      Cefazolin Susceptible      Cefepime Susceptible      Ceftazidime Susceptible      Ceftriaxone Susceptible      Gentamicin Susceptible      Levofloxacin Susceptible      Nitrofurantoin Intermediate      Piperacillin + Tazobactam Susceptible      Trimethoprim + Sulfamethoxazole Susceptible                                  Imaging Results (Last 48 Hours)       Procedure Component Value Units Date/Time    XR Chest 1 View [[id number]] Resulted: 07/21/23 0655     Updated: 07/21/23 0655    US Renal Bilateral [[id number]] Resulted: 07/20/23 1611     Updated: 07/20/23 1701    XR Chest 1 View [[id number]] Collected: 07/20/23 1528     Updated: 07/20/23 1531    Narrative:      EXAM:    XR Chest, 1 View     EXAM DATE:    7/20/2023 2:45 PM     CLINICAL HISTORY:    Central line; D64.9-Anemia, unspecified; A41.9-Sepsis, unspecified  organism; R65.20-Severe sepsis without septic shock; J96.01-Acute  respiratory failure with hypoxia; N39.0-Urinary tract infection, site  not specified; I50.31-Acute diastolic (congestive) heart failure     TECHNIQUE:    Frontal view of the chest.     COMPARISON:    07/20/2023     FINDINGS:    Lungs and pleural spaces:  No pneumothorax.    Heart:  Cardiomegaly and left basilar airspace disease.    Mediastinum:  Unremarkable.    Bones/joints:  Unremarkable.    Vasculature:  Right IJ deep line noted with tip in the distal SVC.    Tubes, lines and devices:  ET tube with tip just below clavicles.  NG  tube extends into the stomach.       Impression:      1.  Support devices detailed above. No evidence of pneumothorax.  2.  Otherwise stable chest.     This report was finalized on 7/20/2023 3:28 PM by Dr. Kvng Tijerina MD.       US Venous Doppler Lower Extremity Bilateral (duplex) [[id number]] Collected: 07/20/23 1317     Updated: 07/20/23 1319    Narrative:      US VENOUS DOPPLER LOWER EXTREMITY BILATERAL (DUPLEX)-     CLINICAL INDICATION: r/o dvt; D64.9-Anemia, unspecified;  A41.9-Sepsis,  unspecified organism; R65.20-Severe sepsis without septic shock;  J96.01-Acute respiratory failure with hypoxia; N39.0-Urinary tract  infection, site not specified; I50.31-Acute diastolic (congestive) heart  failure        COMPARISON: None available      TECHNIQUE: Color Doppler imaging was used with compression and  augmentation to evaluate the lower extremity deep venous system.     FINDINGS:   There is patent spontaneous flow from the common femoral vein through  the posterior tibial veins.  There was no internal clot or area of noncompressibility.  Normal augmentation was elicited where applicable.       Impression:      No DVT in the lower extremities on today's exam.      This report was finalized on 7/20/2023 1:17 PM by Dr. Nicho Reeves MD.       XR Chest 1 View [[id number]] Collected: 07/20/23 0847     Updated: 07/20/23 0851    Narrative:      EXAM:    XR Chest, 1 View     EXAM DATE:    7/20/2023 8:23 AM     CLINICAL HISTORY:    respiratory failure; D64.9-Anemia, unspecified; A41.9-Sepsis,  unspecified organism; R65.20-Severe sepsis without septic shock;  J96.01-Acute respiratory failure with hypoxia; N39.0-Urinary tract  infection, site not specified; I50.31-Acute diastolic (congestive) heart  failure     TECHNIQUE:    Frontal view of the chest.     COMPARISON:    07/15/2023     FINDINGS:    Lungs and pleural spaces:  Left basal consolidation.  Mild pulmonary  vascular congestion.  No pneumothorax.    Heart:  Cardiomegaly.    Mediastinum:  Unremarkable.    Bones/joints:  Unremarkable.    Tubes, lines and devices:  ET tube with tip at level of clavicles.  NG  tube extends into the distal stomach.       Impression:      1.  Support devices as above.  2.  Cardiomegaly and pulmonary vascular congestion.  3.  Left basilar airspace disease.     This report was finalized on 7/20/2023 8:48 AM by Dr. Kvng Tijerina MD.       CT Chest Without Contrast Diagnostic [[id number]] Collected: 07/19/23 1018      Updated: 07/19/23 1022    Narrative:      EXAM:    CT Chest Without Intravenous Contrast     EXAM DATE:    7/19/2023 9:45 AM     CLINICAL HISTORY:    worsening oxygen requirement, concern for fluid overload vs  development of aspiration pneumonia; D64.9-Anemia, unspecified;  A41.9-Sepsis, unspecified organism; R65.20-Severe sepsis without septic  shock; J96.01-Acute respiratory failure with hypoxia; N39.0-Urinary  tract infection, site not specified; I50.31-Acute diastolic (congestive)  heart failure     TECHNIQUE:    Axial computed tomography images of the chest without intravenous  contrast.  Sagittal and coronal reformatted images were created and  reviewed.  This CT exam was performed using one or more of the following  dose reduction techniques:  automated exposure control, adjustment of  the mA and/or kV according to patient size, and/or use of iterative  reconstruction technique.     COMPARISON:    07/12/2023     FINDINGS:    Lungs and pleural spaces:  Development of bilateral consolidative  airspace disease more pronounced in the upper lobes but also present in  the lower lobes consistent with pneumonia.  Miniscule pleural effusions  which are nonloculated.  Interstitial thickening has developed  compatible with edema.    Heart:  Very marked cardiac enlargement is stable from previous.  No  significant pericardial effusion.  No significant coronary artery  calcifications.    Mediastinum:  Hiatal hernia, stable.    Bones/joints:  Unremarkable.  No acute fracture.  No dislocation.    Soft tissues:  Diffuse anasarca has slightly improved.    Vasculature:  Pulmonary venous hypertension is noted.  No thoracic  aortic aneurysm.    Lymph nodes:  Reactive and/or congested lymph nodes in the mediastinum  and hilar stations but no bulky adenopathy identified.    Liver:  Liver cirrhosis.    Stomach and bowel:  Gastroesophageal reflux.    Intraperitoneal space:  Upper abdominal ascites which appears stable.        Impression:      1.  Development of bilateral multilobar partially consolidative  pneumonia. Combination atelectasis may be considered.  2.  Slight increase in changes of CHF now with interstitial edema.  Miniscule nonloculated pleural effusions with stable marked cardiac  enlargement.  3.  Questionable decrease in the degree of anasarca with persistent  upper abdominal ascites noted.  4.  Other incidental/nonacute findings as above.     This report was finalized on 7/19/2023 10:20 AM by Dr. Kvng Tijerina MD.       CT Head Without Contrast [[id number]] Collected: 07/19/23 1016     Updated: 07/19/23 1020    Narrative:      EXAM:    CT Head Without Intravenous Contrast     EXAM DATE:    7/19/2023 9:41 AM     CLINICAL HISTORY:    Mental status change, unknown cause; D64.9-Anemia, unspecified;  A41.9-Sepsis, unspecified organism; R65.20-Severe sepsis without septic  shock; J96.01-Acute respiratory failure with hypoxia; N39.0-Urinary  tract infection, site not specified; I50.31-Acute diastolic (congestive)  heart failure     TECHNIQUE:    Axial computed tomography images of the head/brain without intravenous  contrast.  Sagittal and coronal reformatted images were created and  reviewed.  This CT exam was performed using one or more of the following  dose reduction techniques:  automated exposure control, adjustment of  the mA and/or kV according to patient size, and/or use of iterative  reconstruction technique.     COMPARISON:    No relevant prior studies available.     FINDINGS:    Brain and extra-axial spaces:  Unremarkable.  No hemorrhage.  No  significant white matter disease.  No edema.  No ventriculomegaly.    Bones/joints:  Unremarkable.  No acute fracture.    Soft tissues:  Unremarkable.    Sinuses:  Unremarkable as visualized.  No acute sinusitis.    Mastoid air cells:  Unremarkable as visualized.  No mastoid effusion.       Impression:        Unremarkable exam demonstrating no CT evidence of acute  intracranial  findings.     This report was finalized on 7/19/2023 10:17 AM by Dr. Kvng Tijerina MD.               ECHO:  Results for orders placed during the hospital encounter of 07/12/23    Adult Transthoracic Echo Limited W/ Cont if Necessary Per Protocol    Interpretation Summary    Left ventricular systolic function is normal. Left ventricular ejection fraction appears to be 66 - 70%.    Left ventricular diastolic dysfunction is noted.    The right ventricular cavity is mild to moderately dilated, D-shaped septum was noted however no assessment of the PA pressure was done right ventricular pressure not estimated.    There is a large (>2cm) circumferential pericardial effusion. There is no evidence of cardiac tamponade.    IVC is dilated and collapsible.    This is a technically limited study.      STRESS TEST:   No results found for this or any previous visit.      HEART CATH:  No results found for this or any previous visit.      TELEMETRY:     SR 90s with 2 episodes of brief V. tach since 1:47 PM on 07/20/2023.  See strips attached below.                  I reviewed the patient's new clinical results.    Medication Review:   Current list of medications may not reflect those currently placed in orders that are not signed or are being held.     aspirin, 81 mg, Per G Tube, Daily  doxycycline, 100 mg, Intravenous, Q12H  ertapenem, 1,000 mg, Intravenous, Q24H  escitalopram, 10 mg, Oral, Daily  heparin (porcine), 5,000 Units, Subcutaneous, Q8H  iron polysaccharides, 150 mg, Oral, Daily  magic barrier cream, 1 application , Topical, BID  metoprolol succinate XL, 25 mg, Oral, Q24H  midodrine, 5 mg, Oral, TID AC  mupirocin, 1 application , Each Nare, BID  pantoprazole, 40 mg, Intravenous, BID AC  potassium chloride, 40 mEq, Oral, Daily  rosuvastatin, 20 mg, Oral, Nightly  senna-docusate sodium, 2 tablet, Oral, BID  sodium chloride, 10 mL, Intravenous, Q12H  sodium chloride, 10 mL, Intravenous, Q12H  sodium  chloride, 10 mL, Intravenous, Q12H  sodium chloride, 10 mL, Intravenous, Q12H  sodium chloride, 10 mL, Intravenous, Q12H      norepinephrine, 0.01-0.07 mcg/kg/min, Last Rate: 0.02 mcg/kg/min (07/21/23 0537)  propofol, 5-50 mcg/kg/min, Last Rate: 40 mcg/kg/min (07/21/23 0726)  sodium chloride, 75 mL/hr, Last Rate: Stopped (07/20/23 0955)        acetaminophen    acetaminophen    senna-docusate sodium **AND** polyethylene glycol **AND** bisacodyl **AND** bisacodyl    [DISCONTINUED] senna-docusate sodium **AND** [DISCONTINUED] polyethylene glycol **AND** [DISCONTINUED] bisacodyl **AND** bisacodyl    fentaNYL citrate (PF)    Magnesium Standard Dose Replacement - Follow Nurse / BPA Driven Protocol    ondansetron    Phosphorus Replacement - Follow Nurse / BPA Driven Protocol    Potassium Replacement - Follow Nurse / BPA Driven Protocol    prochlorperazine    simethicone    sodium chloride    sodium chloride    sodium chloride    sodium chloride    sodium chloride    sodium chloride    sodium chloride    sodium chloride    Assessment      Acute respiratory failure with hypoxia and hypercapnia which seems to be of multifactorial etiology including bilateral pneumonia, obesity hypoventilation and Bri an element of heart failure, patient is orally intubated and is on mechanical ventilator.  Acute HFpEF admission that seems to have improved, patient currently appears to be volume depleted with worsening kidney function, hyponatremia and metabolic alkalosis.  Large sized pericardial effusion with apparently no echocardiographic evidence of cardiac tamponade on echo Doppler study on 7/20/2023.  Mildly elevated troponin of around 30 and admission with relatively flat trend which most likely is a type II MI.  Likely undiagnosed sleep apnea.      Plan     Continue to hold diuretics for now with worsening kidney function and hyponatremia and metabolic alkalosis  Consider referral to cardiothoracic surgery for her pericardial  effusion later on when her respiratory status improves adequately for consideration of pericardial window placement and pericardial biopsy.  May need ischemic work-up at some point in the future whenever her respiratory and kidney status improves adequately.    I have discussed the patients findings and my recommendations with patient's daughter at bedside..    Electronically signed by WINDY Roberts, 07/21/23, 1:33 PM EDT.     Electronically signed by Israel Jett MD, 07/21/23, 2:23 PM EDT.            Please note that portions of this note were completed with a voice recognition program.    Please note that portions of this note were copied and has been reviewed and is accurate as of 7/21/2023 .

## 2023-07-21 NOTE — PROGRESS NOTES
[facility]    Mar/chart reviewed  Follow up Acute hypoxemic and hypercapnic respiratory failure, cardiomegaly, TRACIE  Sedated on vent unable to provide subjective    Vital Sign Min/Max for last 24 hours  Temp  Min: 99.1 °F (37.3 °C)  Max: 100.5 °F (38.1 °C)   BP  Min: 96/38  Max: 127/63   Pulse  Min: 75  Max: 101   Resp  Min: 16  Max: 16   SpO2  Min: 84 %  Max: 100 %   No data recorded   Weight  Min: 166 kg (366 lb 12.8 oz)  Max: 166 kg (366 lb 12.8 oz)   1021/1015    Appears ill, sedated on vent  perrl, normal sclera  Mm dry, no jvd, trachea midline, neck supple,  chest decreased ae bilaterally, no crackles, no wheezes,   tachy  soft, nt, nd +bs,  no c/c/ 1+ edema  Skin warm, dry no rashes    Labs: 7/21: reviewed:  Mg 2.3  7.43/68/62 on vent 50% 10 peep  Glucose 107  Bun 60  Cr 3.06  Na 149  Bicarb 39  Total protein 5.3  Albumin 2.3  Wbc 18  Hgb 7  Plts 457  CXR: can't view images, no read  Micro: reviewed  Venous dopplers neg  Echo: reviewed -      A/P:  Acute on chronic hypercapnic respiratory failure -- had brief rally with NIPPV then needed intubation this morning. She has very small lung volumes on CT chest due to obesity and cardiomegaly.  She has some pneumonia and/or compressive atelectasis on ct that likely tipped her over the edge.   Pneumonia - will defer antibiotics to infectious disease -- ET aspirate for culture ordered  TRACIE --  at time of transfer to unit wt is down 40lbs since admission that likely have intravascular depletion with peripheral edema. Avoid nephrotoxins, support bp. nehprology following  Cardiomegaly -- quite striking on CT imaging, severity on echo seems less? --repeat echo done yesterday, read is puzzling with dilated but collasible IVC and D shaped RV  Pericardial effusion -- I don't see any pulsus alternans, no hypotension, no tamponade physio seen on echo   Pulmonary hypertension -- suspect group II and III.  I can't r/o IV and can't get ct angio or v/q at this point  --  venous doppler neg -- she is a poor candidate for full anticoagulation given continue anemia and need for transfusion   Anemia --  no active bleeding has been noted but she did have to get transfusion again 7/19, hold prophylactic if she has observed bleeding  Leukocytosis  Thrombocytosis  Morbid obesity -with OHV and NANCY both, --I think this has lead to pulmonary hypertension and now with RV failure -- in general group III pulmonary hypertension does not derive benefit from pulmonary vasodilators  NSTEMI type II  Encephalopathy -- suspect worsening was due to hypercapnia  13. Hypernatremia -- better  14. Hypercalcemia --better    Prognosis is poor given untreated OHV for years resulting in pulmonary hypertension, RV failure.  Now with TRACIE.    Discussed with daughter at bedside   CC 35 mins.

## 2023-07-21 NOTE — PROGRESS NOTES
[facility]     Progress Note    Patient Name: Judy Lutz  : 1960  MRN: [id number]  Primary Care Physician:  Provider, No Known  Date of admission: 2023    Subjective   Subjective     Chief Complaint: 63-year-old female being seen in follow-up for dyspnea    Procedures:  2023: Intubation  2023: Right internal jugular central line placement    History of Present Illness  Patient remains sedated and on the ventilator.  Discussed with Bella Contreras RN today.  Patient noted to have fever this morning with a Tmax of 101.8 °F.  Patient also had fairly significant output from her OG tube, dark colored material, most recently being ~300 mL.    Review of Systems  Unable to assess due to sedation/intubation/mechanical ventilation    Objective   Objective     Vitals:   Temp:  [98.8 °F (37.1 °C)-101.8 °F (38.8 °C)] 98.8 °F (37.1 °C)  Heart Rate:  [69-95] 69  Resp:  [16] 16  BP: ()/(38-63) 103/47  FiO2 (%):  [50 %-60 %] 50 %        Physical Exam  Constitutional:       General: She is not in acute distress.     Appearance: She is well-developed. She is morbidly obese.      Interventions: She is sedated and intubated.   HENT:      Head: Normocephalic and atraumatic.      Mouth/Throat:      Comments: ET and OG tubes in place  Eyes:      Conjunctiva/sclera: Conjunctivae normal.   Neck:      Trachea: No tracheal deviation.      Comments: R IJ central line  Cardiovascular:      Rate and Rhythm: Normal rate and regular rhythm.      Heart sounds: No murmur heard.    No friction rub. No gallop.   Pulmonary:      Effort: No respiratory distress. She is intubated.      Breath sounds: Examination of the right-lower field reveals decreased breath sounds. Examination of the left-lower field reveals decreased breath sounds. Decreased breath sounds present. No wheezing or rales.   Abdominal:      General: Bowel sounds are normal. There is no distension.      Palpations: Abdomen is soft.      Tenderness:  There is no abdominal tenderness. There is no guarding.   Genitourinary:     Comments: Gonzalez catheter in place  Skin:     General: Skin is warm and dry.      Findings: No erythema or rash.        Result Review    Result Review:  I have personally reviewed the results from the time of this admission to 7/21/2023 15:24 EDT and agree with these findings:  [x]  Laboratory list / accordion  []  Microbiology  [x]  Radiology  []  EKG/Telemetry   []  Cardiology/Vascular   []  Pathology  []  Old records  []  Other:  Most notable findings include: Today's arterial blood gas overall stable with pH 7.435, PCO2 68.7, PaO2 62.9, bicarbonate 46.1 with an O2 saturation of 92.6%.    CMP with sodium slightly improved to 149, chloride 97, BUN/creatinine 63.06, glucose 107, calcium 10.7. Albumin 2.5.    WBC overall stable at 18.78, H/H 7.0 and 27.4 respectively, platelets 457.    Preliminary urine culture from 7/20 with no growth  Respiratory culture from 7/20 with moderate growth/3+ Candida albicans  MRSA nasal probe positive  Urine culture from 7/15 with ESBL E. coli    CT chest without contrast:  FINDINGS:    Lungs and pleural spaces:  Development of bilateral consolidative  airspace disease more pronounced in the upper lobes but also present in  the lower lobes consistent with pneumonia.  Miniscule pleural effusions  which are nonloculated.  Interstitial thickening has developed  compatible with edema.    Heart:  Very marked cardiac enlargement is stable from previous.  No  significant pericardial effusion.  No significant coronary artery  calcifications.    Mediastinum:  Hiatal hernia, stable.    Bones/joints:  Unremarkable.  No acute fracture.  No dislocation.    Soft tissues:  Diffuse anasarca has slightly improved.    Vasculature:  Pulmonary venous hypertension is noted.  No thoracic  aortic aneurysm.    Lymph nodes:  Reactive and/or congested lymph nodes in the mediastinum  and hilar stations but no bulky adenopathy  identified.    Liver:  Liver cirrhosis.    Stomach and bowel:  Gastroesophageal reflux.    Intraperitoneal space:  Upper abdominal ascites which appears stable.     IMPRESSION:  1.  Development of bilateral multilobar partially consolidative  pneumonia. Combination atelectasis may be considered.  2.  Slight increase in changes of CHF now with interstitial edema.  Miniscule nonloculated pleural effusions with stable marked cardiac  enlargement.  3.  Questionable decrease in the degree of anasarca with persistent  upper abdominal ascites noted.  4.  Other incidental/nonacute findings as above.    Assessment / Plan     Brief Patient Summary:  Judy Lutz is a 63 y.o. female who had no significant known past medical history upon admission, who was admitted with respiratory failure which has now unfortunately required intubation and mechanical ventilation.    #Sepsis, shock criteria as patient currently requiring vasopressor support  #Acute hypercapnic respiratory failure with acute hypoxic respiratory failure upon admission  #Suspect obesity hypoventilation syndrome  #Multifocal pneumonia with combination atelectasis  #Acute decompensated heart failure  #Pleural effusions  #Pericardial effusion, no evidence of tamponade on echocardiogram  #Morbid obesity with OHV and NANCY  -Wean vasopressor support as BP/MAP tolerates  -I have requested repeat blood cultures which are currently pending  -CXR ordered and pending  -Repeat CBC in a.m. and monitor temperature curve  -Cooling blanket added  -Given fever and ongoing leukocytosis, patient's antibiotics have been escalated to meropenem and linezolid  -Appreciate ID and pulmonology recommendations    #Excessive GI output  -Will escalate patient's scheduled bowel regimen  -Tube feeds as tolerated; monitor for significantly elevated residuals    #ESBL UTI  -Invanz has been escalated to Merrem as per ID recommendations  -Continue to follow all final culture results  -Tailor  antibiotic therapy based on results  -Repeat CBC in a.m. and to trend her temperature curve    #TRACIE, nonoliguric  #Hypercalcemia  #Hypernatremia  #Severe hypoalbuminemia  -Unclear etiology and most likely multifactorial in the setting of sepsis, septic shock and probable volume depletion  -Suspect pre-renal  -Appreciate Nephrology recommendations  -Attempt to avoid further hypotension; goal SBP greater than or equal to 90 mmHg/MAP greater than or equal to 65 mmHg  -Limit nephrotoxic medications  -Patient has been started on linezolid rather than vancomycin as noted above  -Hold on further IV fluid hydration for now as per nephrology recommendations  -Repeat chemistry panel in a.m.    #Acute iron deficiency anemia  #Thrombocytosis  -Iron studies consistent with iron deficiency  -Patient is status post 2 units PRBCs during her hospitalization  -Hemoglobin remains low but is overall stable  -Continue IV Protonix for now and continue to monitor for any signs of bleeding  -Repeat CBC in a.m.    #NSTEMI II  #NSVT  -Cardiology has been consulted and is following  -Continue to hold diuretics given clinical presentation that appears to be consistent with volume depletion and hypotension  -Consider ischemic work-up in the near future when respiratory and kidney status improves adequately and allows  -Continue ASA , high intensity statin andToprol XL as tolerated    DVT prophylaxis:  [facility]    CODE STATUS:    Code Status (Patient has no pulse and is not breathing): CPR (Attempt to Resuscitate)  Medical Interventions (Patient has pulse or is breathing): Full Support    Disposition:  I expect patient to be discharged unclear pending clinical disposition.    Patient is considered to be a high risk patient due to: respiratory failure requiring intubation, cardiomegaly, NSTEMI, sepsis/septic shock, TRACIE, suspect obesity/hypoventilation syndrome    Sapphire Contreras, DO

## 2023-07-21 NOTE — PLAN OF CARE
Goal Outcome Evaluation:              Outcome Evaluation: Pt remains intubated/sedated. Propofol gtt infusing. Minimal UOP. Tap water enema done. Tmax of 101.6-cooling blanket applied. VSS.

## 2023-07-21 NOTE — CASE MANAGEMENT/SOCIAL WORK
Discharge Planning Assessment   [place]     Patient Name: Judy Lutz  MRN: [id number]  Today's Date: 7/21/2023    Admit Date: 7/12/2023            Discharge Plan       Row Name 07/21/23 1416       Plan    Plan Pt was admitted on 07/12/23. Pt was intubated on 07/20/23 and remains intubated. Pt lives with her son and plans on returning home at discharge. Pt does not have a PCP. Pt does not utilize home health services. Pt has a rolling walker, shower chair, crutches and motorized wheelchair via unknown provider. Prior to being transferred to CCU, SS spoke with Pt and family and they were agreeable to swing bed placement for short term rehab. SS will follow.                  SCOTT Yuen

## 2023-07-21 NOTE — PROGRESS NOTES
Nephrology Progress Note      Subjective     Patient remains intubated on mechanical ventilation FiO2 has been around 50%    Objective       Vital signs :     Temp:  [99.1 °F (37.3 °C)-100.5 °F (38.1 °C)] 100.4 °F (38 °C)  Heart Rate:  [] 87  Resp:  [16] 16  BP: ()/(35-63) 108/56  FiO2 (%):  [50 %-100 %] 50 %    Intake/Output                         07/19/23 0701 - 07/20/23 0700 07/20/23 0701 - 07/21/23 0700     [id number] 8695-4847 Total 4868-3928 7957-1860 Total                 Intake    P.O.  0  -- 0  --  -- --    I.V.  --  -- --  374.4  447.2 821.6    Blood  --  300 300  --  -- --    Volume (Transfuse RBC Infuse Each Unit Over: 3.5H) -- 300 300 -- -- --    IV Piggyback  850  -- 850  200  -- 200    Total Intake  574.4 447.2 1021.6       Output    Urine  36  150 186  115  200 315    Emesis/NG output  --  -- --  700  -- 700    Total Output 36 150 186              Physical Exam:    General Appearance : On mechanical ventilation  Lungs : clear to auscultation, respirations regular  Heart :  regular rhythm & normal rate, normal S1, S2 and no murmur, no rub  Abdomen : soft, non distended  Extremities : Trace  Neurologic : Unable to assess        Laboratory Data :     Albumin Albumin   Date Value Ref Range Status   07/21/2023 2.5 (L) 3.5 - 5.2 g/dL Final   07/20/2023 3.3 (L) 3.5 - 5.2 g/dL Final   07/19/2023 3.6 3.5 - 5.2 g/dL Final      Magnesium Magnesium   Date Value Ref Range Status   07/21/2023 2.3 1.6 - 2.4 mg/dL Final   07/19/2023 2.5 (H) 1.6 - 2.4 mg/dL Final          PTH               No results found for: PTH    CBC and coagulation:  Results from last 7 days   Lab Units 07/21/23  0012 07/20/23  0559 07/20/23  0010 07/19/23  1545 07/19/23  0207 07/18/23  0052 07/17/23  0022 07/16/23  0426 07/16/23  0425   PROCALCITONIN ng/mL  --  0.53*  --   --   --  0.72*  --   --  0.27*   LACTATE mmol/L  --   --   --   --   --   --   --  0.8  --    CRP mg/dL  --   --   --   --  22.62* 24.42*   --  20.87*  --    WBC 10*3/mm3 18.78* 18.35*  --  21.02* 27.03* 27.48*   < > 20.91*  --    HEMOGLOBIN g/dL 7.0* 7.2* 7.2* 6.0* 7.2* 7.7*   < > 7.1*  --    HEMATOCRIT % 27.4* 29.9* 28.2* 26.5* 30.9* 34.4   < > 29.3*  --    MCV fL 82.5 84.5  --  83.3 83.7 83.5   < > 76.5*  --    MCHC g/dL 25.5* 24.1*  --  22.6* 23.3* 22.4*   < > 24.2*  --    PLATELETS 10*3/mm3 457* 494*  --  548* 592* 665*   < > 499*  --     < > = values in this interval not displayed.     Acid/base balance:  Results from last 7 days   Lab Units 07/21/23  0435 07/20/23  1503 07/20/23  1028   PH, ARTERIAL pH units 7.435 7.440 7.538*   PO2 ART mm Hg 62.9* 65.8* 38.3*   PCO2, ARTERIAL mm Hg 68.7* 67.4* 54.6*   HCO3 ART mmol/L 46.1* 45.7* 46.5*     Renal and electrolytes:    Results from last 7 days   Lab Units 07/21/23  0506 07/21/23  0012 07/20/23  1740 07/20/23  0559 07/19/23  1323 07/19/23  0207 07/18/23  0857 07/18/23  0052 07/17/23  1413 07/17/23  1136 07/17/23  0022   SODIUM mmol/L  --  149*  --  150* 149* 147*  --  148*  --   --  148*   POTASSIUM mmol/L  --  3.7 3.7 3.5 4.1  4.0 3.6   < > 3.4*  3.4*  --    < > 2.6*   MAGNESIUM mg/dL 2.3  --   --   --   --  2.5*  --  2.3  --   --  1.7   CHLORIDE mmol/L  --  97*  --  97* 94* 94*  --  93*  --   --  93*   CO2 mmol/L  --  39.8*  --  45.2* 33.5* 43.2*  --  46.2*  --   --  43.8*   BUN mg/dL  --  60*  --  56* 50* 42*  --  27*  --   --  18   CREATININE mg/dL  --  3.06*  --  2.65* 2.45* 2.08*  --  1.31*  --   --  0.64   CALCIUM mg/dL  --  10.7*  --  11.2* 10.2 11.1*  --  10.9*  --   --  10.2   PHOSPHORUS mg/dL  --   --   --   --   --   --   --  5.6* 4.7*  --  1.6*    < > = values in this interval not displayed.     Estimated Creatinine Clearance: 32.9 mL/min (A) (by C-G formula based on SCr of 3.06 mg/dL (H)).  @GFRCG:3@   Liver and pancreatic function:  Results from last 7 days   Lab Units 07/21/23  0012 07/20/23  0559 07/19/23  0207 07/18/23  0052 07/17/23  1633   ALBUMIN g/dL 2.5* 3.3* 3.6 3.4*  --     BILIRUBIN mg/dL 0.8 0.6 0.6 0.6  --    ALK PHOS U/L 49 51 59 68  --    AST (SGOT) U/L 12 14 15 12  --    ALT (SGPT) U/L 6 8 8 10  --    AMMONIA umol/L  --   --  55* 67* 79*         Cardiac:  Results from last 7 days   Lab Units 07/18/23  0052   PROBNP pg/mL 35,507.0*     Liver and pancreatic function:  Results from last 7 days   Lab Units 07/21/23  0012 07/20/23  0559 07/19/23  0207 07/18/23  0052 07/17/23  1633   ALBUMIN g/dL 2.5* 3.3* 3.6 3.4*  --    BILIRUBIN mg/dL 0.8 0.6 0.6 0.6  --    ALK PHOS U/L 49 51 59 68  --    AST (SGOT) U/L 12 14 15 12  --    ALT (SGPT) U/L 6 8 8 10  --    AMMONIA umol/L  --   --  55* 67* 79*       Medications :     aspirin, 81 mg, Per G Tube, Daily  doxycycline, 100 mg, Intravenous, Q12H  ertapenem, 1,000 mg, Intravenous, Q24H  escitalopram, 10 mg, Oral, Daily  heparin (porcine), 5,000 Units, Subcutaneous, Q8H  iron polysaccharides, 150 mg, Oral, Daily  magic barrier cream, 1 application , Topical, BID  metoprolol succinate XL, 25 mg, Oral, Q24H  midodrine, 5 mg, Oral, TID AC  mupirocin, 1 application , Each Nare, BID  pantoprazole, 40 mg, Intravenous, BID AC  potassium chloride, 40 mEq, Oral, Daily  rosuvastatin, 20 mg, Oral, Nightly  senna-docusate sodium, 2 tablet, Oral, BID  sodium chloride, 10 mL, Intravenous, Q12H  sodium chloride, 10 mL, Intravenous, Q12H  sodium chloride, 10 mL, Intravenous, Q12H  sodium chloride, 10 mL, Intravenous, Q12H  sodium chloride, 10 mL, Intravenous, Q12H      norepinephrine, 0.01-0.07 mcg/kg/min, Last Rate: 0.02 mcg/kg/min (07/21/23 0537)  propofol, 5-50 mcg/kg/min, Last Rate: 40 mcg/kg/min (07/21/23 0726)  sodium chloride, 75 mL/hr, Last Rate: Stopped (07/20/23 0955)          Assessment & Plan     -TRACIE, nonoliguric  - Severe sepsis with septic shock  - Hypercalcemia  - Primary respiratory acidosis  - Hypoxic hypercarbic respiratory failure likely multifactorial  - History of congestive heart failure, well compensated  - Bacterial pneumonia    Renal  functions continue to get worse with creatinine increased from 2.6.  Persistent hyponatremia.  Will start on D5 quarter normal saline at 100 cc/h for 10 hours and then we will reassess.  There is no evidence of florid fluid overload clinically    TRACIE likely multifactorial including severe sepsis with septic shock  Baseline creatinine around 2.6 admitted with 1  UA suggestive of urinary tract infection difficult to interpret  No hydronephrosis on renal ultrasound  No florid fluid overload clinically likely on volume depletion side.  -Follow-up BMP in evening and reassess  - Continue on pressors to keep MAP more than 65 mmHg  - Please avoid any nephrotoxic agents, hypotension and adjust medications according to estimated GFR      Rasheed Stern MD  07/21/23  08:14 EDT    Addendum.  Patient has received 1 L of D5 quarter normal saline urine output is slightly better FiO2 has not changed her remains at 50%.  We will switch fluid to half-normal saline for now

## 2023-07-21 NOTE — PLAN OF CARE
Goal Outcome Evaluation:           Progress: no change  Outcome Evaluation: Pt remains intubated/sedated. Propofol gtt infusing. Minimal UOP. No BM. VSS. Will continue with plan of care.

## 2023-07-21 NOTE — PROGRESS NOTES
Pharmacy was consulted to dose Merrem for pneumonia. Based on SCr = 3.06 mg/dL, the estimated Clcr = 33 ml/min. Will start Merrem at 1 gm q12 hrs for Clcr 26-50 ml/min. Will continue to follow and adjust as needed.    Thank you,    Itzel Bautista, PharmD  7/21/2023  11:04 EDT

## 2023-07-21 NOTE — CONSULTS
Nephrology Consult Note    Referring Provider: Dr. Casiano  Reason for Consultation: Elevated creatinine     Subjective       History of present illness:  Judy Lutz is a 63 y.o. female who presented to [facility] emergency department with chief complaint of generalized weakness fatigability and shortness of breath.  Patient has been admitted with acute hypoxic hypercarbic respiratory failure he has been recently started on vancomycin.  Patient does not have any history of chronic kidney disease he was admitted with a creatinine of 1 that has been creeping up for past couple of days.  Patient has been transferred to CCU yesterday due to worsening shortness of breath.  Patient was ultimately intubated this morning for hypercarbic respiratory failure.  Patient has had received diuretics during this hospitalization  I was not able to obtain history from the patient    History  Past Medical History:   Diagnosis Date    Symptomatic anemia 07/13/2023   , History reviewed. No pertinent surgical history., History reviewed. No pertinent family history.,   Social History     Tobacco Use    Smoking status: Never    Smokeless tobacco: Never   Vaping Use    Vaping Use: Never used   Substance Use Topics    Alcohol use: Never    Drug use: Never   ,   Medications Prior to Admission   Medication Sig Dispense Refill Last Dose    acetaminophen (TYLENOL) 325 MG tablet Take 2 tablets by mouth Every 6 (Six) Hours As Needed for Mild Pain or Headache.   Past Week    ibuprofen (ADVIL,MOTRIN) 200 MG tablet Take 1 tablet by mouth Every 6 (Six) Hours As Needed for Mild Pain or Headache.   Past Week   , Scheduled Meds:  aspirin, 81 mg, Oral, Daily  doxycycline, 100 mg, Intravenous, Q12H  ertapenem, 1,000 mg, Intravenous, Q24H  escitalopram, 10 mg, Oral, Daily  heparin (porcine), 5,000 Units, Subcutaneous, Q8H  iron polysaccharides, 150 mg, Oral, Daily  magic barrier cream, 1 application , Topical, BID  metoprolol succinate XL,  25 mg, Oral, Q24H  midodrine, 5 mg, Oral, TID AC  pantoprazole, 40 mg, Intravenous, BID AC  potassium chloride, 40 mEq, Oral, Daily  rosuvastatin, 20 mg, Oral, Nightly  senna-docusate sodium, 2 tablet, Oral, BID  sodium chloride, 10 mL, Intravenous, Q12H  sodium chloride, 10 mL, Intravenous, Q12H  sodium chloride, 10 mL, Intravenous, Q12H  sodium chloride, 10 mL, Intravenous, Q12H  sodium chloride, 10 mL, Intravenous, Q12H    , Continuous Infusions:  norepinephrine, 0.01-0.07 mcg/kg/min, Last Rate: Stopped (07/20/23 0244)  propofol, 5-50 mcg/kg/min, Last Rate: 40 mcg/kg/min (07/20/23 1802)  sodium chloride, 75 mL/hr, Last Rate: Stopped (07/20/23 0955)    , PRN Meds:    acetaminophen    acetaminophen    senna-docusate sodium **AND** polyethylene glycol **AND** bisacodyl **AND** bisacodyl    [DISCONTINUED] senna-docusate sodium **AND** [DISCONTINUED] polyethylene glycol **AND** [DISCONTINUED] bisacodyl **AND** bisacodyl    fentaNYL citrate (PF)    Magnesium Standard Dose Replacement - Follow Nurse / BPA Driven Protocol    ondansetron    Phosphorus Replacement - Follow Nurse / BPA Driven Protocol    Potassium Replacement - Follow Nurse / BPA Driven Protocol    prochlorperazine    simethicone    sodium chloride    sodium chloride    sodium chloride    sodium chloride    sodium chloride    sodium chloride    sodium chloride    sodium chloride and Allergies:  Codeine    Review of Systems  Unable to obtain    Objective     Vital Signs  Temp:  [98.7 °F (37.1 °C)-100.2 °F (37.9 °C)] 100.2 °F (37.9 °C)  Heart Rate:  [] 85  Resp:  [16-31] 16  BP: ()/(35-90) 114/53  FiO2 (%):  [55 %-100 %] 60 %    Intake/Output                               07/18/23 0701 - 07/19/23 0700 07/19/23 0701 - 07/20/23 0700 07/20/23 0701 - 07/21/23 0700     [id number] 1901-0700 Total 0701-1900 1901-0700 Total 0701-1900 1901-0700 Total                    Intake    P.O.  0  0 0  0  -- 0  --  -- --    I.V.  --  -- --  --  -- --  374.4  -- 374.4     Blood  --  -- --  --  300 300  --  -- --    Volume (Transfuse RBC Infuse Each Unit Over: 3.5H) -- -- -- -- 300 300 -- -- --    IV Piggyback  --  -- --  850  -- 850  200  -- 200    Total Intake 0 0 0  574.4 -- 574.4       Output    Urine  450  400 850  36  150 186  115  -- 115    Emesis/NG output  --  -- --  --  -- --  700  -- 700    Total Output 450 400 850 36 150 186 815 -- 815             Physical Examination:  General Appearance: Intubated on mechanical ventilation  No JVD  Lungs: Clear to auscultation, respirations regular and unlabored  Heart: Regular rhythm & normal rate, normal S1, S2, no murmur, no gallop, no rub   Abdomen: Normal bowel sounds, no masses and soft non-tender  Extremities: No edema  Neurologic: Sedated    Laboratory Data :      WBC WBC   Date Value Ref Range Status   07/20/2023 18.35 (H) 3.40 - 10.80 10*3/mm3 Final   07/19/2023 21.02 (H) 3.40 - 10.80 10*3/mm3 Final   07/19/2023 27.03 (H) 3.40 - 10.80 10*3/mm3 Final   07/18/2023 27.48 (H) 3.40 - 10.80 10*3/mm3 Final      HGB Hemoglobin   Date Value Ref Range Status   07/20/2023 7.2 (L) 12.0 - 15.9 g/dL Final   07/20/2023 7.2 (L) 12.0 - 15.9 g/dL Final   07/19/2023 6.0 (C) 12.0 - 15.9 g/dL Final   07/19/2023 7.2 (L) 12.0 - 15.9 g/dL Final   07/18/2023 7.7 (L) 12.0 - 15.9 g/dL Final      HCT Hematocrit   Date Value Ref Range Status   07/20/2023 29.9 (L) 34.0 - 46.6 % Final   07/20/2023 28.2 (L) 34.0 - 46.6 % Final   07/19/2023 26.5 (L) 34.0 - 46.6 % Final   07/19/2023 30.9 (L) 34.0 - 46.6 % Final   07/18/2023 34.4 34.0 - 46.6 % Final      Platlets No results found for: LABPLAT   MCV MCV   Date Value Ref Range Status   07/20/2023 84.5 79.0 - 97.0 fL Final   07/19/2023 83.3 79.0 - 97.0 fL Final   07/19/2023 83.7 79.0 - 97.0 fL Final   07/18/2023 83.5 79.0 - 97.0 fL Final          Sodium Sodium   Date Value Ref Range Status   07/20/2023 150 (H) 136 - 145 mmol/L Final   07/19/2023 149 (H) 136 - 145 mmol/L Final   07/19/2023 147 (H) 136  - 145 mmol/L Final   07/18/2023 148 (H) 136 - 145 mmol/L Final      Potassium Potassium   Date Value Ref Range Status   07/20/2023 3.7 3.5 - 5.2 mmol/L Final   07/20/2023 3.5 3.5 - 5.2 mmol/L Final   07/19/2023 4.0 3.5 - 5.2 mmol/L Final   07/19/2023 4.1 3.5 - 5.2 mmol/L Final     Comment:     Slight hemolysis detected by analyzer. Results may be affected.   07/19/2023 3.6 3.5 - 5.2 mmol/L Final     Comment:     Slight hemolysis detected by analyzer. Results may be affected.   07/18/2023 3.7 3.5 - 5.2 mmol/L Final   07/18/2023 3.4 (L) 3.5 - 5.2 mmol/L Final   07/18/2023 3.4 (L) 3.5 - 5.2 mmol/L Final      Chloride Chloride   Date Value Ref Range Status   07/20/2023 97 (L) 98 - 107 mmol/L Final   07/19/2023 94 (L) 98 - 107 mmol/L Final   07/19/2023 94 (L) 98 - 107 mmol/L Final   07/18/2023 93 (L) 98 - 107 mmol/L Final      CO2 CO2   Date Value Ref Range Status   07/20/2023 45.2 (H) 22.0 - 29.0 mmol/L Final   07/19/2023 33.5 (H) 22.0 - 29.0 mmol/L Final   07/19/2023 43.2 (H) 22.0 - 29.0 mmol/L Final   07/18/2023 46.2 (H) 22.0 - 29.0 mmol/L Final      BUN BUN   Date Value Ref Range Status   07/20/2023 56 (H) 8 - 23 mg/dL Final   07/19/2023 50 (H) 8 - 23 mg/dL Final   07/19/2023 42 (H) 8 - 23 mg/dL Final   07/18/2023 27 (H) 8 - 23 mg/dL Final      Creatinine Creatinine   Date Value Ref Range Status   07/20/2023 2.65 (H) 0.57 - 1.00 mg/dL Final   07/19/2023 2.45 (H) 0.57 - 1.00 mg/dL Final   07/19/2023 2.08 (H) 0.57 - 1.00 mg/dL Final   07/18/2023 1.31 (H) 0.57 - 1.00 mg/dL Final      Calcium Calcium   Date Value Ref Range Status   07/20/2023 11.2 (H) 8.6 - 10.5 mg/dL Final   07/19/2023 10.2 8.6 - 10.5 mg/dL Final   07/19/2023 11.1 (H) 8.6 - 10.5 mg/dL Final   07/18/2023 10.9 (H) 8.6 - 10.5 mg/dL Final      PO4 No results found for: CAPO4   Albumin Albumin   Date Value Ref Range Status   07/20/2023 3.3 (L) 3.5 - 5.2 g/dL Final   07/19/2023 3.6 3.5 - 5.2 g/dL Final   07/18/2023 3.4 (L) 3.5 - 5.2 g/dL Final      Magnesium  Magnesium   Date Value Ref Range Status   07/19/2023 2.5 (H) 1.6 - 2.4 mg/dL Final   07/18/2023 2.3 1.6 - 2.4 mg/dL Final      Uric Acid Uric Acid   Date Value Ref Range Status   07/19/2023 9.5 (H) 2.4 - 5.7 mg/dL Final        Radiology results :     Imaging Results (Last 72 Hours)       Procedure Component Value Units Date/Time    US Renal Bilateral [[id number]] Resulted: 07/20/23 1611     Updated: 07/20/23 1701    XR Chest 1 View [[id number]] Collected: 07/20/23 1528     Updated: 07/20/23 1531    Narrative:      EXAM:    XR Chest, 1 View     EXAM DATE:    7/20/2023 2:45 PM     CLINICAL HISTORY:    Central line; D64.9-Anemia, unspecified; A41.9-Sepsis, unspecified  organism; R65.20-Severe sepsis without septic shock; J96.01-Acute  respiratory failure with hypoxia; N39.0-Urinary tract infection, site  not specified; I50.31-Acute diastolic (congestive) heart failure     TECHNIQUE:    Frontal view of the chest.     COMPARISON:    07/20/2023     FINDINGS:    Lungs and pleural spaces:  No pneumothorax.    Heart:  Cardiomegaly and left basilar airspace disease.    Mediastinum:  Unremarkable.    Bones/joints:  Unremarkable.    Vasculature:  Right IJ deep line noted with tip in the distal SVC.    Tubes, lines and devices:  ET tube with tip just below clavicles.  NG  tube extends into the stomach.       Impression:      1.  Support devices detailed above. No evidence of pneumothorax.  2.  Otherwise stable chest.     This report was finalized on 7/20/2023 3:28 PM by Dr. Kvng Tijerina MD.       US Venous Doppler Lower Extremity Bilateral (duplex) [[id number]] Collected: 07/20/23 1317     Updated: 07/20/23 1319    Narrative:      US VENOUS DOPPLER LOWER EXTREMITY BILATERAL (DUPLEX)-     CLINICAL INDICATION: r/o dvt; D64.9-Anemia, unspecified; A41.9-Sepsis,  unspecified organism; R65.20-Severe sepsis without septic shock;  J96.01-Acute respiratory failure with hypoxia; N39.0-Urinary tract  infection, site not specified;  I50.31-Acute diastolic (congestive) heart  failure        COMPARISON: None available      TECHNIQUE: Color Doppler imaging was used with compression and  augmentation to evaluate the lower extremity deep venous system.     FINDINGS:   There is patent spontaneous flow from the common femoral vein through  the posterior tibial veins.  There was no internal clot or area of noncompressibility.  Normal augmentation was elicited where applicable.       Impression:      No DVT in the lower extremities on today's exam.      This report was finalized on 7/20/2023 1:17 PM by Dr. Nicho Reeves MD.       XR Chest 1 View [[id number]] Collected: 07/20/23 0847     Updated: 07/20/23 0851    Narrative:      EXAM:    XR Chest, 1 View     EXAM DATE:    7/20/2023 8:23 AM     CLINICAL HISTORY:    respiratory failure; D64.9-Anemia, unspecified; A41.9-Sepsis,  unspecified organism; R65.20-Severe sepsis without septic shock;  J96.01-Acute respiratory failure with hypoxia; N39.0-Urinary tract  infection, site not specified; I50.31-Acute diastolic (congestive) heart  failure     TECHNIQUE:    Frontal view of the chest.     COMPARISON:    07/15/2023     FINDINGS:    Lungs and pleural spaces:  Left basal consolidation.  Mild pulmonary  vascular congestion.  No pneumothorax.    Heart:  Cardiomegaly.    Mediastinum:  Unremarkable.    Bones/joints:  Unremarkable.    Tubes, lines and devices:  ET tube with tip at level of clavicles.  NG  tube extends into the distal stomach.       Impression:      1.  Support devices as above.  2.  Cardiomegaly and pulmonary vascular congestion.  3.  Left basilar airspace disease.     This report was finalized on 7/20/2023 8:48 AM by Dr. Kvng Tijerina MD.       CT Chest Without Contrast Diagnostic [[id number]] Collected: 07/19/23 1018     Updated: 07/19/23 1022    Narrative:      EXAM:    CT Chest Without Intravenous Contrast     EXAM DATE:    7/19/2023 9:45 AM     CLINICAL HISTORY:    worsening oxygen requirement,  concern for fluid overload vs  development of aspiration pneumonia; D64.9-Anemia, unspecified;  A41.9-Sepsis, unspecified organism; R65.20-Severe sepsis without septic  shock; J96.01-Acute respiratory failure with hypoxia; N39.0-Urinary  tract infection, site not specified; I50.31-Acute diastolic (congestive)  heart failure     TECHNIQUE:    Axial computed tomography images of the chest without intravenous  contrast.  Sagittal and coronal reformatted images were created and  reviewed.  This CT exam was performed using one or more of the following  dose reduction techniques:  automated exposure control, adjustment of  the mA and/or kV according to patient size, and/or use of iterative  reconstruction technique.     COMPARISON:    07/12/2023     FINDINGS:    Lungs and pleural spaces:  Development of bilateral consolidative  airspace disease more pronounced in the upper lobes but also present in  the lower lobes consistent with pneumonia.  Miniscule pleural effusions  which are nonloculated.  Interstitial thickening has developed  compatible with edema.    Heart:  Very marked cardiac enlargement is stable from previous.  No  significant pericardial effusion.  No significant coronary artery  calcifications.    Mediastinum:  Hiatal hernia, stable.    Bones/joints:  Unremarkable.  No acute fracture.  No dislocation.    Soft tissues:  Diffuse anasarca has slightly improved.    Vasculature:  Pulmonary venous hypertension is noted.  No thoracic  aortic aneurysm.    Lymph nodes:  Reactive and/or congested lymph nodes in the mediastinum  and hilar stations but no bulky adenopathy identified.    Liver:  Liver cirrhosis.    Stomach and bowel:  Gastroesophageal reflux.    Intraperitoneal space:  Upper abdominal ascites which appears stable.       Impression:      1.  Development of bilateral multilobar partially consolidative  pneumonia. Combination atelectasis may be considered.  2.  Slight increase in changes of CHF now with  interstitial edema.  Miniscule nonloculated pleural effusions with stable marked cardiac  enlargement.  3.  Questionable decrease in the degree of anasarca with persistent  upper abdominal ascites noted.  4.  Other incidental/nonacute findings as above.     This report was finalized on 7/19/2023 10:20 AM by Dr. Kvng Tijerina MD.       CT Head Without Contrast [[id number]] Collected: 07/19/23 1016     Updated: 07/19/23 1020    Narrative:      EXAM:    CT Head Without Intravenous Contrast     EXAM DATE:    7/19/2023 9:41 AM     CLINICAL HISTORY:    Mental status change, unknown cause; D64.9-Anemia, unspecified;  A41.9-Sepsis, unspecified organism; R65.20-Severe sepsis without septic  shock; J96.01-Acute respiratory failure with hypoxia; N39.0-Urinary  tract infection, site not specified; I50.31-Acute diastolic (congestive)  heart failure     TECHNIQUE:    Axial computed tomography images of the head/brain without intravenous  contrast.  Sagittal and coronal reformatted images were created and  reviewed.  This CT exam was performed using one or more of the following  dose reduction techniques:  automated exposure control, adjustment of  the mA and/or kV according to patient size, and/or use of iterative  reconstruction technique.     COMPARISON:    No relevant prior studies available.     FINDINGS:    Brain and extra-axial spaces:  Unremarkable.  No hemorrhage.  No  significant white matter disease.  No edema.  No ventriculomegaly.    Bones/joints:  Unremarkable.  No acute fracture.    Soft tissues:  Unremarkable.    Sinuses:  Unremarkable as visualized.  No acute sinusitis.    Mastoid air cells:  Unremarkable as visualized.  No mastoid effusion.       Impression:        Unremarkable exam demonstrating no CT evidence of acute intracranial  findings.     This report was finalized on 7/19/2023 10:17 AM by Dr. Kvng Tijerina MD.                 Medications:      aspirin, 81 mg, Oral, Daily  doxycycline, 100 mg,  Intravenous, Q12H  ertapenem, 1,000 mg, Intravenous, Q24H  escitalopram, 10 mg, Oral, Daily  heparin (porcine), 5,000 Units, Subcutaneous, Q8H  iron polysaccharides, 150 mg, Oral, Daily  magic barrier cream, 1 application , Topical, BID  metoprolol succinate XL, 25 mg, Oral, Q24H  midodrine, 5 mg, Oral, TID AC  pantoprazole, 40 mg, Intravenous, BID AC  potassium chloride, 40 mEq, Oral, Daily  rosuvastatin, 20 mg, Oral, Nightly  senna-docusate sodium, 2 tablet, Oral, BID  sodium chloride, 10 mL, Intravenous, Q12H  sodium chloride, 10 mL, Intravenous, Q12H  sodium chloride, 10 mL, Intravenous, Q12H  sodium chloride, 10 mL, Intravenous, Q12H  sodium chloride, 10 mL, Intravenous, Q12H      norepinephrine, 0.01-0.07 mcg/kg/min, Last Rate: Stopped (07/20/23 0244)  propofol, 5-50 mcg/kg/min, Last Rate: 40 mcg/kg/min (07/20/23 1802)  sodium chloride, 75 mL/hr, Last Rate: Stopped (07/20/23 0928)        Assessment & Plan       Symptomatic anemia      -TRACIE, nonoliguric  - Severe sepsis with septic shock  - Hypercalcemia  - Primary respiratory acidosis  - Hypoxic hypercarbic respiratory failure likely multifactorial  - History of congestive heart failure, well compensated  - Bacterial pneumonia    TRACIE likely multifactorial including severe sepsis with septic shock  Baseline creatinine around 2.6 admitted with 1  No florid fluid overload clinically likely on volume depletion side.  - Urine analysis with microscopy  - Renal ultrasound  - DC vancomycin and start on daptomycin if MRSA coverage required  - Will hold on IV fluids for now  - Continue on pressors to keep MAP more than 65 mmHg  - Please avoid any nephrotoxic agents, hypotension and adjust medications according to estimated GFR    Thanks Dr hair for the consult. Nephrology will follow the patient.   I discussed the patient's findings and my recommendations with patient and nursing staff    Rasheed Stern MD  07/20/23  20:22 EDT

## 2023-07-21 NOTE — PROGRESS NOTES
PROGRESS NOTE         Patient Identification:  Name:  Judy Lutz  Age:  63 y.o.  Sex:  female  :  1960  MRN:  [id number]  Visit Number:  [id number]  Primary Care Provider:  Provider, No Known         LOS: 8 days       ----------------------------------------------------------------------------------------------------------------------  Subjective       Chief Complaints:    Shortness of Breath        Interval History:      Patient remains intubated and sedated this morning at decreased FiO2 50%.  Persistent fever this morning with Tmax of 101.8.  Primary RN at bedside.  Lung sounds diminished bilaterally.  Abdomen mildly distended and more firm today.  Primary RN reports dark stool like contents expressed from OG tube.  Currently requiring Levophed for blood pressure support.  Urine culture from 2023 currently in process.  Respiratory culture from 2023 preliminary reports growth of Candida albicans.  Blood cultures from 2023 currently in process.  WBC stable at 18.78.  Renal ultrasound from 2023 reports unremarkable sonographic appearance of both kidneys, small perihepatic ascites.    Review of Systems:    Unable to obtain.  Intubated and sedated.    ----------------------------------------------------------------------------------------------------------------------      Objective       Current Hospital Meds:  aspirin, 81 mg, Per G Tube, Daily  heparin (porcine), 5,000 Units, Subcutaneous, Q8H  iron polysaccharides, 150 mg, Oral, Daily  lactulose, 30 g, Per G Tube, BID  Linezolid, 600 mg, Intravenous, Q12H  magic barrier cream, 1 application , Topical, BID  meropenem, 1,000 mg, Intravenous, Once  [START ON 2023] meropenem, 1,000 mg, Intravenous, Q12H  metoprolol succinate XL, 25 mg, Oral, Q24H  midodrine, 5 mg, Oral, TID AC  mupirocin, 1 application , Each Nare, BID  pantoprazole, 40 mg, Intravenous, BID AC  potassium chloride, 40 mEq, Oral, Daily  rosuvastatin, 20  mg, Oral, Nightly  senna-docusate sodium, 2 tablet, Oral, BID  sodium chloride, 10 mL, Intravenous, Q12H  sodium chloride, 10 mL, Intravenous, Q12H  sodium chloride, 10 mL, Intravenous, Q12H  sodium chloride, 10 mL, Intravenous, Q12H  sodium chloride, 10 mL, Intravenous, Q12H      dextrose 5 % and sodium chloride 0.225 %, 100 mL/hr, Last Rate: 100 mL/hr (07/21/23 0949)  norepinephrine, 0.01-0.07 mcg/kg/min, Last Rate: Stopped (07/21/23 0950)  Pharmacy Consult - Pharmacy to dose,   propofol, 5-50 mcg/kg/min, Last Rate: 40 mcg/kg/min (07/21/23 0950)  sodium chloride, 75 mL/hr, Last Rate: Stopped (07/20/23 0955)    ----------------------------------------------------------------------------------------------------------------------    Vital Signs:  Temp:  [99.5 °F (37.5 °C)-101.8 °F (38.8 °C)] 101.8 °F (38.8 °C)  Heart Rate:  [] 80  Resp:  [16] 16  BP: ()/(38-63) 100/54  FiO2 (%):  [50 %-60 %] 50 %  Mean Arterial Pressure (Non-Invasive) for the past 24 hrs (Last 3 readings):   Noninvasive MAP (mmHg)   07/21/23 1145 72   07/21/23 1130 69   07/21/23 1115 71       SpO2 Percentage    07/21/23 1115 07/21/23 1130 07/21/23 1145   SpO2: 96% 97% 97%     SpO2:  [90 %-100 %] 97 %  on   ;   Device (Oxygen Therapy): ventilator    Body mass index is 52.63 kg/m².  Wt Readings from Last 3 Encounters:   07/21/23 (!) 166 kg (366 lb 12.8 oz)        Intake/Output Summary (Last 24 hours) at 7/21/2023 1205  Last data filed at 7/21/2023 0900  Gross per 24 hour   Intake 895.07 ml   Output 1305 ml   Net -409.93 ml       NPO Diet NPO Type: Strict NPO  ----------------------------------------------------------------------------------------------------------------------      Physical Exam:    Constitutional: Chronically ill-appearing.  Currently intubated and sedated  at 50% FiO2.  Primary RN at bedside.  Neck: Unable to assess neck rigidity.  CVC to right IJ with no signs of infection.  Cardiovascular:  Normal rate, regular rhythm  and normal heart sounds with no murmur. No edema.  Pulmonary/Chest: Lung exam is diminished to auscultation bilaterally.  Intubated and sedated.  Abdominal:  Soft.  Bowel sounds are normal.  No distension and no tenderness.   Musculoskeletal:  No edema, no tenderness, and no deformity.  No swelling or redness of joints.  Neurological: Intubated and sedated.  Skin:  Skin is warm and dry.  No rash noted.  No pallor.  Peripheral IVs to the bilateral hands with no evidence of infection or phlebitis.  Psychiatric: Unable to assess.        ----------------------------------------------------------------------------------------------------------------------  Results from last 7 days   Lab Units 07/15/23  1504 07/15/23  1211   HSTROP T ng/L 32* 29*       Results from last 7 days   Lab Units 07/18/23  0052   PROBNP pg/mL 35,507.0*         Results from last 7 days   Lab Units 07/21/23  0435   PH, ARTERIAL pH units 7.435   PO2 ART mm Hg 62.9*   PCO2, ARTERIAL mm Hg 68.7*   HCO3 ART mmol/L 46.1*       Results from last 7 days   Lab Units 07/21/23  0012 07/20/23  0559 07/20/23  0010 07/19/23  1545 07/19/23  0207 07/18/23  0052 07/17/23  0022 07/16/23  0426   CRP mg/dL  --   --   --   --  22.62* 24.42*  --  20.87*   LACTATE mmol/L  --   --   --   --   --   --   --  0.8   WBC 10*3/mm3 18.78* 18.35*  --  21.02* 27.03* 27.48*   < > 20.91*   HEMOGLOBIN g/dL 7.0* 7.2* 7.2* 6.0* 7.2* 7.7*   < > 7.1*   HEMATOCRIT % 27.4* 29.9* 28.2* 26.5* 30.9* 34.4   < > 29.3*   MCV fL 82.5 84.5  --  83.3 83.7 83.5   < > 76.5*   MCHC g/dL 25.5* 24.1*  --  22.6* 23.3* 22.4*   < > 24.2*   PLATELETS 10*3/mm3 457* 494*  --  548* 592* 665*   < > 499*    < > = values in this interval not displayed.       Results from last 7 days   Lab Units 07/21/23  0506 07/21/23  0012 07/20/23  1740 07/20/23  0559 07/19/23  1323 07/19/23  0207 07/18/23  0857 07/18/23  0052   SODIUM mmol/L  --  149*  --  150* 149* 147*  --  148*   POTASSIUM mmol/L  --  3.7 3.7 3.5 4.1  4.0  3.6   < > 3.4*  3.4*   MAGNESIUM mg/dL 2.3  --   --   --   --  2.5*  --  2.3   CHLORIDE mmol/L  --  97*  --  97* 94* 94*  --  93*   CO2 mmol/L  --  39.8*  --  45.2* 33.5* 43.2*  --  46.2*   BUN mg/dL  --  60*  --  56* 50* 42*  --  27*   CREATININE mg/dL  --  3.06*  --  2.65* 2.45* 2.08*  --  1.31*   CALCIUM mg/dL  --  10.7*  --  11.2* 10.2 11.1*  --  10.9*   GLUCOSE mg/dL  --  107*  --  111* 118* 121*  --  133*   ALBUMIN g/dL  --  2.5*  --  3.3*  --  3.6  --  3.4*   BILIRUBIN mg/dL  --  0.8  --  0.6  --  0.6  --  0.6   ALK PHOS U/L  --  49  --  51  --  59  --  68   AST (SGOT) U/L  --  12  --  14  --  15  --  12   ALT (SGPT) U/L  --  6  --  8  --  8  --  10    < > = values in this interval not displayed.     Estimated Creatinine Clearance: 32.9 mL/min (A) (by C-G formula based on SCr of 3.06 mg/dL (H)).  Ammonia   Date Value Ref Range Status   07/19/2023 55 (H) 11 - 51 umol/L Final       Glucose   Date/Time Value Ref Range Status   07/19/2023 0925 133 (H) 70 - 130 mg/dL Final     Comment:     Meter: [id number] : [id number] SHADY WHITMAN     Lab Results   Component Value Date    HGBA1C 5.80 (H) 07/13/2023     Lab Results   Component Value Date    TSH 0.619 07/15/2023       Blood Culture   Date Value Ref Range Status   07/12/2023 No growth at 5 days  Final   07/12/2023 No growth at 5 days  Final     Urine Culture   Date Value Ref Range Status   07/15/2023 >100,000 CFU/mL Escherichia coli ESBL (A)  Final     Comment:       Consider infectious disease consult.  Susceptibility results may not correlate to clinical outcomes.   07/12/2023 >100,000 CFU/mL Escherichia coli ESBL (A)  Final     Comment:       Consider infectious disease consult.  Susceptibility results may not correlate to clinical outcomes.   07/12/2023 (A)  Final    >100,000 CFU/mL Klebsiella pneumoniae ssp pneumoniae     No results found for: WOUNDCX  No results found for: STOOLCX  No results found for: RESPCX  Pain Management Panel  More data may  exist         Latest Ref Rng & Units 7/19/2023 7/14/2023   Pain Management Panel   Creatinine, Urine mg/dL 139.1  8.5    ----------------------------------------------------------------------------------------------------------------------  Imaging Results (Last 24 Hours)       Procedure Component Value Units Date/Time    US Renal Bilateral [[id number]] Collected: 07/21/23 1132     Updated: 07/21/23 1134    Narrative:      EXAMINATION: US RENAL BILATERAL-      CLINICAL INDICATION:     dustin; D64.9-Anemia, unspecified; A41.9-Sepsis,  unspecified organism; R65.20-Severe sepsis without septic shock;  J96.01-Acute respiratory failure with hypoxia; N39.0-Urinary tract  infection, site not specified; I50.31-Acute diastolic (congestive) heart  failure     TECHNIQUE: Multiplanar gray scale sonographic imaging of the kidneys.  Color Doppler implemented.     COMPARISON: NONE      FINDINGS:      RIGHT: The right kidney measures 10.9 x 5.2 cm in size. No mass,  hydronephrosis, or shadowing stone.     LEFT: The left kidney measures 11.8 x 5.9 cm in size. No mass,  hydronephrosis, or shadowing stone.     Small perihepatic ascites noted.       Impression:      1. Unremarkable sonographic appearance of both kidneys.  2. Small perihepatic ascites.      This report was finalized on 7/21/2023 11:32 AM by Dr. Kvng Tijerina MD.       XR Chest 1 View [[id number]] Resulted: 07/21/23 0655     Updated: 07/21/23 0655    XR Chest 1 View [[id number]] Collected: 07/20/23 1528     Updated: 07/20/23 1531    Narrative:      EXAM:    XR Chest, 1 View     EXAM DATE:    7/20/2023 2:45 PM     CLINICAL HISTORY:    Central line; D64.9-Anemia, unspecified; A41.9-Sepsis, unspecified  organism; R65.20-Severe sepsis without septic shock; J96.01-Acute  respiratory failure with hypoxia; N39.0-Urinary tract infection, site  not specified; I50.31-Acute diastolic (congestive) heart failure     TECHNIQUE:    Frontal view of the chest.     COMPARISON:    07/20/2023      FINDINGS:    Lungs and pleural spaces:  No pneumothorax.    Heart:  Cardiomegaly and left basilar airspace disease.    Mediastinum:  Unremarkable.    Bones/joints:  Unremarkable.    Vasculature:  Right IJ deep line noted with tip in the distal SVC.    Tubes, lines and devices:  ET tube with tip just below clavicles.  NG  tube extends into the stomach.       Impression:      1.  Support devices detailed above. No evidence of pneumothorax.  2.  Otherwise stable chest.     This report was finalized on 7/20/2023 3:28 PM by Dr. Kvng Tijerina MD.       US Venous Doppler Lower Extremity Bilateral (duplex) [[id number]] Collected: 07/20/23 1317     Updated: 07/20/23 1319    Narrative:      US VENOUS DOPPLER LOWER EXTREMITY BILATERAL (DUPLEX)-     CLINICAL INDICATION: r/o dvt; D64.9-Anemia, unspecified; A41.9-Sepsis,  unspecified organism; R65.20-Severe sepsis without septic shock;  J96.01-Acute respiratory failure with hypoxia; N39.0-Urinary tract  infection, site not specified; I50.31-Acute diastolic (congestive) heart  failure        COMPARISON: None available      TECHNIQUE: Color Doppler imaging was used with compression and  augmentation to evaluate the lower extremity deep venous system.     FINDINGS:   There is patent spontaneous flow from the common femoral vein through  the posterior tibial veins.  There was no internal clot or area of noncompressibility.  Normal augmentation was elicited where applicable.       Impression:      No DVT in the lower extremities on today's exam.      This report was finalized on 7/20/2023 1:17 PM by Dr. Nicho Reeves MD.               ----------------------------------------------------------------------------------------------------------------------    Pertinent Infectious Disease Results      Urine culture collected on 7/15/2023 showed growth of over 100,000 colonies of ESBL E. Coli.        Assessment/Plan       Assessment     Sepsis  ESBL UTI      Plan      I saw and examined  the patient myself this morning and discussed the findings and plan of care with WINDY Victor and got report from primary RN and here are my findings:    The patient's condition remains critical this morning, as they are still intubated and sedated, now at a decreased FiO2 of 50%. A persistent fever is present, with a maximum temperature of 101.8. The primary RN is at the bedside, actively monitoring the patient's status. Lung sounds are diminished bilaterally, and the abdomen is mildly distended and more firm today. The primary RN observed dark stool-like contents expressed from the OG tube. Currently, the patient requires Levophed for blood pressure support, indicating ongoing hemodynamic instability.    Several cultures are currently in process to help identify the underlying cause of the patient's condition. The urine culture from 7/20/2023 and blood cultures from 7/21/2023 are being processed to detect any potential infections. A preliminary report from the respiratory culture on 7/20/2023 indicates the growth of Candida albicans, suggesting a possible respiratory infection.    The patient's white blood cell count remains stable at 18.78, indicating persistent leukocytosis, which may indicate an ongoing inflammatory response.    In response to the new onset fever, persistent leukocytosis, and the need for pressor support, I escalated the treatment plan. Ertapenem was switched to meropenem.  Additionally, doxycycline was switched to linezolid 600 mg IV every 12 hours to address the suspected MRSA pneumonia. In light of the patient's treatment with linezolid, Celexa was held, as it may have potential interactions with linezolid therapy.    I had the pleasure to discuss her findings and plan of care with her daughter and son in law who were at bedside and seemed to be very supportive and caring.    ANTIMICROBIAL THERAPY    Linezolid - 600 MG/300ML  meropenem (MERREM) 1gm IVPB in 100ml NS (VTB)     Code  Status:   Code Status and Medical Interventions:   Ordered at: 07/13/23 0152     Code Status (Patient has no pulse and is not breathing):    CPR (Attempt to Resuscitate)     Medical Interventions (Patient has pulse or is breathing):    Full Support       WINDY Victor  07/21/23  12:05 EDT     Electronically signed by WNIDY Victor, 07/21/23, 12:10 PM EDT.    Electronically signed by King Broussard MD, 07/21/23, 1:06 PM EDT.

## 2023-07-22 NOTE — PROGRESS NOTES
PROGRESS NOTE         Patient Identification:  Name:  Judy Lutz  Age:  63 y.o.  Sex:  female  :  1960  MRN:  [id number]  Visit Number:  [id number]  Primary Care Provider:  Provider, No Known         LOS: 9 days       ----------------------------------------------------------------------------------------------------------------------  Subjective       Chief Complaints:    Shortness of Breath        Interval History:      Patient remains intubated and sedated today at slightly decreased FiO2 45%.  Family at bedside.  Fever trend overall improved with Tmax of 99.3 today.  Continues to require Levophed for blood pressure support.  Lung sounds diminished bilaterally.  Abdomen mildly distended, nontender.  No reported diarrhea.  WBC elevated 29.50.  Blood cultures from 2023 show no growth thus far.  Urine culture from 2023 shows no growth thus far.  Respiratory culture from 2023 finalized as Candida albicans.  Chest x-ray from 2023 reports consolidation of the left lung base that is unchanged, right lung is clear.    Review of Systems:    Unable to obtain.  Intubated and sedated.    ----------------------------------------------------------------------------------------------------------------------      Objective       Current Hospital Meds:  aspirin, 81 mg, Per G Tube, Daily  chlorhexidine, 15 mL, Mouth/Throat, Q12H  furosemide, 80 mg, Intravenous, Once  heparin (porcine), 5,000 Units, Subcutaneous, Q8H  iron polysaccharides, 150 mg, Oral, Daily  lactulose, 300 mL, Rectal, Q6H  Linezolid, 600 mg, Intravenous, Q12H  magic barrier cream, 1 application , Topical, BID  meropenem, 1,000 mg, Intravenous, Q12H  metoprolol succinate XL, 25 mg, Oral, Q24H  midodrine, 5 mg, Oral, TID AC  mupirocin, 1 application , Each Nare, BID  pantoprazole, 40 mg, Intravenous, BID AC  potassium chloride, 40 mEq, Oral, Daily  rosuvastatin, 20 mg, Oral, Nightly  senna-docusate sodium, 2 tablet,  Oral, BID  sodium chloride, 10 mL, Intravenous, Q12H  sodium chloride, 10 mL, Intravenous, Q12H  sodium chloride, 10 mL, Intravenous, Q12H  sodium chloride, 10 mL, Intravenous, Q12H  sodium chloride, 10 mL, Intravenous, Q12H      norepinephrine, 0.01-0.07 mcg/kg/min, Last Rate: 0.06 mcg/kg/min (07/22/23 0819)  Pharmacy Consult - Pharmacy to dose,   propofol, 5-50 mcg/kg/min, Last Rate: 30 mcg/kg/min (07/22/23 0917)  sodium chloride, 100 mL/hr, Last Rate: 100 mL/hr (07/22/23 0508)    ----------------------------------------------------------------------------------------------------------------------    Vital Signs:  Temp:  [97.9 °F (36.6 °C)-99.3 °F (37.4 °C)] 99.3 °F (37.4 °C)  Heart Rate:  [] 89  Resp:  [16-18] 18  BP: ()/(39-66) 109/51  FiO2 (%):  [40 %-50 %] 45 %  Mean Arterial Pressure (Non-Invasive) for the past 24 hrs (Last 3 readings):   Noninvasive MAP (mmHg)   07/22/23 1045 73   07/22/23 1030 72   07/22/23 1015 72       SpO2 Percentage    07/22/23 1015 07/22/23 1030 07/22/23 1045   SpO2: 93% 93% 93%     SpO2:  [88 %-99 %] 93 %  on   ;   Device (Oxygen Therapy): ventilator    Body mass index is 52.83 kg/m².  Wt Readings from Last 3 Encounters:   07/22/23 (!) 167 kg (368 lb 3.2 oz)        Intake/Output Summary (Last 24 hours) at 7/22/2023 1123  Last data filed at 7/22/2023 1118  Gross per 24 hour   Intake 4071.73 ml   Output 700 ml   Net 3371.73 ml       NPO Diet NPO Type: Strict NPO  ----------------------------------------------------------------------------------------------------------------------      Physical Exam:    Constitutional: Chronically ill-appearing.  Currently intubated and sedated at 45% FiO2.  Family at bedside.  Neck: Unable to assess neck rigidity.  CVC to right IJ with no signs of infection.  Cardiovascular:  Normal rate, regular rhythm and normal heart sounds with no murmur. No edema.  Pulmonary/Chest: Lung exam is diminished to auscultation bilaterally.  Intubated and  sedated.  Abdominal:  Soft.  Bowel sounds are normal.  No distension and no tenderness.   Musculoskeletal:  No edema, no tenderness, and no deformity.  No swelling or redness of joints.  Neurological: Intubated and sedated.  Skin:  Skin is warm and dry.  No rash noted.  No pallor.  Peripheral IVs to the bilateral hands with no evidence of infection or phlebitis.  Psychiatric: Unable to assess.        ----------------------------------------------------------------------------------------------------------------------  Results from last 7 days   Lab Units 07/15/23  1504 07/15/23  1211   HSTROP T ng/L 32* 29*       Results from last 7 days   Lab Units 07/18/23  0052   PROBNP pg/mL 35,507.0*         Results from last 7 days   Lab Units 07/22/23  0738   PH, ARTERIAL pH units 7.420   PO2 ART mm Hg 70.5*   PCO2, ARTERIAL mm Hg 68.0*   HCO3 ART mmol/L 44.1*       Results from last 7 days   Lab Units 07/22/23  0035 07/21/23  0012 07/20/23  0559 07/19/23  1545 07/19/23  0207 07/18/23  0052 07/17/23  0022 07/16/23  0426   CRP mg/dL  --   --   --   --  22.62* 24.42*  --  20.87*   LACTATE mmol/L  --   --   --   --   --   --   --  0.8   WBC 10*3/mm3 29.50* 18.78* 18.35*   < > 27.03* 27.48*   < > 20.91*   HEMOGLOBIN g/dL 7.9* 7.0* 7.2*   < > 7.2* 7.7*   < > 7.1*   HEMATOCRIT % 31.2* 27.4* 29.9*   < > 30.9* 34.4   < > 29.3*   MCV fL 83.2 82.5 84.5   < > 83.7 83.5   < > 76.5*   MCHC g/dL 25.3* 25.5* 24.1*   < > 23.3* 22.4*   < > 24.2*   PLATELETS 10*3/mm3 476* 457* 494*   < > 592* 665*   < > 499*    < > = values in this interval not displayed.       Results from last 7 days   Lab Units 07/22/23  0035 07/21/23  0506 07/21/23  0012 07/20/23  1740 07/20/23  0559 07/19/23  1323 07/19/23  0207 07/18/23  0857 07/18/23  0052   SODIUM mmol/L 144  --  149*  --  150*   < > 147*  --  148*   POTASSIUM mmol/L 3.7  --  3.7 3.7 3.5   < > 3.6   < > 3.4*  3.4*   MAGNESIUM mg/dL  --  2.3  --   --   --   --  2.5*  --  2.3   CHLORIDE mmol/L 95*  --   97*  --  97*   < > 94*  --  93*   CO2 mmol/L 39.1*  --  39.8*  --  45.2*   < > 43.2*  --  46.2*   BUN mg/dL 69*  --  60*  --  56*   < > 42*  --  27*   CREATININE mg/dL 3.09*  --  3.06*  --  2.65*   < > 2.08*  --  1.31*   CALCIUM mg/dL 10.4  --  10.7*  --  11.2*   < > 11.1*  --  10.9*   GLUCOSE mg/dL 152*  --  107*  --  111*   < > 121*  --  133*   ALBUMIN g/dL 2.4*  --  2.5*  --  3.3*  --  3.6  --  3.4*   BILIRUBIN mg/dL 0.8  --  0.8  --  0.6  --  0.6  --  0.6   ALK PHOS U/L 73  --  49  --  51  --  59  --  68   AST (SGOT) U/L 12  --  12  --  14  --  15  --  12   ALT (SGPT) U/L 5  --  6  --  8  --  8  --  10    < > = values in this interval not displayed.     Estimated Creatinine Clearance: 32.6 mL/min (A) (by C-G formula based on SCr of 3.09 mg/dL (H)).  No results found for: AMMONIA      No results found for: HGBA1C, POCGLU    Lab Results   Component Value Date    HGBA1C 5.80 (H) 07/13/2023     Lab Results   Component Value Date    TSH 0.619 07/15/2023       Blood Culture   Date Value Ref Range Status   07/12/2023 No growth at 5 days  Final   07/12/2023 No growth at 5 days  Final     Urine Culture   Date Value Ref Range Status   07/15/2023 >100,000 CFU/mL Escherichia coli ESBL (A)  Final     Comment:       Consider infectious disease consult.  Susceptibility results may not correlate to clinical outcomes.   07/12/2023 >100,000 CFU/mL Escherichia coli ESBL (A)  Final     Comment:       Consider infectious disease consult.  Susceptibility results may not correlate to clinical outcomes.   07/12/2023 (A)  Final    >100,000 CFU/mL Klebsiella pneumoniae ssp pneumoniae     No results found for: WOUNDCX  No results found for: STOOLCX  No results found for: RESPCX  Pain Management Panel  More data may exist         Latest Ref Rng & Units 7/19/2023 7/14/2023   Pain Management Panel   Creatinine, Urine mg/dL 139.1  8.5     ----------------------------------------------------------------------------------------------------------------------  Imaging Results (Last 24 Hours)       Procedure Component Value Units Date/Time    XR Chest 1 View [[id number]] Collected: 07/22/23 0907     Updated: 07/22/23 0912    Narrative:      CLINICAL HISTORY: Follow-up respiratory failure.     COMPARISON: 7/20/2023.     TECHNIQUE: Single portable upright AP view of the chest.     FINDINGS: Endotracheal tube tip is 1.9 cm above the barry.  Right  internal jugular vein central venous catheter tip is in the lower SVC.   Nasogastric tube tip is in the stomach.     Heart size remains enlarged.  Consolidation at the left lung base is  unchanged.  There is no pneumothorax.  Right lung is clear.  No change  in appearance of the chest compared to 7/20/2023.       Impression:         SUPPORT LINES AND TUBES IN POSITION.     UNCHANGED RETROCARDIAC OPACITY COMPARED TO 2 DAYS AGO.     This report was finalized on 7/22/2023 9:09 AM by Pily Solares MD.       US Renal Bilateral [[id number]] Collected: 07/21/23 1132     Updated: 07/21/23 1134    Narrative:      EXAMINATION: US RENAL BILATERAL-      CLINICAL INDICATION:     dustin; D64.9-Anemia, unspecified; A41.9-Sepsis,  unspecified organism; R65.20-Severe sepsis without septic shock;  J96.01-Acute respiratory failure with hypoxia; N39.0-Urinary tract  infection, site not specified; I50.31-Acute diastolic (congestive) heart  failure     TECHNIQUE: Multiplanar gray scale sonographic imaging of the kidneys.  Color Doppler implemented.     COMPARISON: NONE      FINDINGS:      RIGHT: The right kidney measures 10.9 x 5.2 cm in size. No mass,  hydronephrosis, or shadowing stone.     LEFT: The left kidney measures 11.8 x 5.9 cm in size. No mass,  hydronephrosis, or shadowing stone.     Small perihepatic ascites noted.       Impression:      1. Unremarkable sonographic appearance of both kidneys.  2. Small perihepatic ascites.       This report was finalized on 7/21/2023 11:32 AM by Dr. Kvng Tijerina MD.               ----------------------------------------------------------------------------------------------------------------------    Pertinent Infectious Disease Results      Urine culture collected on 7/15/2023 showed growth of over 100,000 colonies of ESBL E. Coli.        Assessment/Plan       Assessment     Sepsis  ESBL UTI  Pneumonia      Plan      Patient remains intubated and sedated today at slightly decreased FiO2 45%.  Family at bedside.  Fever trend overall improved with Tmax of 99.3 today.  Continues to require Levophed for blood pressure support.  Lung sounds diminished bilaterally.  Abdomen mildly distended, nontender.  No reported diarrhea.  WBC elevated 29.50.  Blood cultures from 7/21/2023 show no growth thus far.  Urine culture from 7/20/2023 shows no growth thus far.  Respiratory culture from 7/20/2023 finalized as Candida albicans, possible colonization. Chest x-ray from 7/22/2023 reports consolidation of the left lung base that is unchanged, right lung is clear.    As fever trend has overall improved and patient is requiring decreased FiO2 today and will continue meropenem and linezolid for now.  We are concerned about worsening leukocytosis and continued requirement of Levophed for blood pressure support, if continues to worsen may require initiation of antifungal therapy.  We will continue to follow closely and adjust antibiotic therapy as needed.    ANTIMICROBIAL THERAPY    Linezolid - 600 MG/300ML  meropenem (MERREM) 1gm IVPB in 100ml NS (VTB)     Code Status:   Code Status and Medical Interventions:   Ordered at: 07/13/23 0152     Code Status (Patient has no pulse and is not breathing):    CPR (Attempt to Resuscitate)     Medical Interventions (Patient has pulse or is breathing):    Full Support       WINDY Victor  07/22/23  11:23 EDT

## 2023-07-22 NOTE — PROGRESS NOTES
Nephrology Progress Note      Subjective     Patient remains intubated on mechanical ventilation FiO2 has been around 50%    Objective       Vital signs :     Temp:  [97.9 °F (36.6 °C)-99.3 °F (37.4 °C)] 99.3 °F (37.4 °C)  Heart Rate:  [] 86  Resp:  [16-18] 18  BP: ()/(39-66) 107/48  FiO2 (%):  [40 %-50 %] 45 %    Intake/Output                         07/20/23 0701 - 07/21/23 0700 07/21/23 0701 - 07/22/23 0700     [id number] 6952-7316 Total 2029-8465 9359-0522 Total                 Intake    I.V.  374.4  447.2 821.6  1421.6  1450.2 2871.7    IV Piggyback  200  -- 200  400  400 800    Total Intake 574.4 447.2 1021.6 1821.6 1850.2 3671.7       Output    Urine  115  200 315  275  425 700    Emesis/NG output  700  -- 700  290  0 290    Stool  --  -- --  --  0 0    Total Output  565 425 990             Physical Exam:    General Appearance : On mechanical ventilation  Lungs : clear to auscultation, respirations regular  Heart :  regular rhythm & normal rate, normal S1, S2 and no murmur, no rub  Abdomen : soft, non distended  Extremities : Trace  Neurologic : Unable to assess        Laboratory Data :     Albumin Albumin   Date Value Ref Range Status   07/22/2023 2.4 (L) 3.5 - 5.2 g/dL Final   07/21/2023 2.5 (L) 3.5 - 5.2 g/dL Final   07/20/2023 3.3 (L) 3.5 - 5.2 g/dL Final      Magnesium Magnesium   Date Value Ref Range Status   07/21/2023 2.3 1.6 - 2.4 mg/dL Final          PTH               No results found for: PTH    CBC and coagulation:  Results from last 7 days   Lab Units 07/22/23  0035 07/21/23  0012 07/20/23  0559 07/19/23  1545 07/19/23  0207 07/18/23  0052 07/17/23  0022 07/16/23  0426 07/16/23  0425   PROCALCITONIN ng/mL  --   --  0.53*  --   --  0.72*  --   --  0.27*   LACTATE mmol/L  --   --   --   --   --   --   --  0.8  --    CRP mg/dL  --   --   --   --  22.62* 24.42*  --  20.87*  --    WBC 10*3/mm3 29.50* 18.78* 18.35*   < > 27.03* 27.48*   < > 20.91*  --    HEMOGLOBIN g/dL 7.9* 7.0*  7.2*   < > 7.2* 7.7*   < > 7.1*  --    HEMATOCRIT % 31.2* 27.4* 29.9*   < > 30.9* 34.4   < > 29.3*  --    MCV fL 83.2 82.5 84.5   < > 83.7 83.5   < > 76.5*  --    MCHC g/dL 25.3* 25.5* 24.1*   < > 23.3* 22.4*   < > 24.2*  --    PLATELETS 10*3/mm3 476* 457* 494*   < > 592* 665*   < > 499*  --     < > = values in this interval not displayed.     Acid/base balance:  Results from last 7 days   Lab Units 07/22/23  0738 07/21/23  0435 07/20/23  1503   PH, ARTERIAL pH units 7.420 7.435 7.440   PO2 ART mm Hg 70.5* 62.9* 65.8*   PCO2, ARTERIAL mm Hg 68.0* 68.7* 67.4*   HCO3 ART mmol/L 44.1* 46.1* 45.7*     Renal and electrolytes:    Results from last 7 days   Lab Units 07/22/23  0035 07/21/23  0506 07/21/23  0012 07/20/23  1740 07/20/23  0559 07/19/23  1323 07/19/23  0207 07/18/23  0857 07/18/23  0052 07/17/23  1413 07/17/23  1136 07/17/23  0022   SODIUM mmol/L 144  --  149*  --  150* 149* 147*  --  148*  --   --  148*   POTASSIUM mmol/L 3.7  --  3.7 3.7 3.5 4.1  4.0 3.6   < > 3.4*  3.4*  --    < > 2.6*   MAGNESIUM mg/dL  --  2.3  --   --   --   --  2.5*  --  2.3  --   --  1.7   CHLORIDE mmol/L 95*  --  97*  --  97* 94* 94*  --  93*  --   --  93*   CO2 mmol/L 39.1*  --  39.8*  --  45.2* 33.5* 43.2*  --  46.2*  --   --  43.8*   BUN mg/dL 69*  --  60*  --  56* 50* 42*  --  27*  --   --  18   CREATININE mg/dL 3.09*  --  3.06*  --  2.65* 2.45* 2.08*  --  1.31*  --   --  0.64   CALCIUM mg/dL 10.4  --  10.7*  --  11.2* 10.2 11.1*  --  10.9*  --   --  10.2   PHOSPHORUS mg/dL  --   --   --   --   --   --   --   --  5.6* 4.7*  --  1.6*    < > = values in this interval not displayed.     Estimated Creatinine Clearance: 32.6 mL/min (A) (by C-G formula based on SCr of 3.09 mg/dL (H)).  @GFRCG:3@   Liver and pancreatic function:  Results from last 7 days   Lab Units 07/22/23  0035 07/21/23  0012 07/20/23  0559 07/19/23  0207 07/18/23  0052 07/17/23  1633   ALBUMIN g/dL 2.4* 2.5* 3.3* 3.6 3.4*  --    BILIRUBIN mg/dL 0.8 0.8 0.6 0.6 0.6   --    ALK PHOS U/L 73 49 51 59 68  --    AST (SGOT) U/L 12 12 14 15 12  --    ALT (SGPT) U/L 5 6 8 8 10  --    AMMONIA umol/L  --   --   --  55* 67* 79*         Cardiac:  Results from last 7 days   Lab Units 07/18/23  0052   PROBNP pg/mL 35,507.0*     Liver and pancreatic function:  Results from last 7 days   Lab Units 07/22/23  0035 07/21/23  0012 07/20/23  0559 07/19/23  0207 07/18/23  0052 07/17/23  1633   ALBUMIN g/dL 2.4* 2.5* 3.3* 3.6 3.4*  --    BILIRUBIN mg/dL 0.8 0.8 0.6 0.6 0.6  --    ALK PHOS U/L 73 49 51 59 68  --    AST (SGOT) U/L 12 12 14 15 12  --    ALT (SGPT) U/L 5 6 8 8 10  --    AMMONIA umol/L  --   --   --  55* 67* 79*       Medications :     aspirin, 81 mg, Per G Tube, Daily  chlorhexidine, 15 mL, Mouth/Throat, Q12H  heparin (porcine), 5,000 Units, Subcutaneous, Q8H  iron polysaccharides, 150 mg, Oral, Daily  lactulose, 300 mL, Rectal, Q6H  Linezolid, 600 mg, Intravenous, Q12H  magic barrier cream, 1 application , Topical, BID  meropenem, 1,000 mg, Intravenous, Q12H  metoprolol succinate XL, 25 mg, Oral, Q24H  midodrine, 5 mg, Oral, TID AC  mupirocin, 1 application , Each Nare, BID  pantoprazole, 40 mg, Intravenous, BID AC  potassium chloride, 40 mEq, Oral, Daily  rosuvastatin, 20 mg, Oral, Nightly  senna-docusate sodium, 2 tablet, Oral, BID  sodium chloride, 10 mL, Intravenous, Q12H  sodium chloride, 10 mL, Intravenous, Q12H  sodium chloride, 10 mL, Intravenous, Q12H  sodium chloride, 10 mL, Intravenous, Q12H  sodium chloride, 10 mL, Intravenous, Q12H      norepinephrine, 0.01-0.07 mcg/kg/min, Last Rate: 0.06 mcg/kg/min (07/22/23 0819)  Pharmacy Consult - Pharmacy to dose,   propofol, 5-50 mcg/kg/min, Last Rate: 30 mcg/kg/min (07/22/23 0917)  sodium chloride, 100 mL/hr, Last Rate: 100 mL/hr (07/22/23 9545)          Assessment & Plan     -TRACIE, nonoliguric  - Severe sepsis with septic shock  - Hypercalcemia  - Primary respiratory acidosis  - Hypoxic hypercarbic respiratory failure likely  multifactorial  - History of congestive heart failure, well compensated  - Bacterial pneumonia    Creatinine started plateauing remains at 3 today.  No signs of fluid overload clinically.  We will continue on half-normal saline and I have ordered Lasix 80 mg at 2 PM.    TRACIE likely multifactorial including severe sepsis with septic shock  Baseline creatinine around 2.6 admitted with 1  UA suggestive of urinary tract infection difficult to interpret  No hydronephrosis on renal ultrasound  No florid fluid overload clinically likely on volume depletion side.  - Continue on pressors to keep MAP more than 65 mmHg  - Please avoid any nephrotoxic agents, hypotension and adjust medications according to estimated GFR      Rasheed Stern MD  07/22/23  09:31 EDT

## 2023-07-22 NOTE — PROGRESS NOTES
LOS: 9 days     Name: Judy Lutz  Age/Sex: 63 y.o. female  :  1960        PCP: Provider, No Known  REF: No Known Provider    Principal Problem:    Symptomatic anemia      Reason for follow-up: Pericardial effusion    Subjective       Subjective   Judy Lutz is a 63-year-old female with no reported past medical history.  She presented to [facility] ED on 2023 with complaints of increased fatigue, malaise, shortness of breath, and generalized weakness.  Cardiology was consulted for further evaluation and management of cardiomyopathy and pericardial effusion.    Interval History: Patient is intubated and sedated.  Creatinine this a.m. is 3.09, hemoglobin 7.9.  Nephrology is also following.  Family is at bedside during evaluation this AM.    ROS    Vital Signs  Temp:  [97.9 °F (36.6 °C)-100.1 °F (37.8 °C)] 100.1 °F (37.8 °C)  Heart Rate:  [] 88  Resp:  [16-18] 18  BP: ()/(39-66) 101/53  FiO2 (%):  [40 %-50 %] 45 %  Vital Signs (last 72 hrs)          0700  / 0659  0700   0659  0700   0659  0700   1223   Most Recent      Temp (°F) 97.3 -  99.4    99.1 -  100.5    97.9 -  101.8    99.3 -  100.1     100.1 (37.8)  1130    Heart Rate 88 -  115    75 -  107    68 -  101    81 -  93     88  1219    Resp  -      16 -  28    16 -  17      18     18  1002    BP 96/53 -  145/70    96/38 -  127/63    94/39 -  131/66    93/49 -  115/51     101/53  1115    SpO2 (%) 89 -  98    72 -  100    90 -  99    88 -  96     94  1219          Documented weights    23 0400 07/15/23 0400 23 0400   Weight: (!) 181 kg (400 lb) (!) 183 kg (404 lb 1.7 oz) (!) 177 kg (390 lb 7 oz) (!) 170 kg (375 lb 7.1 oz)    23 0400 23 0530 23 0500 23 0500   Weight: (!) 167 kg (368 lb 4.8 oz) (!) 164 kg (361 lb 9.6 oz) (!) 164 kg (361 lb 4.8 oz) (!) 163 kg (360 lb 3.2 oz)    23 1226 23  "0548 07/21/23 0528 07/22/23 0500   Weight: (!) 169 kg (372 lb) (!) 168 kg (371 lb) (!) 166 kg (366 lb 12.8 oz) (!) 167 kg (368 lb 3.2 oz)      Body mass index is 52.83 kg/m².    Intake/Output Summary (Last 24 hours) at 7/22/2023 1223  Last data filed at 7/22/2023 1118  Gross per 24 hour   Intake 4071.73 ml   Output 700 ml   Net 3371.73 ml     Objective:  Vital signs: (most recent): Blood pressure 101/53, pulse 88, temperature 100.1 °F (37.8 °C), temperature source Esophageal, resp. rate 18, height 177.8 cm (70\"), weight (!) 167 kg (368 lb 3.2 oz), SpO2 94 %.              Objective       Physical Exam:     General Appearance:  Intubated and sedated, in no acute distress   Head:    Normocephalic, without obvious abnormality, atraumatic   Neck:   No adenopathy, supple, trachea midline, no thyromegaly, no   carotid bruit, no JVD   Lungs:   Mechanical ventilation,respirations regular, even and               unlabored    Heart:    Regular rhythm and normal rate, normal S1 and S2, no          murmur, no gallop, no rub, no click   Chest Wall:    No abnormalities observed   Abdomen:     Normal bowel sounds, no masses, no organomegaly, soft      non tender, nondistended, no guarding, no rebound                tenderness   Pulses:   Pulses palpable and equal bilaterally   Skin:   No bleeding, bruising or rash       Neurologic:   Intubated and sedated          Procedures    Results review       Results Review:   Results from last 7 days   Lab Units 07/22/23  0035 07/21/23  0012 07/20/23  0559 07/20/23  0010 07/19/23  1545 07/19/23  0207 07/18/23  0052 07/17/23  1715 07/17/23  0022   WBC 10*3/mm3 29.50* 18.78* 18.35*  --  21.02* 27.03* 27.48*  --  19.23*   HEMOGLOBIN g/dL 7.9* 7.0* 7.2* 7.2* 6.0* 7.2* 7.7*   < > 7.0*   PLATELETS 10*3/mm3 476* 457* 494*  --  548* 592* 665*  --  420    < > = values in this interval not displayed.     Results from last 7 days   Lab Units 07/22/23  0035 07/21/23  0012 07/20/23  1740 07/20/23  0559 " 07/19/23  1323 07/19/23  0207 07/18/23  0857 07/18/23  0052 07/17/23  1136 07/17/23  0022 07/16/23  1830 07/16/23  0426   SODIUM mmol/L 144 149*  --  150* 149* 147*  --  148*  --  148*  --  149*   POTASSIUM mmol/L 3.7 3.7 3.7 3.5 4.1  4.0 3.6 3.7 3.4*  3.4*   < > 2.6*   < > 2.7*   CHLORIDE mmol/L 95* 97*  --  97* 94* 94*  --  93*  --  93*  --  99   CO2 mmol/L 39.1* 39.8*  --  45.2* 33.5* 43.2*  --  46.2*  --  43.8*  --  42.8*   BUN mg/dL 69* 60*  --  56* 50* 42*  --  27*  --  18  --  20   CREATININE mg/dL 3.09* 3.06*  --  2.65* 2.45* 2.08*  --  1.31*  --  0.64  --  0.76   CALCIUM mg/dL 10.4 10.7*  --  11.2* 10.2 11.1*  --  10.9*  --  10.2  --  10.3   GLUCOSE mg/dL 152* 107*  --  111* 118* 121*  --  133*  --  120*  --  110*   ALT (SGPT) U/L 5 6  --  8  --  8  --  10  --  10  --  12   AST (SGOT) U/L 12 12  --  14  --  15  --  12  --  11  --  12    < > = values in this interval not displayed.     Results from last 7 days   Lab Units 07/15/23  1504   HSTROP T ng/L 32*     Lab Results   Component Value Date    INR 1.38 (H) 07/12/2023     Lab Results   Component Value Date    MG 2.3 07/21/2023    MG 2.5 (H) 07/19/2023    MG 2.3 07/18/2023     Lab Results   Component Value Date    TSH 0.619 07/15/2023      Imaging Results (Last 48 Hours)       Procedure Component Value Units Date/Time    XR Chest 1 View [[id number]] Collected: 07/22/23 0907     Updated: 07/22/23 0912    Narrative:      CLINICAL HISTORY: Follow-up respiratory failure.     COMPARISON: 7/20/2023.     TECHNIQUE: Single portable upright AP view of the chest.     FINDINGS: Endotracheal tube tip is 1.9 cm above the barry.  Right  internal jugular vein central venous catheter tip is in the lower SVC.   Nasogastric tube tip is in the stomach.     Heart size remains enlarged.  Consolidation at the left lung base is  unchanged.  There is no pneumothorax.  Right lung is clear.  No change  in appearance of the chest compared to 7/20/2023.       Impression:          SUPPORT LINES AND TUBES IN POSITION.     UNCHANGED RETROCARDIAC OPACITY COMPARED TO 2 DAYS AGO.     This report was finalized on 7/22/2023 9:09 AM by Pily Solares MD.       US Renal Bilateral [[id number]] Collected: 07/21/23 1132     Updated: 07/21/23 1134    Narrative:      EXAMINATION: US RENAL BILATERAL-      CLINICAL INDICATION:     dustin; D64.9-Anemia, unspecified; A41.9-Sepsis,  unspecified organism; R65.20-Severe sepsis without septic shock;  J96.01-Acute respiratory failure with hypoxia; N39.0-Urinary tract  infection, site not specified; I50.31-Acute diastolic (congestive) heart  failure     TECHNIQUE: Multiplanar gray scale sonographic imaging of the kidneys.  Color Doppler implemented.     COMPARISON: NONE      FINDINGS:      RIGHT: The right kidney measures 10.9 x 5.2 cm in size. No mass,  hydronephrosis, or shadowing stone.     LEFT: The left kidney measures 11.8 x 5.9 cm in size. No mass,  hydronephrosis, or shadowing stone.     Small perihepatic ascites noted.       Impression:      1. Unremarkable sonographic appearance of both kidneys.  2. Small perihepatic ascites.      This report was finalized on 7/21/2023 11:32 AM by Dr. Kvng Tijerina MD.       XR Chest 1 View [[id number]] Resulted: 07/21/23 0655     Updated: 07/21/23 0655    XR Chest 1 View [[id number]] Collected: 07/20/23 1528     Updated: 07/20/23 1531    Narrative:      EXAM:    XR Chest, 1 View     EXAM DATE:    7/20/2023 2:45 PM     CLINICAL HISTORY:    Central line; D64.9-Anemia, unspecified; A41.9-Sepsis, unspecified  organism; R65.20-Severe sepsis without septic shock; J96.01-Acute  respiratory failure with hypoxia; N39.0-Urinary tract infection, site  not specified; I50.31-Acute diastolic (congestive) heart failure     TECHNIQUE:    Frontal view of the chest.     COMPARISON:    07/20/2023     FINDINGS:    Lungs and pleural spaces:  No pneumothorax.    Heart:  Cardiomegaly and left basilar airspace disease.    Mediastinum:   Unremarkable.    Bones/joints:  Unremarkable.    Vasculature:  Right IJ deep line noted with tip in the distal SVC.    Tubes, lines and devices:  ET tube with tip just below clavicles.  NG  tube extends into the stomach.       Impression:      1.  Support devices detailed above. No evidence of pneumothorax.  2.  Otherwise stable chest.     This report was finalized on 7/20/2023 3:28 PM by Dr. Kvng Tijerina MD.       US Venous Doppler Lower Extremity Bilateral (duplex) [[id number]] Collected: 07/20/23 1317     Updated: 07/20/23 1319    Narrative:      US VENOUS DOPPLER LOWER EXTREMITY BILATERAL (DUPLEX)-     CLINICAL INDICATION: r/o dvt; D64.9-Anemia, unspecified; A41.9-Sepsis,  unspecified organism; R65.20-Severe sepsis without septic shock;  J96.01-Acute respiratory failure with hypoxia; N39.0-Urinary tract  infection, site not specified; I50.31-Acute diastolic (congestive) heart  failure        COMPARISON: None available      TECHNIQUE: Color Doppler imaging was used with compression and  augmentation to evaluate the lower extremity deep venous system.     FINDINGS:   There is patent spontaneous flow from the common femoral vein through  the posterior tibial veins.  There was no internal clot or area of noncompressibility.  Normal augmentation was elicited where applicable.       Impression:      No DVT in the lower extremities on today's exam.      This report was finalized on 7/20/2023 1:17 PM by Dr. Nicho Reeves MD.             No results found for: BNP    Lab Results   Component Value Date    ABSOLUTELUNG 28 07/15/2023             Echo   Results for orders placed during the hospital encounter of 07/12/23    Adult Transthoracic Echo Limited W/ Cont if Necessary Per Protocol    Interpretation Summary    Left ventricular systolic function is normal. Left ventricular ejection fraction appears to be 66 - 70%.    Left ventricular diastolic dysfunction is noted.    The right ventricular cavity is mild to moderately  dilated, D-shaped septum was noted however no assessment of the PA pressure was done right ventricular pressure not estimated.    There is a large (>2cm) circumferential pericardial effusion. There is no evidence of cardiac tamponade.    IVC is dilated and collapsible.    This is a technically limited study.           I reviewed the patient's new clinical results.    Telemetry: Sinus rhythm 70s to 90s.       Medication Review:   aspirin, 81 mg, Per G Tube, Daily  chlorhexidine, 15 mL, Mouth/Throat, Q12H  furosemide, 80 mg, Intravenous, Once  heparin (porcine), 5,000 Units, Subcutaneous, Q8H  iron polysaccharides, 150 mg, Oral, Daily  lactulose, 300 mL, Rectal, Q6H  Linezolid, 600 mg, Intravenous, Q12H  magic barrier cream, 1 application , Topical, BID  meropenem, 1,000 mg, Intravenous, Q12H  metoprolol succinate XL, 25 mg, Oral, Q24H  midodrine, 5 mg, Oral, TID AC  mupirocin, 1 application , Each Nare, BID  pantoprazole, 40 mg, Intravenous, BID AC  potassium chloride, 40 mEq, Oral, Daily  rosuvastatin, 20 mg, Oral, Nightly  senna-docusate sodium, 2 tablet, Oral, BID  sodium chloride, 10 mL, Intravenous, Q12H  sodium chloride, 10 mL, Intravenous, Q12H  sodium chloride, 10 mL, Intravenous, Q12H  sodium chloride, 10 mL, Intravenous, Q12H  sodium chloride, 10 mL, Intravenous, Q12H        norepinephrine, 0.01-0.07 mcg/kg/min, Last Rate: 0.06 mcg/kg/min (07/22/23 2020)  Pharmacy Consult - Pharmacy to dose,   propofol, 5-50 mcg/kg/min, Last Rate: 30 mcg/kg/min (07/22/23 9366)  sodium chloride, 100 mL/hr, Last Rate: 100 mL/hr (07/22/23 7161)        Assessment      Assessment:  1.  Acute hypercapnic/hypoxic respiratory failure, with multifactorial etiology including pneumonia, obesity with hypoventilation and with element of heart failure earlier currently patient is intubated on mechanical ventilation and sedated  2.  Acute HFpEF, improving  3.  Moderate-sized pericardial effusion with no tamponade, with right ventricular  dilatation   4.  Elevated troponin on admission likely type II NSTEMI  5.  Anemia  6.  TRACIE  7.  Likely undiagnosed sleep apnea  Plan     Recommendations:  1.  Creatinine has been more over the last plateaued, nephrology is monitoring the diuresis  2.  She may require pericardial window subsequently while she is adequately improved  3.  Ischemic work-up is indicated after improvement of her general status    I discussed the patient's findings and my recommendations with patient and family    Electronically signed by WINDY Fitch, 07/22/23, 12:29 PM EDT.  Electronically signed by Bernice Rosas MD, 07/22/23, 12:51 PM EDT.          Please note that portions of this note were completed with a voice recognition program.

## 2023-07-22 NOTE — PLAN OF CARE
Problem: Adult Inpatient Plan of Care  Goal: Plan of Care Review  Outcome: Ongoing, Progressing  Flowsheets (Taken 7/22/2023 1529)  Outcome Evaluation: remains intubated and sedated. remains on levophed. cooling blanket in place. tmax 100.1.  Goal: Patient-Specific Goal (Individualized)  Outcome: Ongoing, Progressing  Goal: Absence of Hospital-Acquired Illness or Injury  Outcome: Ongoing, Progressing  Intervention: Identify and Manage Fall Risk  Recent Flowsheet Documentation  Taken 7/22/2023 1500 by Torey Bledsoe RN  Safety Promotion/Fall Prevention: safety round/check completed  Taken 7/22/2023 1400 by Torey Bledsoe RN  Safety Promotion/Fall Prevention: safety round/check completed  Taken 7/22/2023 1300 by Torey Bledsoe RN  Safety Promotion/Fall Prevention: safety round/check completed  Taken 7/22/2023 1200 by Torey Bledsoe RN  Safety Promotion/Fall Prevention: safety round/check completed  Taken 7/22/2023 1100 by Torey Bledsoe RN  Safety Promotion/Fall Prevention: safety round/check completed  Taken 7/22/2023 1000 by Torey Bledsoe RN  Safety Promotion/Fall Prevention: safety round/check completed  Taken 7/22/2023 0900 by Torey Bledsoe RN  Safety Promotion/Fall Prevention: safety round/check completed  Taken 7/22/2023 0800 by Torey Bledsoe RN  Safety Promotion/Fall Prevention: safety round/check completed  Taken 7/22/2023 0700 by Torey Bledsoe RN  Safety Promotion/Fall Prevention: safety round/check completed  Intervention: Prevent Skin Injury  Recent Flowsheet Documentation  Taken 7/22/2023 1400 by Torey Bledsoe RN  Body Position:   turned   right  Skin Protection: adhesive use limited  Taken 7/22/2023 1200 by Torey Bledsoe RN  Body Position:   turned   left  Taken 7/22/2023 1000 by Torey Bledsoe RN  Body Position:   turned   right  Taken 7/22/2023 0800 by Torey Bledsoe RN  Body Position:   turned    left  Skin Protection: adhesive use limited  Intervention: Prevent and Manage VTE (Venous Thromboembolism) Risk  Recent Flowsheet Documentation  Taken 7/22/2023 1400 by Torey Bledsoe RN  Activity Management: bedrest  Taken 7/22/2023 0800 by Torey Bledsoe RN  Activity Management: bedrest  VTE Prevention/Management:   bilateral   sequential compression devices on  Intervention: Prevent Infection  Recent Flowsheet Documentation  Taken 7/22/2023 1400 by Torey Bledsoe RN  Infection Prevention: environmental surveillance performed  Taken 7/22/2023 0800 by Torey Bledsoe RN  Infection Prevention: environmental surveillance performed  Goal: Optimal Comfort and Wellbeing  Outcome: Ongoing, Progressing  Intervention: Provide Person-Centered Care  Recent Flowsheet Documentation  Taken 7/22/2023 1400 by Torey Bledsoe RN  Trust Relationship/Rapport:   care explained   reassurance provided  Taken 7/22/2023 0800 by Torey Bledsoe RN  Trust Relationship/Rapport:   care explained   reassurance provided  Goal: Readiness for Transition of Care  Outcome: Ongoing, Progressing   Goal Outcome Evaluation:              Outcome Evaluation: remains intubated and sedated. remains on levophed. cooling blanket in place. tmax 100.1.

## 2023-07-22 NOTE — PROGRESS NOTES
[facility]     Progress Note    Patient Name: Judy Lutz  : 1960  MRN: [id number]  Primary Care Physician:  Provider, No Known  Date of admission: 2023    Subjective   Subjective     Chief Complaint: 63-year-old female being seen in follow-up for dyspnea    Procedures:  2023: Intubation  2023: Right internal jugular central line placement    History of Present Illness  Patient remains sedated and on the ventilator.  Afebrile thus far today; maximum temperature 100F.    No acute overnight events reported to me per nursing staff.     Present during visit: JOHN Lema, patient's daughter and son in-law    Review of Systems  Unable to assess due to sedation/intubation/mechanical ventilation    Objective   Objective     Vitals:   Temp:  [97.9 °F (36.6 °C)-99.3 °F (37.4 °C)] 99.3 °F (37.4 °C)  Heart Rate:  [] 84  Resp:  [16-18] 18  BP: ()/(39-66) 93/49  FiO2 (%):  [50 %] 50 %    Physical Exam  Constitutional:       General: She is not in acute distress.     Appearance: She is well-developed. She is morbidly obese.      Interventions: She is sedated and intubated.   HENT:      Head: Normocephalic and atraumatic.      Mouth/Throat:      Comments: ET and OG tubes in place  Eyes:      Conjunctiva/sclera: Conjunctivae normal.   Neck:      Trachea: No tracheal deviation.      Comments: R IJ central line  Cardiovascular:      Rate and Rhythm: Normal rate and regular rhythm.      Heart sounds: No murmur heard.    No friction rub. No gallop.   Pulmonary:      Effort: No respiratory distress. She is intubated.      Breath sounds: Examination of the right-lower field reveals decreased breath sounds. Examination of the left-lower field reveals decreased breath sounds. Decreased breath sounds present. No wheezing or rales.   Abdominal:      General: Bowel sounds are normal. There is no distension.      Palpations: Abdomen is soft.      Tenderness: There is no abdominal tenderness. There is  no guarding.   Genitourinary:     Comments: Gonzalez catheter in place  Skin:     General: Skin is warm and dry.      Findings: No erythema or rash.     *No significant change in exam compared to 7/21/23*     Result Review    Result Review:  I have personally reviewed the results from the time of this admission to 7/22/2023 08:58 EDT and agree with these findings:  [x]  Laboratory list / accordion  []  Microbiology  [x]  Radiology  []  EKG/Telemetry   []  Cardiology/Vascular   []  Pathology  []  Old records  []  Other:  Most notable findings include: Today's arterial blood gas overall stable with pH 7.420, PCO2 68, PaO2 70.5, bicarbonate 44.1 with an O2 saturation of 94.3%.    CMP with sodium improved to 144, chloride 95, BUN/creatinine 69/3.09, glucose 152, albumin 2.4.    WBC worsened at 29.50, H/H 7.9 and 31.2 respectively, platelets 476.    Preliminary urine culture from 7/20 with no growth    Respiratory culture from 7/20 with moderate growth/3+ Candida albicans    MRSA nasal probe positive    Urine culture from 7/15 with ESBL E. coli    CT chest without contrast:  FINDINGS:    Lungs and pleural spaces:  Development of bilateral consolidative  airspace disease more pronounced in the upper lobes but also present in  the lower lobes consistent with pneumonia.  Miniscule pleural effusions  which are nonloculated.  Interstitial thickening has developed  compatible with edema.    Heart:  Very marked cardiac enlargement is stable from previous.  No  significant pericardial effusion.  No significant coronary artery  calcifications.    Mediastinum:  Hiatal hernia, stable.    Bones/joints:  Unremarkable.  No acute fracture.  No dislocation.    Soft tissues:  Diffuse anasarca has slightly improved.    Vasculature:  Pulmonary venous hypertension is noted.  No thoracic  aortic aneurysm.    Lymph nodes:  Reactive and/or congested lymph nodes in the mediastinum  and hilar stations but no bulky adenopathy identified.    Liver:   Liver cirrhosis.    Stomach and bowel:  Gastroesophageal reflux.    Intraperitoneal space:  Upper abdominal ascites which appears stable.     IMPRESSION:  1.  Development of bilateral multilobar partially consolidative  pneumonia. Combination atelectasis may be considered.  2.  Slight increase in changes of CHF now with interstitial edema.  Miniscule nonloculated pleural effusions with stable marked cardiac  enlargement.  3.  Questionable decrease in the degree of anasarca with persistent  upper abdominal ascites noted.  4.  Other incidental/nonacute findings as above.    CXR 7/22/23:  FINDINGS: Endotracheal tube tip is 1.9 cm above the barry.  Right  internal jugular vein central venous catheter tip is in the lower SVC.   Nasogastric tube tip is in the stomach.     Heart size remains enlarged.  Consolidation at the left lung base is  unchanged.  There is no pneumothorax.  Right lung is clear.  No change  in appearance of the chest compared to 7/20/2023.     IMPRESSION:     SUPPORT LINES AND TUBES IN POSITION.     UNCHANGED RETROCARDIAC OPACITY COMPARED TO 2 DAYS AGO.    Assessment / Plan     Brief Patient Summary:  Judy Lutz is a 63 y.o. female who had no significant known past medical history upon admission, who was admitted with respiratory failure which has now unfortunately required intubation and mechanical ventilation.    #Sepsis, shock criteria as patient currently requiring vasopressor support  #Acute hypercapnic respiratory failure with acute hypoxic respiratory failure upon admission  #Worsening leukocytosis  #Suspect obesity hypoventilation syndrome  #Multifocal pneumonia with combination atelectasis  #Acute decompensated heart failure  #Pleural effusions  #Morbid obesity with OHV and NANCY  -Wean vasopressor support as BP/MAP tolerates  -I have requested repeat blood cultures which are currently pending  -ABG reviewed; decrease PEEP to 8; attempt to decrease FiO2 as saturations tolerate; increase  RR to 18  -Repeat ABG in a.m.   -CXR ordered and stable  -Repeat CBC in a.m. and monitor temperature curve  -Continue with broad spectrum IV antibiotics to include Merrem and Linezolid which was initiated yesterday, 7/21/23  -I discussed with WOLFGANG Castillo and given that her temperature curve has improved, will hold on additional infectious agents (ie such anti-fungal coverage) for now; hopefully leukocytosis improvement is delayed as patient has been on her current regimen for ~less than 24 hours  -Appreciate ID and pulmonology recommendations    #Excessive GI output  -Continue scheduled enemas for now until significant stooling occurs  -Tube feeds as tolerated; monitor for significantly elevated residuals    #ESBL UTI  -Invanz has been escalated to Merrem as per ID recommendations  -Continue to follow all final culture results  -Tailor antibiotic therapy based on results  -Repeat CBC in a.m. and to trend her temperature curve    #TRACIE, nonoliguric  #Hypercalcemia  #Hypernatremia  #Severe hypoalbuminemia  -Unclear etiology and most likely multifactorial in the setting of sepsis, septic shock and probable volume depletion  -Suspect pre-renal  -Cr slightly worsened today at 3.09  -Today, patient will continue to receive 1/2 NS and 80 mg IV lasix at 1400 per Nephrology  -UOP has dropped over the past 24 hours; noted a total of 990 ml yesterday  -Appreciate Nephrology recommendations  -Attempt to avoid further hypotension; goal SBP greater than or equal to 90 mmHg/MAP greater than or equal to 65 mmHg  -Limit nephrotoxic medications  -No hydronephrosis noted on renal imaging  -Patient has been started on linezolid rather than vancomycin as noted above  -Keep an eye on her potassium level in the setting of renal failure as patient is receiving scheduled KCL at this time; K is currently stable at 3.7  -Repeat chemistry panel in a.m.    #Acute iron deficiency anemia  #Thrombocytosis  -Iron studies consistent with iron  deficiency  -Patient is status post 2 units PRBCs during her hospitalization  -Hemoglobin remains low but is overall stable  -Continue IV Protonix for now and continue to monitor for any signs of bleeding  -Repeat CBC in a.m.    #NSTEMI II  #NSVT  #Pericardial effusion  #Acute decompensated heart failure  #Pleural effusions  -Cardiology has been consulted and is following  -Consider ischemic work-up in the near future when respiratory and kidney status improves adequately and allows  -Continue ASA , high intensity statin andToprol XL as tolerated  -Patient may require CT surgery referral for consideration of pericardial window and biopsy once medically stable    DVT prophylaxis:  [facility]    CODE STATUS:    Code Status (Patient has no pulse and is not breathing): CPR (Attempt to Resuscitate)  Medical Interventions (Patient has pulse or is breathing): Full Support    Disposition:  I expect patient to be discharged unclear pending clinical disposition.    Case discussed with patient's daughter, son in law and RN at bedside; daughter updated on treatment plan and questions answered satisfactorily    Critical care time spent: 34 minutes    Patient is considered to be a high risk patient due to: respiratory failure requiring intubation, cardiomegaly, NSTEMI, sepsis/septic shock, TRACIE, suspect obesity/hypoventilation syndrome    Sapphire Contreras, DO

## 2023-07-22 NOTE — PLAN OF CARE
Problem: Fall Injury Risk  Goal: Absence of Fall and Fall-Related Injury  Outcome: Ongoing, Progressing  Intervention: Identify and Manage Contributors  Recent Flowsheet Documentation  Taken 7/22/2023 0300 by Damaris Lloyd RN  Medication Review/Management: medications reviewed  Taken 7/21/2023 2000 by Damaris Lloyd RN  Medication Review/Management: medications reviewed  Taken 7/21/2023 1900 by Damaris Lloyd RN  Medication Review/Management: medications reviewed  Intervention: Promote Injury-Free Environment  Recent Flowsheet Documentation  Taken 7/22/2023 0600 by Daamris Lloyd RN  Safety Promotion/Fall Prevention:   safety round/check completed   fall prevention program maintained  Taken 7/22/2023 0500 by Damaris Lloyd RN  Safety Promotion/Fall Prevention:   safety round/check completed   fall prevention program maintained  Taken 7/22/2023 0400 by Damaris Lloyd RN  Safety Promotion/Fall Prevention:   safety round/check completed   fall prevention program maintained  Taken 7/22/2023 0300 by Damaris Lloyd RN  Safety Promotion/Fall Prevention:   safety round/check completed   fall prevention program maintained  Taken 7/22/2023 0200 by Damaris Lloyd RN  Safety Promotion/Fall Prevention:   safety round/check completed   fall prevention program maintained  Taken 7/22/2023 0100 by Damaris Lloyd RN  Safety Promotion/Fall Prevention:   safety round/check completed   fall prevention program maintained  Taken 7/22/2023 0000 by Damaris Lloyd RN  Safety Promotion/Fall Prevention:   safety round/check completed   fall prevention program maintained  Taken 7/21/2023 2300 by Damaris Lloyd RN  Safety Promotion/Fall Prevention:   safety round/check completed   fall prevention program maintained  Taken 7/21/2023 2200 by Damaris Lloyd RN  Safety Promotion/Fall Prevention:   safety round/check completed   fall prevention program maintained  Taken 7/21/2023 2100 by Damaris Lloyd RN  Safety Promotion/Fall  Prevention:   safety round/check completed   fall prevention program maintained  Taken 7/21/2023 2000 by Damaris Lloyd RN  Safety Promotion/Fall Prevention: safety round/check completed  Taken 7/21/2023 1900 by Damaris Lloyd RN  Safety Promotion/Fall Prevention:   safety round/check completed   fall prevention program maintained     Problem: Skin Injury Risk Increased  Goal: Skin Health and Integrity  Outcome: Ongoing, Progressing  Intervention: Optimize Skin Protection  Recent Flowsheet Documentation  Taken 7/22/2023 0600 by Damaris Lloyd RN  Head of Bed ([facility]) Positioning: HOB at 30 degrees  Taken 7/22/2023 0400 by Damaris Lloyd RN  Head of Bed ([facility]) Positioning: HOB at 30 degrees  Taken 7/22/2023 0200 by Damaris Lloyd RN  Head of Bed ([facility]) Positioning: HOB at 30 degrees  Taken 7/22/2023 0130 by Damaris Lloyd RN  Pressure Reduction Techniques:   heels elevated off bed   positioned off wounds   pressure points protected   weight shift assistance provided  Pressure Reduction Devices:   pressure-redistributing mattress utilized   positioning supports utilized   heel offloading device utilized  Skin Protection:   adhesive use limited   incontinence pads utilized   pulse oximeter probe site changed   skin-to-device areas padded   tubing/devices free from skin contact  Taken 7/22/2023 0000 by Damaris Lloyd RN  Head of Bed ([facility]) Positioning: HOB at 30-45 degrees  Taken 7/21/2023 2200 by Damaris Lloyd RN  Head of Bed ([facility]) Positioning: HOB at 30-45 degrees  Taken 7/21/2023 2000 by Damaris Lloyd RN  Pressure Reduction Techniques:   heels elevated off bed   positioned off wounds   weight shift assistance provided   pressure points protected  Head of Bed ([facility]) Positioning: HOB at 30 degrees  Pressure Reduction Devices:   pressure-redistributing mattress utilized   positioning supports utilized  Skin Protection:   adhesive use limited   incontinence pads utilized   tubing/devices free from skin contact      Problem: Adult Inpatient Plan of Care  Goal: Plan of Care Review  Outcome: Ongoing, Progressing  Flowsheets  Taken 7/22/2023 0630 by Damaris Lloyd RN  Outcome Evaluation: Levo, diprivan and fluids infusing.  UO 375ml.  No BM.  Taken 7/21/2023 0453 by Sissy Whiting, RN  Progress: no change  Goal: Patient-Specific Goal (Individualized)  Outcome: Ongoing, Progressing  Goal: Absence of Hospital-Acquired Illness or Injury  Outcome: Ongoing, Progressing  Intervention: Identify and Manage Fall Risk  Recent Flowsheet Documentation  Taken 7/22/2023 0600 by Damaris Lloyd RN  Safety Promotion/Fall Prevention:   safety round/check completed   fall prevention program maintained  Taken 7/22/2023 0500 by Damaris Lloyd RN  Safety Promotion/Fall Prevention:   safety round/check completed   fall prevention program maintained  Taken 7/22/2023 0400 by Damaris Lloyd RN  Safety Promotion/Fall Prevention:   safety round/check completed   fall prevention program maintained  Taken 7/22/2023 0300 by Damaris Lloyd RN  Safety Promotion/Fall Prevention:   safety round/check completed   fall prevention program maintained  Taken 7/22/2023 0200 by Damaris Lloyd RN  Safety Promotion/Fall Prevention:   safety round/check completed   fall prevention program maintained  Taken 7/22/2023 0100 by Damaris Lloyd RN  Safety Promotion/Fall Prevention:   safety round/check completed   fall prevention program maintained  Taken 7/22/2023 0000 by Damaris Lloyd RN  Safety Promotion/Fall Prevention:   safety round/check completed   fall prevention program maintained  Taken 7/21/2023 2300 by Damaris Lloyd RN  Safety Promotion/Fall Prevention:   safety round/check completed   fall prevention program maintained  Taken 7/21/2023 2200 by aDmaris Lloyd RN  Safety Promotion/Fall Prevention:   safety round/check completed   fall prevention program maintained  Taken 7/21/2023 2100 by Damaris Lloyd RN  Safety Promotion/Fall Prevention:   safety  round/check completed   fall prevention program maintained  Taken 7/21/2023 2000 by Damaris Lloyd RN  Safety Promotion/Fall Prevention: safety round/check completed  Taken 7/21/2023 1900 by Damaris Lloyd RN  Safety Promotion/Fall Prevention:   safety round/check completed   fall prevention program maintained  Intervention: Prevent Skin Injury  Recent Flowsheet Documentation  Taken 7/22/2023 0600 by Damaris Lloyd RN  Body Position:   right   turned   side-lying  Taken 7/22/2023 0400 by Damaris Lloyd RN  Body Position:   left   turned   side-lying  Taken 7/22/2023 0200 by Damaris Lloyd RN  Body Position:   right   turned   side-lying  Taken 7/22/2023 0130 by Damaris Lloyd RN  Skin Protection:   adhesive use limited   incontinence pads utilized   pulse oximeter probe site changed   skin-to-device areas padded   tubing/devices free from skin contact  Taken 7/22/2023 0000 by Damaris Lloyd RN  Body Position:   left   turned   side-lying  Taken 7/21/2023 2200 by Damaris Lloyd RN  Body Position:   right   turned   side-lying  Taken 7/21/2023 2000 by Damaris Lloyd RN  Body Position:   left   turned   side-lying  Skin Protection:   adhesive use limited   incontinence pads utilized   tubing/devices free from skin contact  Intervention: Prevent and Manage VTE (Venous Thromboembolism) Risk  Recent Flowsheet Documentation  Taken 7/22/2023 0130 by Damaris Lloyd RN  Activity Management: bedrest  VTE Prevention/Management: (heparin)   bilateral   sequential compression devices on   other (see comments)  Taken 7/21/2023 2000 by Damaris Lloyd RN  Activity Management: bedrest  VTE Prevention/Management: (see MAR)   other (see comments)   bilateral  Range of Motion: ROM (range of motion) performed  Intervention: Prevent Infection  Recent Flowsheet Documentation  Taken 7/21/2023 1900 by Damaris Lloyd RN  Infection Prevention: single patient room provided  Goal: Optimal Comfort and Wellbeing  Outcome: Ongoing,  Progressing  Intervention: Monitor Pain and Promote Comfort  Recent Flowsheet Documentation  Taken 7/21/2023 2303 by Damaris Lloyd RN  Pain Management Interventions: see MAR  Goal: Readiness for Transition of Care  Outcome: Ongoing, Progressing     Problem: Nausea and Vomiting  Goal: Fluid and Electrolyte Balance  Outcome: Ongoing, Progressing  Intervention: Prevent and Manage Nausea and Vomiting  Recent Flowsheet Documentation  Taken 7/22/2023 0400 by Damaris Lloyd RN  Oral Care:   swabbed with antiseptic solution   suction provided   lip/mouth moisturizer applied  Taken 7/22/2023 0000 by Damaris Lloyd RN  Oral Care:   swabbed with antiseptic solution   lip/mouth moisturizer applied   suction provided   teeth brushed - suction toothbrush   tongue brushed  Taken 7/21/2023 2000 by Damaris Lloyd RN  Oral Care:   swabbed with antiseptic solution   tongue brushed   teeth brushed - suction toothbrush   lip/mouth moisturizer applied   suction provided     Problem: Adjustment to Illness (Sepsis/Septic Shock)  Goal: Optimal Coping  Outcome: Ongoing, Progressing     Problem: Bleeding (Sepsis/Septic Shock)  Goal: Absence of Bleeding  Outcome: Ongoing, Progressing     Problem: Glycemic Control Impaired (Sepsis/Septic Shock)  Goal: Blood Glucose Level Within Desired Range  Outcome: Ongoing, Progressing     Problem: Infection Progression (Sepsis/Septic Shock)  Goal: Absence of Infection Signs and Symptoms  Outcome: Ongoing, Progressing  Intervention: Initiate Sepsis Management  Recent Flowsheet Documentation  Taken 7/21/2023 1900 by Damaris Lloyd RN  Infection Prevention: single patient room provided  Isolation Precautions: precautions maintained  Intervention: Promote Recovery  Recent Flowsheet Documentation  Taken 7/22/2023 0130 by Damaris Lloyd RN  Activity Management: bedrest  Taken 7/21/2023 2000 by Damaris Lloyd RN  Activity Management: bedrest     Problem: Nutrition Impaired (Sepsis/Septic Shock)  Goal:  Optimal Nutrition Intake  Outcome: Ongoing, Progressing   Goal Outcome Evaluation:              Outcome Evaluation: Levo, diprivan and fluids infusing.  UO 375ml.  No BM.

## 2023-07-23 NOTE — PLAN OF CARE
Goal Outcome Evaluation:                Problem: Adult Inpatient Plan of Care  Goal: Plan of Care Review  Outcome: Ongoing, Progressing  Goal: Patient-Specific Goal (Individualized)  Outcome: Ongoing, Progressing  Goal: Absence of Hospital-Acquired Illness or Injury  Outcome: Ongoing, Progressing  Intervention: Identify and Manage Fall Risk  Recent Flowsheet Documentation  Taken 7/23/2023 1400 by Torey Bledsoe RN  Safety Promotion/Fall Prevention: safety round/check completed  Taken 7/23/2023 1300 by Torey Bledsoe RN  Safety Promotion/Fall Prevention: safety round/check completed  Taken 7/23/2023 1200 by Torey Bledsoe RN  Safety Promotion/Fall Prevention: safety round/check completed  Taken 7/23/2023 1100 by Torey Bledsoe RN  Safety Promotion/Fall Prevention: safety round/check completed  Taken 7/23/2023 1000 by Torey Bledsoe RN  Safety Promotion/Fall Prevention: safety round/check completed  Taken 7/23/2023 0900 by Torey Bledsoe RN  Safety Promotion/Fall Prevention: safety round/check completed  Taken 7/23/2023 0800 by Torey Bledsoe RN  Safety Promotion/Fall Prevention: safety round/check completed  Taken 7/23/2023 0700 by Torey Bledsoe RN  Safety Promotion/Fall Prevention: safety round/check completed  Intervention: Prevent Skin Injury  Recent Flowsheet Documentation  Taken 7/23/2023 1400 by Torey Bledsoe RN  Body Position:   turned   right  Skin Protection: adhesive use limited  Taken 7/23/2023 1200 by Torey Bledsoe RN  Body Position:   turned   left  Taken 7/23/2023 1000 by Torey Bledsoe RN  Body Position:   turned   right  Taken 7/23/2023 0800 by Torey Bledsoe RN  Body Position:   turned   left  Skin Protection: adhesive use limited  Intervention: Prevent and Manage VTE (Venous Thromboembolism) Risk  Recent Flowsheet Documentation  Taken 7/23/2023 1400 by Torey Bledsoe RN  Activity Management: bedrest  Taken  7/23/2023 0800 by Torey Bledsoe, RN  Activity Management: bedrest  Intervention: Prevent Infection  Recent Flowsheet Documentation  Taken 7/23/2023 1400 by Torey Bledsoe RN  Infection Prevention: environmental surveillance performed  Taken 7/23/2023 0800 by Torey Bledsoe RN  Infection Prevention: environmental surveillance performed  Goal: Optimal Comfort and Wellbeing  Outcome: Ongoing, Progressing  Intervention: Provide Person-Centered Care  Recent Flowsheet Documentation  Taken 7/23/2023 1400 by Torey Bledsoe, JOHN  Trust Relationship/Rapport:   care explained   reassurance provided  Taken 7/23/2023 0800 by Torey Bledsoe RN  Trust Relationship/Rapport:   care explained   reassurance provided  Goal: Readiness for Transition of Care  Outcome: Ongoing, Progressing

## 2023-07-23 NOTE — PROGRESS NOTES
Fluconazole dosed for pneumonia and UTI based on SCr 2.74 the estimated CrCl is 37 mL/min. Will start fluconazole at 200 mg daily for CrCl less than 50 mL/min. Will continue to follow and adjust as needed.    Thank you,  Leno Horan, PharmD  07/23/23  08:28 EDT

## 2023-07-23 NOTE — PROGRESS NOTES
PROGRESS NOTE         Patient Identification:  Name:  Judy Lutz  Age:  63 y.o.  Sex:  female  :  1960  MRN:  [id number]  Visit Number:  [id number]  Primary Care Provider:  Provider, No Known         LOS: 10 days       ----------------------------------------------------------------------------------------------------------------------  Subjective       Chief Complaints:    Shortness of Breath        Interval History:      Patient remains intubated and sedated this morning at decreased FiO2 of 40%.  Low-grade fever overnight with Tmax of 100.2.  Primary RN and daughter at bedside.  Lung sounds diminished bilaterally.  Abdomen soft, nontender.  WBC worsening at 33.91.  Blood cultures from 2023 show no growth thus far.  Respiratory culture from 2023 finalized as Candida albicans.  Urine culture from 2023 finalized as 50,000 colonies of Candida albicans.    Review of Systems:    Unable to obtain.  Intubated and sedated.    ----------------------------------------------------------------------------------------------------------------------      Objective       Current Hospital Meds:  aspirin, 81 mg, Per G Tube, Daily  chlorhexidine, 15 mL, Mouth/Throat, Q12H  fluconazole, 200 mg, Intravenous, Q24H  heparin (porcine), 5,000 Units, Subcutaneous, Q8H  iron polysaccharides, 150 mg, Oral, Daily  Linezolid, 600 mg, Intravenous, Q12H  magic barrier cream, 1 application , Topical, BID  meropenem, 1,000 mg, Intravenous, Q12H  metoprolol succinate XL, 25 mg, Oral, Q24H  midodrine, 5 mg, Oral, TID AC  mupirocin, 1 application , Each Nare, BID  pantoprazole, 40 mg, Intravenous, BID AC  polyethylene glycol, 17 g, Oral, Daily  potassium chloride, 40 mEq, Oral, Daily  rosuvastatin, 20 mg, Oral, Nightly  senna-docusate sodium, 2 tablet, Oral, BID  sodium chloride, 10 mL, Intravenous, Q12H  sodium chloride, 10 mL, Intravenous, Q12H  sodium chloride, 10 mL, Intravenous, Q12H  sodium chloride, 10  mL, Intravenous, Q12H  sodium chloride, 10 mL, Intravenous, Q12H      fentanyl 10 mcg/mL,  mcg/hr, Last Rate: 100 mcg/hr (07/23/23 0343)  norepinephrine, 0.01-0.07 mcg/kg/min, Last Rate: Stopped (07/22/23 2153)  Pharmacy Consult - Pharmacy to dose,   phenylephrine, 0.5-3 mcg/kg/min, Last Rate: 1.4 mcg/kg/min (07/23/23 0735)  propofol, 5-50 mcg/kg/min, Last Rate: 20 mcg/kg/min (07/23/23 0908)  sodium chloride, 100 mL/hr, Last Rate: 100 mL/hr (07/23/23 0908)    ----------------------------------------------------------------------------------------------------------------------    Vital Signs:  Temp:  [98.3 °F (36.8 °C)-100.2 °F (37.9 °C)] 98.9 °F (37.2 °C)  Heart Rate:  [] 97  Resp:  [18-19] 18  BP: ()/() 93/69  FiO2 (%):  [40 %-45 %] 40 %  Mean Arterial Pressure (Non-Invasive) for the past 24 hrs (Last 3 readings):   Noninvasive MAP (mmHg)   07/23/23 0915 74   07/23/23 0900 69   07/23/23 0845 74       SpO2 Percentage    07/23/23 0900 07/23/23 0907 07/23/23 0915   SpO2: 100% 100% 100%     SpO2:  [90 %-100 %] 100 %  on   ;   Device (Oxygen Therapy): ventilator    Body mass index is 54.09 kg/m².  Wt Readings from Last 3 Encounters:   07/23/23 (!) 171 kg (376 lb 15.8 oz)        Intake/Output Summary (Last 24 hours) at 7/23/2023 0936  Last data filed at 7/23/2023 0950  Gross per 24 hour   Intake 4764.8 ml   Output 2540 ml   Net 2224.8 ml       NPO Diet NPO Type: Other (see comments)  ----------------------------------------------------------------------------------------------------------------------      Physical Exam:    Constitutional: Chronically ill-appearing.  Currently intubated and sedated at 40% FiO2.  Daughter and primary RN at bedside.  Neck: Unable to assess neck rigidity.  CVC to right IJ with no signs of infection.  Cardiovascular:  Normal rate, regular rhythm and normal heart sounds with no murmur. No edema.  Pulmonary/Chest: Lungs clear to auscultation bilaterally.  Intubated and  sedated.  Abdominal:  Soft.  Bowel sounds are normal.  No distension and no tenderness.   Musculoskeletal:  No edema, no tenderness, and no deformity.  No swelling or redness of joints.  Neurological: Intubated and sedated.  Skin:  Skin is warm and dry.  No rash noted.  No pallor.  Peripheral IVs to the bilateral hands with no evidence of infection or phlebitis.  Psychiatric: Unable to assess.        ----------------------------------------------------------------------------------------------------------------------        Results from last 7 days   Lab Units 07/18/23  0052   PROBNP pg/mL 35,507.0*         Results from last 7 days   Lab Units 07/23/23  0410   PH, ARTERIAL pH units 7.431   PO2 ART mm Hg 62.7*   PCO2, ARTERIAL mm Hg 63.3*   HCO3 ART mmol/L 42.0*       Results from last 7 days   Lab Units 07/23/23  0359 07/22/23  0035 07/21/23  0012 07/19/23  1545 07/19/23  0207 07/18/23  0052   CRP mg/dL  --   --   --   --  22.62* 24.42*   WBC 10*3/mm3 33.91* 29.50* 18.78*   < > 27.03* 27.48*   HEMOGLOBIN g/dL 9.2* 7.9* 7.0*   < > 7.2* 7.7*   HEMATOCRIT % 35.8 31.2* 27.4*   < > 30.9* 34.4   MCV fL 83.8 83.2 82.5   < > 83.7 83.5   MCHC g/dL 25.7* 25.3* 25.5*   < > 23.3* 22.4*   PLATELETS 10*3/mm3 495* 476* 457*   < > 592* 665*    < > = values in this interval not displayed.       Results from last 7 days   Lab Units 07/23/23  0359 07/22/23  1711 07/22/23  0035 07/21/23  0506 07/21/23  0012 07/19/23  1323 07/19/23  0207   SODIUM mmol/L 142  --  144  --  149*   < > 147*   POTASSIUM mmol/L 3.8 3.6 3.7  --  3.7   < > 3.6   MAGNESIUM mg/dL  --  2.1  --  2.3  --   --  2.5*   CHLORIDE mmol/L 95*  --  95*  --  97*   < > 94*   CO2 mmol/L 33.6*  --  39.1*  --  39.8*   < > 43.2*   BUN mg/dL 64*  --  69*  --  60*   < > 42*   CREATININE mg/dL 2.74*  --  3.09*  --  3.06*   < > 2.08*   CALCIUM mg/dL 10.2  --  10.4  --  10.7*   < > 11.1*   GLUCOSE mg/dL 106*  --  152*  --  107*   < > 121*   ALBUMIN g/dL 2.1*  --  2.4*  --  2.5*   < >  3.6   BILIRUBIN mg/dL 0.6  --  0.8  --  0.8   < > 0.6   ALK PHOS U/L 93  --  73  --  49   < > 59   AST (SGOT) U/L 14  --  12  --  12   < > 15   ALT (SGPT) U/L 5  --  5  --  6   < > 8    < > = values in this interval not displayed.     Estimated Creatinine Clearance: 37.4 mL/min (A) (by C-G formula based on SCr of 2.74 mg/dL (H)).  No results found for: AMMONIA      No results found for: HGBA1C, POCGLU    Lab Results   Component Value Date    HGBA1C 5.80 (H) 07/13/2023     Lab Results   Component Value Date    TSH 0.619 07/15/2023       Blood Culture   Date Value Ref Range Status   07/12/2023 No growth at 5 days  Final   07/12/2023 No growth at 5 days  Final     Urine Culture   Date Value Ref Range Status   07/15/2023 >100,000 CFU/mL Escherichia coli ESBL (A)  Final     Comment:       Consider infectious disease consult.  Susceptibility results may not correlate to clinical outcomes.   07/12/2023 >100,000 CFU/mL Escherichia coli ESBL (A)  Final     Comment:       Consider infectious disease consult.  Susceptibility results may not correlate to clinical outcomes.   07/12/2023 (A)  Final    >100,000 CFU/mL Klebsiella pneumoniae ssp pneumoniae     No results found for: WOUNDCX  No results found for: STOOLCX  No results found for: RESPCX  Pain Management Panel  More data may exist         Latest Ref Rng & Units 7/19/2023 7/14/2023   Pain Management Panel   Creatinine, Urine mg/dL 139.1  8.5    ----------------------------------------------------------------------------------------------------------------------  Imaging Results (Last 24 Hours)       Procedure Component Value Units Date/Time    XR Abdomen KUB [[id number]] Collected: 07/22/23 1959     Updated: 07/22/23 2002    Narrative:      Procedure: X-ray examination of the abdomen performed on 07/22/2023.  Portable technique. 2 films. Supine position.     HISTORY: Vomiting. Fecal output from OG tube.     COMPARISON: None.     FINDINGS:     Enteric drain in place with  tip in the stomach.  Nonobstructive bowel gas pattern.  Levoconvex scoliosis affecting the lumbar spine.  No free air.  Ovoid shaped calcification in the right abdomen could represent a  gallstone.       Impression:         1.  Enteric drain in place with tip in stomach.  2.  Nonobstructive bowel gas pattern.  3.  Levoconvex scoliosis at the lumbar spine.     This report was finalized on 7/22/2023 8:00 PM by Sacha Syed MD.               ----------------------------------------------------------------------------------------------------------------------    Pertinent Infectious Disease Results      Urine culture collected on 7/15/2023 showed growth of over 100,000 colonies of ESBL E. Coli.        Assessment/Plan       Assessment     Sepsis  ESBL UTI  Pneumonia      Plan      Patient remains intubated and sedated this morning at decreased FiO2 of 40%.  Low-grade fever overnight with Tmax of 100.2.  Primary RN and daughter at bedside.  Lung sounds diminished bilaterally.  Abdomen soft, nontender.  WBC worsening at 33.91.  Blood cultures from 7/21/2023 show no growth thus far.  Respiratory culture from 7/20/2023 finalized as Candida albicans.  Urine culture from 7/20/2023 finalized as 50,000 colonies of Candida albicans.    In the setting of persistent low-grade fever, worsening leukocytosis and based on finalization of respiratory culture and urine culture results fluconazole pharmacy to dose was initiated to continue with meropenem and linezolid.  We will continue to follow closely and adjust antibiotic therapy as needed.    ANTIMICROBIAL THERAPY    fluconazole - 200-0.9 MG/100ML-%  Linezolid - 600 MG/300ML  meropenem (MERREM) 1gm IVPB in 100ml NS (VTB)     Code Status:   Code Status and Medical Interventions:   Ordered at: 07/13/23 0152     Code Status (Patient has no pulse and is not breathing):    CPR (Attempt to Resuscitate)     Medical Interventions (Patient has pulse or is breathing):    Full Support        Anna Maldonado, APRN  07/23/23  09:55 EDT

## 2023-07-23 NOTE — PROGRESS NOTES
LOS: 10 days     Name: Judy Lutz  Age/Sex: 63 y.o. female  :  1960        PCP: Provider, No Known  REF: No Known Provider    Principal Problem:    Symptomatic anemia      Reason for follow-up: Pericardial effusion    Subjective       Subjective   Judy Lutz is a 63-year-old female with no reported past medical history.  She presented to [facility] ED on 2023 with complaints of increased fatigue, malaise, shortness of breath, and generalized weakness.  Cardiology was consulted for further evaluation and management of cardiomyopathy and pericardial effusion.     Interval History: Patient remains intubated and sedated. She converted to atrial fibrillation yesterday evening and is now on beta blockers. She is in atrial fibrillation in the s on telemetry this a.m. Creatinine is 2.74 from 3.09 yesterday. Potassium 3.8, hemoglobin 9.2. WBC 33.91.      ROS    Vital Signs  Temp:  [98.3 °F (36.8 °C)-100.2 °F (37.9 °C)] 98.9 °F (37.2 °C)  Heart Rate:  [] 100  Resp:  [18-19] 18  BP: ()/() 115/68  FiO2 (%):  [40 %-45 %] 40 %  Vital Signs (last 72 hrs)          0700   0659  0700   0659  0700   0659  0700   0902   Most Recent      Temp (°F) 99.1 -  100.5    97.9 -  101.8    98.3 -  100.2      98.9     98.9 (37.2)  0800    Heart Rate 75 -  107    68 -  101    81 -  139    83 -  125     100 / 0800    Resp 16 -  28    16 -  17    18 -  19      18     18  0713    BP 96/38 -  127/63    94/39 -  131/66    81/48 -  127/72    93/59 -  122/85     115/68  0800    SpO2 (%) 72 -  100    90 -  99    88 -  100    92 -  99     95  0800          Documented weights    234 23 0400 07/15/23 0400 23 0400   Weight: (!) 181 kg (400 lb) (!) 183 kg (404 lb 1.7 oz) (!) 177 kg (390 lb 7 oz) (!) 170 kg (375 lb 7.1 oz)    23 0400 23 0530 23 0500 23 0500   Weight: (!) 167 kg (368 lb 4.8  "oz) (!) 164 kg (361 lb 9.6 oz) (!) 164 kg (361 lb 4.8 oz) (!) 163 kg (360 lb 3.2 oz)    07/19/23 1226 07/20/23 0548 07/21/23 0528 07/22/23 0500   Weight: (!) 169 kg (372 lb) (!) 168 kg (371 lb) (!) 166 kg (366 lb 12.8 oz) (!) 167 kg (368 lb 3.2 oz)    07/23/23 0524   Weight: (!) 171 kg (376 lb 15.8 oz)      Body mass index is 54.09 kg/m².    Intake/Output Summary (Last 24 hours) at 7/23/2023 0902  Last data filed at 7/23/2023 0800  Gross per 24 hour   Intake 4664.8 ml   Output 2540 ml   Net 2124.8 ml     Objective:  Vital signs: (most recent): Blood pressure 115/68, pulse 100, temperature 98.9 °F (37.2 °C), temperature source Esophageal, resp. rate 18, height 177.8 cm (70\"), weight (!) 171 kg (376 lb 15.8 oz), SpO2 95 %.              Objective       Physical Exam:     General Appearance:   Intubated and sedated, in no acute distress   Head:    Normocephalic, without obvious abnormality, atraumatic   Neck:   No adenopathy, supple, trachea midline, no thyromegaly, no   carotid bruit, no JVD   Lungs:     Mechanical ventilation, diminished air entry at the lung bases, respirations regular, even and unlabored    Heart:  Irregularly irregular rhythm and normal rate, normal S1 and S2, no        murmur, no gallop, no rub, no click   Chest Wall:    No abnormalities observed   Abdomen:     Normal bowel sounds, no masses, no organomegaly, soft     nontender, nondistended, no guarding, no rebound                tenderness   Pulses:   Pulses palpable and equal bilaterally   Skin:   No bleeding, bruising or rash       Neurologic:   Intubated and sedated          Procedures    Results review       Results Review:   Results from last 7 days   Lab Units 07/23/23  0359 07/22/23  0035 07/21/23  0012 07/20/23  0559 07/20/23  0010 07/19/23  1545 07/19/23  0207 07/18/23  0052   WBC 10*3/mm3 33.91* 29.50* 18.78* 18.35*  --  21.02* 27.03* 27.48*   HEMOGLOBIN g/dL 9.2* 7.9* 7.0* 7.2* 7.2* 6.0* 7.2* 7.7*   PLATELETS 10*3/mm3 495* 476* 457* " 494*  --  548* 592* 665*     Results from last 7 days   Lab Units 07/23/23  0359 07/22/23  1711 07/22/23  0035 07/21/23  0012 07/20/23  1740 07/20/23  0559 07/19/23  1323 07/19/23  0207 07/18/23  0857 07/18/23  0052 07/17/23  1136 07/17/23  0022   SODIUM mmol/L 142  --  144 149*  --  150* 149* 147*  --  148*  --  148*   POTASSIUM mmol/L 3.8 3.6 3.7 3.7 3.7 3.5 4.1  4.0 3.6   < > 3.4*  3.4*   < > 2.6*   CHLORIDE mmol/L 95*  --  95* 97*  --  97* 94* 94*  --  93*  --  93*   CO2 mmol/L 33.6*  --  39.1* 39.8*  --  45.2* 33.5* 43.2*  --  46.2*  --  43.8*   BUN mg/dL 64*  --  69* 60*  --  56* 50* 42*  --  27*  --  18   CREATININE mg/dL 2.74*  --  3.09* 3.06*  --  2.65* 2.45* 2.08*  --  1.31*  --  0.64   CALCIUM mg/dL 10.2  --  10.4 10.7*  --  11.2* 10.2 11.1*  --  10.9*  --  10.2   GLUCOSE mg/dL 106*  --  152* 107*  --  111* 118* 121*  --  133*  --  120*   ALT (SGPT) U/L 5  --  5 6  --  8  --  8  --  10  --  10   AST (SGOT) U/L 14  --  12 12  --  14  --  15  --  12  --  11    < > = values in this interval not displayed.         Lab Results   Component Value Date    INR 1.38 (H) 07/12/2023     Lab Results   Component Value Date    MG 2.1 07/22/2023    MG 2.3 07/21/2023    MG 2.5 (H) 07/19/2023     Lab Results   Component Value Date    TSH 0.619 07/15/2023      Imaging Results (Last 48 Hours)       Procedure Component Value Units Date/Time    XR Abdomen KUB [[id number]] Collected: 07/22/23 1959     Updated: 07/22/23 2002    Narrative:      Procedure: X-ray examination of the abdomen performed on 07/22/2023.  Portable technique. 2 films. Supine position.     HISTORY: Vomiting. Fecal output from OG tube.     COMPARISON: None.     FINDINGS:     Enteric drain in place with tip in the stomach.  Nonobstructive bowel gas pattern.  Levoconvex scoliosis affecting the lumbar spine.  No free air.  Ovoid shaped calcification in the right abdomen could represent a  gallstone.       Impression:         1.  Enteric drain in place with tip  in stomach.  2.  Nonobstructive bowel gas pattern.  3.  Levoconvex scoliosis at the lumbar spine.     This report was finalized on 7/22/2023 8:00 PM by Sacha Syed MD.       XR Chest 1 View [[id number]] Collected: 07/22/23 0907     Updated: 07/22/23 0912    Narrative:      CLINICAL HISTORY: Follow-up respiratory failure.     COMPARISON: 7/20/2023.     TECHNIQUE: Single portable upright AP view of the chest.     FINDINGS: Endotracheal tube tip is 1.9 cm above the barry.  Right  internal jugular vein central venous catheter tip is in the lower SVC.   Nasogastric tube tip is in the stomach.     Heart size remains enlarged.  Consolidation at the left lung base is  unchanged.  There is no pneumothorax.  Right lung is clear.  No change  in appearance of the chest compared to 7/20/2023.       Impression:         SUPPORT LINES AND TUBES IN POSITION.     UNCHANGED RETROCARDIAC OPACITY COMPARED TO 2 DAYS AGO.     This report was finalized on 7/22/2023 9:09 AM by Pily Solares MD.       US Renal Bilateral [[id number]] Collected: 07/21/23 1132     Updated: 07/21/23 1134    Narrative:      EXAMINATION: US RENAL BILATERAL-      CLINICAL INDICATION:     dustin; D64.9-Anemia, unspecified; A41.9-Sepsis,  unspecified organism; R65.20-Severe sepsis without septic shock;  J96.01-Acute respiratory failure with hypoxia; N39.0-Urinary tract  infection, site not specified; I50.31-Acute diastolic (congestive) heart  failure     TECHNIQUE: Multiplanar gray scale sonographic imaging of the kidneys.  Color Doppler implemented.     COMPARISON: NONE      FINDINGS:      RIGHT: The right kidney measures 10.9 x 5.2 cm in size. No mass,  hydronephrosis, or shadowing stone.     LEFT: The left kidney measures 11.8 x 5.9 cm in size. No mass,  hydronephrosis, or shadowing stone.     Small perihepatic ascites noted.       Impression:      1. Unremarkable sonographic appearance of both kidneys.  2. Small perihepatic ascites.      This report was finalized  on 7/21/2023 11:32 AM by Dr. Kvng Tijerina MD.             No results found for: BNP    Lab Results   Component Value Date    ABSOLUTELUNG 28 07/15/2023             Echo   Results for orders placed during the hospital encounter of 07/12/23    Adult Transthoracic Echo Limited W/ Cont if Necessary Per Protocol    Interpretation Summary    Left ventricular systolic function is normal. Left ventricular ejection fraction appears to be 66 - 70%.    Left ventricular diastolic dysfunction is noted.    The right ventricular cavity is mild to moderately dilated, D-shaped septum was noted however no assessment of the PA pressure was done right ventricular pressure not estimated.    There is a large (>2cm) circumferential pericardial effusion. There is no evidence of cardiac tamponade.    IVC is dilated and collapsible.    This is a technically limited study.           I reviewed the patient's new clinical results.    Telemetry: atrial fibrillation in the 90s       Medication Review:   aspirin, 81 mg, Per G Tube, Daily  chlorhexidine, 15 mL, Mouth/Throat, Q12H  fluconazole, 200 mg, Intravenous, Q24H  heparin (porcine), 5,000 Units, Subcutaneous, Q8H  iron polysaccharides, 150 mg, Oral, Daily  Linezolid, 600 mg, Intravenous, Q12H  magic barrier cream, 1 application , Topical, BID  meropenem, 1,000 mg, Intravenous, Q12H  metoprolol succinate XL, 25 mg, Oral, Q24H  midodrine, 5 mg, Oral, TID AC  mupirocin, 1 application , Each Nare, BID  pantoprazole, 40 mg, Intravenous, BID AC  polyethylene glycol, 17 g, Oral, Daily  potassium chloride, 40 mEq, Oral, Daily  rosuvastatin, 20 mg, Oral, Nightly  senna-docusate sodium, 2 tablet, Oral, BID  sodium chloride, 10 mL, Intravenous, Q12H  sodium chloride, 10 mL, Intravenous, Q12H  sodium chloride, 10 mL, Intravenous, Q12H  sodium chloride, 10 mL, Intravenous, Q12H  sodium chloride, 10 mL, Intravenous, Q12H        fentanyl 10 mcg/mL,  mcg/hr, Last Rate: 100 mcg/hr (07/23/23  0343)  norepinephrine, 0.01-0.07 mcg/kg/min, Last Rate: Stopped (07/22/23 1802)  Pharmacy Consult - Pharmacy to dose,   phenylephrine, 0.5-3 mcg/kg/min, Last Rate: 1.4 mcg/kg/min (07/23/23 0735)  propofol, 5-50 mcg/kg/min, Last Rate: 20 mcg/kg/min (07/23/23 0521)  sodium chloride, 100 mL/hr, Last Rate: 100 mL/hr (07/23/23 0011)        Assessment      Assessment:  1.  Acute hypercapnic/hypoxic respiratory failure, with multifactorial etiology including pneumonia, obesity with hypoventilation and with element of heart failure earlier currently patient is intubated on mechanical ventilation and sedated  2.  Acute HFpEF, improving  3.  Moderate-sized pericardial effusion with no tamponade, with right ventricular dilatation   4.  Elevated troponin on admission likely type II NSTEMI  5.  Anemia  6.  TRACIE  7.  Likely undiagnosed sleep apnea  8.  New onset atrial fibrillation, patient's at least [id number]-CPAc score of 2 is also she has a history of CHF  Plan     Recommendations:  Patient with new onset atrial fibrillation with controlled ventricular response is very difficult situation with all of the complicated risk factors she is not currently a very good candidate to start on antiarrhythmic medications, regarding anticoagulation she was admitted previously with anemia with no overt clinical blood loss her hemoglobin right now is stable we will start her on heparin because of the atrial fibrillation we will monitor closely her H&H and any evidence of blood loss  We will keep her n.p.o. after midnight and considering cardioversion in a.m.  Again the patient does not seem to be significantly fluid overloaded management of diuresis as per nephrology      I discussed the patient's findings and my recommendations with the patient's family and RN.    Electronically signed by WINDY Fitch, 07/23/23, 10:49 AM EDT.  Electronically signed by Bernice Rosas MD, 07/23/23, 11:14 AM EDT.            Please note that portions of  this note were completed with a voice recognition program.

## 2023-07-23 NOTE — PROGRESS NOTES
[facility]     Patient Identification:  Name:  Judy Lutz  Age:  63 y.o.  Sex:  female  :  1960  MRN:  [id number]  Visit Number:  [id number]  ROOM: [address]     Primary Care Provider:  Provider, No Known     Date of Admission: 2023    Length of stay in inpatient status:  10    Subjective     Chief Compliant:    Chief Complaint   Patient presents with    Shortness of Breath     History of Presenting Illness:  The patient is intubated and mechanically ventilated as well as sedated on fentanyl and propofol and as a result I am unable to obtain a history of presenting illness from her.  I saw the patient with bedside nurse Torey as well as the patient's 2 sisters.  Nurse Torey states that the the patient is having drainage out of the NG tube still despite having a small bowel movement with the lactulose enema yesterday.    Consults:  Alison Zamora, and Isai with cardiology  Dr. Phelps and LISETTE Parkinson with hematology/oncology  Dr. Broussard, LISETTE Maldonado, and LISETTE Tarango with infectious disease  Dr. Aguayo and LIESTTE Tse with general surgery  Dr. Stern with nephrology  Dr. Mathis with pulmonology  Dr. Cross and LISETTE Matson with tele-neurology    Procedures:  2023:  Transfusion of one unit of PRBCs  2023:  Transfusion of one unit of PRBCs  2023:  Right internal jugular triple lumen central venous catheter  2023:  Orally intubated and mechanically ventilated    Objective     Current Hospital Meds:  aspirin, 81 mg, Per G Tube, Daily  chlorhexidine, 15 mL, Mouth/Throat, Q12H  fluconazole, 200 mg, Intravenous, Q24H  furosemide, 80 mg, Intravenous, Once  iron polysaccharides, 150 mg, Oral, Daily  Linezolid, 600 mg, Intravenous, Q12H  magic barrier cream, 1 application , Topical, BID  meropenem, 1,000 mg, Intravenous, Q12H  metoprolol succinate XL, 25 mg, Oral, Q24H  midodrine, 5 mg, Oral, TID AC  mupirocin, 1 application , Each Nare, BID  pantoprazole,  40 mg, Intravenous, BID AC  polyethylene glycol, 17 g, Oral, Daily  potassium chloride, 40 mEq, Oral, Daily  rosuvastatin, 20 mg, Oral, Nightly  senna-docusate sodium, 2 tablet, Oral, BID  sodium chloride, 10 mL, Intravenous, Q12H  sodium chloride, 10 mL, Intravenous, Q12H  sodium chloride, 10 mL, Intravenous, Q12H  sodium chloride, 10 mL, Intravenous, Q12H  sodium chloride, 10 mL, Intravenous, Q12H    fentanyl 10 mcg/mL,  mcg/hr, Last Rate: 100 mcg/hr (07/23/23 0343)  heparin, 5.85 Units/kg/hr, Last Rate: 5.85 Units/kg/hr (07/23/23 1151)  norepinephrine, 0.01-0.07 mcg/kg/min, Last Rate: Stopped (07/22/23 2153)  Pharmacy Consult - Pharmacy to dose,   Pharmacy to Dose Heparin,   phenylephrine, 0.5-3 mcg/kg/min, Last Rate: 1.4 mcg/kg/min (07/23/23 1145)  propofol, 5-50 mcg/kg/min, Last Rate: 20 mcg/kg/min (07/23/23 0908)  sodium chloride, 100 mL/hr, Last Rate: 100 mL/hr (07/23/23 0908)      Current Antimicrobial Therapy:  Anti-Infectives (From admission, onward)      Ordered     Dose/Rate Route Frequency Start Stop    07/23/23 0824  fluconazole (DIFLUCAN) IVPB 200 mg        Ordering Provider: Anna Maldonado APRN    200 mg  100 mL/hr over 60 Minutes Intravenous Every 24 Hours 07/23/23 1000 07/30/23 0959    07/21/23 1052  meropenem (MERREM) 1,000 mg in sodium chloride 0.9 % 100 mL IVPB-VTB        Ordering Provider: King Broussard MD    1,000 mg  over 3 Hours Intravenous Every 12 Hours 07/22/23 0000 07/28/23 2359    07/21/23 1029  Linezolid (ZYVOX) 600 mg 300 mL        Ordering Provider: King Broussard MD    600 mg  300 mL/hr over 60 Minutes Intravenous Every 12 Hours 07/21/23 1200 07/28/23 1159    07/21/23 1052  meropenem (MERREM) 1,000 mg in sodium chloride 0.9 % 100 mL IVPB-VTB        Ordering Provider: King Broussard MD    1,000 mg  over 30 Minutes Intravenous Once 07/21/23 1200 07/21/23 1355    07/19/23 1607  vancomycin 2500 mg/500 mL 0.9% NS IVPB ([facility])        Ordering Provider: Sukhdeep  DO Ginette    2,500 mg  over 180 Minutes Intravenous Once 07/19/23 1700 07/19/23 2116    07/18/23 1259  gentamicin (GARAMYCIN) 540 mg in sodium chloride 0.9 % IVPB        Note to Pharmacy: Separate Administration Time With Ampicillin and Other Penicillins (Ampicillin / Penicillins deactivate gentamicin when infused together).   Ordering Provider: King Broussard MD    5 mg/kg × 107 kg (Adjusted)  over 30 Minutes Intravenous Once 07/18/23 1400 07/18/23 1505    07/15/23 1915  meropenem (MERREM) 1,000 mg in sodium chloride 0.9 % 100 mL IVPB-VTB        Ordering Provider: Candido Rodney MD    1,000 mg  over 30 Minutes Intravenous Once 07/15/23 2015 07/15/23 2056    07/12/23 2137  cefTRIAXone (ROCEPHIN) 2,000 mg in sodium chloride 0.9 % 100 mL IVPB-VTB        Ordering Provider: Yusuf Luo DO    2,000 mg  200 mL/hr over 30 Minutes Intravenous Once 07/12/23 2153 07/12/23 2330          Current Diuretic Therapy:  Diuretics (From admission, onward)      Ordered     Dose/Rate Route Frequency Start Stop    07/23/23 1125  furosemide (LASIX) injection 80 mg        Ordering Provider: Rasheed Stern MD    80 mg Intravenous Once 07/23/23 1215      07/22/23 0932  furosemide (LASIX) injection 80 mg        Ordering Provider: Rasheed Stern MD    80 mg Intravenous Once 07/22/23 1400 07/22/23 1422    07/14/23 0810  furosemide (LASIX) injection 60 mg        Ordering Provider: Leonard Dang DO    60 mg Intravenous Once 07/14/23 0900 07/14/23 0846    07/13/23 0054  furosemide (LASIX) injection 80 mg        Ordering Provider: Yusuf Luo DO    80 mg Intravenous Once 07/13/23 0110 07/13/23 0114          ----------------------------------------------------------------------------------------------------------------------  Vital Signs:  Temp:  [98.3 °F (36.8 °C)-100.2 °F (37.9 °C)] 100 °F (37.8 °C)  Heart Rate:  [] 110  Resp:  [18-19] 18  BP: ()/() 97/62  FiO2 (%):  [40 %-45 %] 40 %  SpO2:  [90  %-100 %] 95 %  on   ;   Device (Oxygen Therapy): ventilator  Body mass index is 54.09 kg/m².    Ventilator settings:  FiO2 (%):  [40 %-45 %] 40 %  S RR:  [18] 18  PEEP/CPAP (cm H2O):  [8 cm H20] 8 cm H20  MAP (cm H2O):  [13-17] 14     Wt Readings from Last 3 Encounters:   07/23/23 (!) 171 kg (376 lb 15.8 oz)     Intake & Output (last 3 days)         07/20 0701 07/21 0700 07/21 0701 07/22 0700 07/22 0701 07/23 0700 07/23 0701 07/24 0700    P.O.        I.V. (mL/kg) 821.6 (4.9) 2871.7 (17.2) 3864.8 (22.6)     Blood        IV Piggyback 200 800 800 500    Total Intake(mL/kg) 1021.6 (6.2) 3671.7 (22) 4664.8 (27.3) 500 (2.9)    Urine (mL/kg/hr) 315 (0.1) 700 (0.2) 2150 (0.5)     Emesis/NG output 700 290 180 210    Stool  0 0     Total Output 6056 857 6615 210    Net +6.6 +2681.7 +2334.8 +290            Stool Unmeasured Occurrence  1 x 1 x           NPO Diet NPO Type: Other (see comments)  NPO Diet NPO Type: Sips with Meds  ----------------------------------------------------------------------------------------------------------------------  Physical Exam  Vitals and nursing note reviewed.   Constitutional:       Appearance: She is well-developed. She is morbidly obese.      Interventions: She is sedated and intubated.   HENT:      Head: Normocephalic and atraumatic.      Right Ear: External ear normal.      Left Ear: External ear normal.      Nose: Nose normal.   Eyes:      General: No scleral icterus.        Right eye: No discharge.         Left eye: No discharge.   Cardiovascular:      Rate and Rhythm: Tachycardia present. Rhythm irregular.      Pulses: Normal pulses.   Pulmonary:      Effort: Pulmonary effort is normal. No respiratory distress. She is intubated.      Breath sounds: No wheezing or rales.   Abdominal:      General: Abdomen is protuberant. Bowel sounds are decreased. There is distension.      Palpations: Abdomen is soft.   Musculoskeletal:         General: No deformity or signs of injury.       Comments: Edema of her feet and legs bilaterally have greatly improved.   Skin:     Capillary Refill: Capillary refill takes less than 2 seconds.      Coloration: Skin is not jaundiced or pale.   Neurological:      Comments: Sedated on fentanyl and propofol and as a result unable to perform this portion of the exam.   Psychiatric:      Comments: Sedated on fentanyl and propofol and as a result unable to perform this portion of the exam.     ----------------------------------------------------------------------------------------------------------------------  Tele:  AFib/AFlutter with heart rates 's.  I have personally reviewed/looked at the telemetry strips.       Last echocardiogram:  Results for orders placed during the hospital encounter of 07/12/23    Adult Transthoracic Echo Limited W/ Cont if Necessary Per Protocol    Interpretation Summary    Left ventricular systolic function is normal. Left ventricular ejection fraction appears to be 66 - 70%.    Left ventricular diastolic dysfunction is noted.    The right ventricular cavity is mild to moderately dilated, D-shaped septum was noted however no assessment of the PA pressure was done right ventricular pressure not estimated.    There is a large (>2cm) circumferential pericardial effusion. There is no evidence of cardiac tamponade.    IVC is dilated and collapsible.    This is a technically limited study.    I have personally read the ECHO final report.   ----------------------------------------------------------------------------------------------------------------------  LABS:    CBC and coagulation:  Results from last 7 days   Lab Units 07/23/23  1130 07/23/23  0359 07/22/23  0035 07/21/23  0012 07/20/23  0559 07/20/23  0010 07/19/23  1545 07/19/23  0207 07/18/23  0052   PROCALCITONIN ng/mL  --   --   --   --  0.53*  --   --   --  0.72*   CRP mg/dL  --   --   --   --   --   --   --  22.62* 24.42*   WBC 10*3/mm3  --  33.91* 29.50* 18.78* 18.35*  --   21.02* 27.03* 27.48*   HEMOGLOBIN g/dL  --  9.2* 7.9* 7.0* 7.2* 7.2* 6.0* 7.2* 7.7*   HEMATOCRIT %  --  35.8 31.2* 27.4* 29.9* 28.2* 26.5* 30.9* 34.4   MCV fL  --  83.8 83.2 82.5 84.5  --  83.3 83.7 83.5   MCHC g/dL  --  25.7* 25.3* 25.5* 24.1*  --  22.6* 23.3* 22.4*   PLATELETS 10*3/mm3  --  495* 476* 457* 494*  --  548* 592* 665*   INR  1.11*  --   --   --   --   --   --   --   --      Acid/base balance:  Results from last 7 days   Lab Units 07/23/23  0410 07/22/23  0738 07/21/23  0435 07/20/23  1503 07/20/23  1028 07/20/23  0721 07/20/23  0411   PH, ARTERIAL pH units 7.431 7.420 7.435 7.440 7.538* 7.257* 7.300*   PCO2, ARTERIAL mm Hg 63.3* 68.0* 68.7* 67.4* 54.6* >104.0* 97.0*   PO2 ART mm Hg 62.7* 70.5* 62.9* 65.8* 38.3* 93.7 84.2   HCO3 ART mmol/L 42.0* 44.1* 46.1* 45.7* 46.5* 47.8* 47.7*     Renal and electrolytes:  Results from last 7 days   Lab Units 07/23/23  0359 07/22/23  1711 07/22/23  0035 07/21/23  0506 07/21/23  0012 07/20/23  1740 07/20/23  0559 07/19/23  1323 07/19/23  0207 07/18/23  0857 07/18/23  0052 07/17/23  1413 07/17/23  1136 07/17/23  0022   SODIUM mmol/L 142  --  144  --  149*  --  150* 149* 147*  --  148*  --   --  148*   POTASSIUM mmol/L 3.8 3.6 3.7  --  3.7 3.7 3.5 4.1  4.0 3.6   < > 3.4*  3.4*  --    < > 2.6*   MAGNESIUM mg/dL  --  2.1  --  2.3  --   --   --   --  2.5*  --  2.3  --   --  1.7   CHLORIDE mmol/L 95*  --  95*  --  97*  --  97* 94* 94*  --  93*  --   --  93*   CO2 mmol/L 33.6*  --  39.1*  --  39.8*  --  45.2* 33.5* 43.2*  --  46.2*  --   --  43.8*   BUN mg/dL 64*  --  69*  --  60*  --  56* 50* 42*  --  27*  --   --  18   CREATININE mg/dL 2.74*  --  3.09*  --  3.06*  --  2.65* 2.45* 2.08*  --  1.31*  --   --  0.64   CALCIUM mg/dL 10.2  --  10.4  --  10.7*  --  11.2* 10.2 11.1*  --  10.9*  --   --  10.2   PHOSPHORUS mg/dL  --   --   --   --   --   --   --   --   --   --  5.6* 4.7*  --  1.6*   GLUCOSE mg/dL 106*  --  152*  --  107*  --  111* 118* 121*  --  133*  --   --  120*    ANION GAP mmol/L 13.4  --  9.9  --  12.2  --  7.8 21.5* 9.8  --  8.8  --   --  11.2    < > = values in this interval not displayed.     Estimated Creatinine Clearance: 37.4 mL/min (A) (by C-G formula based on SCr of 2.74 mg/dL (H)).    Liver and pancreatic function:  Results from last 7 days   Lab Units 07/23/23  0359 07/22/23  0035 07/21/23  0012 07/20/23  0559 07/19/23  0207 07/18/23  0052 07/17/23  1633 07/17/23  0022   ALBUMIN g/dL 2.1* 2.4* 2.5* 3.3* 3.6 3.4*  --  3.1*   BILIRUBIN mg/dL 0.6 0.8 0.8 0.6 0.6 0.6  --  0.7   ALK PHOS U/L 93 73 49 51 59 68  --  60   AST (SGOT) U/L 14 12 12 14 15 12  --  11   ALT (SGPT) U/L 5 5 6 8 8 10  --  10   AMMONIA umol/L  --   --   --   --  55* 67* 79*  --      Endocrine function:  Lab Results   Component Value Date    HGBA1C 5.80 (H) 07/13/2023     Lab Results   Component Value Date    TSH 0.619 07/15/2023     Cardiac:  Results from last 7 days   Lab Units 07/18/23 0052   PROBNP pg/mL 35,507.0*       Cultures:  Lab Results   Component Value Date    COLORU Dark Yellow (A) 07/20/2023    CLARITYU Turbid (A) 07/20/2023    PHUR 6.0 07/20/2023    GLUCOSEU Negative 07/20/2023    KETONESU Trace (A) 07/20/2023    BLOODU Large (3+) (A) 07/20/2023    NITRITEU Negative 07/20/2023    LEUKOCYTESUR Large (3+) (A) 07/20/2023    BILIRUBINUR Small (1+) (A) 07/20/2023    UROBILINOGEN 1.0 E.U./dL 07/20/2023    RBCUA 13-20 (A) 07/20/2023    WBCUA Too Numerous to Count (A) 07/20/2023    BACTERIA 4+ (A) 07/20/2023     Microbiology Results (last 10 days)       Procedure Component Value - Date/Time    Blood Culture - Blood, Arm, Right [[id number]]  (Normal) Collected: 07/21/23 1041    Lab Status: Preliminary result Specimen: Blood from Arm, Right Updated: 07/23/23 1101     Blood Culture No growth at 2 days    Blood Culture - Blood, Arm, Left [[id number]]  (Normal) Collected: 07/21/23 1000    Lab Status: Preliminary result Specimen: Blood from Arm, Left Updated: 07/23/23 1101     Blood Culture No  growth at 2 days    Urine Culture - Urine, Urine, Clean Catch [[id number]]  (Abnormal) Collected: 07/20/23 0945    Lab Status: Final result Specimen: Urine, Clean Catch Updated: 07/22/23 1410     Urine Culture 50,000 CFU/mL Candida albicans    Narrative:      Colonization of the urinary tract without infection is common. Treatment is discouraged unless the patient is symptomatic, pregnant, or undergoing an invasive urologic procedure.    Respiratory Culture - Aspirate, ET Suction [[id number]]  (Abnormal) Collected: 07/20/23 0843    Lab Status: Final result Specimen: Aspirate from ET Suction Updated: 07/22/23 1030     Respiratory Culture Moderate growth (3+) Candida albicans      Scant growth (1+) Normal Respiratory Stephanie     Gram Stain Many (4+) WBCs seen      Few (2+) Epithelial cells seen      Mixed stephanie      Yeast with hyphae seen    MRSA Screen, PCR (Inpatient) - Swab, Nares [[id number]]  (Abnormal) Collected: 07/19/23 1306    Lab Status: Final result Specimen: Swab from Nares Updated: 07/19/23 1557     MRSA PCR MRSA Detected    Narrative:      The negative predictive value of this diagnostic test is high and should only be used to consider de-escalating anti-MRSA therapy. A positive result may indicate colonization with MRSA and must be correlated clinically.    Urine Culture - Urine, Urine, Clean Catch [[id number]]  (Abnormal)  (Susceptibility) Collected: 07/15/23 1627    Lab Status: Final result Specimen: Urine, Clean Catch Updated: 07/17/23 1159     Urine Culture >100,000 CFU/mL Escherichia coli ESBL     Comment:   Consider infectious disease consult.  Susceptibility results may not correlate to clinical outcomes.       Narrative:      Colonization of the urinary tract without infection is common. Treatment is discouraged unless the patient is symptomatic, pregnant, or undergoing an invasive urologic procedure.  Recent outcomes data supports the use of pip/tazo in the treatment of susceptible ESBL infections  for uncomplicated UTI. Consider use of pip/tazo as a carbapenem-sparing regimen in applicable patients.    Susceptibility        Escherichia coli ESBL      MANUEL      Ertapenem Susceptible      Gentamicin Susceptible      Levofloxacin Resistant      Meropenem Susceptible      Nitrofurantoin Susceptible      Piperacillin + Tazobactam Resistant      Trimethoprim + Sulfamethoxazole Resistant                        I have personally looked at the labs and they are summarized above.  ----------------------------------------------------------------------------------------------------------------------  Detailed radiology reports for the last 24 hours:    Imaging Results (Last 24 Hours)       Procedure Component Value Units Date/Time    XR Abdomen KUB [[id number]] Collected: 07/22/23 1959     Updated: 07/22/23 2002    Narrative:      Procedure: X-ray examination of the abdomen performed on 07/22/2023.  Portable technique. 2 films. Supine position.     HISTORY: Vomiting. Fecal output from OG tube.     COMPARISON: None.     FINDINGS:     Enteric drain in place with tip in the stomach.  Nonobstructive bowel gas pattern.  Levoconvex scoliosis affecting the lumbar spine.  No free air.  Ovoid shaped calcification in the right abdomen could represent a  gallstone.       Impression:         1.  Enteric drain in place with tip in stomach.  2.  Nonobstructive bowel gas pattern.  3.  Levoconvex scoliosis at the lumbar spine.     This report was finalized on 7/22/2023 8:00 PM by Sacha Syed MD.             I have personally looked at the radiology images and I have read the available final report.    Assessment & Plan      -Acute hypoxemic respiratory failure that was present on admission and is due to acute exacerbation of HFpEF  -Type II non-ST elevation MI, present on admission  -Moderate to severe pericardial effusion without tamponade physiology that was present on admission  -Acute encephalopathy present on admission and  returning during the hospitalization, suspect multifactorial and related to hypoxia and/or infectious/UTI  -Anasarca and bilateral pleural effusions, suspect due to the combined heart failure  -Acute metabolic encephalopathy that was present on admission, due to the acute hypoxic respiratory failure and the acute urinary tract infection  -Acute urinary tract infection, present on admission, with urine culture growing ESBL E. coli and Klebsiella pneumoniae  -As of 7/19/2023, acute hypercapnic and hypoxic respiratory failure that developed due to presumed bilateral bacterial pneumonia and failed BiPAP, orally intubated 7/20/2023  -Fecal-like material coming out of the OG tube, suspect due to constipation  -Symptomatic anemia, present on admission, suspect due to severe iron deficiency anemia, with no overt signs of bleeding so far this admission, status post 2 units of PRBCs thus far  -Leukocytosis, present on admission, suspect secondary to bone marrow overproduction as a result of the severe iron deficiency anemia  -As of evening of 7/22/2023, new onset AFib with RVR  -As of 7/20/2023, new Candida albicans UTI  -Sepsis, shock criteria that developed 7/21/2023, requiring vasopressor support (Levophed started 7/21/2023 and Xavi-synephrine started 7/22/2023)  -Nonoliguric acute kidney injury, noted on 7/18/2023, with peak Cr on 7/22/2023 of 3.09, current Cr 2.74 (baseline Cr 0.7-0.77)  -Moderate ascites, present on admission  -Morbid obesity per BMI 57.39 kg/m², which complicates all aspects of care, and is causing obesity hypoventilation syndrome and obstructive sleep apnea  -Incidentally noted 7 mm right upper lobe subpleural noncalcified groundglass density, thought to be postinflammatory change    Been on Linezolid and meropenem combo since 7/21/2023, as the vancomycin was changed due to the TRACIE.  ID added fluconazole for the possible Candida albicans UTI as the WBC continues to worsen.  We will repeat the blood  work in the morning to see how the inflammatory markers are trending.  We will have to stop oral medication at this time due to the output from the NG tube.  Dr. Stern gave Lasix today; will monitor fluid intake closely.  Will ask him about the IVF as well.  Will start metoprolol 2.5 mg IV every 8 hours for the AFib with RVR since I can't give the oral metoprolol at this time.  Will try daily lactulose enemas prn to see if the constipation improves so as to help decrease the green drainage from the OG tube.  May have to start TPN soon if we cannot start tube feeds soon.  Please note that cardiology already changed the Eliquis to a heparin drip.      VTE Prophylaxis:   Mechanical Order History:        Ordered        07/13/23 0225  Place Sequential Compression Device  Once            07/13/23 0225  Maintain Sequential Compression Device  Continuous                          Pharmalogical Order History:        Ordered     Dose Route Frequency Stop    07/23/23 1101  heparin 23090 units/250 mL (100 units/mL) in 0.45 % NaCl infusion  10 mL/hr         5.85 Units/kg/hr IV Titrated --    07/23/23 1101  heparin (porcine) 5000 UNIT/ML injection 4,000 Units  Status:  Discontinued         4,000 Units IV As Needed 07/23/23 1135    07/23/23 1101  heparin (porcine) 5000 UNIT/ML injection 2,000 Units  Status:  Discontinued         2,000 Units IV As Needed 07/23/23 1135    07/23/23 1101  Pharmacy to Dose Heparin         -- XX Continuous PRN --    07/19/23 1134  heparin (porcine) 5000 UNIT/ML injection 5,000 Units  Status:  Discontinued         5,000 Units SC Every 8 Hours Scheduled 07/23/23 1101                  The patient is high risk due to the following diagnoses/reasons:  Septic shock and acute hypoxic and hypercapnic respiratory failure    Disposition: Undetermined at this time    Candido Rodney MD  [facility]  07/23/23  12:08  EDT    ----------------------------------------------------------------------------------------------------------------------     Dr. Stern reported to nurse Torey that using IVF and Lasix is unusual but this combination is working for this patient and thus we will continue this for now.      Candido Rodney MD  [facility]  07/23/23  15:50 EDT

## 2023-07-23 NOTE — PLAN OF CARE
Goal Outcome Evaluation:  Plan of Care Reviewed With: patient        Progress: no change     Pt VSS, temp max 100.2, on ventilator. Pt remains in afib, levophed turned off and phenylephrine started d/t tachycardia per AICU. Adequate UOP, no BM. Safety measures in place, turns Q2H, WCTM.      Problem: Fall Injury Risk  Goal: Absence of Fall and Fall-Related Injury  Outcome: Ongoing, Progressing  Intervention: Identify and Manage Contributors  Recent Flowsheet Documentation  Taken 7/22/2023 2000 by Mimi gAuilar RN  Medication Review/Management: medications reviewed  Intervention: Promote Injury-Free Environment  Recent Flowsheet Documentation  Taken 7/23/2023 0600 by Mimi Aguilar RN  Safety Promotion/Fall Prevention:   safety round/check completed   fall prevention program maintained  Taken 7/23/2023 0500 by Mimi Aguilar RN  Safety Promotion/Fall Prevention:   safety round/check completed   fall prevention program maintained  Taken 7/23/2023 0400 by Mimi Aguilar RN  Safety Promotion/Fall Prevention:   safety round/check completed   fall prevention program maintained  Taken 7/23/2023 0300 by Mimi Aguilar RN  Safety Promotion/Fall Prevention:   safety round/check completed   fall prevention program maintained  Taken 7/23/2023 0200 by Mimi Aguilar RN  Safety Promotion/Fall Prevention:   safety round/check completed   fall prevention program maintained  Taken 7/23/2023 0100 by Mimi Aguilar RN  Safety Promotion/Fall Prevention:   safety round/check completed   fall prevention program maintained  Taken 7/23/2023 0000 by Mimi Aguilar RN  Safety Promotion/Fall Prevention:   safety round/check completed   fall prevention program maintained  Taken 7/22/2023 2300 by Mimi Aguilar RN  Safety Promotion/Fall Prevention:   safety round/check completed   fall prevention program maintained  Taken 7/22/2023 2200 by Mimi Aguilar RN  Safety Promotion/Fall Prevention:   safety round/check completed   fall  prevention program maintained  Taken 7/22/2023 2100 by Mimi Aguilar RN  Safety Promotion/Fall Prevention:   safety round/check completed   fall prevention program maintained  Taken 7/22/2023 2000 by Mimi Aguilar RN  Safety Promotion/Fall Prevention:   safety round/check completed   fall prevention program maintained  Taken 7/22/2023 1900 by Mimi Aguilar RN  Safety Promotion/Fall Prevention:   safety round/check completed   fall prevention program maintained     Problem: Skin Injury Risk Increased  Goal: Skin Health and Integrity  Outcome: Ongoing, Progressing  Intervention: Optimize Skin Protection  Recent Flowsheet Documentation  Taken 7/23/2023 0600 by Mimi Aguilar RN  Head of Bed (HOB) Positioning: HOB at 30-45 degrees  Taken 7/23/2023 0400 by Mimi Aguilar RN  Head of Bed (HOB) Positioning: HOB at 30-45 degrees  Taken 7/23/2023 0200 by Mimi Aguilar RN  Head of Bed (HOB) Positioning: HOB at 30-45 degrees  Taken 7/23/2023 0133 by Mimi Aguilar RN  Pressure Reduction Techniques:   weight shift assistance provided   pressure points protected   positioned off wounds   heels elevated off bed  Pressure Reduction Devices:   pressure-redistributing mattress utilized   positioning supports utilized   heel offloading device utilized  Taken 7/23/2023 0000 by Mimi Aguilar RN  Head of Bed (HOB) Positioning: HOB at 30-45 degrees  Taken 7/22/2023 2200 by Mimi Aguilar RN  Head of Bed (HOB) Positioning: HOB at 30-45 degrees  Taken 7/22/2023 2000 by Mimi Aguilar RN  Pressure Reduction Techniques:   weight shift assistance provided   pressure points protected   positioned off wounds   heels elevated off bed  Head of Bed (HOB) Positioning: HOB at 30-45 degrees  Pressure Reduction Devices:   pressure-redistributing mattress utilized   positioning supports utilized   heel offloading device utilized  Skin Protection:   tubing/devices free from skin contact   adhesive use limited     Problem: Adult Inpatient  Plan of Care  Goal: Plan of Care Review  Outcome: Ongoing, Progressing  Flowsheets (Taken 7/23/2023 0611)  Progress: no change  Plan of Care Reviewed With: patient  Goal: Patient-Specific Goal (Individualized)  Outcome: Ongoing, Progressing  Goal: Absence of Hospital-Acquired Illness or Injury  Outcome: Ongoing, Progressing  Intervention: Identify and Manage Fall Risk  Recent Flowsheet Documentation  Taken 7/23/2023 0600 by Mimi Aguilar RN  Safety Promotion/Fall Prevention:   safety round/check completed   fall prevention program maintained  Taken 7/23/2023 0500 by Mimi Aguilar RN  Safety Promotion/Fall Prevention:   safety round/check completed   fall prevention program maintained  Taken 7/23/2023 0400 by Mimi Aguilar RN  Safety Promotion/Fall Prevention:   safety round/check completed   fall prevention program maintained  Taken 7/23/2023 0300 by Mimi Aguilar RN  Safety Promotion/Fall Prevention:   safety round/check completed   fall prevention program maintained  Taken 7/23/2023 0200 by Mimi Aguilar RN  Safety Promotion/Fall Prevention:   safety round/check completed   fall prevention program maintained  Taken 7/23/2023 0100 by Mimi Aguilar RN  Safety Promotion/Fall Prevention:   safety round/check completed   fall prevention program maintained  Taken 7/23/2023 0000 by Mimi Aguilar RN  Safety Promotion/Fall Prevention:   safety round/check completed   fall prevention program maintained  Taken 7/22/2023 2300 by Mimi Aguilar RN  Safety Promotion/Fall Prevention:   safety round/check completed   fall prevention program maintained  Taken 7/22/2023 2200 by Mimi Aguilar RN  Safety Promotion/Fall Prevention:   safety round/check completed   fall prevention program maintained  Taken 7/22/2023 2100 by Mimi Aguilar RN  Safety Promotion/Fall Prevention:   safety round/check completed   fall prevention program maintained  Taken 7/22/2023 2000 by Mimi Aguilar RN  Safety Promotion/Fall  Prevention:   safety round/check completed   fall prevention program maintained  Taken 7/22/2023 1900 by Mimi Aguilar RN  Safety Promotion/Fall Prevention:   safety round/check completed   fall prevention program maintained  Intervention: Prevent Skin Injury  Recent Flowsheet Documentation  Taken 7/23/2023 0600 by Mimi Aguilar RN  Body Position:   turned   right  Taken 7/23/2023 0400 by Mimi Aguilar RN  Body Position:   turned   left  Taken 7/23/2023 0200 by Mimi Aguilar RN  Body Position:   turned   right  Taken 7/23/2023 0000 by Mimi Aguilar RN  Body Position:   turned   left  Taken 7/22/2023 2200 by Mimi Aguilar RN  Body Position:   turned   right  Taken 7/22/2023 2000 by Mimi Aguilar RN  Body Position:   turned   left  Skin Protection:   tubing/devices free from skin contact   adhesive use limited  Intervention: Prevent and Manage VTE (Venous Thromboembolism) Risk  Recent Flowsheet Documentation  Taken 7/23/2023 0133 by Mimi Aguilar RN  VTE Prevention/Management:   bilateral   sequential compression devices on  Range of Motion: ROM (range of motion) performed  Taken 7/22/2023 2000 by Mimi Aguilar RN  VTE Prevention/Management:   bilateral   sequential compression devices on  Goal: Optimal Comfort and Wellbeing  Outcome: Ongoing, Progressing  Intervention: Provide Person-Centered Care  Recent Flowsheet Documentation  Taken 7/23/2023 0133 by Mimi Aguilar RN  Trust Relationship/Rapport:   care explained   reassurance provided  Taken 7/22/2023 2000 by Mimi Aguilar RN  Trust Relationship/Rapport:   care explained   reassurance provided  Goal: Readiness for Transition of Care  Outcome: Ongoing, Progressing     Problem: Nausea and Vomiting  Goal: Fluid and Electrolyte Balance  Outcome: Ongoing, Progressing  Intervention: Prevent and Manage Nausea and Vomiting  Recent Flowsheet Documentation  Taken 7/23/2023 0400 by Mimi Aguilar RN  Oral Care:   swabbed with antiseptic solution    suction provided  Taken 7/23/2023 0000 by Mimi Aguilar, RN  Oral Care:   swabbed with antiseptic solution   suction provided  Taken 7/22/2023 2000 by Mimi Aguilar, RN  Oral Care:   swabbed with antiseptic solution   suction provided     Problem: Adjustment to Illness (Sepsis/Septic Shock)  Goal: Optimal Coping  Outcome: Ongoing, Progressing  Intervention: Optimize Psychosocial Adjustment to Illness  Recent Flowsheet Documentation  Taken 7/23/2023 0133 by Mimi Aguilar, RN  Family/Support System Care: support provided  Taken 7/22/2023 2000 by Mimi Aguilar, RN  Family/Support System Care: support provided     Problem: Bleeding (Sepsis/Septic Shock)  Goal: Absence of Bleeding  Outcome: Ongoing, Progressing     Problem: Glycemic Control Impaired (Sepsis/Septic Shock)  Goal: Blood Glucose Level Within Desired Range  Outcome: Ongoing, Progressing     Problem: Infection Progression (Sepsis/Septic Shock)  Goal: Absence of Infection Signs and Symptoms  Outcome: Ongoing, Progressing     Problem: Nutrition Impaired (Sepsis/Septic Shock)  Goal: Optimal Nutrition Intake  Outcome: Ongoing, Progressing

## 2023-07-23 NOTE — PROGRESS NOTES
Nephrology Progress Note      Subjective     Patient remains intubated FiO2 has further dropped to 40%    Objective       Vital signs :     Temp:  [98.3 °F (36.8 °C)-100.2 °F (37.9 °C)] 98.9 °F (37.2 °C)  Heart Rate:  [] 99  Resp:  [18-19] 18  BP: ()/() 105/62  FiO2 (%):  [40 %-45 %] 40 %    Intake/Output                               07/21/23 0701 - 07/22/23 0700 07/22/23 0701 - 07/23/23 0700 07/23/23 0701 - 07/24/23 0700     [id number] 2886-9915 Total 4974-7023 4008-5760 Total 6559-8149 0557-6856 Total                    Intake    I.V.  1421.6  1450.2 2871.7  1889.1  1975.7 3864.8  --  -- --    IV Piggyback  400  400 800  400  400 800  100  -- 100    Total Intake 1821.6 1850.2 3671.7 2289.1 2375.7 4664.8 100 -- 100       Output    Urine  275  425 700  1150  1000 2150  --  -- --    Emesis/NG output  290  0 290  180  -- 180  210  -- 210    Stool  --  0 0  --  -- --  --  -- --    Total Output 565  1000 2330 210 -- 210             Physical Exam:    General Appearance : On mechanical ventilation  Lungs : clear to auscultation, respirations regular  Heart :  regular rhythm & normal rate, normal S1, S2 and no murmur, no rub  Abdomen : soft, non distended  Extremities : Trace  Neurologic : Unable to assess        Laboratory Data :     Albumin Albumin   Date Value Ref Range Status   07/23/2023 2.1 (L) 3.5 - 5.2 g/dL Final   07/22/2023 2.4 (L) 3.5 - 5.2 g/dL Final   07/21/2023 2.5 (L) 3.5 - 5.2 g/dL Final      Magnesium Magnesium   Date Value Ref Range Status   07/22/2023 2.1 1.6 - 2.4 mg/dL Final   07/21/2023 2.3 1.6 - 2.4 mg/dL Final          PTH               No results found for: PTH    CBC and coagulation:  Results from last 7 days   Lab Units 07/23/23  0359 07/22/23  0035 07/21/23  0012 07/20/23  0559 07/19/23  1545 07/19/23  0207 07/18/23  0052   PROCALCITONIN ng/mL  --   --   --  0.53*  --   --  0.72*   CRP mg/dL  --   --   --   --   --  22.62* 24.42*   WBC 10*3/mm3 33.91* 29.50* 18.78*  18.35*   < > 27.03* 27.48*   HEMOGLOBIN g/dL 9.2* 7.9* 7.0* 7.2*   < > 7.2* 7.7*   HEMATOCRIT % 35.8 31.2* 27.4* 29.9*   < > 30.9* 34.4   MCV fL 83.8 83.2 82.5 84.5   < > 83.7 83.5   MCHC g/dL 25.7* 25.3* 25.5* 24.1*   < > 23.3* 22.4*   PLATELETS 10*3/mm3 495* 476* 457* 494*   < > 592* 665*    < > = values in this interval not displayed.     Acid/base balance:  Results from last 7 days   Lab Units 07/23/23  0410 07/22/23  0738 07/21/23  0435   PH, ARTERIAL pH units 7.431 7.420 7.435   PO2 ART mm Hg 62.7* 70.5* 62.9*   PCO2, ARTERIAL mm Hg 63.3* 68.0* 68.7*   HCO3 ART mmol/L 42.0* 44.1* 46.1*     Renal and electrolytes:    Results from last 7 days   Lab Units 07/23/23  0359 07/22/23  1711 07/22/23  0035 07/21/23  0506 07/21/23  0012 07/20/23  1740 07/20/23  0559 07/19/23  1323 07/19/23  0207 07/18/23  0857 07/18/23  0052 07/17/23  1413 07/17/23  1136 07/17/23  0022   SODIUM mmol/L 142  --  144  --  149*  --  150* 149* 147*  --  148*  --   --  148*   POTASSIUM mmol/L 3.8 3.6 3.7  --  3.7   < > 3.5 4.1  4.0 3.6   < > 3.4*  3.4*  --    < > 2.6*   MAGNESIUM mg/dL  --  2.1  --  2.3  --   --   --   --  2.5*  --  2.3  --   --  1.7   CHLORIDE mmol/L 95*  --  95*  --  97*  --  97* 94* 94*  --  93*  --   --  93*   CO2 mmol/L 33.6*  --  39.1*  --  39.8*  --  45.2* 33.5* 43.2*  --  46.2*  --   --  43.8*   BUN mg/dL 64*  --  69*  --  60*  --  56* 50* 42*  --  27*  --   --  18   CREATININE mg/dL 2.74*  --  3.09*  --  3.06*  --  2.65* 2.45* 2.08*  --  1.31*  --   --  0.64   CALCIUM mg/dL 10.2  --  10.4  --  10.7*  --  11.2* 10.2 11.1*  --  10.9*  --   --  10.2   PHOSPHORUS mg/dL  --   --   --   --   --   --   --   --   --   --  5.6* 4.7*  --  1.6*    < > = values in this interval not displayed.     Estimated Creatinine Clearance: 37.4 mL/min (A) (by C-G formula based on SCr of 2.74 mg/dL (H)).  @GFRCG:3@   Liver and pancreatic function:  Results from last 7 days   Lab Units 07/23/23  0359 07/22/23  0035 07/21/23  0012  07/20/23  0559 07/19/23  0207 07/18/23  0052 07/17/23  1633   ALBUMIN g/dL 2.1* 2.4* 2.5*   < > 3.6 3.4*  --    BILIRUBIN mg/dL 0.6 0.8 0.8   < > 0.6 0.6  --    ALK PHOS U/L 93 73 49   < > 59 68  --    AST (SGOT) U/L 14 12 12   < > 15 12  --    ALT (SGPT) U/L 5 5 6   < > 8 10  --    AMMONIA umol/L  --   --   --   --  55* 67* 79*    < > = values in this interval not displayed.         Cardiac:  Results from last 7 days   Lab Units 07/18/23 0052   PROBNP pg/mL 35,507.0*     Liver and pancreatic function:  Results from last 7 days   Lab Units 07/23/23  0359 07/22/23  0035 07/21/23  0012 07/20/23  0559 07/19/23  0207 07/18/23 0052 07/17/23  1633   ALBUMIN g/dL 2.1* 2.4* 2.5*   < > 3.6 3.4*  --    BILIRUBIN mg/dL 0.6 0.8 0.8   < > 0.6 0.6  --    ALK PHOS U/L 93 73 49   < > 59 68  --    AST (SGOT) U/L 14 12 12   < > 15 12  --    ALT (SGPT) U/L 5 5 6   < > 8 10  --    AMMONIA umol/L  --   --   --   --  55* 67* 79*    < > = values in this interval not displayed.       Medications :     aspirin, 81 mg, Per G Tube, Daily  chlorhexidine, 15 mL, Mouth/Throat, Q12H  fluconazole, 200 mg, Intravenous, Q24H  iron polysaccharides, 150 mg, Oral, Daily  Linezolid, 600 mg, Intravenous, Q12H  magic barrier cream, 1 application , Topical, BID  meropenem, 1,000 mg, Intravenous, Q12H  metoprolol succinate XL, 25 mg, Oral, Q24H  midodrine, 5 mg, Oral, TID AC  mupirocin, 1 application , Each Nare, BID  pantoprazole, 40 mg, Intravenous, BID AC  polyethylene glycol, 17 g, Oral, Daily  potassium chloride, 40 mEq, Oral, Daily  rosuvastatin, 20 mg, Oral, Nightly  senna-docusate sodium, 2 tablet, Oral, BID  sodium chloride, 10 mL, Intravenous, Q12H  sodium chloride, 10 mL, Intravenous, Q12H  sodium chloride, 10 mL, Intravenous, Q12H  sodium chloride, 10 mL, Intravenous, Q12H  sodium chloride, 10 mL, Intravenous, Q12H      fentanyl 10 mcg/mL,  mcg/hr, Last Rate: 100 mcg/hr (07/23/23 0343)  heparin, 1,000 Units/hr  norepinephrine, 0.01-0.07  mcg/kg/min, Last Rate: Stopped (07/22/23 2153)  Pharmacy Consult - Pharmacy to dose,   Pharmacy to Dose Heparin,   phenylephrine, 0.5-3 mcg/kg/min, Last Rate: 1.4 mcg/kg/min (07/23/23 0735)  propofol, 5-50 mcg/kg/min, Last Rate: 20 mcg/kg/min (07/23/23 0908)  sodium chloride, 100 mL/hr, Last Rate: 100 mL/hr (07/23/23 0908)          Assessment & Plan     -TRACIE, nonoliguric  - Severe sepsis with septic shock  - Hypercalcemia  - Primary respiratory acidosis  - Hypoxic hypercarbic respiratory failure likely multifactorial  - History of congestive heart failure, well compensated  - Bacterial pneumonia    Creatinine started improving after apply to face adequate urine output.  We will continue on a half-normal saline and repeat Lasix today as well.    TRACIE likely multifactorial including severe sepsis with septic shock  Baseline creatinine around 2.6 admitted with 1  UA suggestive of urinary tract infection difficult to interpret  No hydronephrosis on renal ultrasound  No florid fluid overload clinically likely on volume depletion side.  - Continue on pressors to keep MAP more than 65 mmHg  - Please avoid any nephrotoxic agents, hypotension and adjust medications according to estimated GFR      Rasheed Stern MD  07/23/23  11:24 EDT

## 2023-07-24 NOTE — PROGRESS NOTES
HEPARIN INFUSION  Judy Lutz is a  63 y.o. female receiving heparin infusion.     Therapy for (VTE/Cardiac):   Cardiac  Patient Dosing Weight: 171 kg  Initial Bolus (Y/N):   N  Any Bolus (Y/N):   Y        Signs or Symptoms of Bleeding: No    Cardiac or Other (Not VTE)   Initial Bolus: 60 units/kg (Max 4,000 units)  Initial rate: 12 units/kg/hr (Max 1,000 units/hr)   Anti Xa Rebolus Infusion Hold time Change infusion Dose (Units/kg/hr) Next Anti Xa or aPTT Level Due   < 0.11 50 Units/kg  (4000 Units Max) None Increase by  3 Units/kg/hr 6 hours   0.11- 0.19 25 Units/kg  (2000 Units Max) None Increase by  2 Units/kg/hr 6 hours   0.2 - 0.29 0 None Increase by  1 Units/kg/hr 6 hours   0.3 - 0.5 0 None No Change 6 hours (after 2 consecutive levels in range check q24h @0700)   0.51 - 0.6 0 None Decrease by  1 Units/kg/hr 6 hours   0.61 - 0.8 0 30 Minutes Decrease by  2 Units/kg/hr 6 hours   0.81 - 1 0 60 Minutes Decrease by  3 Units/kg/hr 6 hours   >1 0 Hold  After Anti Xa less than 0.5 decrease previous rate by  4 Units/kg/hr  Every 2 hours until Anti Xa  less than 0.5 then when infusion restarts in 6 hours       Recommend anti-Xa every 6 hours.     Results from last 7 days   Lab Units 07/24/23  0105 07/23/23  1735 07/23/23  1130 07/23/23  0359 07/22/23  0035 07/21/23  0012   INR   --   --  1.11*  --   --   --    HEMOGLOBIN g/dL  --   --   --  9.2* 7.9* 7.0*   HEMATOCRIT %  --   --   --  35.8 31.2* 27.4*   PLATELETS 10*3/mm3  --   --   --  495* 476* 457*   HEPARIN ANTI-XA IU/ml 0.32 <0.10* <0.10*  --   --   --           Date   Time   Anti-Xa Current Rate (Unit/kg/hr) Bolus   (Units) Rate Change   (Unit/kg/hr) New Rate (Unit/kg/hr) Next   Anti-Xa Comments  Pump Check Daily   7/23 1130 < 0.10 5.85 - initial 5.85 1800 Spoke with patients nurse to confirm rate and no initial bolus   7/23 1832 <0.10 5.85 4000 +3 8.85 0100 Spoke with Torey. No s/s of bleeding and no noted stops.    07/23 0130 0.32 8.85 0 0 8.85 0700  Spoke with Nancy (not the patients nurse but took the message). No s/s of bleeding.                                                                                                                                                                                                            Pharmacy will continue to follow anti-Xa results and monitor for signs and symptoms of bleeding or thrombosis.    Thank you,   Pantera Durbin, PharmD  01:31 EDT

## 2023-07-24 NOTE — PROGRESS NOTES
Chief Complaint:  Pulmonary and critical care is following for ventilator management and critical illness management      Subjective    Overnight events reviewed   MDR done at bedside all the labs meds ins and out vitals reviewed   Case d/w daughter at bed side and answered her questions to her satisfaction.        Review of Systems:    Cannot be reviewed as patient is intubated       Vital Signs  Temp:  [97.8 °F (36.6 °C)-101.1 °F (38.4 °C)] 98.9 °F (37.2 °C)  Heart Rate:  [] 106  Resp:  [18-19] 18  BP: ()/() 100/78  FiO2 (%):  [40 %] 40 %  Body mass index is 54.57 kg/m².    Intake/Output Summary (Last 24 hours) at 7/24/2023 1758  Last data filed at 7/24/2023 1732  Gross per 24 hour   Intake 5714.09 ml   Output 1825 ml   Net 3889.09 ml     I/O this shift:  In: 2824.9 [I.V.:2264.9; Other:60; IV Piggyback:500]  Out: 1100 [Urine:600; Emesis/NG output:500]    Physical Exam:   GENERAL APPEARANCE: Intubated and sedated.  Appears to be resting comfortably in bed.     HEAD: normocephalic.    EYES: PERRLA.    THROAT: ET tube in place. Oral cavity and pharynx normal. No inflammation, swelling, exudate, or lesions.     Neck: Supple.     CARDIAC: Normal S1 and S2. No S3, S4 or murmurs. Rhythm is regular.     LUNGS: Clear to auscultation and percussion without rales, rhonchi, wheezing or diminished breath sounds.    ABDOMEN: Positive bowel sounds. Soft, nondistended, nontender.     EXTREMITIES: No significant deformity or joint abnormality. No edema. Peripheral pulses intact.    NEUROLOGICAL: Unable to assess due to sedation status.     PSYCHIATRIC: Unable to assess due to sedation status     Results Review:     I reviewed the patient's new clinical results.  Results from last 7 days   Lab Units 07/24/23  0105 07/23/23  0359 07/22/23  0035   WBC 10*3/mm3 26.19* 33.91* 29.50*   HEMOGLOBIN g/dL 9.9* 9.2* 7.9*   PLATELETS 10*3/mm3 441 495* 476*     Results from last 7 days   Lab Units 07/24/23  0100  07/23/23  0359 07/22/23  1711 07/22/23  0035 07/21/23  0506   SODIUM mmol/L 139 142  --  144  --    POTASSIUM mmol/L 3.8 3.8 3.6 3.7  --    CHLORIDE mmol/L 94* 95*  --  95*  --    CO2 mmol/L 29.5* 33.6*  --  39.1*  --    BUN mg/dL 63* 64*  --  69*  --    CREATININE mg/dL 2.64* 2.74*  --  3.09*  --    CALCIUM mg/dL 9.8 10.2  --  10.4  --    GLUCOSE mg/dL 121* 106*  --  152*  --    MAGNESIUM mg/dL 2.0  --  2.1  --  2.3     Lab Results   Component Value Date    INR 1.11 (H) 07/23/2023    INR 1.38 (H) 07/12/2023    PROTIME 14.9 (H) 07/23/2023    PROTIME 17.6 (H) 07/12/2023     Results from last 7 days   Lab Units 07/24/23  0105 07/23/23  0359 07/22/23  0035   ALK PHOS U/L 91 93 73   BILIRUBIN mg/dL 0.7 0.6 0.8   ALT (SGPT) U/L <5 5 5   AST (SGOT) U/L 23 14 12     Results from last 7 days   Lab Units 07/24/23  0443   PH, ARTERIAL pH units 7.403   PO2 ART mm Hg 69.2*   PCO2, ARTERIAL mm Hg 60.6*   HCO3 ART mmol/L 37.8*     Imaging Results (Last 24 Hours)       Procedure Component Value Units Date/Time    CT Abdomen Pelvis Without Contrast [[id number]] Resulted: 07/24/23 1640     Updated: 07/24/23 1657    CT Chest Without Contrast Diagnostic [[id number]] Resulted: 07/24/23 1639     Updated: 07/24/23 1656    XR Chest 1 View [[id number]] Collected: 07/24/23 0939     Updated: 07/24/23 0943    Narrative:      EXAM:    XR Chest, 1 View     EXAM DATE:    7/21/2023 6:55 AM     CLINICAL HISTORY:    Respiratory failure; D64.9-Anemia, unspecified; A41.9-Sepsis,  unspecified organism; R65.20-Severe sepsis without septic shock;  J96.01-Acute respiratory failure with hypoxia; N39.0-Urinary tract  infection, site not specified; I50.31-Acute diastolic (congestive) heart  failure     TECHNIQUE:    Frontal view of the chest.     COMPARISON:    07/20/2023     FINDINGS:    LUNGS AND PLEURAL SPACES:  Atelectasis in the lung bases.  No  pneumothorax.    HEART:  Heart size is stable.    MEDIASTINUM:  Unremarkable.    BONES/JOINTS:  Unremarkable.     TUBES, LINES AND DEVICES:  The endotracheal tube (ETT) is in  satisfactory position.  Right internal jugular central venous catheter  tip in the superior vena cava.  Nasogastric tube tip in the stomach.       Impression:        No acute cardiopulmonary findings identified.     This report was finalized on 7/24/2023 9:41 AM by Dr. Chaim Mcnulty MD.                    aspirin, 81 mg, Per G Tube, Daily  chlorhexidine, 15 mL, Mouth/Throat, Q12H  fluconazole, 200 mg, Intravenous, Q24H  Linezolid, 600 mg, Intravenous, Q12H  magic barrier cream, 1 application , Topical, BID  meropenem, 1,000 mg, Intravenous, Q12H  midodrine, 10 mg, Oral, TID AC  [START ON 7/26/2023] trace minerals + multivitamin IVPB, , Intravenous, Once per day on Mon Wed Fri  mupirocin, 1 application , Each Nare, BID  pantoprazole, 40 mg, Intravenous, BID AC  polyethylene glycol, 17 g, Oral, Daily  potassium chloride, 40 mEq, Nasogastric, Daily  rosuvastatin, 20 mg, Oral, Nightly  senna-docusate sodium, 2 tablet, Oral, BID  sodium chloride, 10 mL, Intravenous, Q12H  sodium chloride, 10 mL, Intravenous, Q12H  sodium chloride, 10 mL, Intravenous, Q12H  sodium chloride, 10 mL, Intravenous, Q12H  sodium chloride, 10 mL, Intravenous, Q12H      [START ON 7/25/2023] Adult Central Clinimix TPN,   Adult Central Clinimix TPN,   fentanyl 10 mcg/mL,  mcg/hr, Last Rate: 50 mcg/hr (07/24/23 1225)  heparin, 14.85 Units/kg/hr, Last Rate: 14.85 Units/kg/hr (07/24/23 1529)  Pharmacy Consult - Pharmacy to dose,   Pharmacy to Dose Heparin,   phenylephrine, 0.5-3 mcg/kg/min, Last Rate: 2.3 mcg/kg/min (07/24/23 1508)  propofol, 5-50 mcg/kg/min, Last Rate: 15 mcg/kg/min (07/24/23 1712)      Site   Date Value Ref Range Status   07/24/2023 Right Brachial  Final     Adsh's Test   Date Value Ref Range Status   07/24/2023 N/A  Final     pH, Arterial   Date Value Ref Range Status   07/24/2023 7.403 7.350 - 7.450 pH units Final     pCO2, Arterial   Date Value Ref Range Status    07/24/2023 60.6 (H) 35.0 - 45.0 mm Hg Final     Comment:     83 Value above reference range     pO2, Arterial   Date Value Ref Range Status   07/24/2023 69.2 (L) 83.0 - 108.0 mm Hg Final     Comment:     84 Value below reference range     HCO3, Arterial   Date Value Ref Range Status   07/24/2023 37.8 (H) 20.0 - 26.0 mmol/L Final     Comment:     83 Value above reference range     Base Excess, Arterial   Date Value Ref Range Status   07/24/2023 11.4 (H) 0.0 - 2.0 mmol/L Final     O2 Saturation, Arterial   Date Value Ref Range Status   07/24/2023 93.8 (L) 94.0 - 99.0 % Final     Comment:     84 Value below reference range     Hemoglobin, Blood Gas   Date Value Ref Range Status   07/24/2023 9.3 (L) 13.5 - 17.5 g/dL Final     Comment:     84 Value below reference range     Hematocrit, Blood Gas   Date Value Ref Range Status   07/24/2023 28.4 (L) 38.0 - 51.0 % Final     Comment:     84 Value below reference range     Oxyhemoglobin   Date Value Ref Range Status   07/24/2023 91.4 (L) 94 - 99 % Final     Comment:     84 Value below reference range     Methemoglobin   Date Value Ref Range Status   07/24/2023 0.00 0.00 - 3.00 % Final     Comment:     84 Value below reference range     Carboxyhemoglobin   Date Value Ref Range Status   07/24/2023 2.7 0 - 5 % Final     CO2 Content   Date Value Ref Range Status   07/24/2023 39.6 (H) 22 - 33 mmol/L Final     Barometric Pressure for Blood Gas   Date Value Ref Range Status   07/24/2023 729 mmHg Final     Modality   Date Value Ref Range Status   07/24/2023 Ventilator  Final     FIO2   Date Value Ref Range Status   07/24/2023 40 % Final     udy Result    Narrative & Impression   CLINICAL HISTORY: Follow-up respiratory failure.     COMPARISON: 7/20/2023.     TECHNIQUE: Single portable upright AP view of the chest.     FINDINGS: Endotracheal tube tip is 1.9 cm above the brary.  Right  internal jugular vein central venous catheter tip is in the lower SVC.   Nasogastric tube tip is in  the stomach.     Heart size remains enlarged.  Consolidation at the left lung base is  unchanged.  There is no pneumothorax.  Right lung is clear.  No change  in appearance of the chest compared to 7/20/2023.     IMPRESSION:     SUPPORT LINES AND TUBES IN POSITION.     UNCHANGED RETROCARDIAC OPACITY COMPARED TO 2 DAYS AGO.     This report was finalized on 7/22/2023 9:09 AM by Pily Solares MD.          Medication Review:     Assessment & Plan     Neuro- Sedated- Sedation vacation was done   SAt and SBT were done   Patient failed      Respiratory-  hypoxic respiratory failure   Vent settings and graphics reviewed   Latest chest xray reviewed  Fio2 reviewed and adjusted to maintain sats 88- 92 %       Vent settings   Vent Settings          Resp Rate (Set): 18  Pressure Support (cm H2O): 10 cm H20  FiO2 (%): 40 %  PEEP/CPAP (cm H2O): 8 cm H20    Minute Ventilation (L/min) (Obs): 7.99 L/min  Resp Rate (Observed) Vent: 18     I:E Ratio (Obs): 1:3.3    PIP Observed (cm H2O): 31 cm H2O        Tried SBT with PS 10/8 - failed as rr went up and tidal volumes dropped    VAP- precautions  Head end - 30 %  Mouth care   DVt prophylaxis    Morbid obesity- overall complicates the care and prognosis    Cardiology- hemodynamically -unstable   SHOCK- Adjusted vasopressors to maintain MAP more than 65 mm hg   Continue to monitor HR- rate and rhythm, BP   Results for orders placed during the hospital encounter of 07/12/23    Adult Transthoracic Echo Limited W/ Cont if Necessary Per Protocol    Interpretation Summary    Left ventricular systolic function is normal. Left ventricular ejection fraction appears to be 66 - 70%.    Left ventricular diastolic dysfunction is noted.    The right ventricular cavity is mild to moderately dilated, D-shaped septum was noted however no assessment of the PA pressure was done right ventricular pressure not estimated.    There is a large (>2cm) circumferential pericardial effusion. There is no evidence  of cardiac tamponade.    IVC is dilated and collapsible.    This is a technically limited study.      Nephrology- Cr BUN stable  I/O- reviewed   Nephrology on case   Treatment plan reviewed   Discussed in MDR     GI- PPI prophylaxis  Fecal like material coming out of og tube   Will get CT abd    Will start TPN as not tolerating oral   Will get CT chest and ABD    Heme- CBC  Hb- transfuse for hb less than 7 - received 2 units of blood  Platelet-   WBC- high - ID on case     ID  Culture  And Antibiotics reviewed   Micro reviewed   Continues to spike fever - will get CT abd ct chest     Endocrinology- Maintain Blood sugar 140 -180      Electrolytes-   Mag phos       DVT prophylaxis- cont     Bed side rounds were done with RT and patient's nurse. All the lab and clinical findings were discussed with them and plan was also discussed in great detail.  Critically ill with shock sepsis and respiratory failure  Adjusted drips and vent settings  Cc 31 mins   Family member present- daughter       Symptomatic anemia               Jose Antonio Hurst MD  07/24/23  17:58 EDT

## 2023-07-24 NOTE — PLAN OF CARE
Goal Outcome Evaluation:           Progress: no change  Outcome Evaluation: Intubated/sedated, on fentanyl and propofol, tatum, heparin gtt, tolerated SAT, failed SBT RR 40's, large amt gastric contents from OG, for ct Abd/Chest today.         Problem: Fall Injury Risk  Goal: Absence of Fall and Fall-Related Injury  Outcome: Ongoing, Progressing     Problem: Skin Injury Risk Increased  Goal: Skin Health and Integrity  Outcome: Ongoing, Progressing     Problem: Adult Inpatient Plan of Care  Goal: Patient-Specific Goal (Individualized)  Outcome: Ongoing, Progressing     Problem: Adult Inpatient Plan of Care  Goal: Absence of Hospital-Acquired Illness or Injury  Outcome: Ongoing, Progressing     Problem: Adult Inpatient Plan of Care  Goal: Optimal Comfort and Wellbeing  Outcome: Ongoing, Progressing     Problem: Adult Inpatient Plan of Care  Goal: Readiness for Transition of Care  Outcome: Ongoing, Progressing     Problem: Nausea and Vomiting  Goal: Fluid and Electrolyte Balance  Outcome: Ongoing, Progressing     Problem: Adjustment to Illness (Sepsis/Septic Shock)  Goal: Optimal Coping  Outcome: Ongoing, Progressing     Problem: Bleeding (Sepsis/Septic Shock)  Goal: Absence of Bleeding  Outcome: Ongoing, Progressing     Problem: Glycemic Control Impaired (Sepsis/Septic Shock)  Goal: Blood Glucose Level Within Desired Range  Outcome: Ongoing, Progressing     Problem: Infection Progression (Sepsis/Septic Shock)  Goal: Absence of Infection Signs and Symptoms  Outcome: Ongoing, Progressing     Problem: Infection Progression (Sepsis/Septic Shock)  Goal: Absence of Infection Signs and Symptoms  Outcome: Ongoing, Progressing     Problem: Nutrition Impaired (Sepsis/Septic Shock)  Goal: Optimal Nutrition Intake  Outcome: Ongoing, Progressing

## 2023-07-24 NOTE — PROGRESS NOTES
PROGRESS NOTE         Patient Identification:  Name:  Judy Lutz  Age:  63 y.o.  Sex:  female  :  1960  MRN:  [id number]  Visit Number:  [id number]  Primary Care Provider:  Provider, No Known         LOS: 11 days       ----------------------------------------------------------------------------------------------------------------------  Subjective       Chief Complaints:    Shortness of Breath        Interval History:      Patient remains intubated and sedated at 40% FiO2 this morning.  Febrile with Tmax of 101.1 this morning.  Lungs clear to auscultation bilaterally.  Abdomen distended with mild firmness.  Unable to assess tenderness due to sedation.  WBC improved at 26.19.  CRP improving at 20.36.  Procalcitonin elevated 0.91.    Review of Systems:    Unable to obtain.  Intubated and sedated.    ----------------------------------------------------------------------------------------------------------------------      Objective       Current Hospital Meds:  aspirin, 81 mg, Per G Tube, Daily  chlorhexidine, 15 mL, Mouth/Throat, Q12H  fluconazole, 200 mg, Intravenous, Q24H  Linezolid, 600 mg, Intravenous, Q12H  magic barrier cream, 1 application , Topical, BID  meropenem, 1,000 mg, Intravenous, Q12H  midodrine, 5 mg, Oral, TID AC  mupirocin, 1 application , Each Nare, BID  pantoprazole, 40 mg, Intravenous, BID AC  polyethylene glycol, 17 g, Oral, Daily  potassium chloride, 40 mEq, Nasogastric, Daily  rosuvastatin, 20 mg, Oral, Nightly  senna-docusate sodium, 2 tablet, Oral, BID  sodium chloride, 10 mL, Intravenous, Q12H  sodium chloride, 10 mL, Intravenous, Q12H  sodium chloride, 10 mL, Intravenous, Q12H  sodium chloride, 10 mL, Intravenous, Q12H  sodium chloride, 10 mL, Intravenous, Q12H      fentanyl 10 mcg/mL,  mcg/hr, Last Rate: Stopped (23)  heparin, 11.85 Units/kg/hr, Last Rate: 11.85 Units/kg/hr (23 0743)  Pharmacy Consult - Pharmacy to dose,   Pharmacy to Dose  Heparin,   Pharmacy to Dose TPN,   phenylephrine, 0.5-3 mcg/kg/min, Last Rate: 1.8 mcg/kg/min (07/24/23 1032)  propofol, 5-50 mcg/kg/min, Last Rate: Stopped (07/24/23 0948)    ----------------------------------------------------------------------------------------------------------------------    Vital Signs:  Temp:  [97.8 °F (36.6 °C)-101.1 °F (38.4 °C)] 101.1 °F (38.4 °C)  Heart Rate:  [] 96  Resp:  [18-19] 19  BP: ()/() 96/77  FiO2 (%):  [40 %] 40 %  Mean Arterial Pressure (Non-Invasive) for the past 24 hrs (Last 3 readings):   Noninvasive MAP (mmHg)   07/24/23 1100 86   07/24/23 1045 79   07/24/23 1030 62       SpO2 Percentage    07/24/23 1030 07/24/23 1045 07/24/23 1100   SpO2: 93% 94% 95%     SpO2:  [85 %-100 %] 95 %  on   ;   Device (Oxygen Therapy): ventilator    Body mass index is 54.57 kg/m².  Wt Readings from Last 3 Encounters:   07/24/23 (!) 173 kg (380 lb 4.7 oz)        Intake/Output Summary (Last 24 hours) at 7/24/2023 1126  Last data filed at 7/24/2023 1112  Gross per 24 hour   Intake 6116.78 ml   Output 1730 ml   Net 4386.78 ml       NPO Diet NPO Type: Strict NPO  ----------------------------------------------------------------------------------------------------------------------      Physical Exam:    Constitutional: Chronically ill-appearing.  Currently intubated and sedated at 40% FiO2.  Daughter at bedside. Febrile this morning.  Neck: Unable to assess neck rigidity.  CVC to right IJ with no signs of infection.  Cardiovascular:  Normal rate, regular rhythm and normal heart sounds with no murmur. No edema.  Pulmonary/Chest: Lungs clear to auscultation bilaterally.  Intubated and sedated.  Abdominal:  Soft.  Bowel sounds are normal.  Positive distension and no tenderness.   Musculoskeletal:  No edema, no tenderness, and no deformity.  No swelling or redness of joints.  Neurological: Intubated and sedated.  Skin:  Skin is warm and dry.  No rash noted.  No pallor.  Peripheral IVs  to the bilateral hands with no evidence of infection or phlebitis.  Psychiatric: Unable to assess.        ----------------------------------------------------------------------------------------------------------------------        Results from last 7 days   Lab Units 07/18/23  0052   PROBNP pg/mL 35,507.0*         Results from last 7 days   Lab Units 07/24/23  0443   PH, ARTERIAL pH units 7.403   PO2 ART mm Hg 69.2*   PCO2, ARTERIAL mm Hg 60.6*   HCO3 ART mmol/L 37.8*       Results from last 7 days   Lab Units 07/24/23  0951 07/24/23  0637 07/24/23  0411 07/24/23  0105 07/24/23  0105 07/23/23  1130 07/23/23  0359 07/22/23  0035 07/19/23  1545 07/19/23  0207 07/18/23  0052   CRP mg/dL  --   --   --   --  20.36*  --   --   --   --  22.62* 24.42*   LACTATE mmol/L 2.1* 2.6* 2.5*   < > 2.7*  --   --   --   --   --   --    WBC 10*3/mm3  --   --   --   --  26.19*  --  33.91* 29.50*   < > 27.03* 27.48*   HEMOGLOBIN g/dL  --   --   --   --  9.9*  --  9.2* 7.9*   < > 7.2* 7.7*   HEMATOCRIT %  --   --   --   --  38.7  --  35.8 31.2*   < > 30.9* 34.4   MCV fL  --   --   --   --  85.2  --  83.8 83.2   < > 83.7 83.5   MCHC g/dL  --   --   --   --  25.6*  --  25.7* 25.3*   < > 23.3* 22.4*   PLATELETS 10*3/mm3  --   --   --   --  441  --  495* 476*   < > 592* 665*   INR   --   --   --   --   --  1.11*  --   --   --   --   --     < > = values in this interval not displayed.       Results from last 7 days   Lab Units 07/24/23  0105 07/23/23  0359 07/22/23  1711 07/22/23  0035 07/21/23  0506   SODIUM mmol/L 139 142  --  144  --    POTASSIUM mmol/L 3.8 3.8 3.6 3.7  --    MAGNESIUM mg/dL 2.0  --  2.1  --  2.3   CHLORIDE mmol/L 94* 95*  --  95*  --    CO2 mmol/L 29.5* 33.6*  --  39.1*  --    BUN mg/dL 63* 64*  --  69*  --    CREATININE mg/dL 2.64* 2.74*  --  3.09*  --    CALCIUM mg/dL 9.8 10.2  --  10.4  --    GLUCOSE mg/dL 121* 106*  --  152*  --    ALBUMIN g/dL 2.0* 2.1*  --  2.4*  --    BILIRUBIN mg/dL 0.7 0.6  --  0.8  --    ALK  PHOS U/L 91 93  --  73  --    AST (SGOT) U/L 23 14  --  12  --    ALT (SGPT) U/L <5 5  --  5  --      Estimated Creatinine Clearance: 38.9 mL/min (A) (by C-G formula based on SCr of 2.64 mg/dL (H)).  No results found for: AMMONIA      No results found for: HGBA1C, POCGLU    Lab Results   Component Value Date    HGBA1C 5.80 (H) 07/13/2023     Lab Results   Component Value Date    TSH 0.619 07/15/2023       Blood Culture   Date Value Ref Range Status   07/12/2023 No growth at 5 days  Final   07/12/2023 No growth at 5 days  Final     Urine Culture   Date Value Ref Range Status   07/15/2023 >100,000 CFU/mL Escherichia coli ESBL (A)  Final     Comment:       Consider infectious disease consult.  Susceptibility results may not correlate to clinical outcomes.   07/12/2023 >100,000 CFU/mL Escherichia coli ESBL (A)  Final     Comment:       Consider infectious disease consult.  Susceptibility results may not correlate to clinical outcomes.   07/12/2023 (A)  Final    >100,000 CFU/mL Klebsiella pneumoniae ssp pneumoniae     No results found for: WOUNDCX  No results found for: STOOLCX  No results found for: RESPCX  Pain Management Panel  More data may exist         Latest Ref Rng & Units 7/19/2023 7/14/2023   Pain Management Panel   Creatinine, Urine mg/dL 139.1  8.5    ----------------------------------------------------------------------------------------------------------------------  Imaging Results (Last 24 Hours)       Procedure Component Value Units Date/Time    XR Chest 1 View [[id number]] Collected: 07/24/23 0939     Updated: 07/24/23 0943    Narrative:      EXAM:    XR Chest, 1 View     EXAM DATE:    7/21/2023 6:55 AM     CLINICAL HISTORY:    Respiratory failure; D64.9-Anemia, unspecified; A41.9-Sepsis,  unspecified organism; R65.20-Severe sepsis without septic shock;  J96.01-Acute respiratory failure with hypoxia; N39.0-Urinary tract  infection, site not specified; I50.31-Acute diastolic (congestive) heart  failure      TECHNIQUE:    Frontal view of the chest.     COMPARISON:    07/20/2023     FINDINGS:    LUNGS AND PLEURAL SPACES:  Atelectasis in the lung bases.  No  pneumothorax.    HEART:  Heart size is stable.    MEDIASTINUM:  Unremarkable.    BONES/JOINTS:  Unremarkable.    TUBES, LINES AND DEVICES:  The endotracheal tube (ETT) is in  satisfactory position.  Right internal jugular central venous catheter  tip in the superior vena cava.  Nasogastric tube tip in the stomach.       Impression:        No acute cardiopulmonary findings identified.     This report was finalized on 7/24/2023 9:41 AM by Dr. Chaim Mcnulty MD.               ----------------------------------------------------------------------------------------------------------------------    Pertinent Infectious Disease Results      Urine culture collected on 7/15/2023 showed growth of over 100,000 colonies of ESBL E. Coli. Blood cultures from 7/21/2023 show no growth thus far.  Respiratory culture from 7/20/2023 finalized as Candida albicans.  Urine culture from 7/20/2023 finalized as 50,000 colonies of Candida albicans.    Assessment/Plan       Assessment     Sepsis  ESBL UTI  Pneumonia      Plan      I saw and examined the patient myself this morning and discussed the findings and plan of care with WINDY Victor and got report from primary RN and here are my findings:    This morning, the patient remains intubated and sedated, receiving 40% FiO2 support. The patient is febrile, with a maximum temperature (Tmax) of 101.1. Lung examination reveals clear sounds bilaterally. However, the abdomen is distended with mild firmness, and tenderness assessment is limited due to sedation. The WBC count has improved to 26.19, and CRP is showing improvement at 20.36. The procalcitonin level remains elevated at 0.91.    Current Treatment Plan:  The current antibiotic regimen of meropenem, linezolid, and fluconazole will be continued. These medications are being  administered to address the underlying infections and ensure comprehensive coverage against possible pathogens.    Further Assessment:  Due to the persistence of fever and the distended abdomen, a CT scan of the abdomen and pelvis is recommended. This imaging study will provide valuable insights to help evaluate and identify any potential underlying causes such as abscess.      ANTIMICROBIAL THERAPY    fluconazole - 200-0.9 MG/100ML-%  Linezolid - 600 MG/300ML  meropenem (MERREM) 1gm IVPB in 100ml NS (VTB)     Code Status:   Code Status and Medical Interventions:   Ordered at: 07/13/23 0152     Code Status (Patient has no pulse and is not breathing):    CPR (Attempt to Resuscitate)     Medical Interventions (Patient has pulse or is breathing):    Full Support       WINDY Victor  07/24/23  11:26 EDT     Electronically signed by WINDY Victor, 07/24/23, 11:30 AM EDT.    Electronically signed by King Broussard MD, 07/24/23, 11:56 AM EDT.

## 2023-07-24 NOTE — PROGRESS NOTES
[facility]  PROGRESS NOTE    Patient information:  Name: Judy Lutz  Age/Sex: 63 y.o. female  :  1960        PCP: Provider, No Known  Attending: Sapphire Lanre DianeDO  MRN:  [id number]  Visit Number:  [id number]    LOS:  LOS: 11 days     CODE STATUS:    Code Status and Medical Interventions:   Ordered at: 23 0152     Code Status (Patient has no pulse and is not breathing):    CPR (Attempt to Resuscitate)     Medical Interventions (Patient has pulse or is breathing):    Full Support       PROBLEM LIST:Principal Problem:    Symptomatic anemia      Reason for Cardiology follow-up:  Pericardial effusion and cardiomyopathy    Subjective   ADMISSION INFORMATION:  Chief Complaint   Patient presents with    Shortness of Breath       HPI:  Judy Lutz is a 63-year-old female with no reported past medical history.  She presented to [facility] ED on 2023 with complaints of increased fatigue, malaise, shortness of breath, and generalized weakness.  Cardiology was consulted for further evaluation and management of cardiomyopathy and pericardial effusion.      No primary Cardiology noted in her chart.    Interval History:   Patient is in room CCU 4 and was examined by Dr. Rosas.  Telemetry reveals atrial fibrillation 90s with occasional PVCs and a BBB with 2 brief runs of V. tach seen and telemetry strips attached below.  Sodium 139, potassium 3.8, chloride 94, CO2 29.5, BUN of 63, and a creatinine of 2.64 which has improved gradually.  Magnesium 2.0.  WBC of 26.19, hemoglobin of 9.9, platelet count of 441.  Patient remains intubated and sedated.  She is on IV heparin at this time.  Patient's daughter is at bedside.    Vital Signs  Temp:  [97.8 °F (36.6 °C)-101.1 °F (38.4 °C)] 98.5 °F (36.9 °C)  Heart Rate:  [] 91  Resp:  [18] 18  BP: ()/(42-95) 93/43  FiO2 (%):  [40 %] 40 %  Vital Signs (last 72 hrs)           0700 07/22 0659 07/22 0700 07/23 0659 07/23 0700  07/24 0659 07/24 0700 07/24 0739   Most Recent      Temp (°F) 97.9 -  101.8    98.3 -  100.2    97.8 -  101.1       98.5 (36.9) 07/24 0400    Heart Rate 68 -  101    81 -  139    81 -  125       91 07/24 0657    Resp 16 -  17    18 -  19      18       18 07/24 0635    BP 94/39 -  131/66    81/48 -  127/72    81/65 -  123/91       93/43 07/24 0657    SpO2 (%) 90 -  99    88 -  100    85 -  100       100 07/24 0635          Body mass index is 54.57 kg/m².    Intake/Output Summary (Last 24 hours) at 7/24/2023 0739  Last data filed at 7/24/2023 0602  Gross per 24 hour   Intake 5816.78 ml   Output 1940 ml   Net 3876.78 ml       Objective     Physical Exam:      General Appearance:  Intubated and sedated and in no acute distress.  BMI 54.57.   Head:    Normocephalic, without obvious abnormality, atraumatic.   Eyes:                             Neck:   No adenopathy, supple, trachea midline, no thyromegaly, no carotid bruit, no JVD.   Lungs:   Mechanically ventilated with clear breath sounds per auscultation, respirations regular, even and unlabored.    Heart:    Irregular rhythm and normal rate, normal S1 and S2, no         murmur, no gallop, no rub, no click   Chest Wall:    No abnormalities observed.   Abdomen:     Normal bowel sounds, no masses, no organomegaly, soft nontender, nondistended, no guarding, no rebound tenderness.   Extremities:   Sedated, + 1-2 edema, no cyanosis, no redness.   Pulses:   Pulses palpable and equal bilaterally.   Skin:   No bleeding, bruising or rash. SCUDS are in use BLE.    Neurologic: Intubated and sedated.       Results review   Results Review:   Results from last 7 days   Lab Units 07/24/23  0105 07/23/23  0359 07/22/23  0035 07/21/23  0012 07/20/23  0559 07/20/23  0010 07/19/23  1545 07/19/23  0207   WBC 10*3/mm3 26.19* 33.91* 29.50* 18.78* 18.35*  --  21.02* 27.03*   HEMOGLOBIN g/dL 9.9* 9.2* 7.9* 7.0* 7.2* 7.2* 6.0* 7.2*    PLATELETS 10*3/mm3 441 495* 476* 457* 494*  --  548* 592*     Results from last 7 days   Lab Units 07/24/23  0105 07/23/23  0359 07/22/23  1711 07/22/23  0035 07/21/23  0012 07/20/23  1740 07/20/23  0559 07/19/23  1323 07/19/23  0207 07/18/23  0857 07/18/23  0052   SODIUM mmol/L 139 142  --  144 149*  --  150* 149* 147*  --  148*   POTASSIUM mmol/L 3.8 3.8 3.6 3.7 3.7 3.7 3.5 4.1  4.0 3.6   < > 3.4*  3.4*   CHLORIDE mmol/L 94* 95*  --  95* 97*  --  97* 94* 94*  --  93*   CO2 mmol/L 29.5* 33.6*  --  39.1* 39.8*  --  45.2* 33.5* 43.2*  --  46.2*   BUN mg/dL 63* 64*  --  69* 60*  --  56* 50* 42*  --  27*   CREATININE mg/dL 2.64* 2.74*  --  3.09* 3.06*  --  2.65* 2.45* 2.08*  --  1.31*   CALCIUM mg/dL 9.8 10.2  --  10.4 10.7*  --  11.2* 10.2 11.1*  --  10.9*   GLUCOSE mg/dL 121* 106*  --  152* 107*  --  111* 118* 121*  --  133*   ALT (SGPT) U/L <5 5  --  5 6  --  8  --  8  --  10   AST (SGOT) U/L 23 14  --  12 12  --  14  --  15  --  12    < > = values in this interval not displayed.         Lab Results   Component Value Date    PROBNP 35,507.0 (H) 07/18/2023    PROBNP 26,037.0 (H) 07/13/2023     No results found.  Lab Results   Component Value Date    ABSOLUTELUNG 28 07/15/2023     Lab Results   Component Value Date    INR 1.11 (H) 07/23/2023    INR 1.38 (H) 07/12/2023     Lab Results   Component Value Date    MG 2.0 07/24/2023    MG 2.1 07/22/2023    MG 2.3 07/21/2023     Lab Results   Component Value Date    TSH 0.619 07/15/2023      No results found for: CHOL, TRIG, HDL, LDL  Pain Management Panel  More data may exist         Latest Ref Rng & Units 7/19/2023 7/14/2023   Pain Management Panel   Creatinine, Urine mg/dL 139.1  8.5      Microbiology Results (last 10 days)       Procedure Component Value - Date/Time    Blood Culture - Blood, Arm, Right [[id number]]  (Normal) Collected: 07/21/23 1041    Lab Status: Preliminary result Specimen: Blood from Arm, Right Updated: 07/23/23 1101     Blood Culture No growth at  2 days    Blood Culture - Blood, Arm, Left [[id number]]  (Normal) Collected: 07/21/23 1000    Lab Status: Preliminary result Specimen: Blood from Arm, Left Updated: 07/23/23 1101     Blood Culture No growth at 2 days    Urine Culture - Urine, Urine, Clean Catch [[id number]]  (Abnormal) Collected: 07/20/23 0945    Lab Status: Final result Specimen: Urine, Clean Catch Updated: 07/22/23 1410     Urine Culture 50,000 CFU/mL Candida albicans    Narrative:      Colonization of the urinary tract without infection is common. Treatment is discouraged unless the patient is symptomatic, pregnant, or undergoing an invasive urologic procedure.    Respiratory Culture - Aspirate, ET Suction [[id number]]  (Abnormal) Collected: 07/20/23 0843    Lab Status: Final result Specimen: Aspirate from ET Suction Updated: 07/22/23 1030     Respiratory Culture Moderate growth (3+) Candida albicans      Scant growth (1+) Normal Respiratory Stephanie     Gram Stain Many (4+) WBCs seen      Few (2+) Epithelial cells seen      Mixed stephanie      Yeast with hyphae seen    MRSA Screen, PCR (Inpatient) - Swab, Nares [[id number]]  (Abnormal) Collected: 07/19/23 1306    Lab Status: Final result Specimen: Swab from Nares Updated: 07/19/23 1557     MRSA PCR MRSA Detected    Narrative:      The negative predictive value of this diagnostic test is high and should only be used to consider de-escalating anti-MRSA therapy. A positive result may indicate colonization with MRSA and must be correlated clinically.    Urine Culture - Urine, Urine, Clean Catch [[id number]]  (Abnormal)  (Susceptibility) Collected: 07/15/23 1627    Lab Status: Final result Specimen: Urine, Clean Catch Updated: 07/17/23 1159     Urine Culture >100,000 CFU/mL Escherichia coli ESBL     Comment:   Consider infectious disease consult.  Susceptibility results may not correlate to clinical outcomes.       Narrative:      Colonization of the urinary tract without infection is common. Treatment is  discouraged unless the patient is symptomatic, pregnant, or undergoing an invasive urologic procedure.  Recent outcomes data supports the use of pip/tazo in the treatment of susceptible ESBL infections for uncomplicated UTI. Consider use of pip/tazo as a carbapenem-sparing regimen in applicable patients.    Susceptibility        Escherichia coli ESBL      MANUEL      Ertapenem Susceptible      Gentamicin Susceptible      Levofloxacin Resistant      Meropenem Susceptible      Nitrofurantoin Susceptible      Piperacillin + Tazobactam Resistant      Trimethoprim + Sulfamethoxazole Resistant                                  Imaging Results (Last 48 Hours)       Procedure Component Value Units Date/Time    XR Abdomen KUB [[id number]] Collected: 07/22/23 1959     Updated: 07/22/23 2002    Narrative:      Procedure: X-ray examination of the abdomen performed on 07/22/2023.  Portable technique. 2 films. Supine position.     HISTORY: Vomiting. Fecal output from OG tube.     COMPARISON: None.     FINDINGS:     Enteric drain in place with tip in the stomach.  Nonobstructive bowel gas pattern.  Levoconvex scoliosis affecting the lumbar spine.  No free air.  Ovoid shaped calcification in the right abdomen could represent a  gallstone.       Impression:         1.  Enteric drain in place with tip in stomach.  2.  Nonobstructive bowel gas pattern.  3.  Levoconvex scoliosis at the lumbar spine.     This report was finalized on 7/22/2023 8:00 PM by Sacha Syed MD.       XR Chest 1 View [[id number]] Collected: 07/22/23 0907     Updated: 07/22/23 0912    Narrative:      CLINICAL HISTORY: Follow-up respiratory failure.     COMPARISON: 7/20/2023.     TECHNIQUE: Single portable upright AP view of the chest.     FINDINGS: Endotracheal tube tip is 1.9 cm above the barry.  Right  internal jugular vein central venous catheter tip is in the lower SVC.   Nasogastric tube tip is in the stomach.     Heart size remains enlarged.  Consolidation at  the left lung base is  unchanged.  There is no pneumothorax.  Right lung is clear.  No change  in appearance of the chest compared to 7/20/2023.       Impression:         SUPPORT LINES AND TUBES IN POSITION.     UNCHANGED RETROCARDIAC OPACITY COMPARED TO 2 DAYS AGO.     This report was finalized on 7/22/2023 9:09 AM by Pily Solares MD.               ECHO:  Results for orders placed during the hospital encounter of 07/12/23    Adult Transthoracic Echo Limited W/ Cont if Necessary Per Protocol    Interpretation Summary    Left ventricular systolic function is normal. Left ventricular ejection fraction appears to be 66 - 70%.    Left ventricular diastolic dysfunction is noted.    The right ventricular cavity is mild to moderately dilated, D-shaped septum was noted however no assessment of the PA pressure was done right ventricular pressure not estimated.    There is a large (>2cm) circumferential pericardial effusion. There is no evidence of cardiac tamponade.    IVC is dilated and collapsible.    This is a technically limited study.    STRESS TEST:  No results found for this or any previous visit.    HEART CATH:  No results found for this or any previous visit.    TELEMETRY:     Atrial fibrillation 90s with occasional PVCs and a BBB with 2 brief runs of V. tach seen and telemetry strips attached below.                I reviewed the patient's new clinical results.    Medication Review:   Current list of medications may not reflect those currently placed in orders that are not signed or are being held.     aspirin, 81 mg, Per G Tube, Daily  chlorhexidine, 15 mL, Mouth/Throat, Q12H  fluconazole, 200 mg, Intravenous, Q24H  heparin (porcine), 4,000 Units, Intravenous, Once  Linezolid, 600 mg, Intravenous, Q12H  magic barrier cream, 1 application , Topical, BID  meropenem, 1,000 mg, Intravenous, Q12H  midodrine, 5 mg, Oral, TID AC  mupirocin, 1 application , Each Nare, BID  pantoprazole, 40 mg, Intravenous, BID  AC  polyethylene glycol, 17 g, Oral, Daily  potassium chloride, 40 mEq, Oral, Daily  rosuvastatin, 20 mg, Oral, Nightly  senna-docusate sodium, 2 tablet, Oral, BID  sodium chloride, 10 mL, Intravenous, Q12H  sodium chloride, 10 mL, Intravenous, Q12H  sodium chloride, 10 mL, Intravenous, Q12H  sodium chloride, 10 mL, Intravenous, Q12H  sodium chloride, 10 mL, Intravenous, Q12H      fentanyl 10 mcg/mL,  mcg/hr, Last Rate: 100 mcg/hr (07/23/23 2212)  heparin, 11.85 Units/kg/hr, Last Rate: 8.85 Units/kg/hr (07/24/23 0600)  Pharmacy Consult - Pharmacy to dose,   Pharmacy to Dose Heparin,   phenylephrine, 0.5-3 mcg/kg/min, Last Rate: 1.8 mcg/kg/min (07/24/23 0657)  propofol, 5-50 mcg/kg/min, Last Rate: 20 mcg/kg/min (07/24/23 0657)  sodium chloride, 100 mL/hr, Last Rate: 100 mL/hr (07/24/23 0602)        acetaminophen    acetaminophen    senna-docusate sodium **AND** polyethylene glycol **AND** bisacodyl **AND** bisacodyl    [DISCONTINUED] senna-docusate sodium **AND** polyethylene glycol **AND** [DISCONTINUED] bisacodyl **AND** bisacodyl    fentaNYL citrate (PF)    lactulose    Magnesium Standard Dose Replacement - Follow Nurse / BPA Driven Protocol    ondansetron    Pharmacy Consult - Pharmacy to dose    Pharmacy to Dose Heparin    Phosphorus Replacement - Follow Nurse / BPA Driven Protocol    Potassium Replacement - Follow Nurse / BPA Driven Protocol    prochlorperazine    simethicone    sodium chloride    sodium chloride    sodium chloride    sodium chloride    sodium chloride    sodium chloride    sodium chloride    sodium chloride    Assessment    1.  Acute hypercapnic/hypoxic respiratory failure, with multifactorial etiology including pneumonia, obesity with hypoventilation and with element of heart failure earlier currently patient is intubated on mechanical ventilation and sedated  2.  Acute HFpEF, improving  3.  Moderate-sized pericardial effusion with no tamponade, with right ventricular dilatation   4.   Elevated troponin on admission likely type II NSTEMI  5.  Anemia  6.  TRACIE  7.  Likely undiagnosed sleep apnea  8.  New onset atrial fibrillation, patient's at least [id number] score of 2 is also she has a history of CHF            Plan   1.  Patient stays in A-fib with rate control I discussed with the patient's daughter and she is willing not to proceed with cardioversion at the moment so we will continue with current management  2.  Creatinine is improving consider starting a small dose of diuretics if is okay with nephrology  3.  Overall poor prognosis I discussed the case with her daughter in details          I have discussed the patients findings and my recommendations with patient.    Electronically signed by WINDY Roberts, 07/24/23, 10:47 AM EDT.   Electronically signed by Bernice Rosas MD, 07/24/23, 11:13 AM EDT.                      Please note that portions of this note were completed with a voice recognition program.    Please note that portions of this note were copied and has been reviewed and is accurate as of 7/24/2023 .

## 2023-07-24 NOTE — PROGRESS NOTES
[facility]    Patient Identification:  Name:  Judy Lutz  Age:  63 y.o.  Sex:  female  :  1960  MRN:  [id number]  Visit number:  [id number]  Primary Care Provider:  Provider, No Known    Length of stay in inpatient status:  11    Brief Update     To Whom it May Concern,    I am the attending hospitalist for the above stated patient.  She is gravely ill, as she was placed on life support with a ventilator and two vasopressors to improve blood pressures on 2023.  Thus, since she is sedated and on life support, she needs to have an emergency guardian appointed in order to make medical decisions while she is incapacitated.            Candido Rodney MD  [facility] Hospitalist  23  07:47 EDT

## 2023-07-24 NOTE — PLAN OF CARE
Goal Outcome Evaluation:  Plan of Care Reviewed With: patient        Progress: no change  Outcome Evaluation: pt remains intubated and sedated,tolerating settings well, pt remains on CHIDI, heparin, IVF, and afenbrile during this shift, pt given Prn Fentanyl for pain, bed in a low position

## 2023-07-24 NOTE — PROGRESS NOTES
[facility]     Patient Identification:  Name:  Judy Lutz  Age:  63 y.o.  Sex:  female  :  1960  MRN:  [id number]  Visit Number:  [id number]  ROOM: [address]     Primary Care Provider:  Provider, No Known     Date of Admission: 2023    Length of stay in inpatient status:  11    Subjective     Chief Compliant:    Chief Complaint   Patient presents with    Shortness of Breath     History of Presenting Illness:  The patient is intubated and mechanically ventilated as well as sedated on fentanyl and propofol and as a result I am unable to obtain a history of presenting illness from her.  One of her daughters were at bedside as was bedside nurse Asya.     Consults:  Dr. Hurst with pulmonology  Black Jett, Alison, and Isai with cardiology  Dr. Phelps and LISETTE Parkinson with hematology/oncology  Dr. Broussard, LISETTE Maldonado, and LISETTE Tarango with infectious disease  Dr. Aguayo and LISETTE Tse with general surgery  Dr. Stern with nephrology  Dr. Mathis with pulmonology  Dr. Cross and LISETTE Matson with tele-neurology     Procedures:  2023:  Transfusion of one unit of PRBCs  2023:  Transfusion of one unit of PRBCs  2023:  Right internal jugular triple lumen central venous catheter  2023:  Orally intubated and mechanically ventilated  2023:  TPN ordered    Objective     Current Hospital Meds:  aspirin, 81 mg, Per G Tube, Daily  chlorhexidine, 15 mL, Mouth/Throat, Q12H  fluconazole, 200 mg, Intravenous, Q24H  Linezolid, 600 mg, Intravenous, Q12H  magic barrier cream, 1 application , Topical, BID  meropenem, 1,000 mg, Intravenous, Q12H  midodrine, 5 mg, Oral, TID AC  mupirocin, 1 application , Each Nare, BID  pantoprazole, 40 mg, Intravenous, BID AC  polyethylene glycol, 17 g, Oral, Daily  potassium chloride, 40 mEq, Oral, Daily  rosuvastatin, 20 mg, Oral, Nightly  senna-docusate sodium, 2 tablet, Oral, BID  sodium chloride, 10 mL, Intravenous, Q12H  sodium  chloride, 10 mL, Intravenous, Q12H  sodium chloride, 10 mL, Intravenous, Q12H  sodium chloride, 10 mL, Intravenous, Q12H  sodium chloride, 10 mL, Intravenous, Q12H    fentanyl 10 mcg/mL,  mcg/hr, Last Rate: 100 mcg/hr (07/23/23 2212)  heparin, 11.85 Units/kg/hr, Last Rate: 11.85 Units/kg/hr (07/24/23 0743)  Pharmacy Consult - Pharmacy to dose,   Pharmacy to Dose Heparin,   phenylephrine, 0.5-3 mcg/kg/min, Last Rate: 1.8 mcg/kg/min (07/24/23 0657)  propofol, 5-50 mcg/kg/min, Last Rate: 20 mcg/kg/min (07/24/23 0743)  sodium chloride, 100 mL/hr, Last Rate: 100 mL/hr (07/24/23 0602)      Current Antimicrobial Therapy:  Anti-Infectives (From admission, onward)      Ordered     Dose/Rate Route Frequency Start Stop    07/23/23 0824  fluconazole (DIFLUCAN) IVPB 200 mg        Ordering Provider: Anna Maldonado APRN    200 mg  100 mL/hr over 60 Minutes Intravenous Every 24 Hours 07/23/23 1000 07/30/23 0959    07/21/23 1052  meropenem (MERREM) 1,000 mg in sodium chloride 0.9 % 100 mL IVPB-VTB        Ordering Provider: King Broussard MD    1,000 mg  over 3 Hours Intravenous Every 12 Hours 07/22/23 0000 07/28/23 2359    07/21/23 1029  Linezolid (ZYVOX) 600 mg 300 mL        Ordering Provider: King Broussard MD    600 mg  300 mL/hr over 60 Minutes Intravenous Every 12 Hours 07/21/23 1200 07/28/23 1159    07/21/23 1052  meropenem (MERREM) 1,000 mg in sodium chloride 0.9 % 100 mL IVPB-VTB        Ordering Provider: King Broussard MD    1,000 mg  over 30 Minutes Intravenous Once 07/21/23 1200 07/21/23 1355    07/19/23 1607  vancomycin 2500 mg/500 mL 0.9% NS IVPB ([facility])        Ordering Provider: Ginette Tarango DO    2,500 mg  over 180 Minutes Intravenous Once 07/19/23 1700 07/19/23 2116    07/18/23 1259  gentamicin (GARAMYCIN) 540 mg in sodium chloride 0.9 % IVPB        Note to Pharmacy: Separate Administration Time With Ampicillin and Other Penicillins (Ampicillin / Penicillins deactivate gentamicin when  infused together).   Ordering Provider: King Broussard MD    5 mg/kg × 107 kg (Adjusted)  over 30 Minutes Intravenous Once 07/18/23 1400 07/18/23 1505    07/15/23 1915  meropenem (MERREM) 1,000 mg in sodium chloride 0.9 % 100 mL IVPB-VTB        Ordering Provider: Candido Rodney MD    1,000 mg  over 30 Minutes Intravenous Once 07/15/23 2015 07/15/23 2056    07/12/23 2137  cefTRIAXone (ROCEPHIN) 2,000 mg in sodium chloride 0.9 % 100 mL IVPB-VTB        Ordering Provider: Yusuf Luo DO    2,000 mg  200 mL/hr over 30 Minutes Intravenous Once 07/12/23 2153 07/12/23 2330          Current Diuretic Therapy:  Diuretics (From admission, onward)      Ordered     Dose/Rate Route Frequency Start Stop    07/23/23 1125  furosemide (LASIX) injection 80 mg        Ordering Provider: Rasheed Stern MD    80 mg Intravenous Once 07/23/23 1215 07/23/23 1418    07/22/23 0932  furosemide (LASIX) injection 80 mg        Ordering Provider: Rasheed Stern MD    80 mg Intravenous Once 07/22/23 1400 07/22/23 1422    07/14/23 0810  furosemide (LASIX) injection 60 mg        Ordering Provider: Leonard Dang DO    60 mg Intravenous Once 07/14/23 0900 07/14/23 0846    07/13/23 0054  furosemide (LASIX) injection 80 mg        Ordering Provider: Yusuf Luo DO    80 mg Intravenous Once 07/13/23 0110 07/13/23 0114          ----------------------------------------------------------------------------------------------------------------------  Vital Signs:  Temp:  [97.8 °F (36.6 °C)-101.1 °F (38.4 °C)] 98.5 °F (36.9 °C)  Heart Rate:  [] 91  Resp:  [18] 18  BP: ()/(42-95) 93/43  FiO2 (%):  [40 %] 40 %  SpO2:  [85 %-100 %] 100 %  on   ;   Device (Oxygen Therapy): ventilator  Body mass index is 54.57 kg/m².    Wt Readings from Last 3 Encounters:   07/24/23 (!) 173 kg (380 lb 4.7 oz)     Intake & Output (last 3 days)         07/21 0701 07/22 0700 07/22 0701 07/23 0700 07/23 0701 07/24 0700 07/24 0701 07/25 0700     I.V. (mL/kg) 2871.7 (17.2) 3864.8 (22.6) 5216.8 (30.3)     IV Piggyback 800 800 600     Total Intake(mL/kg) 3671.7 (22) 4664.8 (27.3) 5816.8 (33.8)     Urine (mL/kg/hr) 700 (0.2) 2150 (0.5) 1200 (0.3)     Emesis/NG output 290 180 740     Stool 0 0 0     Total Output 990 2330 1940     Net +2681.7 +2334.8 +3876.8             Stool Unmeasured Occurrence 1 x 1 x 2 x           NPO Diet  ----------------------------------------------------------------------------------------------------------------------  Physical Exam  Vitals and nursing note reviewed.   Constitutional:       Appearance: She is well-developed. She is morbidly obese.      Interventions: She is sedated and intubated.   HENT:      Head: Normocephalic and atraumatic.      Right Ear: External ear normal.      Left Ear: External ear normal.      Nose: Nose normal.   Eyes:      General: No scleral icterus.        Right eye: No discharge.         Left eye: No discharge.   Cardiovascular:      Rate and Rhythm: Tachycardia present. Rhythm irregular.      Pulses: Normal pulses.   Pulmonary:      Effort: Pulmonary effort is normal. No respiratory distress. She is intubated.      Breath sounds: No wheezing or rales.   Abdominal:      General: Abdomen is protuberant. Bowel sounds are decreased. There is distension.      Palpations: Abdomen is soft.   Musculoskeletal:         General: No deformity or signs of injury.      Comments: Edema of her feet and legs bilaterally have greatly improved from admission but appears about the same or slightly improved compared to yesterday.   Skin:     Capillary Refill: Capillary refill takes less than 2 seconds.      Coloration: Skin is not jaundiced or pale.   Neurological:      Comments: Sedated on fentanyl and propofol, which had just been stopped prior to my evaluation, and as a result unable to perform this portion of the exam.  She was not following commands for me but she was opening her eyes and I did see her move her feet  bilaterally.  When I placed my hand inside her hand, she would squeeze it but it was not on command.  I also saw her bite the ETT and cause the pressure alarm to go off on the vent.  Psychiatric:      Comments: Sedated on fentanyl and propofol and as a result unable to perform this portion of the exam.   ----------------------------------------------------------------------------------------------------------------------  Tele:  Atrial fibrillation/atrial flutter with heart rates .  I personally reviewed the telemetry strips.  ----------------------------------------------------------------------------------------------------------------------  LABS:    CBC and coagulation:  Results from last 7 days   Lab Units 07/24/23  0637 07/24/23  0411 07/24/23  0105 07/23/23  1130 07/23/23  0359 07/22/23  0035 07/21/23  0012 07/20/23  0559 07/20/23  0010 07/19/23  1545 07/19/23  0207 07/18/23  0052   PROCALCITONIN ng/mL  --   --  0.91*  --   --   --   --  0.53*  --   --   --  0.72*   LACTATE mmol/L 2.6* 2.5* 2.7*  --   --   --   --   --   --   --   --   --    CRP mg/dL  --   --  20.36*  --   --   --   --   --   --   --  22.62* 24.42*   WBC 10*3/mm3  --   --  26.19*  --  33.91* 29.50* 18.78* 18.35*  --  21.02* 27.03* 27.48*   HEMOGLOBIN g/dL  --   --  9.9*  --  9.2* 7.9* 7.0* 7.2* 7.2* 6.0* 7.2* 7.7*   HEMATOCRIT %  --   --  38.7  --  35.8 31.2* 27.4* 29.9* 28.2* 26.5* 30.9* 34.4   MCV fL  --   --  85.2  --  83.8 83.2 82.5 84.5  --  83.3 83.7 83.5   MCHC g/dL  --   --  25.6*  --  25.7* 25.3* 25.5* 24.1*  --  22.6* 23.3* 22.4*   PLATELETS 10*3/mm3  --   --  441  --  495* 476* 457* 494*  --  548* 592* 665*   INR   --   --   --  1.11*  --   --   --   --   --   --   --   --      Acid/base balance:  Results from last 7 days   Lab Units 07/24/23  0443 07/23/23  0410 07/22/23  0738 07/21/23  0435 07/20/23  1503 07/20/23  1028 07/20/23  0721   PH, ARTERIAL pH units 7.403 7.431 7.420 7.435 7.440 7.538* 7.257*   PCO2, ARTERIAL mm  Hg 60.6* 63.3* 68.0* 68.7* 67.4* 54.6* >104.0*   PO2 ART mm Hg 69.2* 62.7* 70.5* 62.9* 65.8* 38.3* 93.7   HCO3 ART mmol/L 37.8* 42.0* 44.1* 46.1* 45.7* 46.5* 47.8*     Renal and electrolytes:  Results from last 7 days   Lab Units 07/24/23  0105 07/23/23  0359 07/22/23  1711 07/22/23  0035 07/21/23  0506 07/21/23  0012 07/20/23  1740 07/20/23  0559 07/19/23  1323 07/19/23  0207 07/18/23  0857 07/18/23  0052 07/17/23  1413 07/17/23  1136   SODIUM mmol/L 139 142  --  144  --  149*  --  150* 149* 147*  --  148*  --    < >   POTASSIUM mmol/L 3.8 3.8 3.6 3.7  --  3.7 3.7 3.5 4.1  4.0 3.6   < > 3.4*  3.4*  --   --    MAGNESIUM mg/dL 2.0  --  2.1  --  2.3  --   --   --   --  2.5*  --  2.3  --   --    CHLORIDE mmol/L 94* 95*  --  95*  --  97*  --  97* 94* 94*  --  93*  --    < >   CO2 mmol/L 29.5* 33.6*  --  39.1*  --  39.8*  --  45.2* 33.5* 43.2*  --  46.2*  --    < >   BUN mg/dL 63* 64*  --  69*  --  60*  --  56* 50* 42*  --  27*  --    < >   CREATININE mg/dL 2.64* 2.74*  --  3.09*  --  3.06*  --  2.65* 2.45* 2.08*  --  1.31*  --    < >   CALCIUM mg/dL 9.8 10.2  --  10.4  --  10.7*  --  11.2* 10.2 11.1*  --  10.9*  --    < >   PHOSPHORUS mg/dL 5.4*  --   --   --   --   --   --   --   --   --   --  5.6* 4.7*  --    GLUCOSE mg/dL 121* 106*  --  152*  --  107*  --  111* 118* 121*  --  133*  --    < >   ANION GAP mmol/L 15.5* 13.4  --  9.9  --  12.2  --  7.8 21.5* 9.8  --  8.8  --    < >    < > = values in this interval not displayed.     Estimated Creatinine Clearance: 38.9 mL/min (A) (by C-G formula based on SCr of 2.64 mg/dL (H)).    Liver and pancreatic function:  Results from last 7 days   Lab Units 07/24/23  0105 07/23/23  0359 07/22/23  0035 07/21/23  0012 07/20/23  0559 07/19/23  0207 07/18/23  0052 07/17/23  1633   ALBUMIN g/dL 2.0* 2.1* 2.4* 2.5* 3.3* 3.6 3.4*  --    BILIRUBIN mg/dL 0.7 0.6 0.8 0.8 0.6 0.6 0.6  --    ALK PHOS U/L 91 93 73 49 51 59 68  --    AST (SGOT) U/L 23 14 12 12 14 15 12  --    ALT (SGPT) U/L  <5 5 5 6 8 8 10  --    AMMONIA umol/L  --   --   --   --   --  55* 67* 79*     Endocrine function:  Lab Results   Component Value Date    HGBA1C 5.80 (H) 07/13/2023     Point of care bedside glucose levels:  Results from last 7 days   Lab Units 07/19/23  0925   GLUCOSE mg/dL 133*     Glucose levels from the CMP:  Results from last 7 days   Lab Units 07/24/23  0105 07/23/23  0359 07/22/23  0035 07/21/23  0012 07/20/23  0559 07/19/23  1323 07/19/23  0207 07/18/23  0052   GLUCOSE mg/dL 121* 106* 152* 107* 111* 118* 121* 133*     Lab Results   Component Value Date    TSH 0.619 07/15/2023     Cardiac:  Results from last 7 days   Lab Units 07/18/23  0052   PROBNP pg/mL 35,507.0*       Cultures:  Microbiology Results (last 10 days)       Procedure Component Value - Date/Time    Blood Culture - Blood, Arm, Right [[id number]]  (Normal) Collected: 07/21/23 1041    Lab Status: Preliminary result Specimen: Blood from Arm, Right Updated: 07/23/23 1101     Blood Culture No growth at 2 days    Blood Culture - Blood, Arm, Left [[id number]]  (Normal) Collected: 07/21/23 1000    Lab Status: Preliminary result Specimen: Blood from Arm, Left Updated: 07/23/23 1101     Blood Culture No growth at 2 days    Urine Culture - Urine, Urine, Clean Catch [[id number]]  (Abnormal) Collected: 07/20/23 0945    Lab Status: Final result Specimen: Urine, Clean Catch Updated: 07/22/23 1410     Urine Culture 50,000 CFU/mL Candida albicans    Narrative:      Colonization of the urinary tract without infection is common. Treatment is discouraged unless the patient is symptomatic, pregnant, or undergoing an invasive urologic procedure.    Respiratory Culture - Aspirate, ET Suction [[id number]]  (Abnormal) Collected: 07/20/23 0843    Lab Status: Final result Specimen: Aspirate from ET Suction Updated: 07/22/23 1030     Respiratory Culture Moderate growth (3+) Candida albicans      Scant growth (1+) Normal Respiratory Karol     Gram Stain Many (4+) WBCs seen       Few (2+) Epithelial cells seen      Mixed stephanie      Yeast with hyphae seen    MRSA Screen, PCR (Inpatient) - Swab, Nares [[id number]]  (Abnormal) Collected: 07/19/23 1306    Lab Status: Final result Specimen: Swab from Nares Updated: 07/19/23 1557     MRSA PCR MRSA Detected    Narrative:      The negative predictive value of this diagnostic test is high and should only be used to consider de-escalating anti-MRSA therapy. A positive result may indicate colonization with MRSA and must be correlated clinically.    Urine Culture - Urine, Urine, Clean Catch [[id number]]  (Abnormal)  (Susceptibility) Collected: 07/15/23 1627    Lab Status: Final result Specimen: Urine, Clean Catch Updated: 07/17/23 1159     Urine Culture >100,000 CFU/mL Escherichia coli ESBL     Comment:   Consider infectious disease consult.  Susceptibility results may not correlate to clinical outcomes.       Narrative:      Colonization of the urinary tract without infection is common. Treatment is discouraged unless the patient is symptomatic, pregnant, or undergoing an invasive urologic procedure.  Recent outcomes data supports the use of pip/tazo in the treatment of susceptible ESBL infections for uncomplicated UTI. Consider use of pip/tazo as a carbapenem-sparing regimen in applicable patients.    Susceptibility        Escherichia coli ESBL      MANUEL      Ertapenem Susceptible      Gentamicin Susceptible      Levofloxacin Resistant      Meropenem Susceptible      Nitrofurantoin Susceptible      Piperacillin + Tazobactam Resistant      Trimethoprim + Sulfamethoxazole Resistant                        I have personally looked at the labs and they are summarized above.    Assessment & Plan      -Acute hypoxemic respiratory failure that was present on admission and is due to acute exacerbation of HFpEF  -Type II non-ST elevation MI, present on admission  -Moderate to severe pericardial effusion without tamponade physiology that was present on  admission  -Acute encephalopathy present on admission and returning during the hospitalization, suspect multifactorial and related to hypoxia and/or infectious/UTI  -Anasarca and bilateral pleural effusions, suspect due to the combined heart failure, present on admission  -Acute metabolic encephalopathy that was present on admission, due to the acute hypoxic respiratory failure and the acute urinary tract infection  -Acute urinary tract infection, present on admission, with urine culture growing ESBL E. coli and Klebsiella pneumoniae  -As of 7/19/2023, acute hypercapnic and hypoxic respiratory failure that developed due to presumed bilateral bacterial pneumonia and failed BiPAP, orally intubated 7/20/2023  -Fecal-like material coming out of the OG tube, suspect due to constipation  -Symptomatic anemia, present on admission, suspect due to severe iron deficiency anemia, with no overt signs of bleeding so far this admission, status post 2 units of PRBCs thus far  -Leukocytosis, present on admission, suspect secondary to bone marrow overproduction as a result of the severe iron deficiency anemia  -As of evening of 7/22/2023, new onset AFib with RVR  -As of 7/20/2023, new Candida albicans UTI  -Sepsis, shock criteria that developed 7/21/2023, requiring vasopressor support (Levophed started 7/21/2023 and Xavi-synephrine started 7/22/2023)  -Nonoliguric acute kidney injury, noted on 7/18/2023, with peak Cr on 7/22/2023 of 3.09, current Cr 2.74 (baseline Cr 0.7-0.77)  -Moderate ascites, present on admission  -Morbid obesity per BMI 57.39 kg/m², which complicates all aspects of care, and is causing obesity hypoventilation syndrome and obstructive sleep apnea  -Incidentally noted 7 mm right upper lobe subpleural noncalcified groundglass density, thought to be postinflammatory change    Discussed on multidisciplinary rounds.  Will now do sedation vacations everyday as the vent settings are low.  Will stop the IVF as the TRACIE  has improved and to prevent fluid overload.  Will clamp the OG today instead of doing intermittent low wall suction as the stooling has improved.  She was finally able to receive one 5 mg midodrine today and thus will increase this to 10 mg and wean off the tatum-synephrine for MAP of 65 mmHg or above.  The patient is continuing to have a fever; thus, ID has recommended chest and abdomen/pelvic CT scan.  CT with oral contrast only of the chest, abdomen, and pelvis has been ordered.  Will obtain another sepsis work up in the am.  Since cannot do tube feeds at this time, will start TPN and monitor the electrolytes and Cr closely.  Pulmonology has been consulted.  Both nephrology and cardiology have requested diuresis today; this will be a day-to-day decision.  Will give KCl 40 meq today and the patient will need magnesium replacement per our protocol.  Will repeat the blood cultures and urine culture and if it is decided not to perform     VTE Prophylaxis:   Mechanical Order History:        Ordered        07/13/23 0225  Place Sequential Compression Device  Once            07/13/23 0225  Maintain Sequential Compression Device  Continuous                     Pharmalogical Order History:        Ordered     Dose Route Frequency Stop    07/24/23 0716  heparin (porcine) 5000 UNIT/ML injection 4,000 Units         4,000 Units IV Once 07/24/23 0743    07/23/23 1835  heparin (porcine) 5000 UNIT/ML injection 4,000 Units         4,000 Units IV Once 07/23/23 1837    07/23/23 1101  heparin 36925 units/250 mL (100 units/mL) in 0.45 % NaCl infusion  20.2 mL/hr         11.85 Units/kg/hr IV Titrated --                  The patient is high risk due to the following diagnoses/reasons:  Septic shock and acute hypoxic and hypercapnic respiratory failure     Disposition: Undetermined at this time    Candido Rodney MD  [facility] Hospitalist  07/24/23  07:46 EDT

## 2023-07-24 NOTE — PROGRESS NOTES
Nutrition Services    Patient Name:  Judy Lutz  YOB: 1960  MRN: [id number]  Admit Date:  7/12/2023    Consult received for TPN.  Labs and med noted.  Noted patient on propofol.  Recommend TPN Clinimix E @ 65 ml/hr of 20% Dextrose, 5% AA and no lipids due to propofol. Will continue to follow.    Electronically signed by:  Sissy Haley RD  07/24/23 10:55 EDT

## 2023-07-24 NOTE — CASE MANAGEMENT/SOCIAL WORK
Discharge Planning Assessment   [facility]     Patient Name: Judy Lutz  MRN: [id number]  Today's Date: 7/24/2023    Admit Date: 7/12/2023            Discharge Plan       Row Name 07/24/23 1839       Plan    Plan Pt was intubated on 07/20/23 and remains intubated. Pt lives with her son and plans on returning home at discharge. Pt does not have a PCP. Pt does not utilize home health services. Pt has a rolling walker, shower chair, crutches and motorized wheelchair via unknown provider. Prior to being intubated, SS spoke with Pt and family were agreeable to swing bed placement for short term rehab. SS will follow.                   SCOTT Yuen

## 2023-07-24 NOTE — PROGRESS NOTES
Nephrology Progress Note      Subjective     Remains intubated on mechanical ventilation with FiO2 unchanged compared to yesterday    Objective       Vital signs :     Temp:  [97.8 °F (36.6 °C)-101.1 °F (38.4 °C)] 101.1 °F (38.4 °C)  Heart Rate:  [] 89  Resp:  [18-19] 19  BP: ()/(42-95) 99/80  FiO2 (%):  [40 %] 40 %    Intake/Output                         07/22/23 0701 - 07/23/23 0700 07/23/23 0701 - 07/24/23 0700     2560-2841 9462-5480 Total 4974-8079 5980-8379 Total                 Intake    I.V.  1889.1  1975.7 3864.8  2427.6  2789.2 5216.8    IV Piggyback  400  400 800  500  100 600    Total Intake 2289.1 2375.7 4664.8 2927.6 2889.2 5816.8       Output    Urine  1150  1000 2150  725  475 1200    Emesis/NG output  180  -- 180  490  250 740    Total Output 1330 1000 2330 [phone]             Physical Exam:    General Appearance : On mechanical ventilation  Lungs : clear to auscultation, respirations regular  Heart :  regular rhythm & normal rate, normal S1, S2 and no murmur, no rub  Abdomen : soft, non distended  Extremities : Trace  Neurologic : Unable to assess        Laboratory Data :     Albumin Albumin   Date Value Ref Range Status   07/24/2023 2.0 (L) 3.5 - 5.2 g/dL Final   07/23/2023 2.1 (L) 3.5 - 5.2 g/dL Final   07/22/2023 2.4 (L) 3.5 - 5.2 g/dL Final      Magnesium Magnesium   Date Value Ref Range Status   07/24/2023 2.0 1.6 - 2.4 mg/dL Final   07/22/2023 2.1 1.6 - 2.4 mg/dL Final          PTH               No results found for: PTH    CBC and coagulation:  Results from last 7 days   Lab Units 07/24/23  0637 07/24/23  0411 07/24/23  0105 07/23/23  1130 07/23/23  0359 07/22/23  0035 07/21/23  0012 07/20/23  0559 07/19/23  1545 07/19/23  0207 07/18/23  0052   PROCALCITONIN ng/mL  --   --  0.91*  --   --   --   --  0.53*  --   --  0.72*   LACTATE mmol/L 2.6* 2.5* 2.7*  --   --   --   --   --   --   --   --    CRP mg/dL  --   --  20.36*  --   --   --   --   --   --  22.62* 24.42*   WBC  10*3/mm3  --   --  26.19*  --  33.91* 29.50*   < > 18.35*   < > 27.03* 27.48*   HEMOGLOBIN g/dL  --   --  9.9*  --  9.2* 7.9*   < > 7.2*   < > 7.2* 7.7*   HEMATOCRIT %  --   --  38.7  --  35.8 31.2*   < > 29.9*   < > 30.9* 34.4   MCV fL  --   --  85.2  --  83.8 83.2   < > 84.5   < > 83.7 83.5   MCHC g/dL  --   --  25.6*  --  25.7* 25.3*   < > 24.1*   < > 23.3* 22.4*   PLATELETS 10*3/mm3  --   --  441  --  495* 476*   < > 494*   < > 592* 665*   INR   --   --   --  1.11*  --   --   --   --   --   --   --     < > = values in this interval not displayed.     Acid/base balance:  Results from last 7 days   Lab Units 07/24/23  0443 07/23/23  0410 07/22/23  0738   PH, ARTERIAL pH units 7.403 7.431 7.420   PO2 ART mm Hg 69.2* 62.7* 70.5*   PCO2, ARTERIAL mm Hg 60.6* 63.3* 68.0*   HCO3 ART mmol/L 37.8* 42.0* 44.1*     Renal and electrolytes:    Results from last 7 days   Lab Units 07/24/23  0105 07/23/23  0359 07/22/23  1711 07/22/23  0035 07/21/23  0506 07/21/23  0012 07/20/23  1740 07/20/23  0559 07/18/23  0857 07/18/23  0052 07/17/23  1413   SODIUM mmol/L 139 142  --  144  --  149*  --  150*   < > 148*  --    POTASSIUM mmol/L 3.8 3.8 3.6 3.7  --  3.7   < > 3.5   < > 3.4*  3.4*  --    MAGNESIUM mg/dL 2.0  --  2.1  --  2.3  --   --   --    < > 2.3  --    CHLORIDE mmol/L 94* 95*  --  95*  --  97*  --  97*   < > 93*  --    CO2 mmol/L 29.5* 33.6*  --  39.1*  --  39.8*  --  45.2*   < > 46.2*  --    BUN mg/dL 63* 64*  --  69*  --  60*  --  56*   < > 27*  --    CREATININE mg/dL 2.64* 2.74*  --  3.09*  --  3.06*  --  2.65*   < > 1.31*  --    CALCIUM mg/dL 9.8 10.2  --  10.4  --  10.7*  --  11.2*   < > 10.9*  --    PHOSPHORUS mg/dL 5.4*  --   --   --   --   --   --   --   --  5.6* 4.7*    < > = values in this interval not displayed.     Estimated Creatinine Clearance: 38.9 mL/min (A) (by C-G formula based on SCr of 2.64 mg/dL (H)).  @GFRCG:3@   Liver and pancreatic function:  Results from last 7 days   Lab Units 07/24/23  1491  07/23/23  0359 07/22/23  0035 07/20/23  0559 07/19/23  0207 07/18/23  0052 07/18/23  0052 07/17/23  1633   ALBUMIN g/dL 2.0* 2.1* 2.4*   < > 3.6   < > 3.4*  --    BILIRUBIN mg/dL 0.7 0.6 0.8   < > 0.6   < > 0.6  --    ALK PHOS U/L 91 93 73   < > 59   < > 68  --    AST (SGOT) U/L 23 14 12   < > 15   < > 12  --    ALT (SGPT) U/L <5 5 5   < > 8   < > 10  --    AMMONIA umol/L  --   --   --   --  55*  --  67* 79*    < > = values in this interval not displayed.         Cardiac:  Results from last 7 days   Lab Units 07/18/23 0052   PROBNP pg/mL 35,507.0*     Liver and pancreatic function:  Results from last 7 days   Lab Units 07/24/23  0105 07/23/23  0359 07/22/23  0035 07/20/23  0559 07/19/23  0207 07/18/23 0052 07/18/23 0052 07/17/23  1633   ALBUMIN g/dL 2.0* 2.1* 2.4*   < > 3.6   < > 3.4*  --    BILIRUBIN mg/dL 0.7 0.6 0.8   < > 0.6   < > 0.6  --    ALK PHOS U/L 91 93 73   < > 59   < > 68  --    AST (SGOT) U/L 23 14 12   < > 15   < > 12  --    ALT (SGPT) U/L <5 5 5   < > 8   < > 10  --    AMMONIA umol/L  --   --   --   --  55*  --  67* 79*    < > = values in this interval not displayed.       Medications :     aspirin, 81 mg, Per G Tube, Daily  chlorhexidine, 15 mL, Mouth/Throat, Q12H  fluconazole, 200 mg, Intravenous, Q24H  Linezolid, 600 mg, Intravenous, Q12H  magic barrier cream, 1 application , Topical, BID  meropenem, 1,000 mg, Intravenous, Q12H  midodrine, 5 mg, Oral, TID AC  mupirocin, 1 application , Each Nare, BID  pantoprazole, 40 mg, Intravenous, BID AC  polyethylene glycol, 17 g, Oral, Daily  potassium chloride, 40 mEq, Nasogastric, Daily  rosuvastatin, 20 mg, Oral, Nightly  senna-docusate sodium, 2 tablet, Oral, BID  sodium chloride, 10 mL, Intravenous, Q12H  sodium chloride, 10 mL, Intravenous, Q12H  sodium chloride, 10 mL, Intravenous, Q12H  sodium chloride, 10 mL, Intravenous, Q12H  sodium chloride, 10 mL, Intravenous, Q12H      fentanyl 10 mcg/mL,  mcg/hr, Last Rate: Stopped (07/24/23  0949)  heparin, 11.85 Units/kg/hr, Last Rate: 11.85 Units/kg/hr (07/24/23 0743)  Pharmacy Consult - Pharmacy to dose,   Pharmacy to Dose Heparin,   Pharmacy to Dose TPN,   phenylephrine, 0.5-3 mcg/kg/min, Last Rate: 1.5 mcg/kg/min (07/24/23 0955)  propofol, 5-50 mcg/kg/min, Last Rate: Stopped (07/24/23 0948)  sodium chloride, 100 mL/hr, Last Rate: 100 mL/hr (07/24/23 0602)          Assessment & Plan     - TRACIE, nonoliguric  - Severe sepsis with septic shock  - Hypercalcemia  - Primary respiratory acidosis  - Hypoxic hypercarbic respiratory failure likely multifactorial  - History of congestive heart failure, well compensated  - Bacterial pneumonia    Renal functions continue to improve slowly creatinine 2.5 today from 2.7.  Adequate urine output with overall net negative fluid balance.  We will continue on half-normal saline and also to be repeated Lasix    TRACIE likely multifactorial including severe sepsis with septic shock  Baseline creatinine around 2.6 admitted with 1  UA suggestive of urinary tract infection difficult to interpret  No hydronephrosis on renal ultrasound  No florid fluid overload clinically likely on volume depletion side.  - Continue on pressors to keep MAP more than 65 mmHg  - Please avoid any nephrotoxic agents, hypotension and adjust medications according to estimated GFR      Rasheed Stern MD  07/24/23  10:24 EDT

## 2023-07-24 NOTE — PROGRESS NOTES
HEPARIN INFUSION  Judy Lutz is a  63 y.o. female receiving heparin infusion.     Therapy for (VTE/Cardiac):   Cardiac  Patient Dosing Weight: 171 kg  Initial Bolus (Y/N):   N  Any Bolus (Y/N):   Y        Signs or Symptoms of Bleeding: No    Cardiac or Other (Not VTE)   Initial Bolus: 60 units/kg (Max 4,000 units)  Initial rate: 12 units/kg/hr (Max 1,000 units/hr)   Anti Xa Rebolus Infusion Hold time Change infusion Dose (Units/kg/hr) Next Anti Xa or aPTT Level Due   < 0.11 50 Units/kg  (4000 Units Max) None Increase by  3 Units/kg/hr 6 hours   0.11- 0.19 25 Units/kg  (2000 Units Max) None Increase by  2 Units/kg/hr 6 hours   0.2 - 0.29 0 None Increase by  1 Units/kg/hr 6 hours   0.3 - 0.5 0 None No Change 6 hours (after 2 consecutive levels in range check q24h @0700)   0.51 - 0.6 0 None Decrease by  1 Units/kg/hr 6 hours   0.61 - 0.8 0 30 Minutes Decrease by  2 Units/kg/hr 6 hours   0.81 - 1 0 60 Minutes Decrease by  3 Units/kg/hr 6 hours   >1 0 Hold  After Anti Xa less than 0.5 decrease previous rate by  4 Units/kg/hr  Every 2 hours until Anti Xa  less than 0.5 then when infusion restarts in 6 hours       Recommend anti-Xa every 6 hours.     Results from last 7 days   Lab Units 07/24/23  0105 07/23/23  1735 07/23/23  1130 07/23/23  0359 07/22/23  0035 07/21/23  0012   INR   --   --  1.11*  --   --   --    HEMOGLOBIN g/dL  --   --   --  9.2* 7.9* 7.0*   HEMATOCRIT %  --   --   --  35.8 31.2* 27.4*   PLATELETS 10*3/mm3  --   --   --  495* 476* 457*   HEPARIN ANTI-XA IU/ml 0.32 <0.10* <0.10*  --   --   --           Date   Time   Anti-Xa Current Rate (Unit/kg/hr) Bolus   (Units) Rate Change   (Unit/kg/hr) New Rate (Unit/kg/hr) Next   Anti-Xa Comments  Pump Check Daily   7/23 1130 < 0.10 5.85 - initial 5.85 1800 Spoke with patients nurse to confirm rate and no initial bolus   7/23 1832 <0.10 5.85 4000 +3 8.85 0100 Spoke with Torey. No s/s of bleeding and no noted stops.    07/23 0130 0.32 8.85 0 0 8.85 0700  Spoke with Nancy (not the patients nurse but took the message). No s/s of bleeding.    7/24 0637 <0.1 8.85 4000 +3 11.85 1400 Discussed rate change and bolus with nurse . No s/s bleeding   7/24 14:16 <0.1 11.85 4000 +3 14.85 2130 Pump check done and discussed rate change  and bolus with nurse Asya                                                                                                                                                                                     Pharmacy will continue to follow anti-Xa results and monitor for signs and symptoms of bleeding or thrombosis.    Thank you,    Itzel Bautista, PharmD  7/24/2023  15:30 EDT

## 2023-07-25 NOTE — PROGRESS NOTES
[facility]     Patient Identification:  Name:  Judy Lutz  Age:  63 y.o.  Sex:  female  :  1960  MRN:  [id number]  Visit Number:  [id number]  ROOM: [address]     Primary Care Provider:  Provider, No Known     Date of Admission: 2023    Length of stay in inpatient status:  12    Subjective     Chief Compliant:    Chief Complaint   Patient presents with    Shortness of Breath     History of Presenting Illness:  The patient is intubated and mechanically ventilated; the sedation was off this morning for a sedation vacation.  The patient was awake, looking at me, but was not able to answer all questions.  She knew her family and interacted with them.  However, I was not able to a complete the history of presenting illness.    Consults:  Dr. Hurst with pulmonology  Black Jett, Alison, and Isai with cardiology  Dr. Phelps and LISETTE Parkinson with hematology/oncology  Dr. Broussard, LISETTE Maldonado, and LISETTE Tarango with infectious disease  Dr. Aguayo and LISETTE Tse with general surgery  Dr. Stern with nephrology  Dr. Mathis with pulmonology  Dr. Cross and LISETTE Matson with tele-neurology     Procedures:  2023:  Transfusion of one unit of PRBCs  2023:  Transfusion of one unit of PRBCs  2023:  Right internal jugular triple lumen central venous catheter  2023:  Orally intubated and mechanically ventilated  2023:  TPN ordered    Objective     Current Hospital Meds:  aspirin, 81 mg, Per G Tube, Daily  chlorhexidine, 15 mL, Mouth/Throat, Q12H  diphenhydrAMINE, 25 mg, Intravenous, Q6H  fluconazole, 200 mg, Intravenous, Q24H  Linezolid, 600 mg, Intravenous, Q12H  magic barrier cream, 1 application , Topical, BID  meropenem, 1,000 mg, Intravenous, Q12H  midodrine, 10 mg, Oral, TID AC  [START ON 2023] trace minerals + multivitamin IVPB, , Intravenous, Once per day on   mupirocin, 1 application , Each Nare, BID  pantoprazole, 40 mg, Intravenous, BID  AC  polyethylene glycol, 17 g, Oral, Daily  potassium chloride, 40 mEq, Nasogastric, Daily  rosuvastatin, 20 mg, Oral, Nightly  senna-docusate sodium, 2 tablet, Oral, BID  sodium chloride, 10 mL, Intravenous, Q12H  sodium chloride, 10 mL, Intravenous, Q12H  sodium chloride, 10 mL, Intravenous, Q12H  sodium chloride, 10 mL, Intravenous, Q12H  sodium chloride, 10 mL, Intravenous, Q12H    Adult Central Clinimix TPN,   Adult Central Clinimix TPN, , Last Rate: 65 mL/hr at 07/25/23 1102  dexmedetomidine, 0.2-1.5 mcg/kg/hr  fentanyl 10 mcg/mL,  mcg/hr, Last Rate: 50 mcg/hr (07/25/23 1407)  heparin, 18.85 Units/kg/hr, Last Rate: 18.85 Units/kg/hr (07/25/23 1427)  Pharmacy Consult - Pharmacy to dose,   Pharmacy to Dose Heparin,   phenylephrine, 0.5-3 mcg/kg/min, Last Rate: 1.1 mcg/kg/min (07/25/23 1048)  propofol, 5-50 mcg/kg/min, Last Rate: Stopped (07/25/23 0731)  sodium chloride, 40 mL/hr, Last Rate: 40 mL/hr (07/25/23 0838)      Current Antimicrobial Therapy:  Anti-Infectives (From admission, onward)      Ordered     Dose/Rate Route Frequency Start Stop    07/23/23 0824  fluconazole (DIFLUCAN) IVPB 200 mg        Ordering Provider: Anna Maldonado APRN    200 mg  100 mL/hr over 60 Minutes Intravenous Every 24 Hours 07/23/23 1000 07/30/23 0959    07/21/23 1052  meropenem (MERREM) 1,000 mg in sodium chloride 0.9 % 100 mL IVPB-VTB        Ordering Provider: King Broussard MD    1,000 mg  over 3 Hours Intravenous Every 12 Hours 07/22/23 0000 07/28/23 2359    07/21/23 1029  Linezolid (ZYVOX) 600 mg 300 mL        Ordering Provider: King Broussard MD    600 mg  300 mL/hr over 60 Minutes Intravenous Every 12 Hours 07/21/23 1200 07/28/23 1159    07/21/23 1052  meropenem (MERREM) 1,000 mg in sodium chloride 0.9 % 100 mL IVPB-VTB        Ordering Provider: King Broussard MD    1,000 mg  over 30 Minutes Intravenous Once 07/21/23 1200 07/21/23 1355    07/19/23 1607  vancomycin 2500 mg/500 mL 0.9% NS IVPB ([facility])         Ordering Provider: Ginette Tarango DO    2,500 mg  over 180 Minutes Intravenous Once 07/19/23 1700 07/19/23 2116    07/18/23 1259  gentamicin (GARAMYCIN) 540 mg in sodium chloride 0.9 % IVPB        Note to Pharmacy: Separate Administration Time With Ampicillin and Other Penicillins (Ampicillin / Penicillins deactivate gentamicin when infused together).   Ordering Provider: King Broussard MD    5 mg/kg × 107 kg (Adjusted)  over 30 Minutes Intravenous Once 07/18/23 1400 07/18/23 1505    07/15/23 1915  meropenem (MERREM) 1,000 mg in sodium chloride 0.9 % 100 mL IVPB-VTB        Ordering Provider: Candido Rodney MD    1,000 mg  over 30 Minutes Intravenous Once 07/15/23 2015 07/15/23 2056    07/12/23 2137  cefTRIAXone (ROCEPHIN) 2,000 mg in sodium chloride 0.9 % 100 mL IVPB-VTB        Ordering Provider: Yusuf Luo DO    2,000 mg  200 mL/hr over 30 Minutes Intravenous Once 07/12/23 2153 07/12/23 2330          Current Diuretic Therapy:  Diuretics (From admission, onward)      Ordered     Dose/Rate Route Frequency Start Stop    07/24/23 1024  furosemide (LASIX) injection 80 mg        Ordering Provider: Rasheed Stern MD    80 mg Intravenous Once 07/24/23 1100 07/24/23 1112    07/23/23 1125  furosemide (LASIX) injection 80 mg        Ordering Provider: Rasheed Stern MD    80 mg Intravenous Once 07/23/23 1215 07/23/23 1418    07/22/23 0932  furosemide (LASIX) injection 80 mg        Ordering Provider: Rasheed Stern MD    80 mg Intravenous Once 07/22/23 1400 07/22/23 1422    07/14/23 0810  furosemide (LASIX) injection 60 mg        Ordering Provider: Leonard Dang DO    60 mg Intravenous Once 07/14/23 0900 07/14/23 0846    07/13/23 0054  furosemide (LASIX) injection 80 mg        Ordering Provider: Yusuf Luo DO    80 mg Intravenous Once 07/13/23 0110 07/13/23 0114           ----------------------------------------------------------------------------------------------------------------------  Vital Signs:  Temp:  [98.7 °F (37.1 °C)-99.1 °F (37.3 °C)] 98.9 °F (37.2 °C)  Heart Rate:  [] 97  Resp:  [18-36] 25  BP: ()/(45-89) 100/60  FiO2 (%):  [40 %] 40 %  SpO2:  [89 %-100 %] 92 %  on   ;   Device (Oxygen Therapy): ventilator  Body mass index is 56.25 kg/m².    Wt Readings from Last 3 Encounters:   07/25/23 (!) 178 kg (392 lb)     Intake & Output (last 3 days)         07/22 0701 07/23 0700 07/23 0701 07/24 0700 07/24 0701 07/25 0700 07/25 0701 07/26 0700    I.V. (mL/kg) 3864.8 (22.6) 5216.8 (30.3) 4347.8 (24.4)     Other   60     IV Piggyback 800 600 900 500    TPN   336     Total Intake(mL/kg) 4664.8 (27.3) 5816.8 (33.8) 5643.8 (31.7) 500 (2.8)    Urine (mL/kg/hr) 2150 (0.5) 1200 (0.3) 850 (0.2) 125 (0.1)    Emesis/NG output 180 740 750     Stool 0 0 0     Total Output 2330 1940 1600 125    Net +2334.8 +3876.8 +4043.8 +375            Stool Unmeasured Occurrence 1 x 2 x 3 x           Adult Central Clinimix TPN (and additional linked orders)  NPO Diet NPO Type: Other (see comments)  ----------------------------------------------------------------------------------------------------------------------  Physical Exam; nurse Asya and the daughter and brother were at bedside during my evaluation.  Vitals and nursing note reviewed.   Constitutional:       Appearance: She is well-developed. She is morbidly obese.      Interventions: She is sedated and intubated.   HENT:      Head: Normocephalic and atraumatic.      Right Ear: External ear normal.      Left Ear: External ear normal.      Nose: Nose normal.   Eyes:      General: No scleral icterus.        Right eye: No discharge.         Left eye: No discharge.   Cardiovascular:      Rate and Rhythm: Tachycardia present. Rhythm irregular.      Pulses: Normal pulses.   Pulmonary:      Effort: Pulmonary effort is normal. No  respiratory distress. She is intubated.      Breath sounds: No wheezing or rales.   Abdominal:      General: Abdomen is protuberant. Bowel sounds are decreased. There is distension but her abdomen remains soft.   Musculoskeletal:         General: No deformity or signs of injury.      Comments: Edema of her feet and legs bilaterally have continues to improve.    Skin:     Capillary Refill: Capillary refill takes less than 2 seconds.      Coloration: Skin is not jaundiced or pale.   Neurological:      Comments: Sedation was held today.  The patient was alert and able to shake her head yes and no.  She seemed to recognize her family members.  She was able to follow commands.  Her pupillary exam is unremarkable.  Psychiatric:      Comments: Sedated was held today.  However, with the patient still on the ventilator, I am unable to perform this portion of the physical exam.   ----------------------------------------------------------------------------------------------------------------------  Tele: A-fib/a flutter with heart rates of 115-125 with the sedation held and 80-90 with the sedation infusing.  I have personally looked at the telemetry strips.  ----------------------------------------------------------------------------------------------------------------------  LABS:  07/25/2023 0653 Cortisol - AM [[id number]]    Blood    Final result Component Value Units   Cortisol - AM 19.04 mcg/dL        CBC and coagulation:  Results from last 7 days   Lab Units 07/25/23  0549 07/24/23  8150 07/24/23  1547 07/24/23  0951 07/24/23  0637 07/24/23  0411 07/24/23  0105 07/23/23  1130 07/23/23  0359 07/22/23  0035 07/21/23  0012 07/20/23  0559 07/20/23  0010 07/19/23  1545 07/19/23  0207   PROCALCITONIN ng/mL 1.18*  --   --   --   --   --  0.91*  --   --   --   --  0.53*  --   --   --    LACTATE mmol/L 2.1* 2.6* 2.5* 2.1* 2.6* 2.5* 2.7*  --   --   --   --   --   --   --   --    CRP mg/dL 21.23*  --   --   --   --   --  20.36*  --    --   --   --   --   --   --  22.62*   WBC 10*3/mm3 18.22*  --   --   --   --   --  26.19*  --  33.91* 29.50* 18.78* 18.35*  --  21.02* 27.03*   HEMOGLOBIN g/dL 8.2*  --   --   --   --   --  9.9*  --  9.2* 7.9* 7.0* 7.2* 7.2* 6.0* 7.2*   HEMATOCRIT % 32.0*  --   --   --   --   --  38.7  --  35.8 31.2* 27.4* 29.9* 28.2* 26.5* 30.9*   MCV fL 86.3  --   --   --   --   --  85.2  --  83.8 83.2 82.5 84.5  --  83.3 83.7   MCHC g/dL 25.6*  --   --   --   --   --  25.6*  --  25.7* 25.3* 25.5* 24.1*  --  22.6* 23.3*   PLATELETS 10*3/mm3 373  --   --   --   --   --  441  --  495* 476* 457* 494*  --  548* 592*   INR   --   --   --   --   --   --   --  1.11*  --   --   --   --   --   --   --      Acid/base balance:  Results from last 7 days   Lab Units 07/24/23  0443 07/23/23  0410 07/22/23  0738 07/21/23  0435 07/20/23  1503 07/20/23  1028 07/20/23  0721   PH, ARTERIAL pH units 7.403 7.431 7.420 7.435 7.440 7.538* 7.257*   PCO2, ARTERIAL mm Hg 60.6* 63.3* 68.0* 68.7* 67.4* 54.6* >104.0*   PO2 ART mm Hg 69.2* 62.7* 70.5* 62.9* 65.8* 38.3* 93.7   HCO3 ART mmol/L 37.8* 42.0* 44.1* 46.1* 45.7* 46.5* 47.8*     Renal and electrolytes:  Results from last 7 days   Lab Units 07/25/23  0549 07/24/23  0105 07/23/23  0359 07/22/23  1711 07/22/23  0035 07/21/23  0506 07/21/23  0012 07/20/23  1740 07/20/23  0559 07/19/23  1323 07/19/23  0207   SODIUM mmol/L 141 139 142  --  144  --  149*  --  150* 149* 147*   POTASSIUM mmol/L 3.8 3.8 3.8 3.6 3.7  --  3.7 3.7 3.5 4.1  4.0 3.6   MAGNESIUM mg/dL 2.0 2.0  --  2.1  --  2.3  --   --   --   --  2.5*   CHLORIDE mmol/L 95* 94* 95*  --  95*  --  97*  --  97* 94* 94*   CO2 mmol/L 33.5* 29.5* 33.6*  --  39.1*  --  39.8*  --  45.2* 33.5* 43.2*   BUN mg/dL 68* 63* 64*  --  69*  --  60*  --  56* 50* 42*   CREATININE mg/dL 2.70* 2.64* 2.74*  --  3.09*  --  3.06*  --  2.65* 2.45* 2.08*   CALCIUM mg/dL 10.4 9.8 10.2  --  10.4  --  10.7*  --  11.2* 10.2 11.1*   PHOSPHORUS mg/dL 6.7* 5.4*  --   --   --   --    --   --   --   --   --    GLUCOSE mg/dL 157* 121* 106*  --  152*  --  107*  --  111* 118* 121*   ANION GAP mmol/L 12.5 15.5* 13.4  --  9.9  --  12.2  --  7.8 21.5* 9.8     Estimated Creatinine Clearance: 38.7 mL/min (A) (by C-G formula based on SCr of 2.7 mg/dL (H)).    Liver and pancreatic function:  Results from last 7 days   Lab Units 07/25/23  0549 07/24/23  0105 07/23/23  0359 07/22/23  0035 07/21/23  0012 07/20/23  0559 07/19/23  0207   ALBUMIN g/dL 2.3* 2.0* 2.1* 2.4* 2.5* 3.3* 3.6   BILIRUBIN mg/dL 0.6 0.7 0.6 0.8 0.8 0.6 0.6   ALK PHOS U/L 81 91 93 73 49 51 59   AST (SGOT) U/L 17 23 14 12 12 14 15   ALT (SGPT) U/L 6 <5 5 5 6 8 8   AMMONIA umol/L  --   --   --   --   --   --  55*     Endocrine function:  Lab Results   Component Value Date    HGBA1C 5.80 (H) 07/13/2023     Point of care bedside glucose levels:  Results from last 7 days   Lab Units 07/25/23  1306 07/25/23  0527 07/25/23  0001 07/24/23  1809 07/19/23  0925   GLUCOSE mg/dL 223* 165* 130 111 133*     Glucose levels from the [facility]:  Results from last 7 days   Lab Units 07/25/23  0549 07/24/23  0105 07/23/23  0359 07/22/23  0035 07/21/23  0012 07/20/23  0559 07/19/23  1323 07/19/23  0207   GLUCOSE mg/dL 157* 121* 106* 152* 107* 111* 118* 121*     Lab Results   Component Value Date    TSH 0.619 07/15/2023     Cultures:  Microbiology Results (last 10 days)       Procedure Component Value - Date/Time    Blood Culture - Blood, Arm, Right [[id number]]  (Normal) Collected: 07/24/23 1444    Lab Status: Preliminary result Specimen: Blood from Arm, Right Updated: 07/25/23 1500     Blood Culture No growth at 24 hours    Respiratory Culture - Sputum, ET Suction [[id number]] Collected: 07/24/23 1420    Lab Status: Preliminary result Specimen: Sputum from ET Suction Updated: 07/25/23 1055     Respiratory Culture Culture in progress     Gram Stain Moderate (3+) WBCs seen      Rare (1+) Epithelial cells seen      No organisms seen    Blood Culture - Blood, Arm, Left  [[id number]]  (Normal) Collected: 07/24/23 1416    Lab Status: Preliminary result Specimen: Blood from Arm, Left Updated: 07/25/23 1500     Blood Culture No growth at 24 hours    Respiratory Panel PCR w/COVID-19(SARS-CoV-2) ALIVIA/MIRIAM/CRYSTAL/PAD/COR/MAD/TYLER In-House, NP Swab in UTM/VTM, 3-4 HR TAT - Swab, Nasopharynx [[id number]]  (Normal) Collected: 07/24/23 1411    Lab Status: Final result Specimen: Swab from Nasopharynx Updated: 07/24/23 1520     ADENOVIRUS, PCR Not Detected     Coronavirus 229E Not Detected     Coronavirus HKU1 Not Detected     Coronavirus NL63 Not Detected     Coronavirus OC43 Not Detected     COVID19 Not Detected     Human Metapneumovirus Not Detected     Human Rhinovirus/Enterovirus Not Detected     Influenza A PCR Not Detected     Influenza B PCR Not Detected     Parainfluenza Virus 1 Not Detected     Parainfluenza Virus 2 Not Detected     Parainfluenza Virus 3 Not Detected     Parainfluenza Virus 4 Not Detected     RSV, PCR Not Detected     Bordetella pertussis pcr Not Detected     Bordetella parapertussis PCR Not Detected     Chlamydophila pneumoniae PCR Not Detected     Mycoplasma pneumo by PCR Not Detected    Narrative:      In the setting of a positive respiratory panel with a viral infection PLUS a negative procalcitonin without other underlying concern for bacterial infection, consider observing off antibiotics or discontinuation of antibiotics and continue supportive care. If the respiratory panel is positive for atypical bacterial infection (Bordetella pertussis, Chlamydophila pneumoniae, or Mycoplasma pneumoniae), consider antibiotic de-escalation to target atypical bacterial infection.    Blood Culture - Blood, Arm, Right [[id number]]  (Normal) Collected: 07/21/23 1041    Lab Status: Preliminary result Specimen: Blood from Arm, Right Updated: 07/25/23 1100     Blood Culture No growth at 4 days    Blood Culture - Blood, Arm, Left [[id number]]  (Normal) Collected: 07/21/23 1000    Lab Status:  Preliminary result Specimen: Blood from Arm, Left Updated: 07/25/23 1100     Blood Culture No growth at 4 days    Urine Culture - Urine, Urine, Clean Catch [[id number]]  (Abnormal) Collected: 07/20/23 0945    Lab Status: Final result Specimen: Urine, Clean Catch Updated: 07/22/23 1410     Urine Culture 50,000 CFU/mL Candida albicans    Narrative:      Colonization of the urinary tract without infection is common. Treatment is discouraged unless the patient is symptomatic, pregnant, or undergoing an invasive urologic procedure.    Respiratory Culture - Aspirate, ET Suction [[id number]]  (Abnormal) Collected: 07/20/23 0843    Lab Status: Final result Specimen: Aspirate from ET Suction Updated: 07/22/23 1030     Respiratory Culture Moderate growth (3+) Candida albicans      Scant growth (1+) Normal Respiratory Stephanie     Gram Stain Many (4+) WBCs seen      Few (2+) Epithelial cells seen      Mixed stephanie      Yeast with hyphae seen    MRSA Screen, PCR (Inpatient) - Swab, Nares [[id number]]  (Abnormal) Collected: 07/19/23 1306    Lab Status: Final result Specimen: Swab from Nares Updated: 07/19/23 1557     MRSA PCR MRSA Detected    Narrative:      The negative predictive value of this diagnostic test is high and should only be used to consider de-escalating anti-MRSA therapy. A positive result may indicate colonization with MRSA and must be correlated clinically.    Urine Culture - Urine, Urine, Clean Catch [[id number]]  (Abnormal)  (Susceptibility) Collected: 07/15/23 1627    Lab Status: Final result Specimen: Urine, Clean Catch Updated: 07/17/23 1159     Urine Culture >100,000 CFU/mL Escherichia coli ESBL     Comment:   Consider infectious disease consult.  Susceptibility results may not correlate to clinical outcomes.       Narrative:      Colonization of the urinary tract without infection is common. Treatment is discouraged unless the patient is symptomatic, pregnant, or undergoing an invasive urologic procedure.  Recent  outcomes data supports the use of pip/tazo in the treatment of susceptible ESBL infections for uncomplicated UTI. Consider use of pip/tazo as a carbapenem-sparing regimen in applicable patients.    Susceptibility        Escherichia coli ESBL      MANUEL      Ertapenem Susceptible      Gentamicin Susceptible      Levofloxacin Resistant      Meropenem Susceptible      Nitrofurantoin Susceptible      Piperacillin + Tazobactam Resistant      Trimethoprim + Sulfamethoxazole Resistant                                 I have personally looked at the labs and they are summarized above.  ----------------------------------------------------------------------------------------------------------------------  Detailed radiology reports for the last 24 hours:    Imaging Results (Last 24 Hours)       Procedure Component Value Units Date/Time    CT Chest Without Contrast Diagnostic [[id number]] Collected: 07/25/23 0909     Updated: 07/25/23 0913    Narrative:      EXAM:    CT Chest Without Intravenous Contrast     EXAM DATE:    7/24/2023 4:39 PM     CLINICAL HISTORY:    Worsening fever with no obvious source of infection; D64.9-Anemia,  unspecified; A41.9-Sepsis, unspecified organism; R65.20-Severe sepsis  without septic shock; J96.01-Acute respiratory failure with hypoxia;  N39.0-Urinary tract infection, site not specified; I50.31-Acute  diastolic (congestive) heart failure     TECHNIQUE:    Axial computed tomography images of the chest without intravenous  contrast.  Sagittal and coronal reformatted images were created and  reviewed.  This CT exam was performed using one or more of the following  dose reduction techniques:  automated exposure control, adjustment of  the mA and/or kV according to patient size, and/or use of iterative  reconstruction technique.     COMPARISON:    07/19/2023     FINDINGS:    Lungs and pleural spaces:  Bibasilar consolidations have increased  from the previous exam.  Interstitial edema has slightly  improved.  Left  upper lobe partially consolidative airspace disease has improved from  the previous exam.  Trace pleural effusions are noted and appear  nonloculated.  No pneumothorax.    Heart:  Massive cardiomegaly is stable.  No significant pericardial  effusion.  No significant coronary artery calcifications.    Bones/joints:  Stable bony structures.  No acute fracture.  No  dislocation.    Soft tissues:  Anasarca is again noted.    Vasculature:  Portions of a right deep line are noted that extend into  the distal SVC.  No thoracic aortic aneurysm.    Lymph nodes:  Unremarkable.  No enlarged lymph nodes.    Liver:  Liver cirrhosis noted.    Intraperitoneal space:  Upper abdominal ascites appear stable.    Tubes, lines and devices:  NG tube extends into the mid stomach  region.  ET tube with tip below level of clavicles and above the barry.       Impression:      1.  Worsening bibasilar consolidations.  2.  Slight improvement in mid and upper lung zone airspace disease when  compared to the previous exam.  3.  Overall minimal improvement in interstitial edema but stable severe  cardiomegaly with trace pleural effusions noted.  4.  Support devices as above.  5.  Persistent anasarca and liver cirrhosis with upper abdominal  ascites.  6.  No pneumothorax. Other nonacute findings as above.     This report was finalized on 7/25/2023 9:11 AM by Dr. Kvng Tijerina MD.       CT Abdomen Pelvis Without Contrast [[id number]] Collected: 07/25/23 0835     Updated: 07/25/23 0839    Narrative:      EXAM:    CT Abdomen and Pelvis Without Intravenous Contrast     EXAM DATE:    7/24/2023 4:39 PM     CLINICAL HISTORY:    Worsening fever with no obvious source of infection; D64.9-Anemia,  unspecified; A41.9-Sepsis, unspecified organism; R65.20-Severe sepsis  without septic shock; J96.01-Acute respiratory failure with hypoxia;  N39.0-Urinary tract infection, site not specified; I50.31-Acute  diastolic (congestive) heart failure      TECHNIQUE:    Axial computed tomography images of the abdomen and pelvis without  intravenous contrast.  Sagittal and coronal reformatted images were  created and reviewed.  This CT exam was performed using one or more of  the following dose reduction techniques:  automated exposure control,  adjustment of the mA and/or kV according to patient size, and/or use of  iterative reconstruction technique.     COMPARISON:    07/19/2023     FINDINGS:    LUNG BASES:  Left lower lobe consolidative opacity.    HEART:  Cardiomegaly again noted.      ABDOMEN:    LIVER:  Unremarkable.    GALLBLADDER AND BILE DUCTS:  Gallstone.  Gallbladder otherwise  unremarkable.  No ductal dilation.    PANCREAS:  Unremarkable.  No ductal dilation.    SPLEEN:  Unremarkable.  No splenomegaly.    ADRENALS:  Unremarkable.  No mass.    KIDNEYS AND URETERS:  Unremarkable.  No obstructing stones.  No  hydronephrosis.    STOMACH AND BOWEL:  Unremarkable.  No obstruction.  No mucosal  thickening.      PELVIS:    APPENDIX:  No findings to suggest acute appendicitis.    BLADDER:  Unremarkable.  No stones.    REPRODUCTIVE:  Unremarkable as visualized.      ABDOMEN and PELVIS:    INTRAPERITONEAL SPACE:  Moderate ascites.  No free air.    BONES/JOINTS:  Degenerative disc disease throughout the lumbar spine.   Degenerative facet arthropathy throughout the lumbar spine, most  prominent in the lower lumbar spine.  No acute fracture.  No  dislocation.    SOFT TISSUES:  Unremarkable.    VASCULATURE:  Unremarkable.  No abdominal aortic aneurysm.    LYMPH NODES:  Unremarkable.  No enlarged lymph nodes.    TUBES, LINES AND DEVICES:  Nasogastric tube tip in the stomach.       Impression:      1.  Moderate ascites.  2.  Gallstone.  Gallbladder otherwise unremarkable.  3.  Degenerative changes lumbar spine as described.     This report was finalized on 7/25/2023 8:37 AM by Dr. Chaim Mcnulty MD.             I have personally looked at the radiology images and I have  read the available final reports.    Assessment & Plan      -Acute hypoxemic respiratory failure that was present on admission and is due to acute exacerbation of HFpEF  -Type II non-ST elevation MI, present on admission  -Moderate to severe pericardial effusion without tamponade physiology that was present on admission  -Acute encephalopathy present on admission and returning during the hospitalization, suspect multifactorial and related to hypoxia and/or infectious/UTI  -Anasarca and bilateral pleural effusions, suspect due to the combined heart failure, present on admission  -Acute metabolic encephalopathy that was present on admission, due to the acute hypoxic respiratory failure and the acute urinary tract infection  -Acute urinary tract infection, present on admission, with urine culture growing ESBL E. coli and Klebsiella pneumoniae  -As of 7/19/2023, acute hypercapnic and hypoxic respiratory failure that developed due to presumed bilateral bacterial pneumonia and failed BiPAP, orally intubated 7/20/2023  -Fecal-like material coming out of the OG tube, suspect due to constipation  -Symptomatic anemia, present on admission, suspect due to severe iron deficiency anemia, with no overt signs of bleeding so far this admission, status post 2 units of PRBCs thus far  -Leukocytosis, present on admission, suspect secondary to bone marrow overproduction as a result of the severe iron deficiency anemia  -As of evening of 7/22/2023, new onset AFib with RVR  -As of 7/20/2023, new Candida albicans UTI  -Sepsis, shock criteria that developed 7/21/2023, requiring vasopressor support (Levophed started 7/21/2023 and Xavi-synephrine started 7/22/2023)  -Nonoliguric acute kidney injury, noted on 7/18/2023, with peak Cr on 7/22/2023 of 3.09, current Cr 2.74 (baseline Cr 0.7-0.77)  -Moderate ascites, present on admission  -Morbid obesity per BMI 57.39 kg/m², which complicates all aspects of care, and is causing obesity  hypoventilation syndrome and obstructive sleep apnea  -Incidentally noted 7 mm right upper lobe subpleural noncalcified groundglass density, thought to be postinflammatory change    Cardiology states that the patient is not a candidate for antiarrhythmics and thus we will continue monitoring the heart rhythm on telemetry.  Will continue to decrease the Xavi-Synephrine for MAP of 65 mmHg.  A limited ECHO to evaluate the pericardial effusion was suggested by cardiology and thus I have ordered this.  Infectious disease team wants us to continue with the current broad-spectrum antibiotics as she has now been afebrile for 24 hours while receiving Merrem.  Dr. Hurst with pulmonology wanted to do chest CT and abdomen to look for any potential sources of infection; the initial area of pneumonia has improved but now she has bibasilar consolidations that were not present previously.  However, since the inflammatory markers are better and she is afebrile, we will not change antibiotics at this time.  We are attempting to start her on midodrine so we can remove the Xavi-Synephrine; however, the patient had 750 mL of OG residual in 24 hours.  Thus, we are unable to give her midodrine at this time due to the high output from the OG.  We are not able to start tube feeds at this time and we will continue with TPN.  There have been 3 bowel movements documented 24 hours and thus we will continue monitoring her input and output closely, in the hopes that a decrease in her constipation burden will help with the OG residuals.  We will continue holding the sedation as long as possible today and continue doing spontaneous breathing trials today.  Patient has anxiety at baseline but we are limited by what medications we can give her as she is still on the vasopressors; therefore, I will try IV Benadryl and if this does not work then we will try a Precedex drip if okay with pulmonology.  Family has been at bedside daily as they are trying to  keep patient calm so that she can remain off of sedation.  We will repeat blood work and a VBG in the morning.    VTE Prophylaxis:   Mechanical Order History:        Ordered        07/13/23 0225  Place Sequential Compression Device  Once            07/13/23 0225  Maintain Sequential Compression Device  Continuous                          Pharmalogical Order History:        Ordered     Dose Route Frequency Stop    07/25/23 0643  heparin 65232 units/250 mL (100 units/mL) in 0.45 % NaCl infusion  30.5 mL/hr         17.85 Units/kg/hr IV Titrated --    07/25/23 0643  heparin (porcine) 5000 UNIT/ML injection 2,000 Units         2,000 Units IV Once 07/25/23 0646    07/24/23 1518  heparin (porcine) 5000 UNIT/ML injection 4,000 Units         4,000 Units IV Once 07/24/23 1525    07/24/23 0716  heparin (porcine) 5000 UNIT/ML injection 4,000 Units         4,000 Units IV Once 07/24/23 0743    07/23/23 1835  heparin (porcine) 5000 UNIT/ML injection 4,000 Units         4,000 Units IV Once 07/23/23 1837    07/23/23 1101  heparin 82541 units/250 mL (100 units/mL) in 0.45 % NaCl infusion  27.1 mL/hr,   Status:  Discontinued         15.85 Units/kg/hr IV Titrated 07/25/23 0643    07/23/23 1101  heparin (porcine) 5000 UNIT/ML injection 4,000 Units  Status:  Discontinued         4,000 Units IV As Needed 07/23/23 1135    07/23/23 1101  heparin (porcine) 5000 UNIT/ML injection 2,000 Units  Status:  Discontinued         2,000 Units IV As Needed 07/23/23 1135    07/23/23 1101  Pharmacy to Dose Heparin         -- XX Continuous PRN --    07/19/23 1134  heparin (porcine) 5000 UNIT/ML injection 5,000 Units  Status:  Discontinued         5,000 Units SC Every 8 Hours Scheduled 07/23/23 1101                The patient is high risk due to the following diagnoses/reasons:  Septic shock and acute hypoxic and hypercapnic respiratory failure     Disposition: Undetermined at this time    Candido Rodney MD  [facility]  Hospitalist  07/25/23  16:12 EDT

## 2023-07-25 NOTE — PROGRESS NOTES
Nephrology Progress Note      Subjective     Patient remains intubated on mechanical ventilation    Objective       Vital signs :     Temp:  [98.7 °F (37.1 °C)-98.9 °F (37.2 °C)] 98.8 °F (37.1 °C)  Heart Rate:  [] 89  Resp:  [18-25] 18  BP: ()/() 101/82  FiO2 (%):  [40 %] 40 %    Intake/Output                         07/23/23 0701 - 07/24/23 0700 07/24/23 0701 - 07/25/23 0700 0701-1900 1901-0700 Total 9582-3176 8804-9910 Total                 Intake    I.V.  2427.6  2789.2 5216.8  2264.9  2083 4347.8    Other  --  -- --  60  -- 60    Flush/ Irrigation Intake (mL) (NG/OG Tube Orogastric 16 Fr Center mouth) -- -- -- 60 -- 60    IV Piggyback  500  100 600  500  400 900    TPN  --  -- --  --  336 336    Total Intake 2927.6 2889.2 5816.8 2824.9 2819 5643.8       Output    Urine  725  475 1200  600  250 850    Emesis/NG output  490  250 740  500  250 750    Stool  --  -- --  --  0 0    Total Output 3101 212 6401 1306 230 6188             Physical Exam:    General Appearance : On mechanical ventilation  Lungs : clear to auscultation, respirations regular  Heart :  regular rhythm & normal rate, normal S1, S2 and no murmur, no rub  Abdomen : soft, non distended  Extremities : Trace  Neurologic : Unable to assess        Laboratory Data :     Albumin Albumin   Date Value Ref Range Status   07/25/2023 2.3 (L) 3.5 - 5.2 g/dL Final   07/24/2023 2.0 (L) 3.5 - 5.2 g/dL Final   07/23/2023 2.1 (L) 3.5 - 5.2 g/dL Final      Magnesium Magnesium   Date Value Ref Range Status   07/25/2023 2.0 1.6 - 2.4 mg/dL Final   07/24/2023 2.0 1.6 - 2.4 mg/dL Final   07/22/2023 2.1 1.6 - 2.4 mg/dL Final          PTH               No results found for: PTH    CBC and coagulation:  Results from last 7 days   Lab Units 07/25/23  0549 07/24/23  2155 07/24/23  1547 07/24/23  0411 07/24/23  0105 07/23/23  1130 07/23/23  0359 07/21/23  0012 07/20/23  0559 07/19/23  1545 07/19/23  0207   PROCALCITONIN ng/mL 1.18*  --   --   --  0.91*   --   --   --  0.53*  --   --    LACTATE mmol/L 2.1* 2.6* 2.5*   < > 2.7*  --   --   --   --   --   --    CRP mg/dL 21.23*  --   --   --  20.36*  --   --   --   --   --  22.62*   WBC 10*3/mm3 18.22*  --   --   --  26.19*  --  33.91*   < > 18.35*   < > 27.03*   HEMOGLOBIN g/dL 8.2*  --   --   --  9.9*  --  9.2*   < > 7.2*   < > 7.2*   HEMATOCRIT % 32.0*  --   --   --  38.7  --  35.8   < > 29.9*   < > 30.9*   MCV fL 86.3  --   --   --  85.2  --  83.8   < > 84.5   < > 83.7   MCHC g/dL 25.6*  --   --   --  25.6*  --  25.7*   < > 24.1*   < > 23.3*   PLATELETS 10*3/mm3 373  --   --   --  441  --  495*   < > 494*   < > 592*   INR   --   --   --   --   --  1.11*  --   --   --   --   --     < > = values in this interval not displayed.     Acid/base balance:  Results from last 7 days   Lab Units 07/24/23  0443 07/23/23  0410 07/22/23  0738   PH, ARTERIAL pH units 7.403 7.431 7.420   PO2 ART mm Hg 69.2* 62.7* 70.5*   PCO2, ARTERIAL mm Hg 60.6* 63.3* 68.0*   HCO3 ART mmol/L 37.8* 42.0* 44.1*     Renal and electrolytes:    Results from last 7 days   Lab Units 07/25/23  0549 07/24/23  0105 07/23/23  0359 07/22/23  1711 07/22/23  0035 07/21/23  0506 07/21/23  0012   SODIUM mmol/L 141 139 142  --  144  --  149*   POTASSIUM mmol/L 3.8 3.8 3.8 3.6 3.7  --  3.7   MAGNESIUM mg/dL 2.0 2.0  --  2.1  --    < >  --    CHLORIDE mmol/L 95* 94* 95*  --  95*  --  97*   CO2 mmol/L 33.5* 29.5* 33.6*  --  39.1*  --  39.8*   BUN mg/dL 68* 63* 64*  --  69*  --  60*   CREATININE mg/dL 2.70* 2.64* 2.74*  --  3.09*  --  3.06*   CALCIUM mg/dL 10.4 9.8 10.2  --  10.4  --  10.7*   PHOSPHORUS mg/dL 6.7* 5.4*  --   --   --   --   --     < > = values in this interval not displayed.     Estimated Creatinine Clearance: 38.7 mL/min (A) (by C-G formula based on SCr of 2.7 mg/dL (H)).  @GFRCG:3@   Liver and pancreatic function:  Results from last 7 days   Lab Units 07/25/23  0549 07/24/23  0105 07/23/23  0359 07/20/23  0559 07/19/23  0207   ALBUMIN g/dL 2.3* 2.0*  2.1*   < > 3.6   BILIRUBIN mg/dL 0.6 0.7 0.6   < > 0.6   ALK PHOS U/L 81 91 93   < > 59   AST (SGOT) U/L 17 23 14   < > 15   ALT (SGPT) U/L 6 <5 5   < > 8   AMMONIA umol/L  --   --   --   --  55*    < > = values in this interval not displayed.         Cardiac:        Liver and pancreatic function:  Results from last 7 days   Lab Units 07/25/23  0549 07/24/23  0105 07/23/23  0359 07/20/23  0559 07/19/23  0207   ALBUMIN g/dL 2.3* 2.0* 2.1*   < > 3.6   BILIRUBIN mg/dL 0.6 0.7 0.6   < > 0.6   ALK PHOS U/L 81 91 93   < > 59   AST (SGOT) U/L 17 23 14   < > 15   ALT (SGPT) U/L 6 <5 5   < > 8   AMMONIA umol/L  --   --   --   --  55*    < > = values in this interval not displayed.       Medications :     aspirin, 81 mg, Per G Tube, Daily  chlorhexidine, 15 mL, Mouth/Throat, Q12H  fluconazole, 200 mg, Intravenous, Q24H  Linezolid, 600 mg, Intravenous, Q12H  magic barrier cream, 1 application , Topical, BID  meropenem, 1,000 mg, Intravenous, Q12H  midodrine, 10 mg, Oral, TID AC  [START ON 7/26/2023] trace minerals + multivitamin IVPB, , Intravenous, Once per day on Mon Wed Fri  mupirocin, 1 application , Each Nare, BID  pantoprazole, 40 mg, Intravenous, BID AC  polyethylene glycol, 17 g, Oral, Daily  potassium chloride, 40 mEq, Nasogastric, Daily  rosuvastatin, 20 mg, Oral, Nightly  senna-docusate sodium, 2 tablet, Oral, BID  sodium chloride, 10 mL, Intravenous, Q12H  sodium chloride, 10 mL, Intravenous, Q12H  sodium chloride, 10 mL, Intravenous, Q12H  sodium chloride, 10 mL, Intravenous, Q12H  sodium chloride, 10 mL, Intravenous, Q12H      Adult Central Clinimix TPN,   Adult Central Clinimix TPN, , Last Rate: 25 mL/hr at 07/24/23 5053  fentanyl 10 mcg/mL,  mcg/hr, Last Rate: 50 mcg/hr (07/24/23 1225)  heparin, 17.85 Units/kg/hr, Last Rate: 17.85 Units/kg/hr (07/25/23 0647)  Pharmacy Consult - Pharmacy to dose,   Pharmacy to Dose Heparin,   phenylephrine, 0.5-3 mcg/kg/min, Last Rate: 0.5 mcg/kg/min (07/25/23  0530)  propofol, 5-50 mcg/kg/min, Last Rate: 15 mcg/kg/min (07/25/23 0510)          Assessment & Plan     - TRACIE, nonoliguric  - Severe sepsis with septic shock  - Hypercalcemia  - Primary respiratory acidosis  - Hypoxic hypercarbic respiratory failure likely multifactorial  - History of congestive heart failure, well compensated  - Bacterial pneumonia    We will reduce lactated Ringer to 40 cc an hour and Lasix and another dose today.  Overall renal functions continue to improve.  No evidence of overt fluid overload clinically.    TRACIE likely multifactorial including severe sepsis with septic shock  Baseline creatinine around 2.6 admitted with 1  UA suggestive of urinary tract infection difficult to interpret  No hydronephrosis on renal ultrasound  No florid fluid overload clinically likely on volume depletion side.  - Continue on pressors to keep MAP more than 65 mmHg  - Please avoid any nephrotoxic agents, hypotension and adjust medications according to estimated GFR      Rasheed Stern MD  07/25/23  07:17 EDT

## 2023-07-25 NOTE — PROGRESS NOTES
[facility]  PROGRESS NOTE    Patient information:  Name: Judy Lutz  Age/Sex: 63 y.o. female  :  1960        PCP: Provider, No Known  Attending: Sapphire Lanre Contreras   MRN:  [id number]  Visit Number:  [id number]    LOS:  LOS: 12 days     CODE STATUS:    Code Status and Medical Interventions:   Ordered at: 23 0152     Code Status (Patient has no pulse and is not breathing):    CPR (Attempt to Resuscitate)     Medical Interventions (Patient has pulse or is breathing):    Full Support       PROBLEM LIST:Principal Problem:    Symptomatic anemia      Reason for Cardiology follow-up:  Pericardial effusion and cardiomyopathy     Subjective   ADMISSION INFORMATION:  Chief Complaint   Patient presents with    Shortness of Breath       HPI:  Judy Lutz is a 63-year-old female with no reported past medical history.  She presented to [facility] ED on 2023 with complaints of increased fatigue, malaise, shortness of breath, and generalized weakness.  Cardiology was consulted for further evaluation and management of cardiomyopathy and pericardial effusion.      No primary Cardiology noted in her chart.    Interval History:   Patient is in room CCU #4 and was examined by Dr. Jett.  Telemetry reveals Atrial fibrillation 80s to 90s.  Patient remains intubated.  According to the MAR, fentanyl and propofol were both stopped at 7:30 AM today.  Patient still has IV heparin infusion.  Sodium 141, potassium 3.8, chloride 95, CO2 33.5, BUN 68, and creatinine is 2.7.  Magnesium 2.0.  Patient's hemoglobin has dropped to 8.2 from 9.9 with a platelet count of 373 from 441.  No acute distress noted at this time.  Patient's daughter is at bedside. Patient is off sedation and is tracking with her eyes and following simple commands.     Vital Signs  Temp:  [98.7 °F (37.1 °C)-98.9 °F (37.2 °C)] 98.8 °F (37.1 °C)  Heart Rate:  [] 86  Resp:  [18-25]  18  BP: ()/() 96/60  FiO2 (%):  [40 %] 40 %  Vital Signs (last 72 hrs)         07/22 0700 07/23 0659 07/23 0700 07/24 0659 07/24 0700 07/25 0659 07/25 0700 07/25 0756   Most Recent      Temp (°F) 98.3 -  100.2    97.8 -  101.1    98.7 -  101.1       98.8 (37.1) 07/25 0400    Heart Rate 81 -  139    81 -  125    61 -  134      86     86 07/25 0740    Resp 18 -  19      18    18 -  25       18 07/25 0630    BP 81/48 -  127/72    81/65 -  123/91    71/52 -  137/84      96/60     96/60 07/25 0740    SpO2 (%) 88 -  100    85 -  100    93 -  100       97 07/25 0630          Body mass index is 56.25 kg/m².    Intake/Output Summary (Last 24 hours) at 7/25/2023 0756  Last data filed at 7/25/2023 0530  Gross per 24 hour   Intake 5643.83 ml   Output 1600 ml   Net 4043.83 ml       Objective     I have seen and examined Ms. Lutz today.  Physical Exam:      General Appearance:  Intubated. Alert.  Patient is orally intubated and mechanically ventilated.   Head:    Normocephalic, without obvious abnormality, atraumatic.   Eyes:                          PERRLA.   Neck:   No adenopathy, supple, trachea midline, no thyromegaly, no carotid bruit, no JVD.   Lungs:   Intubated with mechanical ventilation noted to be clear to auscultation, respirations regular, even and unlabored.    Heart:    Irregular rhythm and normal rate, normal S1 and S2, no        murmur, no gallop, no rub, no click   Chest Wall:    No abnormalities observed.   Abdomen:     Normal bowel sounds, no masses, no organomegaly, soft nontender, nondistended, no guarding, no rebound tenderness.   Extremities:  + 2 edema, no cyanosis, no redness. SCUDS in use.    Pulses:   Pulses palpable and equal bilaterally.   Skin:   No bleeding, bruising or rash.   Neurologic: Intubated. Alert and following simple commands.      Results review   Results Review:   Results from last 7 days   Lab Units 07/25/23  0549 07/24/23  0105 07/23/23  0359 07/22/23  0035  07/21/23  0012 07/20/23  0559 07/20/23  0010 07/19/23  1545   WBC 10*3/mm3 18.22* 26.19* 33.91* 29.50* 18.78* 18.35*  --  21.02*   HEMOGLOBIN g/dL 8.2* 9.9* 9.2* 7.9* 7.0* 7.2* 7.2* 6.0*   PLATELETS 10*3/mm3 373 441 495* 476* 457* 494*  --  548*     Results from last 7 days   Lab Units 07/25/23  0549 07/24/23  0105 07/23/23  0359 07/22/23  1711 07/22/23  0035 07/21/23  0012 07/20/23  1740 07/20/23  0559 07/19/23  1323 07/19/23  0207   SODIUM mmol/L 141 139 142  --  144 149*  --  150* 149* 147*   POTASSIUM mmol/L 3.8 3.8 3.8 3.6 3.7 3.7 3.7 3.5 4.1  4.0 3.6   CHLORIDE mmol/L 95* 94* 95*  --  95* 97*  --  97* 94* 94*   CO2 mmol/L 33.5* 29.5* 33.6*  --  39.1* 39.8*  --  45.2* 33.5* 43.2*   BUN mg/dL 68* 63* 64*  --  69* 60*  --  56* 50* 42*   CREATININE mg/dL 2.70* 2.64* 2.74*  --  3.09* 3.06*  --  2.65* 2.45* 2.08*   CALCIUM mg/dL 10.4 9.8 10.2  --  10.4 10.7*  --  11.2* 10.2 11.1*   GLUCOSE mg/dL 157* 121* 106*  --  152* 107*  --  111* 118* 121*   ALT (SGPT) U/L 6 <5 5  --  5 6  --  8  --  8   AST (SGOT) U/L 17 23 14  --  12 12  --  14  --  15         Lab Results   Component Value Date    PROBNP 35,507.0 (H) 07/18/2023    PROBNP 26,037.0 (H) 07/13/2023     No results found.  Lab Results   Component Value Date    ABSOLUTELUNG 28 07/15/2023     Lab Results   Component Value Date    INR 1.11 (H) 07/23/2023    INR 1.38 (H) 07/12/2023     Lab Results   Component Value Date    MG 2.0 07/25/2023    MG 2.0 07/24/2023    MG 2.1 07/22/2023     Lab Results   Component Value Date    TSH 0.619 07/15/2023      No results found for: CHOL, TRIG, HDL, LDL  Pain Management Panel  More data may exist         Latest Ref Rng & Units 7/19/2023 7/14/2023   Pain Management Panel   Creatinine, Urine mg/dL 139.1  8.5           Imaging Results (Last 48 Hours)       Procedure Component Value Units Date/Time    CT Abdomen Pelvis Without Contrast [[id number]] Resulted: 07/24/23 1640     Updated: 07/24/23 1657    CT Chest Without Contrast  Diagnostic [[id number]] Resulted: 07/24/23 1639     Updated: 07/24/23 1656    XR Chest 1 View [[id number]] Collected: 07/24/23 0939     Updated: 07/24/23 0943    Narrative:      EXAM:    XR Chest, 1 View     EXAM DATE:    7/21/2023 6:55 AM     CLINICAL HISTORY:    Respiratory failure; D64.9-Anemia, unspecified; A41.9-Sepsis,  unspecified organism; R65.20-Severe sepsis without septic shock;  J96.01-Acute respiratory failure with hypoxia; N39.0-Urinary tract  infection, site not specified; I50.31-Acute diastolic (congestive) heart  failure     TECHNIQUE:    Frontal view of the chest.     COMPARISON:    07/20/2023     FINDINGS:    LUNGS AND PLEURAL SPACES:  Atelectasis in the lung bases.  No  pneumothorax.    HEART:  Heart size is stable.    MEDIASTINUM:  Unremarkable.    BONES/JOINTS:  Unremarkable.    TUBES, LINES AND DEVICES:  The endotracheal tube (ETT) is in  satisfactory position.  Right internal jugular central venous catheter  tip in the superior vena cava.  Nasogastric tube tip in the stomach.       Impression:        No acute cardiopulmonary findings identified.     This report was finalized on 7/24/2023 9:41 AM by Dr. Chaim Mcnulty MD.               ECHO:  Results for orders placed during the hospital encounter of 07/12/23    Adult Transthoracic Echo Limited W/ Cont if Necessary Per Protocol    Interpretation Summary    Left ventricular systolic function is normal. Left ventricular ejection fraction appears to be 66 - 70%.    Left ventricular diastolic dysfunction is noted.    The right ventricular cavity is mild to moderately dilated, D-shaped septum was noted however no assessment of the PA pressure was done right ventricular pressure not estimated.    There is a large (>2cm) circumferential pericardial effusion. There is no evidence of cardiac tamponade.    IVC is dilated and collapsible.    This is a technically limited study.      TELEMETRY:     Atrial fibrillation with ventricular rate in 80s to  90s          I reviewed the patient's new clinical results.    Medication Review:   Current list of medications may not reflect those currently placed in orders that are not signed or are being held.     aspirin, 81 mg, Per G Tube, Daily  chlorhexidine, 15 mL, Mouth/Throat, Q12H  fluconazole, 200 mg, Intravenous, Q24H  Linezolid, 600 mg, Intravenous, Q12H  magic barrier cream, 1 application , Topical, BID  meropenem, 1,000 mg, Intravenous, Q12H  midodrine, 10 mg, Oral, TID AC  [START ON 7/26/2023] trace minerals + multivitamin IVPB, , Intravenous, Once per day on Mon Wed Fri  mupirocin, 1 application , Each Nare, BID  pantoprazole, 40 mg, Intravenous, BID AC  polyethylene glycol, 17 g, Oral, Daily  potassium chloride, 40 mEq, Nasogastric, Daily  rosuvastatin, 20 mg, Oral, Nightly  senna-docusate sodium, 2 tablet, Oral, BID  sodium chloride, 10 mL, Intravenous, Q12H  sodium chloride, 10 mL, Intravenous, Q12H  sodium chloride, 10 mL, Intravenous, Q12H  sodium chloride, 10 mL, Intravenous, Q12H  sodium chloride, 10 mL, Intravenous, Q12H      Adult Central Clinimix TPN,   Adult Central Clinimix TPN, , Last Rate: 25 mL/hr at 07/24/23 1853  fentanyl 10 mcg/mL,  mcg/hr, Last Rate: Stopped (07/25/23 0730)  heparin, 17.85 Units/kg/hr, Last Rate: 17.85 Units/kg/hr (07/25/23 0647)  Pharmacy Consult - Pharmacy to dose,   Pharmacy to Dose Heparin,   phenylephrine, 0.5-3 mcg/kg/min, Last Rate: 0.5 mcg/kg/min (07/25/23 0742)  propofol, 5-50 mcg/kg/min, Last Rate: Stopped (07/25/23 0731)  sodium chloride, 75 mL/hr        acetaminophen    acetaminophen    senna-docusate sodium **AND** polyethylene glycol **AND** bisacodyl **AND** bisacodyl    [DISCONTINUED] senna-docusate sodium **AND** polyethylene glycol **AND** [DISCONTINUED] bisacodyl **AND** bisacodyl    fentaNYL citrate (PF)    lactulose    Magnesium Standard Dose Replacement - Follow Nurse / BPA Driven Protocol    ondansetron    Pharmacy Consult - Pharmacy to dose     Pharmacy to Dose Heparin    Phosphorus Replacement - Follow Nurse / BPA Driven Protocol    Potassium Replacement - Follow Nurse / BPA Driven Protocol    prochlorperazine    simethicone    sodium chloride    sodium chloride    sodium chloride    sodium chloride    sodium chloride    sodium chloride    sodium chloride    sodium chloride    Assessment      Acute respiratory failure with hypoxia and hypercapnia, multifactorial etiology including pneumonia, obesity hypoventilation and some degree of heart failure.  Patient is orally intubated and mechanically ventilated.  Attempts are being made to wean her off the ventilator.  Moderate to large size pericardial effusion with no echocardiographic rest of cardiac tamponade on recent echo Doppler study.  Elevated troponin possibly type II non-STEMI from respiratory failure.  Recurrent atrial fibrillation with controlled ventricular response.  Acute kidney injury, improving.  Nephrology is on board.    Plan     Given patient's multiple comorbidities, she is not a good candidate for any of the antiarrhythmics at this time.  We will continue to aim for rate control for now.  IV diuretics per nephrology.  Reevaluate her pericardial effusion with a limited echo Doppler study.    I have discussed the patients findings and my recommendations with patient's daughter at bedside.    Electronically signed by WINDY Roberts, 07/25/23, 10:57 AM EDT.     Electronically signed by Israel Jett MD, 07/25/23, 12:59 PM EDT.      Please note that portions of this note were completed with a voice recognition program.    Please note that portions of this note were copied and has been reviewed and is accurate as of 7/25/2023 .

## 2023-07-25 NOTE — PLAN OF CARE
Goal Outcome Evaluation:           Progress: no change  Outcome Evaluation: Intubated, SAT follows simple commands, failed SBT not pulling volumes, ^RR, Fentanyl given for abdominal pain, gtt restarted, OG LWS dark green/brown, Heparin, phenylephrine, TPN, Tmax 100.1, cooling blanket, Low UOP.         Problem: Fall Injury Risk  Goal: Absence of Fall and Fall-Related Injury  Outcome: Ongoing, Progressing     Problem: Skin Injury Risk Increased  Goal: Skin Health and Integrity  Outcome: Ongoing, Progressing     Problem: Adult Inpatient Plan of Care  Goal: Patient-Specific Goal (Individualized)  Outcome: Ongoing, Progressing     Problem: Adult Inpatient Plan of Care  Goal: Absence of Hospital-Acquired Illness or Injury  Outcome: Ongoing, Progressing     Problem: Adult Inpatient Plan of Care  Goal: Optimal Comfort and Wellbeing  Outcome: Ongoing, Progressing     Problem: Nausea and Vomiting  Goal: Fluid and Electrolyte Balance  Outcome: Ongoing, Progressing     Problem: Adjustment to Illness (Sepsis/Septic Shock)  Goal: Optimal Coping  Outcome: Ongoing, Progressing     Problem: Bleeding (Sepsis/Septic Shock)  Goal: Absence of Bleeding  Outcome: Ongoing, Progressing     Problem: Glycemic Control Impaired (Sepsis/Septic Shock)  Goal: Blood Glucose Level Within Desired Range  Outcome: Ongoing, Progressing     Problem: Infection Progression (Sepsis/Septic Shock)  Goal: Absence of Infection Signs and Symptoms  Outcome: Ongoing, Progressing     Problem: Nutrition Impaired (Sepsis/Septic Shock)  Goal: Optimal Nutrition Intake  Outcome: Ongoing, Progressing

## 2023-07-25 NOTE — PROGRESS NOTES
HEPARIN INFUSION  Judy Lutz is a  63 y.o. female receiving heparin infusion.     Therapy for (VTE/Cardiac):   Cardiac  Patient Dosing Weight: 171 kg  Initial Bolus (Y/N):   N  Any Bolus (Y/N):   Y        Signs or Symptoms of Bleeding: No    Cardiac or Other (Not VTE)   Initial Bolus: 60 units/kg (Max 4,000 units)  Initial rate: 12 units/kg/hr (Max 1,000 units/hr)   Anti Xa Rebolus Infusion Hold time Change infusion Dose (Units/kg/hr) Next Anti Xa or aPTT Level Due   < 0.11 50 Units/kg  (4000 Units Max) None Increase by  3 Units/kg/hr 6 hours   0.11- 0.19 25 Units/kg  (2000 Units Max) None Increase by  2 Units/kg/hr 6 hours   0.2 - 0.29 0 None Increase by  1 Units/kg/hr 6 hours   0.3 - 0.5 0 None No Change 6 hours (after 2 consecutive levels in range check q24h @0700)   0.51 - 0.6 0 None Decrease by  1 Units/kg/hr 6 hours   0.61 - 0.8 0 30 Minutes Decrease by  2 Units/kg/hr 6 hours   0.81 - 1 0 60 Minutes Decrease by  3 Units/kg/hr 6 hours   >1 0 Hold  After Anti Xa less than 0.5 decrease previous rate by  4 Units/kg/hr  Every 2 hours until Anti Xa  less than 0.5 then when infusion restarts in 6 hours       Recommend anti-Xa every 6 hours.     Results from last 7 days   Lab Units 07/25/23  0549 07/24/23  2155 07/24/23  1416 07/24/23  0637 07/24/23  0105 07/23/23  1735 07/23/23  1130 07/23/23  0359   INR   --   --   --   --   --   --  1.11*  --    HEMOGLOBIN g/dL 8.2*  --   --   --  9.9*  --   --  9.2*   HEMATOCRIT % 32.0*  --   --   --  38.7  --   --  35.8   PLATELETS 10*3/mm3 373  --   --   --  441  --   --  495*   HEPARIN ANTI-XA IU/ml 0.16* 0.21* <0.10*   < > 0.32   < > <0.10*  --     < > = values in this interval not displayed.          Date   Time   Anti-Xa Current Rate (Unit/kg/hr) Bolus   (Units) Rate Change   (Unit/kg/hr) New Rate (Unit/kg/hr) Next   Anti-Xa Comments  Pump Check Daily   7/23 1130 < 0.10 5.85 - initial 5.85 1800 Spoke with patients nurse to confirm rate and no initial bolus   7/23 7602  <0.10 5.85 4000 +3 8.85 0100 Spoke with Torey. No s/s of bleeding and no noted stops.    07/23 0130 0.32 8.85 0 0 8.85 0700 Spoke with Nancy (not the patients nurse but took the message). No s/s of bleeding.    7/24 0637 <0.1 8.85 4000 +3 11.85 1400 Discussed rate change and bolus with nurse . No s/s bleeding   7/24 14:16 <0.1 11.85 4000 +3 14.85 2130 Pump check done and discussed rate change  and bolus with nurse Asya   7/24 2300 0.21 14.85 0 +1 15.85 0500 Spoke with Nancy  since pt nurse was with another patient. No s/s of bleeding.    7/25 0640 0.16 15.85 2000 +2 17.85 1300 Spoke to JOHN Weems. Drip wasn't stopped or held any during her shift. No s/s of bleeding                                                                                                                                                                Pharmacy will continue to follow anti-Xa results and monitor for signs and symptoms of bleeding or thrombosis.    Thank you,  Pantera Durbin, PharmD  06:44 EDT

## 2023-07-25 NOTE — PROGRESS NOTES
Chief Complaint:  Pulmonary and critical care is following for ventilator management and critical illness management      Subjective    Overnight events reviewed.  All the labs medications ins and outs and vitals reviewed.  Patient remains critically ill.  Remains on vasopressors.  Patient's daughter at bedside.  Case discussed with her and answered her questions to her satisfaction.  Did SAT and SBT.  Patient failed.  Did weaning trial twice and failed both the times.  Even tried SIMV mode.  White count better.  No more fevers.  CT of the chest and abdomen reviewed.  Discussed with team.  MDR done at bedside.  Case discussed with Dr. Rodney.  Review of Systems:    Cannot be reviewed as patient is intubated      Vital Signs  Temp:  [98.7 °F (37.1 °C)-99.1 °F (37.3 °C)] 98.9 °F (37.2 °C)  Heart Rate:  [] 123  Resp:  [18-36] 36  BP: ()/(46-89) 97/57  FiO2 (%):  [40 %] 40 %  Body mass index is 56.25 kg/m².    Intake/Output Summary (Last 24 hours) at 7/25/2023 1313  Last data filed at 7/25/2023 1152  Gross per 24 hour   Intake 4942.16 ml   Output 1600 ml   Net 3342.16 ml       I/O this shift:  In: 500 [IV Piggyback:500]  Out: -     Physical Exam:   GENERAL APPEARANCE: Intubated.  Appears to be resting comfortably in bed.     HEAD: normocephalic.    EYES: PERRLA.    THROAT: ET tube in place. Oral cavity and pharynx normal. No inflammation, swelling, exudate, or lesions.     Neck: Supple.     CARDIAC: Normal S1 and S2. No S3, S4 or murmurs. Rhythm is regular.     LUNGS: Clear to auscultation and percussion without rales, rhonchi, wheezing or diminished breath sounds.    ABDOMEN: Positive bowel sounds. Soft, nondistended, nontender.     EXTREMITIES: No significant deformity or joint abnormality. No edema. Peripheral pulses intact.    NEUROLOGICAL: Unable to assess due to sedation status.     PSYCHIATRIC: Unable to assess due to sedation status     Results Review:     I reviewed the patient's new clinical  results.  Results from last 7 days   Lab Units 07/25/23  0549 07/24/23  0105 07/23/23  0359   WBC 10*3/mm3 18.22* 26.19* 33.91*   HEMOGLOBIN g/dL 8.2* 9.9* 9.2*   PLATELETS 10*3/mm3 373 441 495*       Results from last 7 days   Lab Units 07/25/23  0549 07/24/23  0105 07/23/23  0359 07/22/23  1711   SODIUM mmol/L 141 139 142  --    POTASSIUM mmol/L 3.8 3.8 3.8 3.6   CHLORIDE mmol/L 95* 94* 95*  --    CO2 mmol/L 33.5* 29.5* 33.6*  --    BUN mg/dL 68* 63* 64*  --    CREATININE mg/dL 2.70* 2.64* 2.74*  --    CALCIUM mg/dL 10.4 9.8 10.2  --    GLUCOSE mg/dL 157* 121* 106*  --    MAGNESIUM mg/dL 2.0 2.0  --  2.1       Lab Results   Component Value Date    INR 1.11 (H) 07/23/2023    INR 1.38 (H) 07/12/2023    PROTIME 14.9 (H) 07/23/2023    PROTIME 17.6 (H) 07/12/2023     Results from last 7 days   Lab Units 07/25/23  0549 07/24/23  0105 07/23/23  0359   ALK PHOS U/L 81 91 93   BILIRUBIN mg/dL 0.6 0.7 0.6   ALT (SGPT) U/L 6 <5 5   AST (SGOT) U/L 17 23 14       Results from last 7 days   Lab Units 07/24/23  0443   PH, ARTERIAL pH units 7.403   PO2 ART mm Hg 69.2*   PCO2, ARTERIAL mm Hg 60.6*   HCO3 ART mmol/L 37.8*       Imaging Results (Last 24 Hours)       Procedure Component Value Units Date/Time    CT Chest Without Contrast Diagnostic [[id number]] Collected: 07/25/23 0909     Updated: 07/25/23 0913    Narrative:      EXAM:    CT Chest Without Intravenous Contrast     EXAM DATE:    7/24/2023 4:39 PM     CLINICAL HISTORY:    Worsening fever with no obvious source of infection; D64.9-Anemia,  unspecified; A41.9-Sepsis, unspecified organism; R65.20-Severe sepsis  without septic shock; J96.01-Acute respiratory failure with hypoxia;  N39.0-Urinary tract infection, site not specified; I50.31-Acute  diastolic (congestive) heart failure     TECHNIQUE:    Axial computed tomography images of the chest without intravenous  contrast.  Sagittal and coronal reformatted images were created and  reviewed.  This CT exam was performed using  one or more of the following  dose reduction techniques:  automated exposure control, adjustment of  the mA and/or kV according to patient size, and/or use of iterative  reconstruction technique.     COMPARISON:    07/19/2023     FINDINGS:    Lungs and pleural spaces:  Bibasilar consolidations have increased  from the previous exam.  Interstitial edema has slightly improved.  Left  upper lobe partially consolidative airspace disease has improved from  the previous exam.  Trace pleural effusions are noted and appear  nonloculated.  No pneumothorax.    Heart:  Massive cardiomegaly is stable.  No significant pericardial  effusion.  No significant coronary artery calcifications.    Bones/joints:  Stable bony structures.  No acute fracture.  No  dislocation.    Soft tissues:  Anasarca is again noted.    Vasculature:  Portions of a right deep line are noted that extend into  the distal SVC.  No thoracic aortic aneurysm.    Lymph nodes:  Unremarkable.  No enlarged lymph nodes.    Liver:  Liver cirrhosis noted.    Intraperitoneal space:  Upper abdominal ascites appear stable.    Tubes, lines and devices:  NG tube extends into the mid stomach  region.  ET tube with tip below level of clavicles and above the barry.       Impression:      1.  Worsening bibasilar consolidations.  2.  Slight improvement in mid and upper lung zone airspace disease when  compared to the previous exam.  3.  Overall minimal improvement in interstitial edema but stable severe  cardiomegaly with trace pleural effusions noted.  4.  Support devices as above.  5.  Persistent anasarca and liver cirrhosis with upper abdominal  ascites.  6.  No pneumothorax. Other nonacute findings as above.     This report was finalized on 7/25/2023 9:11 AM by Dr. Kvng Tijerina MD.       CT Abdomen Pelvis Without Contrast [[id number]] Collected: 07/25/23 0835     Updated: 07/25/23 0839    Narrative:      EXAM:    CT Abdomen and Pelvis Without Intravenous Contrast     EXAM  DATE:    7/24/2023 4:39 PM     CLINICAL HISTORY:    Worsening fever with no obvious source of infection; D64.9-Anemia,  unspecified; A41.9-Sepsis, unspecified organism; R65.20-Severe sepsis  without septic shock; J96.01-Acute respiratory failure with hypoxia;  N39.0-Urinary tract infection, site not specified; I50.31-Acute  diastolic (congestive) heart failure     TECHNIQUE:    Axial computed tomography images of the abdomen and pelvis without  intravenous contrast.  Sagittal and coronal reformatted images were  created and reviewed.  This CT exam was performed using one or more of  the following dose reduction techniques:  automated exposure control,  adjustment of the mA and/or kV according to patient size, and/or use of  iterative reconstruction technique.     COMPARISON:    07/19/2023     FINDINGS:    LUNG BASES:  Left lower lobe consolidative opacity.    HEART:  Cardiomegaly again noted.      ABDOMEN:    LIVER:  Unremarkable.    GALLBLADDER AND BILE DUCTS:  Gallstone.  Gallbladder otherwise  unremarkable.  No ductal dilation.    PANCREAS:  Unremarkable.  No ductal dilation.    SPLEEN:  Unremarkable.  No splenomegaly.    ADRENALS:  Unremarkable.  No mass.    KIDNEYS AND URETERS:  Unremarkable.  No obstructing stones.  No  hydronephrosis.    STOMACH AND BOWEL:  Unremarkable.  No obstruction.  No mucosal  thickening.      PELVIS:    APPENDIX:  No findings to suggest acute appendicitis.    BLADDER:  Unremarkable.  No stones.    REPRODUCTIVE:  Unremarkable as visualized.      ABDOMEN and PELVIS:    INTRAPERITONEAL SPACE:  Moderate ascites.  No free air.    BONES/JOINTS:  Degenerative disc disease throughout the lumbar spine.   Degenerative facet arthropathy throughout the lumbar spine, most  prominent in the lower lumbar spine.  No acute fracture.  No  dislocation.    SOFT TISSUES:  Unremarkable.    VASCULATURE:  Unremarkable.  No abdominal aortic aneurysm.    LYMPH NODES:  Unremarkable.  No enlarged lymph nodes.     TUBES, LINES AND DEVICES:  Nasogastric tube tip in the stomach.       Impression:      1.  Moderate ascites.  2.  Gallstone.  Gallbladder otherwise unremarkable.  3.  Degenerative changes lumbar spine as described.     This report was finalized on 7/25/2023 8:37 AM by Dr. Chaim Mcnulty MD.                    aspirin, 81 mg, Per G Tube, Daily  chlorhexidine, 15 mL, Mouth/Throat, Q12H  fluconazole, 200 mg, Intravenous, Q24H  hydrOXYzine, 25 mg, Oral, Q8H  Linezolid, 600 mg, Intravenous, Q12H  magic barrier cream, 1 application , Topical, BID  meropenem, 1,000 mg, Intravenous, Q12H  midodrine, 10 mg, Oral, TID AC  [START ON 7/26/2023] trace minerals + multivitamin IVPB, , Intravenous, Once per day on Mon Wed Fri  mupirocin, 1 application , Each Nare, BID  pantoprazole, 40 mg, Intravenous, BID AC  polyethylene glycol, 17 g, Oral, Daily  potassium chloride, 40 mEq, Nasogastric, Daily  rosuvastatin, 20 mg, Oral, Nightly  senna-docusate sodium, 2 tablet, Oral, BID  sodium chloride, 10 mL, Intravenous, Q12H  sodium chloride, 10 mL, Intravenous, Q12H  sodium chloride, 10 mL, Intravenous, Q12H  sodium chloride, 10 mL, Intravenous, Q12H  sodium chloride, 10 mL, Intravenous, Q12H      Adult [facility] [employer]imix TPN,   Adult Central Clinimix TPN, , Last Rate: 65 mL/hr at 07/25/23 1102  fentanyl 10 mcg/mL,  mcg/hr, Last Rate: Stopped (07/25/23 0730)  heparin, 17.85 Units/kg/hr, Last Rate: 17.85 Units/kg/hr (07/25/23 1244)  Pharmacy Consult - Pharmacy to dose,   Pharmacy to Dose Heparin,   phenylephrine, 0.5-3 mcg/kg/min, Last Rate: 1.1 mcg/kg/min (07/25/23 1048)  propofol, 5-50 mcg/kg/min, Last Rate: Stopped (07/25/23 0731)  sodium chloride, 40 mL/hr, Last Rate: 40 mL/hr (07/25/23 0838)      Site   Date Value Ref Range Status   07/25/2023 Nurse/Dr Mcbride  Final     Dash's Test   Date Value Ref Range Status   07/24/2023 N/A  Final     pH, Arterial   Date Value Ref Range Status   07/24/2023 7.403 7.350 - 7.450 pH units Final      pCO2, Arterial   Date Value Ref Range Status   07/24/2023 60.6 (H) 35.0 - 45.0 mm Hg Final     Comment:     83 Value above reference range     pO2, Arterial   Date Value Ref Range Status   07/24/2023 69.2 (L) 83.0 - 108.0 mm Hg Final     Comment:     84 Value below reference range     HCO3, Arterial   Date Value Ref Range Status   07/24/2023 37.8 (H) 20.0 - 26.0 mmol/L Final     Comment:     83 Value above reference range     Base Excess, Arterial   Date Value Ref Range Status   07/24/2023 11.4 (H) 0.0 - 2.0 mmol/L Final     O2 Saturation, Arterial   Date Value Ref Range Status   07/24/2023 93.8 (L) 94.0 - 99.0 % Final     Comment:     84 Value below reference range     Hemoglobin, Blood Gas   Date Value Ref Range Status   07/25/2023 9.0 (L) 13.5 - 17.5 g/dL Final     Comment:     84 Value below reference range     Hematocrit, Blood Gas   Date Value Ref Range Status   07/24/2023 28.4 (L) 38.0 - 51.0 % Final     Comment:     84 Value below reference range     Oxyhemoglobin Venous   Date Value Ref Range Status   07/25/2023 61.3 45.0 - 75.0 % Final     Methemoglobin Venous   Date Value Ref Range Status   07/25/2023 0.4 0.0 - 3.0 % Final     Methemoglobin   Date Value Ref Range Status   07/24/2023 0.00 0.00 - 3.00 % Final     Comment:     84 Value below reference range     Carboxyhemoglobin   Date Value Ref Range Status   07/24/2023 2.7 0 - 5 % Final     Carboxyhemoglobin Venous   Date Value Ref Range Status   07/25/2023 2.5 0.0 - 5.0 % Final     CO2 Content   Date Value Ref Range Status   07/25/2023 38.1 (H) 22 - 33 mmol/L Final     Barometric Pressure for Blood Gas   Date Value Ref Range Status   07/25/2023 730 mmHg Final     Modality   Date Value Ref Range Status   07/25/2023 Ventilator  Final     FIO2   Date Value Ref Range Status   07/25/2023 40 % Final     udy Result    Narrative & Impression   CLINICAL HISTORY: Follow-up respiratory failure.     COMPARISON: 7/20/2023.     TECHNIQUE: Single portable upright AP  view of the chest.     FINDINGS: Endotracheal tube tip is 1.9 cm above the barry.  Right  internal jugular vein central venous catheter tip is in the lower SVC.   Nasogastric tube tip is in the stomach.     Heart size remains enlarged.  Consolidation at the left lung base is  unchanged.  There is no pneumothorax.  Right lung is clear.  No change  in appearance of the chest compared to 7/20/2023.     IMPRESSION:     SUPPORT LINES AND TUBES IN POSITION.     UNCHANGED RETROCARDIAC OPACITY COMPARED TO 2 DAYS AGO.     This report was finalized on 7/22/2023 9:09 AM by Pily Solares MD.          Medication Review:     Assessment & Plan     Neuro-SAT and SBT were done.  Patient failed.  Tidal volumes were low.  Followed some commands.  We will start Precedex if gets anxious and respiratory rate goes up.  Plan discussed with patient's nurse.        Respiratory-  hypoxic respiratory failure-oxygen requirements reviewed.  Continue with a PEEP of 8.  Latest ABG reviewed.  We will titrate FiO2 to maintain saturation 88 to 92%.  CT chest reviewed.  White count better.  No more fevers.  If patient spikes a fever again will do a bronchoscopy to get specific cultures.  Peak and plateau pressures reviewed and are within normal limits.  Tried SBT twice.  Failed.  Trial volumes were low and respiratory rate was high.    We will try again tomorrow  Vent settings   Vent Settings          Resp Rate (Set): 18  Pressure Support (cm H2O): 10 cm H20  FiO2 (%): 40 %  PEEP/CPAP (cm H2O): 8 cm H20    Minute Ventilation (L/min) (Obs): 11.6 L/min  Resp Rate (Observed) Vent: 26     I:E Ratio (Obs): 1:3.2    PIP Observed (cm H2O): 44 cm H2O          VAP- precautions  Head end - 30 %  Mouth care   DVt prophylaxis    Morbid obesity- overall complicates the care and prognosis    Cardiology- hemodynamically -unstable   SHOCK- Adjusted vasopressors to maintain MAP more than 65 mm hg   Continue to monitor HR- rate and rhythm, BP   If starts tolerating  orally.  Will start on midodrine.  Vasopressor requirements better  Results for orders placed during the hospital encounter of 07/12/23    Adult Transthoracic Echo Limited W/ Cont if Necessary Per Protocol    Interpretation Summary    Left ventricular systolic function is normal. Left ventricular ejection fraction appears to be 66 - 70%.    Left ventricular diastolic dysfunction is noted.    The right ventricular cavity is mild to moderately dilated, D-shaped septum was noted however no assessment of the PA pressure was done right ventricular pressure not estimated.    There is a large (>2cm) circumferential pericardial effusion. There is no evidence of cardiac tamponade.    IVC is dilated and collapsible.    This is a technically limited study.      Nephrology- Cr BUN stable  I/O- reviewed   Nephrology on case   Treatment plan reviewed.    GI- PPI prophylaxis  Continue current treatment.  CT of the abdomen and pelvis reviewed.  Continue TPN    Heme- CBC  Hb- transfuse for hb less than 7 - received 2 units of blood  Platelet-   WBC- high but better- ID on case     ID  Culture  And Antibiotics reviewed   Micro reviewed   Continues to spike fever - will get CT abd ct chest     Endocrinology- Maintain Blood sugar 140 -180      Electrolytes-   Mag phos       DVT prophylaxis- cont     Bed side rounds were done with RT and patient's nurse. All the lab and clinical findings were discussed with them and plan was also discussed in great detail.  Critically ill with shock sepsis and respiratory failure  Adjusted drips and vent settings  Cc 32 mins   Family member present- daughter       Symptomatic anemia               Jose Antonio Hurst MD  07/25/23  13:13 EDT

## 2023-07-25 NOTE — PROGRESS NOTES
PROGRESS NOTE         Patient Identification:  Name:  Judy Lutz  Age:  63 y.o.  Sex:  female  :  1960  MRN:  [id number]  Visit Number:  [id number]  Primary Care Provider:  Provider, No Known         LOS: 12 days       ----------------------------------------------------------------------------------------------------------------------  Subjective       Chief Complaints:    Shortness of Breath        Interval History:      Patient remains intubated at 40% FiO2 this morning.  Daughter and primary RN at bedside this morning.  Currently awakening off of sedation.  Opens eyes but not currently following commands.  Lungs clear to auscultation bilaterally.  Abdomen soft, nontender.  No reported diarrhea.  Afebrile.  WBC improved at 18.22.  CRP elevated 21.23.  Procalcitonin elevated at 1.18.  Lactic acid elevated 2.1.  Respiratory panel PCR from 2023 negative.  Blood cultures from 2023 currently in process.  Respiratory culture from 2023 currently in process.  CT of the chest from 2023 reports worsening bibasilar consolidations, slight improvement in mid and upper lung zone airspace disease, overall minimal improvement in interstitial edema but stable severe cardiomegaly with trace pleural effusions, persistent anasarca and liver cirrhosis with upper abdominal ascites.  CT of the abdomen pelvis reports moderate ascites, gallstone, degenerative changes of the lumbar spine.    Review of Systems:    Unable to obtain.  Intubated and sedated.    ----------------------------------------------------------------------------------------------------------------------      Objective       Current Hospital Meds:  aspirin, 81 mg, Per G Tube, Daily  chlorhexidine, 15 mL, Mouth/Throat, Q12H  fluconazole, 200 mg, Intravenous, Q24H  Linezolid, 600 mg, Intravenous, Q12H  magic barrier cream, 1 application , Topical, BID  meropenem, 1,000 mg, Intravenous, Q12H  midodrine, 10 mg, Oral, TID  AC  [START ON 7/26/2023] trace minerals + multivitamin IVPB, , Intravenous, Once per day on Mon Wed Fri  mupirocin, 1 application , Each Nare, BID  pantoprazole, 40 mg, Intravenous, BID AC  polyethylene glycol, 17 g, Oral, Daily  potassium chloride, 40 mEq, Nasogastric, Daily  rosuvastatin, 20 mg, Oral, Nightly  senna-docusate sodium, 2 tablet, Oral, BID  sodium chloride, 10 mL, Intravenous, Q12H  sodium chloride, 10 mL, Intravenous, Q12H  sodium chloride, 10 mL, Intravenous, Q12H  sodium chloride, 10 mL, Intravenous, Q12H  sodium chloride, 10 mL, Intravenous, Q12H      Adult [facility] TPN,   Adult Central [employer] TPN, , Last Rate: 65 mL/hr at 07/25/23 1102  fentanyl 10 mcg/mL,  mcg/hr, Last Rate: Stopped (07/25/23 0730)  heparin, 17.85 Units/kg/hr, Last Rate: 17.85 Units/kg/hr (07/25/23 0647)  Pharmacy Consult - Pharmacy to dose,   Pharmacy to Dose Heparin,   phenylephrine, 0.5-3 mcg/kg/min, Last Rate: 1.1 mcg/kg/min (07/25/23 1048)  propofol, 5-50 mcg/kg/min, Last Rate: Stopped (07/25/23 0731)  sodium chloride, 40 mL/hr, Last Rate: 40 mL/hr (07/25/23 0838)    ----------------------------------------------------------------------------------------------------------------------    Vital Signs:  Temp:  [98.7 °F (37.1 °C)-99.1 °F (37.3 °C)] 99.1 °F (37.3 °C)  Heart Rate:  [] 99  Resp:  [18-25] 22  BP: ()/(46-98) 83/52  FiO2 (%):  [40 %] 40 %  Mean Arterial Pressure (Non-Invasive) for the past 24 hrs (Last 3 readings):   Noninvasive MAP (mmHg)   07/25/23 1048 59   07/25/23 1015 79   07/25/23 1000 71       SpO2 Percentage    07/25/23 0945 07/25/23 1000 07/25/23 1015   SpO2: 98% 94% 98%     SpO2:  [93 %-100 %] 98 %  on   ;   Device (Oxygen Therapy): ventilator    Body mass index is 56.25 kg/m².  Wt Readings from Last 3 Encounters:   07/25/23 (!) 178 kg (392 lb)        Intake/Output Summary (Last 24 hours) at 7/25/2023 1127  Last data filed at 7/25/2023 1047  Gross per 24 hour   Intake 5283.83 ml    Output 1600 ml   Net 3683.83 ml       Adult [facility] TPN (and additional linked orders)  NPO Diet NPO Type: Other (see comments)  ----------------------------------------------------------------------------------------------------------------------      Physical Exam:    Constitutional: Chronically ill-appearing.  Currently intubated and sedated at 40% FiO2.  Daughter at bedside.   Neck: Unable to assess neck rigidity.  CVC to right IJ with no signs of infection.  Cardiovascular:  Normal rate, regular rhythm and normal heart sounds with no murmur. No edema.  Pulmonary/Chest: Lungs clear to auscultation bilaterally.  Intubated and sedated.  Abdominal:  Soft.  Bowel sounds are normal.  Positive distension and no tenderness.   Musculoskeletal:  No edema, no tenderness, and no deformity.  No swelling or redness of joints.  Neurological: Intubated. Opening eyes this morning.   Skin:  Skin is warm and dry.  No rash noted.  No pallor.  Peripheral IVs to the bilateral hands with no evidence of infection or phlebitis.  Psychiatric: Unable to assess.        ----------------------------------------------------------------------------------------------------------------------                Results from last 7 days   Lab Units 07/24/23  0443   PH, ARTERIAL pH units 7.403   PO2 ART mm Hg 69.2*   PCO2, ARTERIAL mm Hg 60.6*   HCO3 ART mmol/L 37.8*       Results from last 7 days   Lab Units 07/25/23  0549 07/24/23  2155 07/24/23  1547 07/24/23  0411 07/24/23  0105 07/23/23  1130 07/23/23  0359 07/19/23  1545 07/19/23  0207   CRP mg/dL 21.23*  --   --   --  20.36*  --   --   --  22.62*   LACTATE mmol/L 2.1* 2.6* 2.5*   < > 2.7*  --   --   --   --    WBC 10*3/mm3 18.22*  --   --   --  26.19*  --  33.91*   < > 27.03*   HEMOGLOBIN g/dL 8.2*  --   --   --  9.9*  --  9.2*   < > 7.2*   HEMATOCRIT % 32.0*  --   --   --  38.7  --  35.8   < > 30.9*   MCV fL 86.3  --   --   --  85.2  --  83.8   < > 83.7   MCHC g/dL 25.6*  --   --    --  25.6*  --  25.7*   < > 23.3*   PLATELETS 10*3/mm3 373  --   --   --  441  --  495*   < > 592*   INR   --   --   --   --   --  1.11*  --   --   --     < > = values in this interval not displayed.       Results from last 7 days   Lab Units 07/25/23  0549 07/24/23  0105 07/23/23  0359 07/22/23  1711   SODIUM mmol/L 141 139 142  --    POTASSIUM mmol/L 3.8 3.8 3.8 3.6   MAGNESIUM mg/dL 2.0 2.0  --  2.1   CHLORIDE mmol/L 95* 94* 95*  --    CO2 mmol/L 33.5* 29.5* 33.6*  --    BUN mg/dL 68* 63* 64*  --    CREATININE mg/dL 2.70* 2.64* 2.74*  --    CALCIUM mg/dL 10.4 9.8 10.2  --    GLUCOSE mg/dL 157* 121* 106*  --    ALBUMIN g/dL 2.3* 2.0* 2.1*  --    BILIRUBIN mg/dL 0.6 0.7 0.6  --    ALK PHOS U/L 81 91 93  --    AST (SGOT) U/L 17 23 14  --    ALT (SGPT) U/L 6 <5 5  --      Estimated Creatinine Clearance: 38.7 mL/min (A) (by C-G formula based on SCr of 2.7 mg/dL (H)).  No results found for: AMMONIA      Glucose   Date/Time Value Ref Range Status   07/25/2023 0527 165 (H) 70 - 130 mg/dL Final     Comment:     Meter: [id number] : [id number] DANTE PELLETIER   07/25/2023 0001 130 70 - 130 mg/dL Final     Comment:     Meter: [id number] : [id number] JEN VARGAS   07/24/2023 1809 111 70 - 130 mg/dL Final     Comment:     Meter: [id number] : [id number] NIVIA RAINEY       Lab Results   Component Value Date    HGBA1C 5.80 (H) 07/13/2023     Lab Results   Component Value Date    TSH 0.619 07/15/2023       Blood Culture   Date Value Ref Range Status   07/12/2023 No growth at 5 days  Final   07/12/2023 No growth at 5 days  Final     Urine Culture   Date Value Ref Range Status   07/15/2023 >100,000 CFU/mL Escherichia coli ESBL (A)  Final     Comment:       Consider infectious disease consult.  Susceptibility results may not correlate to clinical outcomes.   07/12/2023 >100,000 CFU/mL Escherichia coli ESBL (A)  Final     Comment:       Consider infectious disease consult.  Susceptibility results may not correlate to  clinical outcomes.   07/12/2023 (A)  Final    >100,000 CFU/mL Klebsiella pneumoniae ssp pneumoniae     No results found for: WOUNDCX  No results found for: STOOLCX  No results found for: RESPCX  Pain Management Panel  More data may exist         Latest Ref Rng & Units 7/19/2023 7/14/2023   Pain Management Panel   Creatinine, Urine mg/dL 139.1  8.5    ----------------------------------------------------------------------------------------------------------------------  Imaging Results (Last 24 Hours)       Procedure Component Value Units Date/Time    CT Chest Without Contrast Diagnostic [[id number]] Collected: 07/25/23 0909     Updated: 07/25/23 0913    Narrative:      EXAM:    CT Chest Without Intravenous Contrast     EXAM DATE:    7/24/2023 4:39 PM     CLINICAL HISTORY:    Worsening fever with no obvious source of infection; D64.9-Anemia,  unspecified; A41.9-Sepsis, unspecified organism; R65.20-Severe sepsis  without septic shock; J96.01-Acute respiratory failure with hypoxia;  N39.0-Urinary tract infection, site not specified; I50.31-Acute  diastolic (congestive) heart failure     TECHNIQUE:    Axial computed tomography images of the chest without intravenous  contrast.  Sagittal and coronal reformatted images were created and  reviewed.  This CT exam was performed using one or more of the following  dose reduction techniques:  automated exposure control, adjustment of  the mA and/or kV according to patient size, and/or use of iterative  reconstruction technique.     COMPARISON:    07/19/2023     FINDINGS:    Lungs and pleural spaces:  Bibasilar consolidations have increased  from the previous exam.  Interstitial edema has slightly improved.  Left  upper lobe partially consolidative airspace disease has improved from  the previous exam.  Trace pleural effusions are noted and appear  nonloculated.  No pneumothorax.    Heart:  Massive cardiomegaly is stable.  No significant pericardial  effusion.  No significant  coronary artery calcifications.    Bones/joints:  Stable bony structures.  No acute fracture.  No  dislocation.    Soft tissues:  Anasarca is again noted.    Vasculature:  Portions of a right deep line are noted that extend into  the distal SVC.  No thoracic aortic aneurysm.    Lymph nodes:  Unremarkable.  No enlarged lymph nodes.    Liver:  Liver cirrhosis noted.    Intraperitoneal space:  Upper abdominal ascites appear stable.    Tubes, lines and devices:  NG tube extends into the mid stomach  region.  ET tube with tip below level of clavicles and above the barry.       Impression:      1.  Worsening bibasilar consolidations.  2.  Slight improvement in mid and upper lung zone airspace disease when  compared to the previous exam.  3.  Overall minimal improvement in interstitial edema but stable severe  cardiomegaly with trace pleural effusions noted.  4.  Support devices as above.  5.  Persistent anasarca and liver cirrhosis with upper abdominal  ascites.  6.  No pneumothorax. Other nonacute findings as above.     This report was finalized on 7/25/2023 9:11 AM by Dr. Kvng Tijerina MD.       CT Abdomen Pelvis Without Contrast [[id number]] Collected: 07/25/23 0835     Updated: 07/25/23 0839    Narrative:      EXAM:    CT Abdomen and Pelvis Without Intravenous Contrast     EXAM DATE:    7/24/2023 4:39 PM     CLINICAL HISTORY:    Worsening fever with no obvious source of infection; D64.9-Anemia,  unspecified; A41.9-Sepsis, unspecified organism; R65.20-Severe sepsis  without septic shock; J96.01-Acute respiratory failure with hypoxia;  N39.0-Urinary tract infection, site not specified; I50.31-Acute  diastolic (congestive) heart failure     TECHNIQUE:    Axial computed tomography images of the abdomen and pelvis without  intravenous contrast.  Sagittal and coronal reformatted images were  created and reviewed.  This CT exam was performed using one or more of  the following dose reduction techniques:  automated exposure  control,  adjustment of the mA and/or kV according to patient size, and/or use of  iterative reconstruction technique.     COMPARISON:    07/19/2023     FINDINGS:    LUNG BASES:  Left lower lobe consolidative opacity.    HEART:  Cardiomegaly again noted.      ABDOMEN:    LIVER:  Unremarkable.    GALLBLADDER AND BILE DUCTS:  Gallstone.  Gallbladder otherwise  unremarkable.  No ductal dilation.    PANCREAS:  Unremarkable.  No ductal dilation.    SPLEEN:  Unremarkable.  No splenomegaly.    ADRENALS:  Unremarkable.  No mass.    KIDNEYS AND URETERS:  Unremarkable.  No obstructing stones.  No  hydronephrosis.    STOMACH AND BOWEL:  Unremarkable.  No obstruction.  No mucosal  thickening.      PELVIS:    APPENDIX:  No findings to suggest acute appendicitis.    BLADDER:  Unremarkable.  No stones.    REPRODUCTIVE:  Unremarkable as visualized.      ABDOMEN and PELVIS:    INTRAPERITONEAL SPACE:  Moderate ascites.  No free air.    BONES/JOINTS:  Degenerative disc disease throughout the lumbar spine.   Degenerative facet arthropathy throughout the lumbar spine, most  prominent in the lower lumbar spine.  No acute fracture.  No  dislocation.    SOFT TISSUES:  Unremarkable.    VASCULATURE:  Unremarkable.  No abdominal aortic aneurysm.    LYMPH NODES:  Unremarkable.  No enlarged lymph nodes.    TUBES, LINES AND DEVICES:  Nasogastric tube tip in the stomach.       Impression:      1.  Moderate ascites.  2.  Gallstone.  Gallbladder otherwise unremarkable.  3.  Degenerative changes lumbar spine as described.     This report was finalized on 7/25/2023 8:37 AM by Dr. Chaim Mcnulty MD.               ----------------------------------------------------------------------------------------------------------------------    Pertinent Infectious Disease Results      Urine culture collected on 7/15/2023 showed growth of over 100,000 colonies of ESBL E. Coli. Blood cultures from 7/21/2023 show no growth thus far.  Respiratory culture from  7/20/2023 finalized as Candida albicans.  Urine culture from 7/20/2023 finalized as 50,000 colonies of Candida albicans.    Assessment/Plan       Assessment     Sepsis  ESBL UTI  Pneumonia      Plan      Patient remains intubated at 40% FiO2 this morning.  Daughter and primary RN at bedside this morning.  Currently awakening off of sedation.  Opens eyes but not currently following commands.  Lungs clear to auscultation bilaterally.  Abdomen soft, nontender.  No reported diarrhea.  Afebrile.  WBC improved at 18.22.  CRP elevated 21.23.  Procalcitonin elevated at 1.18.  Lactic acid elevated 2.1.  Respiratory panel PCR from 7/24/2023 negative.  Blood cultures from 7/24/2023 currently in process.  Respiratory culture from 7/24/2023 currently in process.  CT of the chest from 7/25/2023 reports worsening bibasilar consolidations, slight improvement in mid and upper lung zone airspace disease, overall minimal improvement in interstitial edema but stable severe cardiomegaly with trace pleural effusions, persistent anasarca and liver cirrhosis with upper abdominal ascites.  CT of the abdomen pelvis reports moderate ascites, gallstone, degenerative changes of the lumbar spine.    As fever has resolved and leukocytosis continues to improve we will continue current antibiotic regimen of meropenem, linezolid and fluconazole.  We will continue to monitor recent cultures and adjust antibiotic therapy as needed.  We will continue to follow closely.      ANTIMICROBIAL THERAPY    fluconazole - 200-0.9 MG/100ML-%  Linezolid - 600 MG/300ML  meropenem (MERREM) 1gm IVPB in 100ml NS (VTB)     Code Status:   Code Status and Medical Interventions:   Ordered at: 07/13/23 0152     Code Status (Patient has no pulse and is not breathing):    CPR (Attempt to Resuscitate)     Medical Interventions (Patient has pulse or is breathing):    Full Support       WINDY Victor  07/25/23  11:27 EDT

## 2023-07-25 NOTE — PROGRESS NOTES
HEPARIN INFUSION  Judy Lutz is a  63 y.o. female receiving heparin infusion.     Therapy for (VTE/Cardiac):   Cardiac  Patient Dosing Weight: 171 kg  Initial Bolus (Y/N):   N  Any Bolus (Y/N):   Y        Signs or Symptoms of Bleeding: No    Cardiac or Other (Not VTE)   Initial Bolus: 60 units/kg (Max 4,000 units)  Initial rate: 12 units/kg/hr (Max 1,000 units/hr)   Anti Xa Rebolus Infusion Hold time Change infusion Dose (Units/kg/hr) Next Anti Xa or aPTT Level Due   < 0.11 50 Units/kg  (4000 Units Max) None Increase by  3 Units/kg/hr 6 hours   0.11- 0.19 25 Units/kg  (2000 Units Max) None Increase by  2 Units/kg/hr 6 hours   0.2 - 0.29 0 None Increase by  1 Units/kg/hr 6 hours   0.3 - 0.5 0 None No Change 6 hours (after 2 consecutive levels in range check q24h @0700)   0.51 - 0.6 0 None Decrease by  1 Units/kg/hr 6 hours   0.61 - 0.8 0 30 Minutes Decrease by  2 Units/kg/hr 6 hours   0.81 - 1 0 60 Minutes Decrease by  3 Units/kg/hr 6 hours   >1 0 Hold  After Anti Xa less than 0.5 decrease previous rate by  4 Units/kg/hr  Every 2 hours until Anti Xa  less than 0.5 then when infusion restarts in 6 hours       Recommend anti-Xa every 6 hours.     Results from last 7 days   Lab Units 07/25/23  0549 07/24/23  2155 07/24/23  1416 07/24/23  0637 07/24/23  0105 07/23/23  1735 07/23/23  1130 07/23/23  0359   INR   --   --   --   --   --   --  1.11*  --    HEMOGLOBIN g/dL 8.2*  --   --   --  9.9*  --   --  9.2*   HEMATOCRIT % 32.0*  --   --   --  38.7  --   --  35.8   PLATELETS 10*3/mm3 373  --   --   --  441  --   --  495*   HEPARIN ANTI-XA IU/ml 0.16* 0.21* <0.10*   < > 0.32   < > <0.10*  --     < > = values in this interval not displayed.          Date   Time   Anti-Xa Current Rate (Unit/kg/hr) Bolus   (Units) Rate Change   (Unit/kg/hr) New Rate (Unit/kg/hr) Next   Anti-Xa Comments  Pump Check Daily   7/23 1130 < 0.10 5.85 - initial 5.85 1800 Spoke with patients nurse to confirm rate and no initial bolus   7/23 2442  <0.10 5.85 4000 +3 8.85 0100 Spoke with Torey. No s/s of bleeding and no noted stops.    07/23 0130 0.32 8.85 0 0 8.85 0700 Spoke with Nancy (not the patients nurse but took the message). No s/s of bleeding.    7/24 0637 <0.1 8.85 4000 +3 11.85 1400 Discussed rate change and bolus with nurse . No s/s bleeding   7/24 14:16 <0.1 11.85 4000 +3 14.85 2130 Pump check done and discussed rate change  and bolus with nurse Asya   7/24 2300 0.21 14.85 0 +1 15.85 0500 Spoke with Nancy  since pt nurse was with another patient. No s/s of bleeding.    7/25 0640 0.16 15.85 2000 +2 17.85 1300 Spoke to JOHN Weems. Drip wasn't stopped or held any during her shift. No s/s of bleeding    7/25 1242 0.25 17.85 0 +1 18.85 2030 Spoke to JOHN Saab , increase rate , no bolus                                                                                                                                                    Pharmacy will continue to follow anti-Xa results and monitor for signs and symptoms of bleeding or thrombosis.    Thank you,

## 2023-07-25 NOTE — PLAN OF CARE
Goal Outcome Evaluation:  Plan of Care Reviewed With: patient        Progress: no change  Outcome Evaluation: Remains intubated/sedated. Fentanyl, propofol, heparin, and tatum infusing. TPN continued. OG remains to L[facility]. VSS at this time. Care ongoing.

## 2023-07-25 NOTE — PROGRESS NOTES
Neurology Progress Note       Chief Complaint:  AMS    Subjective    Subjective     Subjective:  No acute events overnight.  Patient seen during sedation vacation this am, still intubated and on mechanical ventilator.  She was awake and seemed alert, she would shake her head yes/no in response to her daughter speaking to her.  Followed simple commands.      Review of Systems   Unable to perform ROS: Intubated          Objective    Objective      Temp:  [98.7 °F (37.1 °C)-99.1 °F (37.3 °C)] 99.1 °F (37.3 °C)  Heart Rate:  [] 99  Resp:  [18-25] 22  BP: ()/(46-98) 83/52  FiO2 (%):  [40 %] 40 %        Neurological Exam  Mental Status  Awake and alert. Patient is nonverbal.  Patient intubated on mechanical ventilator with sedation held.    Cranial Nerves  CN III, IV, VI: Pupils equal round and reactive to light bilaterally.  CN VII: No obvious facial droop.  CN IX, X:  Right: Gag is reduced.  Left: Gag is reduced.    Motor  Normal muscle bulk throughout. Decreased muscle tone. No abnormal involuntary movements.  Followed simple commands, squeeze finger/wiggle toes.    Physical Exam  Vitals and nursing note reviewed.   Constitutional:       General: She is awake.      Appearance: She is obese. She is ill-appearing.   HENT:      Head: Normocephalic.      Mouth/Throat:      Comments: ET tube in place  Eyes:      Pupils: Pupils are equal, round, and reactive to light.   Cardiovascular:      Rate and Rhythm: Normal rate.   Pulmonary:      Comments: Intubated on ventilator  Skin:     General: Skin is warm and dry.   Neurological:      Mental Status: She is alert.       Hospital Meds:  Scheduled- aspirin, 81 mg, Per G Tube, Daily  chlorhexidine, 15 mL, Mouth/Throat, Q12H  fluconazole, 200 mg, Intravenous, Q24H  Linezolid, 600 mg, Intravenous, Q12H  magic barrier cream, 1 application , Topical, BID  meropenem, 1,000 mg, Intravenous, Q12H  midodrine, 10 mg, Oral, TID AC  [START ON 7/26/2023] trace minerals +  multivitamin IVPB, , Intravenous, Once per day on Mon Wed Fri  mupirocin, 1 application , Each Nare, BID  pantoprazole, 40 mg, Intravenous, BID AC  polyethylene glycol, 17 g, Oral, Daily  potassium chloride, 40 mEq, Nasogastric, Daily  rosuvastatin, 20 mg, Oral, Nightly  senna-docusate sodium, 2 tablet, Oral, BID  sodium chloride, 10 mL, Intravenous, Q12H  sodium chloride, 10 mL, Intravenous, Q12H  sodium chloride, 10 mL, Intravenous, Q12H  sodium chloride, 10 mL, Intravenous, Q12H  sodium chloride, 10 mL, Intravenous, Q12H      Infusions- Adult Central Clinimix TPN,   Adult Central Clinimix TPN, , Last Rate: 65 mL/hr at 07/25/23 1102  fentanyl 10 mcg/mL,  mcg/hr, Last Rate: Stopped (07/25/23 0730)  heparin, 17.85 Units/kg/hr, Last Rate: 17.85 Units/kg/hr (07/25/23 0647)  Pharmacy Consult - Pharmacy to dose,   Pharmacy to Dose Heparin,   phenylephrine, 0.5-3 mcg/kg/min, Last Rate: 1.1 mcg/kg/min (07/25/23 1048)  propofol, 5-50 mcg/kg/min, Last Rate: Stopped (07/25/23 0731)  sodium chloride, 40 mL/hr, Last Rate: 40 mL/hr (07/25/23 0838)       PRNs-   acetaminophen    acetaminophen    artificial tears    senna-docusate sodium **AND** polyethylene glycol **AND** bisacodyl **AND** bisacodyl    [DISCONTINUED] senna-docusate sodium **AND** polyethylene glycol **AND** [DISCONTINUED] bisacodyl **AND** bisacodyl    fentaNYL citrate (PF)    lactulose    Magnesium Standard Dose Replacement - Follow Nurse / BPA Driven Protocol    ondansetron    Pharmacy Consult - Pharmacy to dose    Pharmacy to Dose Heparin    Phosphorus Replacement - Follow Nurse / BPA Driven Protocol    Potassium Replacement - Follow Nurse / BPA Driven Protocol    prochlorperazine    simethicone    sodium chloride    sodium chloride    sodium chloride    sodium chloride    sodium chloride    sodium chloride    sodium chloride    sodium chloride    Results Review:    I reviewed the patient's new clinical results.    Imaging Results (Last 24 Hours)        Procedure Component Value Units Date/Time    CT Chest Without Contrast Diagnostic [[id number]] Collected: 07/25/23 0909     Updated: 07/25/23 0913    Narrative:      EXAM:    CT Chest Without Intravenous Contrast     EXAM DATE:    7/24/2023 4:39 PM     CLINICAL HISTORY:    Worsening fever with no obvious source of infection; D64.9-Anemia,  unspecified; A41.9-Sepsis, unspecified organism; R65.20-Severe sepsis  without septic shock; J96.01-Acute respiratory failure with hypoxia;  N39.0-Urinary tract infection, site not specified; I50.31-Acute  diastolic (congestive) heart failure     TECHNIQUE:    Axial computed tomography images of the chest without intravenous  contrast.  Sagittal and coronal reformatted images were created and  reviewed.  This CT exam was performed using one or more of the following  dose reduction techniques:  automated exposure control, adjustment of  the mA and/or kV according to patient size, and/or use of iterative  reconstruction technique.     COMPARISON:    07/19/2023     FINDINGS:    Lungs and pleural spaces:  Bibasilar consolidations have increased  from the previous exam.  Interstitial edema has slightly improved.  Left  upper lobe partially consolidative airspace disease has improved from  the previous exam.  Trace pleural effusions are noted and appear  nonloculated.  No pneumothorax.    Heart:  Massive cardiomegaly is stable.  No significant pericardial  effusion.  No significant coronary artery calcifications.    Bones/joints:  Stable bony structures.  No acute fracture.  No  dislocation.    Soft tissues:  Anasarca is again noted.    Vasculature:  Portions of a right deep line are noted that extend into  the distal SVC.  No thoracic aortic aneurysm.    Lymph nodes:  Unremarkable.  No enlarged lymph nodes.    Liver:  Liver cirrhosis noted.    Intraperitoneal space:  Upper abdominal ascites appear stable.    Tubes, lines and devices:  NG tube extends into the mid stomach  region.  ET  tube with tip below level of clavicles and above the barry.       Impression:      1.  Worsening bibasilar consolidations.  2.  Slight improvement in mid and upper lung zone airspace disease when  compared to the previous exam.  3.  Overall minimal improvement in interstitial edema but stable severe  cardiomegaly with trace pleural effusions noted.  4.  Support devices as above.  5.  Persistent anasarca and liver cirrhosis with upper abdominal  ascites.  6.  No pneumothorax. Other nonacute findings as above.     This report was finalized on 7/25/2023 9:11 AM by Dr. Kvng Tijerina MD.       CT Abdomen Pelvis Without Contrast [[id number]] Collected: 07/25/23 0835     Updated: 07/25/23 0839    Narrative:      EXAM:    CT Abdomen and Pelvis Without Intravenous Contrast     EXAM DATE:    7/24/2023 4:39 PM     CLINICAL HISTORY:    Worsening fever with no obvious source of infection; D64.9-Anemia,  unspecified; A41.9-Sepsis, unspecified organism; R65.20-Severe sepsis  without septic shock; J96.01-Acute respiratory failure with hypoxia;  N39.0-Urinary tract infection, site not specified; I50.31-Acute  diastolic (congestive) heart failure     TECHNIQUE:    Axial computed tomography images of the abdomen and pelvis without  intravenous contrast.  Sagittal and coronal reformatted images were  created and reviewed.  This CT exam was performed using one or more of  the following dose reduction techniques:  automated exposure control,  adjustment of the mA and/or kV according to patient size, and/or use of  iterative reconstruction technique.     COMPARISON:    07/19/2023     FINDINGS:    LUNG BASES:  Left lower lobe consolidative opacity.    HEART:  Cardiomegaly again noted.      ABDOMEN:    LIVER:  Unremarkable.    GALLBLADDER AND BILE DUCTS:  Gallstone.  Gallbladder otherwise  unremarkable.  No ductal dilation.    PANCREAS:  Unremarkable.  No ductal dilation.    SPLEEN:  Unremarkable.  No splenomegaly.    ADRENALS:   Unremarkable.  No mass.    KIDNEYS AND URETERS:  Unremarkable.  No obstructing stones.  No  hydronephrosis.    STOMACH AND BOWEL:  Unremarkable.  No obstruction.  No mucosal  thickening.      PELVIS:    APPENDIX:  No findings to suggest acute appendicitis.    BLADDER:  Unremarkable.  No stones.    REPRODUCTIVE:  Unremarkable as visualized.      ABDOMEN and PELVIS:    INTRAPERITONEAL SPACE:  Moderate ascites.  No free air.    BONES/JOINTS:  Degenerative disc disease throughout the lumbar spine.   Degenerative facet arthropathy throughout the lumbar spine, most  prominent in the lower lumbar spine.  No acute fracture.  No  dislocation.    SOFT TISSUES:  Unremarkable.    VASCULATURE:  Unremarkable.  No abdominal aortic aneurysm.    LYMPH NODES:  Unremarkable.  No enlarged lymph nodes.    TUBES, LINES AND DEVICES:  Nasogastric tube tip in the stomach.       Impression:      1.  Moderate ascites.  2.  Gallstone.  Gallbladder otherwise unremarkable.  3.  Degenerative changes lumbar spine as described.     This report was finalized on 7/25/2023 8:37 AM by Dr. Chaim Mcnulty MD.             Results for orders placed during the hospital encounter of 07/12/23    Adult Transthoracic Echo Limited W/ Cont if Necessary Per Protocol    Interpretation Summary    Left ventricular systolic function is normal. Left ventricular ejection fraction appears to be 66 - 70%.    Left ventricular diastolic dysfunction is noted.    The right ventricular cavity is mild to moderately dilated, D-shaped septum was noted however no assessment of the PA pressure was done right ventricular pressure not estimated.    There is a large (>2cm) circumferential pericardial effusion. There is no evidence of cardiac tamponade.    IVC is dilated and collapsible.    This is a technically limited study.    EEG    Result Date: 7/19/2023  Diffuse cerebral dysfunction of at least mild degree but nonspecific This finding is most commonly seen with encephalopathy due  to toxic/metabolic cause No evidence for epilepsy is seen This report is transcribed using the Dragon dictation system.      CT Abdomen Pelvis Without Contrast    Result Date: 7/25/2023  1.  Moderate ascites. 2.  Gallstone.  Gallbladder otherwise unremarkable. 3.  Degenerative changes lumbar spine as described.  This report was finalized on 7/25/2023 8:37 AM by Dr. Chaim Mcnulty MD.      CT Head Without Contrast    Result Date: 7/19/2023    Unremarkable exam demonstrating no CT evidence of acute intracranial findings.  This report was finalized on 7/19/2023 10:17 AM by Dr. Kvng Tijerina MD.      CT Chest Without Contrast Diagnostic    Result Date: 7/25/2023  1.  Worsening bibasilar consolidations. 2.  Slight improvement in mid and upper lung zone airspace disease when compared to the previous exam. 3.  Overall minimal improvement in interstitial edema but stable severe cardiomegaly with trace pleural effusions noted. 4.  Support devices as above. 5.  Persistent anasarca and liver cirrhosis with upper abdominal ascites. 6.  No pneumothorax. Other nonacute findings as above.  This report was finalized on 7/25/2023 9:11 AM by Dr. Kvng Tijerina MD.      CT Chest Without Contrast Diagnostic    Result Date: 7/19/2023  1.  Development of bilateral multilobar partially consolidative pneumonia. Combination atelectasis may be considered. 2.  Slight increase in changes of CHF now with interstitial edema. Miniscule nonloculated pleural effusions with stable marked cardiac enlargement. 3.  Questionable decrease in the degree of anasarca with persistent upper abdominal ascites noted. 4.  Other incidental/nonacute findings as above.  This report was finalized on 7/19/2023 10:20 AM by Dr. Kvng Tijerina MD.      XR Chest 1 View    Result Date: 7/24/2023    No acute cardiopulmonary findings identified.  This report was finalized on 7/24/2023 9:41 AM by Dr. Chaim Mcnulty MD.      XR Chest 1 View    Result Date: 7/22/2023   SUPPORT  LINES AND TUBES IN POSITION.  UNCHANGED RETROCARDIAC OPACITY COMPARED TO 2 DAYS AGO.  This report was finalized on 7/22/2023 9:09 AM by Pily Solares MD.      XR Chest 1 View    Result Date: 7/20/2023  1.  Support devices detailed above. No evidence of pneumothorax. 2.  Otherwise stable chest.  This report was finalized on 7/20/2023 3:28 PM by Dr. Kvng Tijerina MD.      XR Chest 1 View    Result Date: 7/20/2023  1.  Support devices as above. 2.  Cardiomegaly and pulmonary vascular congestion. 3.  Left basilar airspace disease.  This report was finalized on 7/20/2023 8:48 AM by Dr. Kvng Tijerina MD.      US Renal Bilateral    Result Date: 7/21/2023  1. Unremarkable sonographic appearance of both kidneys. 2. Small perihepatic ascites.  This report was finalized on 7/21/2023 11:32 AM by Dr. Kvng Tijerina MD.      US Venous Doppler Lower Extremity Bilateral (duplex)    Result Date: 7/20/2023  No DVT in the lower extremities on today's exam.  This report was finalized on 7/20/2023 1:17 PM by Dr. Nicho Reeves MD.      XR Abdomen KUB    Result Date: 7/22/2023   1.  Enteric drain in place with tip in stomach. 2.  Nonobstructive bowel gas pattern. 3.  Levoconvex scoliosis at the lumbar spine.  This report was finalized on 7/22/2023 8:00 PM by Sacha Syed MD.        Lab Results   Component Value Date    HGBA1C 5.80 (H) 07/13/2023      No results found for: CHOL, CHLPL, TRIG, HDL, LDL, LDLDIRECT   Lab Results   Component Value Date    WBC 18.22 (H) 07/25/2023    HGB 8.2 (L) 07/25/2023    HCT 32.0 (L) 07/25/2023    MCV 86.3 07/25/2023     07/25/2023      Lab Results   Component Value Date    GLUCOSE 157 (H) 07/25/2023    BUN 68 (H) 07/25/2023    CREATININE 2.70 (H) 07/25/2023    BCR 25.2 (H) 07/25/2023    K 3.8 07/25/2023    CO2 33.5 (H) 07/25/2023    CALCIUM 10.4 07/25/2023    ALBUMIN 2.3 (L) 07/25/2023    AST 17 07/25/2023    ALT 6 07/25/2023      Lab Results   Component Value Date    TSH 0.619 07/15/2023      Lab Results   Component Value Date    TITELQKN20 1,133 (H) 07/13/2023     Lab Results   Component Value Date    FOLATE 9.36 07/13/2023             Assessment/Plan     Assessment/Plan:  63-year-old white female apparently with no significant medical care prior to admission presented to ED with weakness, fatigue and shortness of breath.  Patient also reports some on and off confusion in a couple days leading up to the hospitalization.  Remained with waxing waning mentation during stay, seem to be worsening since Monday.  Initially being treated for septic UTI, now noted to have pneumonia.  ABG showed worsening gases and she was placed on BiPAP, worsening again this morning and she was intubated, now sedated on mechanical ventilator.  CT head performed 07/19/2023 was negative for any acute changes.    Exam on 07/25/2023, patient on sedation vacation, awake, following simple commands.      EEG showed mild/mod nonspecific generalized slowing, no evidence of epilepsy  Initial CT head negative for acute changes     #Encephalopathy, this was present on admission and felt to be related to her UTI and respiratory failure. Encephalopathy had worsened during her stay but was also noted to be hypercapnic, also pneumonia recently noted on CT chest.  #Acute on chronic hypercapnic respiratory failure  #Pneumonia  #New onset A-fib with RVR        -Continue to wean sedation as tolerated, consider Precedex if possible  -Initial CT head negative for any acute changes  -MRI brain when off vent  -Antibiotics per infectious disease  -Heparin drip per cardiology  -We will continue to follow along peripherally    This was an audio and video enabled telemedicine encounter.  Dr. Rich present for encounter via telemedicine.  Verbal consent taken.       The case was discussed with the patient, and her family at bedside.      WINDY Severino  07/25/23  11:05 EDT

## 2023-07-26 NOTE — PROGRESS NOTES
[facility]  PROGRESS NOTE    Patient information:  Name: Judy Lutz  Age/Sex: 63 y.o. female  :  1960        PCP: Provider, No Known  Attending: Sapphire Lanre Contreras   MRN:  [id number]  Visit Number:  [id number]    LOS:  LOS: 13 days     CODE STATUS:    Code Status and Medical Interventions:   Ordered at: 23 0152     Code Status (Patient has no pulse and is not breathing):    CPR (Attempt to Resuscitate)     Medical Interventions (Patient has pulse or is breathing):    Full Support       PROBLEM LIST:Principal Problem:    Symptomatic anemia      Reason for Cardiology follow-up: Pericardial effusion and cardiomyopathy        Subjective   ADMISSION INFORMATION:  Chief Complaint   Patient presents with    Shortness of Breath       HPI:  Judy Lutz is a 63-year-old female with no reported past medical history.  She presented to [facility] ED on 2023 with complaints of increased fatigue, malaise, shortness of breath, and generalized weakness.  Cardiology was consulted for further evaluation and management of cardiomyopathy and pericardial effusion.      No primary Cardiology noted in her chart.     Interval History:   Patient is in room CCU #4 and was examined by Dr. Rosas.  Telemetry reveals atrial fibrillation 80-90s with occasional PVC.  Xavi and heparin drips infusing.  Patient remains intubated and sedated.  Patient's last recorded blood pressure was 100/68.  Patient's hemoglobin is 8.8 with a platelet count of 459.  Sodium 138, potassium 3.4, chloride 95, CO2 29.9, BUN of 70, and a creatinine of 2.71.  Nephrology is following along with patient.  Updated limited echo planned for today.  Patient is lying in bed resting quietly.  No acute distress noted at this time.  Patient's daughter is at bedside. Patient in lying in bed with her eyes open but she does not track with her eyes. When spoken to, she waves her right hand as  if she is waving hello. Fine motor movement is noted in all extremities when she is spoken to.      Vital Signs  Temp:  [95.5 °F (35.3 °C)-98.9 °F (37.2 °C)] 95.5 °F (35.3 °C)  Heart Rate:  [] 119  Resp:  [18-36] 22  BP: ()/(44-98) 100/68  FiO2 (%):  [40 %] 40 %  Vital Signs (last 72 hrs)         07/23 0700  07/24 0659 07/24 0700 07/25 0659 07/25 0700 07/26 0659 07/26 0700 07/26 0805   Most Recent      Temp (°F) 97.8 -  101.1    98.7 -  101.1    95.5 -  99.1       95.5 (35.3) 07/26 0400    Heart Rate 81 -  125    61 -  134    65 -  123       119 07/26 0645    Resp   18    18 -  25    18 -  36       22 07/26 0636    BP 81/65 -  123/91    71/52 -  137/84    68/55 -  128/82       100/68 07/26 0636    SpO2 (%) 85 -  100    93 -  100    81 -  100       100 07/26 0645          Body mass index is 57.61 kg/m².    Intake/Output Summary (Last 24 hours) at 7/26/2023 0805  Last data filed at 7/26/2023 0634  Gross per 24 hour   Intake 4939.35 ml   Output 955 ml   Net 3984.35 ml       Objective     Physical Exam:      General Appearance:  Intubated and lying in bed with eyes open.  In no acute distress.   Head:    Normocephalic, without obvious abnormality, atraumatic.   Eyes:                          Conjunctivae and sclerae normal, no icterus, no pallor, corneas clear.  He is not tracking with her eyes.   Neck:   No adenopathy, supple, trachea midline, no thyromegaly, no carotid bruit, no JVD.   Lungs:   Intubaed with mechanical ventilation with rhonchi noted on auscultation, respirations regular, even and unlabored.    Heart:    Irregular rhythm and normal rate, normal S1 and S2, no        murmur, no gallop, no rub, no click   Chest Wall:    No abnormalities observed.   Abdomen:     Normal bowel sounds, no masses, no organomegaly, soft nontender, nondistended, no guarding, no rebound tenderness.   Extremities: Sedated, +2  edema, no cyanosis, no  redness. SCUDS in use.  Fine motor movement noted in BUE and BLE  when spoken to.  Patient does wave her right hand as if she is trying to say hello.   Pulses:   Pulses palpable and equal bilaterally.   Skin:   No bleeding, bruising or rash.   Neurologic: Intubated and lying in bed with eyes open.      Results review   Results Review:   Results from last 7 days   Lab Units 07/26/23  0406 07/25/23  0549 07/24/23  0105 07/23/23  0359 07/22/23  0035 07/21/23  0012 07/20/23  0559   WBC 10*3/mm3 24.30* 18.22* 26.19* 33.91* 29.50* 18.78* 18.35*   HEMOGLOBIN g/dL 8.8* 8.2* 9.9* 9.2* 7.9* 7.0* 7.2*   PLATELETS 10*3/mm3 459* 373 441 495* 476* 457* 494*     Results from last 7 days   Lab Units 07/26/23  0406 07/25/23  0549 07/24/23  0105 07/23/23  0359 07/22/23  1711 07/22/23  0035 07/21/23  0012 07/20/23  1740 07/20/23  0559   SODIUM mmol/L 138 141 139 142  --  144 149*  --  150*   POTASSIUM mmol/L 3.4* 3.8 3.8 3.8 3.6 3.7 3.7   < > 3.5   CHLORIDE mmol/L 95* 95* 94* 95*  --  95* 97*  --  97*   CO2 mmol/L 29.9* 33.5* 29.5* 33.6*  --  39.1* 39.8*  --  45.2*   BUN mg/dL 70* 68* 63* 64*  --  69* 60*  --  56*   CREATININE mg/dL 2.71* 2.70* 2.64* 2.74*  --  3.09* 3.06*  --  2.65*   CALCIUM mg/dL 9.8 10.4 9.8 10.2  --  10.4 10.7*  --  11.2*   GLUCOSE mg/dL 220* 157* 121* 106*  --  152* 107*  --  111*   ALT (SGPT) U/L 5 6 <5 5  --  5 6  --  8   AST (SGOT) U/L 11 17 23 14  --  12 12  --  14    < > = values in this interval not displayed.         Lab Results   Component Value Date    PROBNP 35,507.0 (H) 07/18/2023    PROBNP 26,037.0 (H) 07/13/2023     No results found.  Lab Results   Component Value Date    ABSOLUTELUNG 28 07/15/2023     Lab Results   Component Value Date    INR 1.11 (H) 07/23/2023    INR 1.38 (H) 07/12/2023     Lab Results   Component Value Date    MG 1.8 07/26/2023    MG 2.0 07/25/2023    MG 2.0 07/24/2023     Lab Results   Component Value Date    TSH 0.619 07/15/2023      No results found for: CHOL, TRIG, HDL, LDL  Pain Management Panel  More data may exist         Latest Ref Rng  & Units 7/19/2023 7/14/2023   Pain Management Panel   Creatinine, Urine mg/dL 139.1  8.5      Microbiology Results (last 10 days)       Procedure Component Value - Date/Time    Blood Culture - Blood, Arm, Right [[id number]]  (Normal) Collected: 07/24/23 1444    Lab Status: Preliminary result Specimen: Blood from Arm, Right Updated: 07/25/23 1500     Blood Culture No growth at 24 hours    Respiratory Culture - Sputum, ET Suction [[id number]] Collected: 07/24/23 1420    Lab Status: Preliminary result Specimen: Sputum from ET Suction Updated: 07/25/23 1055     Respiratory Culture Culture in progress     Gram Stain Moderate (3+) WBCs seen      Rare (1+) Epithelial cells seen      No organisms seen    Blood Culture - Blood, Arm, Left [[id number]]  (Normal) Collected: 07/24/23 1416    Lab Status: Preliminary result Specimen: Blood from Arm, Left Updated: 07/25/23 1500     Blood Culture No growth at 24 hours    Respiratory Panel PCR w/COVID-19(SARS-CoV-2) ALIVIA/MIRIAM/CRYSTAL/PAD/COR/MAD/TYLER In-House, NP Swab in UTM/VTM, 3-4 HR TAT - Swab, Nasopharynx [[id number]]  (Normal) Collected: 07/24/23 1411    Lab Status: Final result Specimen: Swab from Nasopharynx Updated: 07/24/23 1520     ADENOVIRUS, PCR Not Detected     Coronavirus 229E Not Detected     Coronavirus HKU1 Not Detected     Coronavirus NL63 Not Detected     Coronavirus OC43 Not Detected     COVID19 Not Detected     Human Metapneumovirus Not Detected     Human Rhinovirus/Enterovirus Not Detected     Influenza A PCR Not Detected     Influenza B PCR Not Detected     Parainfluenza Virus 1 Not Detected     Parainfluenza Virus 2 Not Detected     Parainfluenza Virus 3 Not Detected     Parainfluenza Virus 4 Not Detected     RSV, PCR Not Detected     Bordetella pertussis pcr Not Detected     Bordetella parapertussis PCR Not Detected     Chlamydophila pneumoniae PCR Not Detected     Mycoplasma pneumo by PCR Not Detected    Narrative:      In the setting of a positive respiratory panel  with a viral infection PLUS a negative procalcitonin without other underlying concern for bacterial infection, consider observing off antibiotics or discontinuation of antibiotics and continue supportive care. If the respiratory panel is positive for atypical bacterial infection (Bordetella pertussis, Chlamydophila pneumoniae, or Mycoplasma pneumoniae), consider antibiotic de-escalation to target atypical bacterial infection.    Blood Culture - Blood, Arm, Right [[id number]]  (Normal) Collected: 07/21/23 1041    Lab Status: Preliminary result Specimen: Blood from Arm, Right Updated: 07/25/23 1100     Blood Culture No growth at 4 days    Blood Culture - Blood, Arm, Left [[id number]]  (Normal) Collected: 07/21/23 1000    Lab Status: Preliminary result Specimen: Blood from Arm, Left Updated: 07/25/23 1100     Blood Culture No growth at 4 days    Urine Culture - Urine, Urine, Clean Catch [[id number]]  (Abnormal) Collected: 07/20/23 0945    Lab Status: Final result Specimen: Urine, Clean Catch Updated: 07/22/23 1410     Urine Culture 50,000 CFU/mL Candida albicans    Narrative:      Colonization of the urinary tract without infection is common. Treatment is discouraged unless the patient is symptomatic, pregnant, or undergoing an invasive urologic procedure.    Respiratory Culture - Aspirate, ET Suction [[id number]]  (Abnormal) Collected: 07/20/23 0843    Lab Status: Final result Specimen: Aspirate from ET Suction Updated: 07/22/23 1030     Respiratory Culture Moderate growth (3+) Candida albicans      Scant growth (1+) Normal Respiratory Stephanie     Gram Stain Many (4+) WBCs seen      Few (2+) Epithelial cells seen      Mixed stephanie      Yeast with hyphae seen    MRSA Screen, PCR (Inpatient) - Swab, Nares [[id number]]  (Abnormal) Collected: 07/19/23 1306    Lab Status: Final result Specimen: Swab from Nares Updated: 07/19/23 1557     MRSA PCR MRSA Detected    Narrative:      The negative predictive value of this diagnostic test  is high and should only be used to consider de-escalating anti-MRSA therapy. A positive result may indicate colonization with MRSA and must be correlated clinically.           Imaging Results (Last 48 Hours)       Procedure Component Value Units Date/Time    XR Chest 1 View [[id number]] Collected: 07/26/23 0710     Updated: 07/26/23 0713    Narrative:      Procedure: X-ray examination of the chest performed on 07/26/2023.  Single view. Portable technique.     HISTORY: NG tube placement. Confirm position.     COMPARISON: None.     FINDINGS:     NG tube in place with tip to the stomach.  Hypoinflated lungs with central pulmonary vascular congestion.  No free air in the upper abdomen.  Right central vascular catheter with tip to the SVC.  Endotracheal tube noted with tip approximately 3.9 cm above the barry.       Impression:         1.  NG tube in place with tip to the stomach.     This report was finalized on 7/26/2023 7:11 AM by Sacha Syed MD.       CT Chest Without Contrast Diagnostic [[id number]] Collected: 07/25/23 0909     Updated: 07/25/23 0913    Narrative:      EXAM:    CT Chest Without Intravenous Contrast     EXAM DATE:    7/24/2023 4:39 PM     CLINICAL HISTORY:    Worsening fever with no obvious source of infection; D64.9-Anemia,  unspecified; A41.9-Sepsis, unspecified organism; R65.20-Severe sepsis  without septic shock; J96.01-Acute respiratory failure with hypoxia;  N39.0-Urinary tract infection, site not specified; I50.31-Acute  diastolic (congestive) heart failure     TECHNIQUE:    Axial computed tomography images of the chest without intravenous  contrast.  Sagittal and coronal reformatted images were created and  reviewed.  This CT exam was performed using one or more of the following  dose reduction techniques:  automated exposure control, adjustment of  the mA and/or kV according to patient size, and/or use of iterative  reconstruction technique.     COMPARISON:    07/19/2023     FINDINGS:     Lungs and pleural spaces:  Bibasilar consolidations have increased  from the previous exam.  Interstitial edema has slightly improved.  Left  upper lobe partially consolidative airspace disease has improved from  the previous exam.  Trace pleural effusions are noted and appear  nonloculated.  No pneumothorax.    Heart:  Massive cardiomegaly is stable.  No significant pericardial  effusion.  No significant coronary artery calcifications.    Bones/joints:  Stable bony structures.  No acute fracture.  No  dislocation.    Soft tissues:  Anasarca is again noted.    Vasculature:  Portions of a right deep line are noted that extend into  the distal SVC.  No thoracic aortic aneurysm.    Lymph nodes:  Unremarkable.  No enlarged lymph nodes.    Liver:  Liver cirrhosis noted.    Intraperitoneal space:  Upper abdominal ascites appear stable.    Tubes, lines and devices:  NG tube extends into the mid stomach  region.  ET tube with tip below level of clavicles and above the barry.       Impression:      1.  Worsening bibasilar consolidations.  2.  Slight improvement in mid and upper lung zone airspace disease when  compared to the previous exam.  3.  Overall minimal improvement in interstitial edema but stable severe  cardiomegaly with trace pleural effusions noted.  4.  Support devices as above.  5.  Persistent anasarca and liver cirrhosis with upper abdominal  ascites.  6.  No pneumothorax. Other nonacute findings as above.     This report was finalized on 7/25/2023 9:11 AM by Dr. Kvng Tijerina MD.       CT Abdomen Pelvis Without Contrast [[id number]] Collected: 07/25/23 0835     Updated: 07/25/23 0839    Narrative:      EXAM:    CT Abdomen and Pelvis Without Intravenous Contrast     EXAM DATE:    7/24/2023 4:39 PM     CLINICAL HISTORY:    Worsening fever with no obvious source of infection; D64.9-Anemia,  unspecified; A41.9-Sepsis, unspecified organism; R65.20-Severe sepsis  without septic shock; J96.01-Acute respiratory  failure with hypoxia;  N39.0-Urinary tract infection, site not specified; I50.31-Acute  diastolic (congestive) heart failure     TECHNIQUE:    Axial computed tomography images of the abdomen and pelvis without  intravenous contrast.  Sagittal and coronal reformatted images were  created and reviewed.  This CT exam was performed using one or more of  the following dose reduction techniques:  automated exposure control,  adjustment of the mA and/or kV according to patient size, and/or use of  iterative reconstruction technique.     COMPARISON:    07/19/2023     FINDINGS:    LUNG BASES:  Left lower lobe consolidative opacity.    HEART:  Cardiomegaly again noted.      ABDOMEN:    LIVER:  Unremarkable.    GALLBLADDER AND BILE DUCTS:  Gallstone.  Gallbladder otherwise  unremarkable.  No ductal dilation.    PANCREAS:  Unremarkable.  No ductal dilation.    SPLEEN:  Unremarkable.  No splenomegaly.    ADRENALS:  Unremarkable.  No mass.    KIDNEYS AND URETERS:  Unremarkable.  No obstructing stones.  No  hydronephrosis.    STOMACH AND BOWEL:  Unremarkable.  No obstruction.  No mucosal  thickening.      PELVIS:    APPENDIX:  No findings to suggest acute appendicitis.    BLADDER:  Unremarkable.  No stones.    REPRODUCTIVE:  Unremarkable as visualized.      ABDOMEN and PELVIS:    INTRAPERITONEAL SPACE:  Moderate ascites.  No free air.    BONES/JOINTS:  Degenerative disc disease throughout the lumbar spine.   Degenerative facet arthropathy throughout the lumbar spine, most  prominent in the lower lumbar spine.  No acute fracture.  No  dislocation.    SOFT TISSUES:  Unremarkable.    VASCULATURE:  Unremarkable.  No abdominal aortic aneurysm.    LYMPH NODES:  Unremarkable.  No enlarged lymph nodes.    TUBES, LINES AND DEVICES:  Nasogastric tube tip in the stomach.       Impression:      1.  Moderate ascites.  2.  Gallstone.  Gallbladder otherwise unremarkable.  3.  Degenerative changes lumbar spine as described.     This report was  finalized on 7/25/2023 8:37 AM by Dr. Chaim Mcnulty MD.       XR Chest 1 View [[id number]] Collected: 07/24/23 0939     Updated: 07/24/23 0943    Narrative:      EXAM:    XR Chest, 1 View     EXAM DATE:    7/21/2023 6:55 AM     CLINICAL HISTORY:    Respiratory failure; D64.9-Anemia, unspecified; A41.9-Sepsis,  unspecified organism; R65.20-Severe sepsis without septic shock;  J96.01-Acute respiratory failure with hypoxia; N39.0-Urinary tract  infection, site not specified; I50.31-Acute diastolic (congestive) heart  failure     TECHNIQUE:    Frontal view of the chest.     COMPARISON:    07/20/2023     FINDINGS:    LUNGS AND PLEURAL SPACES:  Atelectasis in the lung bases.  No  pneumothorax.    HEART:  Heart size is stable.    MEDIASTINUM:  Unremarkable.    BONES/JOINTS:  Unremarkable.    TUBES, LINES AND DEVICES:  The endotracheal tube (ETT) is in  satisfactory position.  Right internal jugular central venous catheter  tip in the superior vena cava.  Nasogastric tube tip in the stomach.       Impression:        No acute cardiopulmonary findings identified.     This report was finalized on 7/24/2023 9:41 AM by Dr. Chaim Mcnulty MD.               ECHO:  Results for orders placed during the hospital encounter of 07/12/23    Adult Transthoracic Echo Limited W/ Cont if Necessary Per Protocol    Interpretation Summary    Left ventricular systolic function is normal. Left ventricular ejection fraction appears to be 66 - 70%.    Left ventricular diastolic dysfunction is noted.    The right ventricular cavity is mild to moderately dilated, D-shaped septum was noted however no assessment of the PA pressure was done right ventricular pressure not estimated.    There is a large (>2cm) circumferential pericardial effusion. There is no evidence of cardiac tamponade.    IVC is dilated and collapsible.    This is a technically limited study.      STRESS TEST:  No results found for this or any previous visit.       HEART CATH:  No  results found for this or any previous visit.      TELEMETRY:     Atrial fibrillation 80-90s with occasional PVC            I reviewed the patient's new clinical results.    Medication Review:   Current list of medications may not reflect those currently placed in orders that are not signed or are being held.     aspirin, 81 mg, Per G Tube, Daily  chlorhexidine, 15 mL, Mouth/Throat, Q12H  diphenhydrAMINE, 25 mg, Intravenous, Q6H  fluconazole, 200 mg, Intravenous, Q24H  furosemide, 80 mg, Intravenous, Once  Linezolid, 600 mg, Intravenous, Q12H  magic barrier cream, 1 application , Topical, BID  meropenem, 1,000 mg, Intravenous, Q12H  midodrine, 10 mg, Oral, TID AC  trace minerals + multivitamin IVPB, , Intravenous, Once per day on Mon Wed Fri  pantoprazole, 40 mg, Intravenous, BID AC  polyethylene glycol, 17 g, Oral, Daily  potassium chloride, 40 mEq, Nasogastric, Daily  potassium chloride, 20 mEq, Intravenous, Q1H  rosuvastatin, 20 mg, Oral, Nightly  senna-docusate sodium, 2 tablet, Oral, BID  sodium chloride, 10 mL, Intravenous, Q12H  sodium chloride, 10 mL, Intravenous, Q12H  sodium chloride, 10 mL, Intravenous, Q12H  sodium chloride, 10 mL, Intravenous, Q12H  sodium chloride, 10 mL, Intravenous, Q12H      Adult Central Clinimix TPN, , Last Rate: 65 mL/hr at 07/25/23 1813  dexmedetomidine, 0.2-1.5 mcg/kg/hr, Last Rate: Stopped (07/25/23 1701)  fentanyl 10 mcg/mL,  mcg/hr, Last Rate: 150 mcg/hr (07/25/23 2104)  heparin, 20.85 Units/kg/hr, Last Rate: 20.85 Units/kg/hr (07/26/23 0506)  Pharmacy Consult - Pharmacy to dose,   Pharmacy to Dose Heparin,   phenylephrine, 0.5-3 mcg/kg/min, Last Rate: 1.4 mcg/kg/min (07/26/23 0541)        acetaminophen    acetaminophen    artificial tears    senna-docusate sodium **AND** polyethylene glycol **AND** bisacodyl **AND** bisacodyl    [DISCONTINUED] senna-docusate sodium **AND** polyethylene glycol **AND** [DISCONTINUED] bisacodyl **AND** bisacodyl    fentaNYL citrate  (PF)    lactulose    Magnesium Standard Dose Replacement - Follow Nurse / BPA Driven Protocol    ondansetron    Pharmacy Consult - Pharmacy to dose    Pharmacy to Dose Heparin    Phosphorus Replacement - Follow Nurse / BPA Driven Protocol    Potassium Replacement - Follow Nurse / BPA Driven Protocol    prochlorperazine    simethicone    sodium chloride    sodium chloride    sodium chloride    sodium chloride    sodium chloride    sodium chloride    sodium chloride    sodium chloride    Assessment    1.  Acute hypercapnic/hypoxic respiratory failure, with multifactorial etiology including pneumonia, obesity with hypoventilation and with element of heart failure earlier currently patient is intubated on mechanical ventilation, with the sedation is gradually being weaned off  2.  Acute HFpEF, improving  3.  Moderate-sized pericardial effusion with no tamponade, with right ventricular dilatation   4.  Elevated troponin on admission likely type II NSTEMI  5.  New onset atrial fibrillation, patient's at least [id number] score of 2 is also she has a history of CHF               Plan   1.  Patient's sedation is being.  Off gradually she is slightly tachycardic with the heart rate between 100 to 110 we will monitor for now  2.  Pending echocardiogram for assessment of the size of the pericardial effusion  3.  Continue diuresis as per nephrology guidance          I have discussed the patients findings and my recommendations with patient.      Electronically signed by WINDY Roberts, 07/26/23, 10:59 AM EDT.   Electronically signed by Bernice Rosas MD, 07/26/23, 11:09 AM EDT.      Electronically signed by WINDY Roberts, 07/27/23, 9:39 AM EDT.               Please note that portions of this note were completed with a voice recognition program.    Please note that portions of this note were copied and has been reviewed and is accurate as of 7/26/2023 .

## 2023-07-26 NOTE — PROGRESS NOTES
Nephrology Progress Note      Subjective     Remains on 40% FiO2 no gross changes overnight remains intubated on mechanical ventilation    Objective       Vital signs :     Temp:  [95.5 °F (35.3 °C)-98.9 °F (37.2 °C)] 95.5 °F (35.3 °C)  Heart Rate:  [] 119  Resp:  [18-36] 22  BP: ()/(44-98) 100/68  FiO2 (%):  [40 %] 40 %    Intake/Output                         07/24/23 0701 - 07/25/23 0700 07/25/23 0701 - 07/26/23 0700     6251-5351 9022-8213 Total 5750-4876 3152-6153 Total                 Intake    I.V.  2264.9  2083 4347.8  1254.9  1812.6 3067.5    Other  60  -- 60  80  -- 80    Flush/ Irrigation Intake (mL) (NG/OG Tube Orogastric 16 Fr Center mouth) 60 -- 60 80 -- 80    IV Piggyback  500  400 900  500  -- 500    TPN  --  336 336  543.3  748.6 1291.8    Total Intake 2824.9 2819 5643.8 2378.2 2561.2 4939.4       Output    Urine  600  250 850  155  100 255    Emesis/NG output  500  250 750  500  200 700    Stool  --  0 0  --  -- --    Total Output 5050 859 8273 655 300 955             Physical Exam:    General Appearance : On mechanical ventilation  Lungs : clear to auscultation, respirations regular  Heart :  regular rhythm & normal rate, normal S1, S2 and no murmur, no rub  Abdomen : soft, non distended  Extremities : Trace  Neurologic : Unable to assess        Laboratory Data :     Albumin Albumin   Date Value Ref Range Status   07/26/2023 2.1 (L) 3.5 - 5.2 g/dL Final   07/25/2023 2.3 (L) 3.5 - 5.2 g/dL Final   07/24/2023 2.0 (L) 3.5 - 5.2 g/dL Final      Magnesium Magnesium   Date Value Ref Range Status   07/26/2023 1.8 1.6 - 2.4 mg/dL Final   07/25/2023 2.0 1.6 - 2.4 mg/dL Final   07/24/2023 2.0 1.6 - 2.4 mg/dL Final          PTH               No results found for: PTH    CBC and coagulation:  Results from last 7 days   Lab Units 07/26/23  0406 07/25/23  0549 07/24/23  2155 07/24/23  1547 07/24/23  0411 07/24/23  0105 07/23/23  1130 07/21/23  0012 07/20/23  0559   PROCALCITONIN ng/mL  --  1.18*  --    --   --  0.91*  --   --  0.53*   LACTATE mmol/L  --  2.1* 2.6* 2.5*   < > 2.7*  --   --   --    CRP mg/dL  --  21.23*  --   --   --  20.36*  --   --   --    WBC 10*3/mm3 24.30* 18.22*  --   --   --  26.19*  --    < > 18.35*   HEMOGLOBIN g/dL 8.8* 8.2*  --   --   --  9.9*  --    < > 7.2*   HEMATOCRIT % 33.5* 32.0*  --   --   --  38.7  --    < > 29.9*   MCV fL 86.6 86.3  --   --   --  85.2  --    < > 84.5   MCHC g/dL 26.3* 25.6*  --   --   --  25.6*  --    < > 24.1*   PLATELETS 10*3/mm3 459* 373  --   --   --  441  --    < > 494*   INR   --   --   --   --   --   --  1.11*  --   --     < > = values in this interval not displayed.     Acid/base balance:  Results from last 7 days   Lab Units 07/24/23  0443 07/23/23  0410 07/22/23  0738   PH, ARTERIAL pH units 7.403 7.431 7.420   PO2 ART mm Hg 69.2* 62.7* 70.5*   PCO2, ARTERIAL mm Hg 60.6* 63.3* 68.0*   HCO3 ART mmol/L 37.8* 42.0* 44.1*     Renal and electrolytes:    Results from last 7 days   Lab Units 07/26/23  0406 07/25/23  0549 07/24/23  0105 07/23/23  0359 07/22/23  1711 07/22/23  0035   SODIUM mmol/L 138 141 139 142  --  144   POTASSIUM mmol/L 3.4* 3.8 3.8 3.8   < > 3.7   MAGNESIUM mg/dL 1.8 2.0 2.0  --    < >  --    CHLORIDE mmol/L 95* 95* 94* 95*  --  95*   CO2 mmol/L 29.9* 33.5* 29.5* 33.6*  --  39.1*   BUN mg/dL 70* 68* 63* 64*  --  69*   CREATININE mg/dL 2.71* 2.70* 2.64* 2.74*  --  3.09*   CALCIUM mg/dL 9.8 10.4 9.8 10.2  --  10.4   PHOSPHORUS mg/dL 6.1* 6.7* 5.4*  --   --   --     < > = values in this interval not displayed.     Estimated Creatinine Clearance: 39.2 mL/min (A) (by C-G formula based on SCr of 2.71 mg/dL (H)).  @GFRCG:3@   Liver and pancreatic function:  Results from last 7 days   Lab Units 07/26/23  0406 07/25/23  0549 07/24/23  0105   ALBUMIN g/dL 2.1* 2.3* 2.0*   BILIRUBIN mg/dL 0.5 0.6 0.7   ALK PHOS U/L 73 81 91   AST (SGOT) U/L 11 17 23   ALT (SGPT) U/L 5 6 <5         Cardiac:        Liver and pancreatic function:  Results from last 7  days   Lab Units 07/26/23  0406 07/25/23  0549 07/24/23  0105   ALBUMIN g/dL 2.1* 2.3* 2.0*   BILIRUBIN mg/dL 0.5 0.6 0.7   ALK PHOS U/L 73 81 91   AST (SGOT) U/L 11 17 23   ALT (SGPT) U/L 5 6 <5       Medications :     aspirin, 81 mg, Per G Tube, Daily  chlorhexidine, 15 mL, Mouth/Throat, Q12H  diphenhydrAMINE, 25 mg, Intravenous, Q6H  fluconazole, 200 mg, Intravenous, Q24H  Linezolid, 600 mg, Intravenous, Q12H  magic barrier cream, 1 application , Topical, BID  meropenem, 1,000 mg, Intravenous, Q12H  midodrine, 10 mg, Oral, TID AC  trace minerals + multivitamin IVPB, , Intravenous, Once per day on Mon Wed Fri  pantoprazole, 40 mg, Intravenous, BID AC  polyethylene glycol, 17 g, Oral, Daily  potassium chloride, 40 mEq, Nasogastric, Daily  potassium chloride, 20 mEq, Intravenous, Q1H  rosuvastatin, 20 mg, Oral, Nightly  senna-docusate sodium, 2 tablet, Oral, BID  sodium chloride, 10 mL, Intravenous, Q12H  sodium chloride, 10 mL, Intravenous, Q12H  sodium chloride, 10 mL, Intravenous, Q12H  sodium chloride, 10 mL, Intravenous, Q12H  sodium chloride, 10 mL, Intravenous, Q12H      Adult Central Clinimix TPN, , Last Rate: 65 mL/hr at 07/25/23 1813  dexmedetomidine, 0.2-1.5 mcg/kg/hr, Last Rate: Stopped (07/25/23 1701)  fentanyl 10 mcg/mL,  mcg/hr, Last Rate: 150 mcg/hr (07/25/23 2104)  heparin, 20.85 Units/kg/hr, Last Rate: 20.85 Units/kg/hr (07/26/23 0506)  Pharmacy Consult - Pharmacy to dose,   Pharmacy to Dose Heparin,   phenylephrine, 0.5-3 mcg/kg/min, Last Rate: 1.4 mcg/kg/min (07/26/23 0541)  sodium chloride, 40 mL/hr, Last Rate: 40 mL/hr (07/26/23 0543)          Assessment & Plan     - TRACIE, nonoliguric  - Severe sepsis with septic shock  - Hypercalcemia  - Primary respiratory acidosis  - Hypoxic hypercarbic respiratory failure likely multifactorial  - History of congestive heart failure, well compensated  - Bacterial pneumonia    There is mild fluid overload clinically will stop IV fluid and repeat a dose  of Lasix today  Renal functions grossly stable and creatinine is currently plateaued, expect recovery.  Continue watchful waiting and careful monitoring.  Continue as needed electrolyte replacement    TRACIE likely multifactorial including severe sepsis with septic shock  Baseline creatinine around 2.6 admitted with 1  UA suggestive of urinary tract infection difficult to interpret  No hydronephrosis on renal ultrasound  No florid fluid overload clinically likely on volume depletion side.  - Continue on pressors to keep MAP more than 65 mmHg  - Please avoid any nephrotoxic agents, hypotension and adjust medications according to estimated GFR      Rasheed Stern MD  07/26/23  08:01 EDT

## 2023-07-26 NOTE — PLAN OF CARE
Goal Outcome Evaluation:           Progress: no change  Outcome Evaluation: Pt remains intubated/sedated. Fentanyl infusing for sedation. Xavi and heparin gtt infusing. OG remains to low wall intermittent suction. Pt continues to have low UOP. VSS. Afebrile. Will continue with plan of care

## 2023-07-26 NOTE — CASE MANAGEMENT/SOCIAL WORK
Discharge Planning Assessment   [place]     Patient Name: Judy Lutz  MRN: [id number]  Today's Date: 7/26/2023    Admit Date: 7/12/2023          Discharge Plan       Row Name 07/26/23 0947       Plan    Plan Pt was intubated on 07/20/23 and remains intubated. Pt lives with her son and plans on returning home at discharge. Pt does not have a PCP. Pt does not utilize home health services. Pt has a rolling walker, shower chair, crutches and motorized wheelchair via unknown provider. Prior to being intubated, SS spoke with Pt and family were agreeable to swing bed placement for short term rehab. SS will follow.                  SCOTT Yuen

## 2023-07-26 NOTE — PROGRESS NOTES
HEPARIN INFUSION  Judy Lutz is a  63 y.o. female receiving heparin infusion.     Therapy for (VTE/Cardiac):   Cardiac  Patient Dosing Weight: 171 kg  Initial Bolus (Y/N):   N  Any Bolus (Y/N):   Y        Signs or Symptoms of Bleeding: No    Cardiac or Other (Not VTE)   Initial Bolus: 60 units/kg (Max 4,000 units)  Initial rate: 12 units/kg/hr (Max 1,000 units/hr)   Anti Xa Rebolus Infusion Hold time Change infusion Dose (Units/kg/hr) Next Anti Xa or aPTT Level Due   < 0.11 50 Units/kg  (4000 Units Max) None Increase by  3 Units/kg/hr 6 hours   0.11- 0.19 25 Units/kg  (2000 Units Max) None Increase by  2 Units/kg/hr 6 hours   0.2 - 0.29 0 None Increase by  1 Units/kg/hr 6 hours   0.3 - 0.5 0 None No Change 6 hours (after 2 consecutive levels in range check q24h @0700)   0.51 - 0.6 0 None Decrease by  1 Units/kg/hr 6 hours   0.61 - 0.8 0 30 Minutes Decrease by  2 Units/kg/hr 6 hours   0.81 - 1 0 60 Minutes Decrease by  3 Units/kg/hr 6 hours   >1 0 Hold  After Anti Xa less than 0.5 decrease previous rate by  4 Units/kg/hr  Every 2 hours until Anti Xa  less than 0.5 then when infusion restarts in 6 hours       Recommend anti-Xa every 6 hours.     Results from last 7 days   Lab Units 07/26/23  0406 07/25/23  2050 07/25/23  1242 07/25/23  0549 07/24/23  0637 07/24/23  0105 07/23/23  1735 07/23/23  1130 07/23/23  0359   INR   --   --   --   --   --   --   --  1.11*  --    HEMOGLOBIN g/dL  --   --   --  8.2*  --  9.9*  --   --  9.2*   HEMATOCRIT %  --   --   --  32.0*  --  38.7  --   --  35.8   PLATELETS 10*3/mm3  --   --   --  373  --  441  --   --  495*   HEPARIN ANTI-XA IU/ml 0.26* 0.29* 0.25* 0.16*   < > 0.32   < > <0.10*  --     < > = values in this interval not displayed.          Date   Time   Anti-Xa Current Rate (Unit/kg/hr) Bolus   (Units) Rate Change   (Unit/kg/hr) New Rate (Unit/kg/hr) Next   Anti-Xa Comments  Pump Check Daily   7/23 1130 < 0.10 5.85 - initial 5.85 1800 Spoke with patients nurse to  confirm rate and no initial bolus   7/23 1832 <0.10 5.85 4000 +3 8.85 0100 Spoke with Torey. No s/s of bleeding and no noted stops.    07/23 0130 0.32 8.85 0 0 8.85 0700 Spoke with Nancy (not the patients nurse but took the message). No s/s of bleeding.    7/24 0637 <0.1 8.85 4000 +3 11.85 1400 Discussed rate change and bolus with nurse . No s/s bleeding   7/24 14:16 <0.1 11.85 4000 +3 14.85 2130 Pump check done and discussed rate change  and bolus with nurse Asya   7/24 2300 0.21 14.85 0 +1 15.85 0500 Spoke with Nancy  since pt nurse was with another patient. No s/s of bleeding.    7/25 0640 0.16 15.85 2000 +2 17.85 1300 Spoke to JOHN Weems. Drip wasn't stopped or held any during her shift. No s/s of bleeding    7/25 1242 0.25 17.85 0 +1 18.85 2030 Spoke to JOHN Saab , increase rate , no bolus   7/25 2125 0.29 18.85 - +1 19.85 0400 Spoke with nursing about rate increase. No s/s of bleeding.   7/26 0504 0.26 19.85 0 +1 20.85 1100 Spoke with Sissy about the rate increase. No s/s of bleeding.                                                                                                                               Pharmacy will continue to follow anti-Xa results and monitor for signs and symptoms of bleeding or thrombosis.    Pantera Durbin, Lacy  05:04 EDT

## 2023-07-26 NOTE — PROGRESS NOTES
Chief Complaint:  Pulmonary and critical care is following for ventilator management and critical illness management      Subjective    Overnight events reviewed.  All the labs medications in's and outs and vitals reviewed.  Patient remains critically ill remains on vasopressors.  Was on fentanyl.  Discontinued and started Precedex.  Case was discussed with patient's daughter at bedside and answered her questions to her satisfaction.  Tried SBT trial and patient failed.     Review of systems-could not be obtained as patient is intubated      Vital Signs  Temp:  [95.5 °F (35.3 °C)-98.9 °F (37.2 °C)] 95.5 °F (35.3 °C)  Heart Rate:  [] 107  Resp:  [18-36] 22  BP: ()/(44-98) 107/44  FiO2 (%):  [40 %] 40 %  Body mass index is 57.61 kg/m².    Intake/Output Summary (Last 24 hours) at 7/26/2023 1009  Last data filed at 7/26/2023 0809  Gross per 24 hour   Intake 4989.35 ml   Output 955 ml   Net 4034.35 ml       I/O this shift:  In: 50 [IV Piggyback:50]  Out: -     Physical Exam:   GENERAL APPEARANCE: Resting in bed comfortably.  Intubated    HEAD: Normal cephalic  EYES: PERRLA ET tube in place  THROAT: ET tube in place. Oral cavity and pharynx normal. No inflammation, swelling, exudate, or lesions.     Neck: Supple.     CARDIAC: Normal S1 and S2. No S3, S4 or murmurs. Rhythm is regular.     LUNGS: Clear to auscultation and percussion without rales, rhonchi, wheezing or diminished breath sounds.  Peak and plateau pressures reviewed and within normal limits    ABDOMEN: Positive bowel sounds. Soft, nondistended, nontender.     EXTREMITIES: No significant deformity or joint abnormality. No edema. Peripheral pulses intact.    NEUROLOGICAL: Unable to assess due to sedation status.     PSYCHIATRIC: Unable to assess due to sedation status     Results Review:     I reviewed the patient's new clinical results.  Results from last 7 days   Lab Units 07/26/23  0406 07/25/23  0549 07/24/23  0105   WBC 10*3/mm3 24.30*  18.22* 26.19*   HEMOGLOBIN g/dL 8.8* 8.2* 9.9*   PLATELETS 10*3/mm3 459* 373 441       Results from last 7 days   Lab Units 07/26/23  0406 07/25/23  0549 07/24/23  0105   SODIUM mmol/L 138 141 139   POTASSIUM mmol/L 3.4* 3.8 3.8   CHLORIDE mmol/L 95* 95* 94*   CO2 mmol/L 29.9* 33.5* 29.5*   BUN mg/dL 70* 68* 63*   CREATININE mg/dL 2.71* 2.70* 2.64*   CALCIUM mg/dL 9.8 10.4 9.8   GLUCOSE mg/dL 220* 157* 121*   MAGNESIUM mg/dL 1.8 2.0 2.0       Lab Results   Component Value Date    INR 1.11 (H) 07/23/2023    INR 1.38 (H) 07/12/2023    PROTIME 14.9 (H) 07/23/2023    PROTIME 17.6 (H) 07/12/2023     Results from last 7 days   Lab Units 07/26/23  0406 07/25/23  0549 07/24/23  0105   ALK PHOS U/L 73 81 91   BILIRUBIN mg/dL 0.5 0.6 0.7   ALT (SGPT) U/L 5 6 <5   AST (SGOT) U/L 11 17 23       Results from last 7 days   Lab Units 07/26/23  0822   PH, ARTERIAL pH units 7.342*   PO2 ART mm Hg 71.4*   PCO2, ARTERIAL mm Hg 57.4*   HCO3 ART mmol/L 31.1*       Imaging Results (Last 24 Hours)       Procedure Component Value Units Date/Time    XR Chest 1 View [[id number]] Collected: 07/26/23 0710     Updated: 07/26/23 0713    Narrative:      Procedure: X-ray examination of the chest performed on 07/26/2023.  Single view. Portable technique.     HISTORY: NG tube placement. Confirm position.     COMPARISON: None.     FINDINGS:     NG tube in place with tip to the stomach.  Hypoinflated lungs with central pulmonary vascular congestion.  No free air in the upper abdomen.  Right central vascular catheter with tip to the SVC.  Endotracheal tube noted with tip approximately 3.9 cm above the barry.       Impression:         1.  NG tube in place with tip to the stomach.     This report was finalized on 7/26/2023 7:11 AM by Sacha Syed MD.                    aspirin, 81 mg, Per G Tube, Daily  chlorhexidine, 15 mL, Mouth/Throat, Q12H  diphenhydrAMINE, 25 mg, Intravenous, Q6H  fluconazole, 200 mg, Intravenous, Q24H  Linezolid, 600 mg,  Intravenous, Q12H  magic barrier cream, 1 application , Topical, BID  meropenem, 1,000 mg, Intravenous, Q12H  midodrine, 10 mg, Oral, TID AC  trace minerals + multivitamin IVPB, , Intravenous, Once per day on Mon Wed Fri  pantoprazole, 40 mg, Intravenous, BID AC  polyethylene glycol, 17 g, Oral, Daily  potassium chloride, 40 mEq, Nasogastric, Daily  rosuvastatin, 20 mg, Oral, Nightly  senna-docusate sodium, 2 tablet, Oral, BID  sodium chloride, 10 mL, Intravenous, Q12H  sodium chloride, 10 mL, Intravenous, Q12H  sodium chloride, 10 mL, Intravenous, Q12H  sodium chloride, 10 mL, Intravenous, Q12H  sodium chloride, 10 mL, Intravenous, Q12H      Adult Central Clinimix TPN, , Last Rate: 65 mL/hr at 07/25/23 1813  dexmedetomidine, 0.2-1.5 mcg/kg/hr, Last Rate: Stopped (07/25/23 1701)  fentanyl 10 mcg/mL,  mcg/hr, Last Rate: 150 mcg/hr (07/25/23 2104)  heparin, 20.85 Units/kg/hr, Last Rate: 20.85 Units/kg/hr (07/26/23 0506)  Pharmacy Consult - Pharmacy to dose,   Pharmacy to Dose Heparin,   phenylephrine, 0.5-3 mcg/kg/min, Last Rate: 1.4 mcg/kg/min (07/26/23 0936)      Site   Date Value Ref Range Status   07/26/2023 Right Radial  Final     Dash's Test   Date Value Ref Range Status   07/26/2023 Positive  Final     pH, Arterial   Date Value Ref Range Status   07/26/2023 7.342 (L) 7.350 - 7.450 pH units Final     Comment:     84 Value below reference range     pCO2, Arterial   Date Value Ref Range Status   07/26/2023 57.4 (H) 35.0 - 45.0 mm Hg Final     Comment:     83 Value above reference range     pO2, Arterial   Date Value Ref Range Status   07/26/2023 71.4 (L) 83.0 - 108.0 mm Hg Final     Comment:     84 Value below reference range     HCO3, Arterial   Date Value Ref Range Status   07/26/2023 31.1 (H) 20.0 - 26.0 mmol/L Final     Comment:     83 Value above reference range     Base Excess, Arterial   Date Value Ref Range Status   07/26/2023 4.4 (H) 0.0 - 2.0 mmol/L Final     O2 Saturation, Arterial   Date Value  Ref Range Status   07/26/2023 94.0 94.0 - 99.0 % Final     Hemoglobin, Blood Gas   Date Value Ref Range Status   07/26/2023 9.2 (L) 13.5 - 17.5 g/dL Final     Comment:     84 Value below reference range     Hematocrit, Blood Gas   Date Value Ref Range Status   07/26/2023 28.1 (L) 38.0 - 51.0 % Final     Comment:     84 Value below reference range     Oxyhemoglobin   Date Value Ref Range Status   07/26/2023 91.5 (L) 94 - 99 % Final     Comment:     84 Value below reference range     Oxyhemoglobin Venous   Date Value Ref Range Status   07/26/2023 62.9 45.0 - 75.0 % Final     Methemoglobin Venous   Date Value Ref Range Status   07/26/2023 0.3 0.0 - 3.0 % Final     Methemoglobin   Date Value Ref Range Status   07/26/2023 <-0.10 (L) 0.00 - 3.00 % Final     Comment:     94 Value below reportable range < _0.1     Carboxyhemoglobin   Date Value Ref Range Status   07/26/2023 2.9 0 - 5 % Final     Carboxyhemoglobin Venous   Date Value Ref Range Status   07/26/2023 2.6 0.0 - 5.0 % Final     CO2 Content   Date Value Ref Range Status   07/26/2023 32.9 22 - 33 mmol/L Final     Barometric Pressure for Blood Gas   Date Value Ref Range Status   07/26/2023 732 mmHg Final     Modality   Date Value Ref Range Status   07/26/2023 Ventilator  Final     FIO2   Date Value Ref Range Status   07/26/2023 40 % Final     udy Result    Narrative & Impression   CLINICAL HISTORY: Follow-up respiratory failure.     COMPARISON: 7/20/2023.     TECHNIQUE: Single portable upright AP view of the chest.     FINDINGS: Endotracheal tube tip is 1.9 cm above the barry.  Right  internal jugular vein central venous catheter tip is in the lower SVC.   Nasogastric tube tip is in the stomach.     Heart size remains enlarged.  Consolidation at the left lung base is  unchanged.  There is no pneumothorax.  Right lung is clear.  No change  in appearance of the chest compared to 7/20/2023.     IMPRESSION:     SUPPORT LINES AND TUBES IN POSITION.     UNCHANGED  RETROCARDIAC OPACITY COMPARED TO 2 DAYS AGO.     This report was finalized on 7/22/2023 9:09 AM by Pily Solares MD.          Medication Review:   Microbiology Results (last 10 days)       Procedure Component Value - Date/Time    Blood Culture - Blood, Arm, Right [[id number]]  (Normal) Collected: 07/24/23 1444    Lab Status: Preliminary result Specimen: Blood from Arm, Right Updated: 07/25/23 1500     Blood Culture No growth at 24 hours    Respiratory Culture - Sputum, ET Suction [[id number]]  (Abnormal) Collected: 07/24/23 1420    Lab Status: Final result Specimen: Sputum from ET Suction Updated: 07/26/23 1101     Respiratory Culture Rare Candida albicans      No Normal Respiratory Karol     Gram Stain Moderate (3+) WBCs seen      Rare (1+) Epithelial cells seen      No organisms seen    Blood Culture - Blood, Arm, Left [[id number]]  (Normal) Collected: 07/24/23 1416    Lab Status: Preliminary result Specimen: Blood from Arm, Left Updated: 07/25/23 1500     Blood Culture No growth at 24 hours    Respiratory Panel PCR w/COVID-19(SARS-CoV-2) [facility] In-House, NP Swab in UTM/VTM, 3-4 HR TAT - Swab, Nasopharynx [[id number]]  (Normal) Collected: 07/24/23 1411    Lab Status: Final result Specimen: Swab from Nasopharynx Updated: 07/24/23 1520     ADENOVIRUS, PCR Not Detected     Coronavirus 229E Not Detected     Coronavirus HKU1 Not Detected     Coronavirus NL63 Not Detected     Coronavirus OC43 Not Detected     COVID19 Not Detected     Human Metapneumovirus Not Detected     Human Rhinovirus/Enterovirus Not Detected     Influenza A PCR Not Detected     Influenza B PCR Not Detected     Parainfluenza Virus 1 Not Detected     Parainfluenza Virus 2 Not Detected     Parainfluenza Virus 3 Not Detected     Parainfluenza Virus 4 Not Detected     RSV, PCR Not Detected     Bordetella pertussis pcr Not Detected     Bordetella parapertussis PCR Not Detected     Chlamydophila pneumoniae PCR Not Detected     Mycoplasma  pneumo by PCR Not Detected    Narrative:      In the setting of a positive respiratory panel with a viral infection PLUS a negative procalcitonin without other underlying concern for bacterial infection, consider observing off antibiotics or discontinuation of antibiotics and continue supportive care. If the respiratory panel is positive for atypical bacterial infection (Bordetella pertussis, Chlamydophila pneumoniae, or Mycoplasma pneumoniae), consider antibiotic de-escalation to target atypical bacterial infection.    Blood Culture - Blood, Arm, Right [[id number]]  (Normal) Collected: 07/21/23 1041    Lab Status: Final result Specimen: Blood from Arm, Right Updated: 07/26/23 1100     Blood Culture No growth at 5 days    Blood Culture - Blood, Arm, Left [[id number]]  (Normal) Collected: 07/21/23 1000    Lab Status: Final result Specimen: Blood from Arm, Left Updated: 07/26/23 1100     Blood Culture No growth at 5 days    Urine Culture - Urine, Urine, Clean Catch [[id number]]  (Abnormal) Collected: 07/20/23 0945    Lab Status: Final result Specimen: Urine, Clean Catch Updated: 07/22/23 1410     Urine Culture 50,000 CFU/mL Candida albicans    Narrative:      Colonization of the urinary tract without infection is common. Treatment is discouraged unless the patient is symptomatic, pregnant, or undergoing an invasive urologic procedure.    Respiratory Culture - Aspirate, ET Suction [[id number]]  (Abnormal) Collected: 07/20/23 0843    Lab Status: Final result Specimen: Aspirate from ET Suction Updated: 07/22/23 1030     Respiratory Culture Moderate growth (3+) Candida albicans      Scant growth (1+) Normal Respiratory Stephanie     Gram Stain Many (4+) WBCs seen      Few (2+) Epithelial cells seen      Mixed stephanie      Yeast with hyphae seen    MRSA Screen, PCR (Inpatient) - Swab, Nares [[id number]]  (Abnormal) Collected: 07/19/23 1306    Lab Status: Final result Specimen: Swab from Nares Updated: 07/19/23 1557     MRSA PCR MRSA  Detected    Narrative:      The negative predictive value of this diagnostic test is high and should only be used to consider de-escalating anti-MRSA therapy. A positive result may indicate colonization with MRSA and must be correlated clinically.           Assessment & Plan     Neuro-  SAT and SBT were done patient failed.  We will discontinue fentanyl and start Precedex as fentanyl will lower the GI motility and patient is already having output from the NG tube.    Drips adjusted to maintain a RASS goal of 0 to -1        Respiratory-  hypoxic respiratory failure-oxygen requirements reviewed.  Continue with a PEEP of 8..  Latest ABG reviewed-ventilator settings adjusted changed the respiratory rate to 22.  Continue FiO2 to maintain saturation 88 to 92%   White count worsened.  We will repeat chest x-ray.  Failed SBT again.  Will try again tomorrow.    We will try again tomorrow  Vent settings   Vent Settings          Resp Rate (Set): 22  Pressure Support (cm H2O): 10 cm H20  FiO2 (%): 40 %  PEEP/CPAP (cm H2O): 8 cm H20    Minute Ventilation (L/min) (Obs): 9.43 L/min  Resp Rate (Observed) Vent: 22     I:E Ratio (Obs): 1:2.7    PIP Observed (cm H2O): 42 cm H2O          VAP- precautions  Head end - 30 %  Mouth care   DVt prophylaxis    Morbid obesity- overall complicates the care and prognosis    Cardiology- hemodynamically -unstable   SHOCK- Adjusted vasopressors to maintain MAP more than 65 mm hg   Currently on Xavi-Synephrine and midodrine.  Continue to monitor HR- rate and rhythm, BP   Results for orders placed during the hospital encounter of 07/12/23    Adult Transthoracic Echo Limited W/ Cont if Necessary Per Protocol    Interpretation Summary    Left ventricular systolic function is normal. Left ventricular ejection fraction appears to be 66 - 70%.    Left ventricular diastolic dysfunction is noted.    The right ventricular cavity is mild to moderately dilated, D-shaped septum was noted however no assessment of  the PA pressure was done right ventricular pressure not estimated.    There is a large (>2cm) circumferential pericardial effusion. There is no evidence of cardiac tamponade.    IVC is dilated and collapsible.    This is a technically limited study.      Nephrology- Cr BUN stable  I/O- reviewed   Nephrology on case   Treatment plan reviewed and discussed with nurse.    GI- PPI prophylaxis  Continue current treatment.  CT of the abdomen and pelvis reviewed.  Continue TPN  We will get EKG for QTc interval.  Discontinued fentanyl as will worsen the GI motility.  Will start on Reglan for GI motility.  Heme- CBC  Hb- transfuse for hb less than 7 - received 2 units of blood  Platelet-   WBC- high but better- ID on case   Antibiotic treatment reviewed.  Medication list reviewed.  Antibiotics reviewed ID  Culture  And Antibiotics reviewed   Micro reviewed   CT of the abdomen pelvis and CT chest reviewed.  White count high.  Repeat chest x-ray does not show any major pulmonary infiltrates    Endocrinology- Maintain Blood sugar 140 -180      Electrolytes-   Mag phos       DVT prophylaxis- cont     Bed side rounds were done with RT and patient's nurse. All the lab and clinical findings were discussed with them and plan was also discussed in great detail.  Critically ill with shock sepsis and respiratory failure  Adjusted drips and vent settings  Cc 32 mins   Family member present- daughter       Symptomatic anemia               Jose Antonio Hurst MD  07/26/23  10:09 EDT

## 2023-07-26 NOTE — PROGRESS NOTES
Date:  07/26/23    CC: Generalized Weakness, Fatigue    HPI: She is lying in bed, remains intubated and sedated. Daughter at bedside. No acute distress noted at this time. Hg (8.8), stable.     Review of Systems  Unable to obtain. Intubated and Sedated.      Objective     Vital Signs  Vitals:    07/26/23 1034   BP:    Pulse: 108   Resp: 22   Temp:    SpO2: 97%        Physical Exam:  General: Intubated and Sedated. In no acute distress.  Head: ATNC   Eyes: PERRL, No evidence of conjunctivitis.   Nose: No nasal discharge.   Mouth: Oral mucosal membranes moist. No oral ulceration or hemorrhages.   Neck: Neck supple. No thyromegaly. No JVD.   Lungs: Rhonchi noted on auscultation.  Heart: S1, S2. Regular rate and rhythm. No murmurs, rubs, or gallops.   Abdomen: Soft. Bowel sounds are normoactive. Nontender with palpation.   Extremities: 2+ edema bilaterally.  Integumentary: Warm, dry, intact.  Neurologic: Intubated and Sedated.     Labs / Studies:  Lab Results   Component Value Date    WBC 24.30 (H) 07/26/2023    HGB 8.8 (L) 07/26/2023    HCT 33.5 (L) 07/26/2023    MCV 86.6 07/26/2023    RDW 33.1 (H) 07/26/2023     (H) 07/26/2023    NEUTRORELPCT 81.0 (H) 07/26/2023    LYMPHORELPCT 5.3 (L) 07/26/2023    MONORELPCT 10.8 07/26/2023    EOSRELPCT 1.1 07/26/2023    BASORELPCT 0.3 07/26/2023    NEUTROABS 19.67 (H) 07/26/2023    LYMPHSABS 1.29 07/26/2023       Lab Results   Component Value Date     07/26/2023    K 3.4 (L) 07/26/2023    CO2 29.9 (H) 07/26/2023    CL 95 (L) 07/26/2023    BUN 70 (H) 07/26/2023    CREATININE 2.71 (H) 07/26/2023    GLUCOSE 220 (H) 07/26/2023    CALCIUM 9.8 07/26/2023    ALKPHOS 73 07/26/2023    AST 11 07/26/2023    ALT 5 07/26/2023    BILITOT 0.5 07/26/2023    ALBUMIN 2.1 (L) 07/26/2023    PROTEINTOT 5.5 (L) 07/26/2023    MG 1.8 07/26/2023    PHOS 6.1 (H) 07/26/2023         Lab Results   Component Value Date    FERRITIN 25.22 07/13/2023    IRON 12 (L) 07/13/2023    TIBC 359  07/13/2023    LABIRON 3 (L) 07/13/2023    HBAYANRQ68 1,133 (H) 07/13/2023    FOLATE 9.36 07/13/2023       Imaging:  Adult Transthoracic Echo Complete W/ Cont if Necessary Per Protocol    Result Date: 7/13/2023  Images from the original result were not included.   Normal left ventricular cavity size noted. Left ventricular wall thickness is consistent with mild concentric hypertrophy. All left ventricular wall segments contract normally.   Left ventricular ejection fraction appears to be 51 - 55%.   Left ventricular diastolic function is consistent with (grade I) impaired relaxation.   There is a moderate (1-2cm) pericardial effusion adjacent to the left ventricle. There is no evidence of cardiac tamponade.   The aortic valve is structurally normal with no regurgitation or stenosis present.   The mitral valve is structurally normal with no significant stenosis present. Trace mitral valve regurgitation is present.   Mild tricuspid valve regurgitation is present. Estimated right ventricular systolic pressure from tricuspid regurgitation is moderately elevated (45-55 mmHg).   Moderate pulmonary hypertension is present.   There is a moderate (1-2cm) pericardial effusion adjacent to the left ventricle. The effusion is fluid filled. There is no evidence of cardiac tamponade.     EEG    Result Date: 7/19/2023  Reason for referral: 63 y.o.female with altered mental status Technical Summary:  A 19 channel digital EEG was performed using the international 10-20 placement system, including eye leads and EKG leads. Duration: 23 minutes Findings: The patient is drowsy.  The background shows diffuse low amplitude 5-6 Hz theta with occasional intermixed 4 Hz delta present.  EMG artifact is variably seen over the frontal leads.  Photic stimulation does not change the background.  Hyperventilation is not performed.  No focal features or epileptiform activity are seen.  No electrographic seizures are present. Video: Available  Technical quality: Superior EKG: Regular, 80 bpm SUMMARY: Mild-moderate generalized slow No focal features or epileptiform activity are seen     Diffuse cerebral dysfunction of at least mild degree but nonspecific This finding is most commonly seen with encephalopathy due to toxic/metabolic cause No evidence for epilepsy is seen This report is transcribed using the Dragon dictation system.      CT Abdomen Pelvis Without Contrast    Result Date: 7/25/2023  EXAM:   CT Abdomen and Pelvis Without Intravenous Contrast  EXAM DATE:   7/24/2023 4:39 PM  CLINICAL HISTORY:   Worsening fever with no obvious source of infection; D64.9-Anemia, unspecified; A41.9-Sepsis, unspecified organism; R65.20-Severe sepsis without septic shock; J96.01-Acute respiratory failure with hypoxia; N39.0-Urinary tract infection, site not specified; I50.31-Acute diastolic (congestive) heart failure  TECHNIQUE:   Axial computed tomography images of the abdomen and pelvis without intravenous contrast.  Sagittal and coronal reformatted images were created and reviewed.  This CT exam was performed using one or more of the following dose reduction techniques:  automated exposure control, adjustment of the mA and/or kV according to patient size, and/or use of iterative reconstruction technique.  COMPARISON:   07/19/2023  FINDINGS:   LUNG BASES:  Left lower lobe consolidative opacity.   HEART:  Cardiomegaly again noted.   ABDOMEN:   LIVER:  Unremarkable.   GALLBLADDER AND BILE DUCTS:  Gallstone.  Gallbladder otherwise unremarkable.  No ductal dilation.   PANCREAS:  Unremarkable.  No ductal dilation.   SPLEEN:  Unremarkable.  No splenomegaly.   ADRENALS:  Unremarkable.  No mass.   KIDNEYS AND URETERS:  Unremarkable.  No obstructing stones.  No hydronephrosis.   STOMACH AND BOWEL:  Unremarkable.  No obstruction.  No mucosal thickening.   PELVIS:   APPENDIX:  No findings to suggest acute appendicitis.   BLADDER:  Unremarkable.  No stones.   REPRODUCTIVE:   Unremarkable as visualized.   ABDOMEN and PELVIS:   INTRAPERITONEAL SPACE:  Moderate ascites.  No free air.   BONES/JOINTS:  Degenerative disc disease throughout the lumbar spine. Degenerative facet arthropathy throughout the lumbar spine, most prominent in the lower lumbar spine.  No acute fracture.  No dislocation.   SOFT TISSUES:  Unremarkable.   VASCULATURE:  Unremarkable.  No abdominal aortic aneurysm.   LYMPH NODES:  Unremarkable.  No enlarged lymph nodes.   TUBES, LINES AND DEVICES:  Nasogastric tube tip in the stomach.      1.  Moderate ascites. 2.  Gallstone.  Gallbladder otherwise unremarkable. 3.  Degenerative changes lumbar spine as described.  This report was finalized on 7/25/2023 8:37 AM by Dr. Chaim Mcnulty MD.      CT Abdomen Pelvis Without Contrast    Result Date: 7/13/2023  CLINICAL INDICATION: ams abd pain. Dyspnea, chronic, unclear etiology PROCEDURE DATE: 7/12/2023  CT ABD/PELVIS W/O IV C Technique: Standard axial collimation through the abdomen and pelvis without oral or intravenous contrast.  Reconstructed images were submitted for interpretation. Limited exposure control, adjustment of the mA and/or KV according to patient size or use of iterative reconstruction technique was utilized.  Findings:  1.  Moderate ascites in the abdomen and pelvis. 2.  No bowel obstruction, free air, or abscess. 3.  No evidence of acute colitis or diverticulitis. 4.  The appendix is not seen. 5.  No CT evidence of acute pancreatitis. 6.  Contracted gallbladder with a questionable stone in the GB fundus. 7.  Small air in the nondependent portion of the urinary bladder, likely related to recent catheterization versus cystitis.  Correlate clinically. 8.  Bilateral renal calculi without hydronephrosis. 9.  Moderate retained fecal volume in the rectum. 10.  Diffuse subcutaneous edema throughout the abdominal/pelvic wall. 11.  Mild thoracolumbar scoliosis with multilevel lumbar discogenic and facet degenerative  change.      Impression:  1.  No bowel obstruction, free air, or abscess. 2.  Moderate ascites in the abdomen and pelvis. 3.  No evidence of acute colitis or diverticulitis. 4.  Biparietal renal calculi without hydronephrosis. 5.  Probable cholelithiasis. 6.  Small air in the nondependent portion of the urinary bladder, likely related to recent catheterization versus cystitis.  Correlate clinically.   This report was finalized on 7/13/2023 12:26 AM by Ricky Perkins MD.      CT Head Without Contrast    Result Date: 7/19/2023  EXAM:   CT Head Without Intravenous Contrast  EXAM DATE:   7/19/2023 9:41 AM  CLINICAL HISTORY:   Mental status change, unknown cause; D64.9-Anemia, unspecified; A41.9-Sepsis, unspecified organism; R65.20-Severe sepsis without septic shock; J96.01-Acute respiratory failure with hypoxia; N39.0-Urinary tract infection, site not specified; I50.31-Acute diastolic (congestive) heart failure  TECHNIQUE:   Axial computed tomography images of the head/brain without intravenous contrast.  Sagittal and coronal reformatted images were created and reviewed.  This CT exam was performed using one or more of the following dose reduction techniques:  automated exposure control, adjustment of the mA and/or kV according to patient size, and/or use of iterative reconstruction technique.  COMPARISON:   No relevant prior studies available.  FINDINGS:   Brain and extra-axial spaces:  Unremarkable.  No hemorrhage.  No significant white matter disease.  No edema.  No ventriculomegaly.   Bones/joints:  Unremarkable.  No acute fracture.   Soft tissues:  Unremarkable.   Sinuses:  Unremarkable as visualized.  No acute sinusitis.   Mastoid air cells:  Unremarkable as visualized.  No mastoid effusion.        Unremarkable exam demonstrating no CT evidence of acute intracranial findings.  This report was finalized on 7/19/2023 10:17 AM by Dr. Kvng Tijerina MD.      CT Head Without Contrast    Result Date: 7/13/2023  CT HEAD   Technique: Standard axial collimation through the head without contrast.  Reconstructed images were submitted for interpretation. Limited exposure control, adjustment of the mA and/or KV according to patient size or use of iterative reconstruction technique was utilized.  Findings:  1.  No acute intracranial hemorrhage, mass effect or cerebral cortical edema. 2.  Normal ventricular volume for the patient's age. 3.  Mild cerebral cortical atrophy. 4.  No skull fractures. 5.  The mastoid air cells are clear. 6.  The visualized paranasal sinuses are clear.      Impression:  No acute intracranial abnormality.  This report was finalized on 7/13/2023 12:25 AM by Ricky Perkins MD.      CT Chest Without Contrast Diagnostic    Result Date: 7/25/2023  EXAM:   CT Chest Without Intravenous Contrast  EXAM DATE:   7/24/2023 4:39 PM  CLINICAL HISTORY:   Worsening fever with no obvious source of infection; D64.9-Anemia, unspecified; A41.9-Sepsis, unspecified organism; R65.20-Severe sepsis without septic shock; J96.01-Acute respiratory failure with hypoxia; N39.0-Urinary tract infection, site not specified; I50.31-Acute diastolic (congestive) heart failure  TECHNIQUE:   Axial computed tomography images of the chest without intravenous contrast.  Sagittal and coronal reformatted images were created and reviewed.  This CT exam was performed using one or more of the following dose reduction techniques:  automated exposure control, adjustment of the mA and/or kV according to patient size, and/or use of iterative reconstruction technique.  COMPARISON:   07/19/2023  FINDINGS:   Lungs and pleural spaces:  Bibasilar consolidations have increased from the previous exam.  Interstitial edema has slightly improved.  Left upper lobe partially consolidative airspace disease has improved from the previous exam.  Trace pleural effusions are noted and appear nonloculated.  No pneumothorax.   Heart:  Massive cardiomegaly is stable.  No significant  pericardial effusion.  No significant coronary artery calcifications.   Bones/joints:  Stable bony structures.  No acute fracture.  No dislocation.   Soft tissues:  Anasarca is again noted.   Vasculature:  Portions of a right deep line are noted that extend into the distal SVC.  No thoracic aortic aneurysm.   Lymph nodes:  Unremarkable.  No enlarged lymph nodes.   Liver:  Liver cirrhosis noted.   Intraperitoneal space:  Upper abdominal ascites appear stable.   Tubes, lines and devices:  NG tube extends into the mid stomach region.  ET tube with tip below level of clavicles and above the barry.      1.  Worsening bibasilar consolidations. 2.  Slight improvement in mid and upper lung zone airspace disease when compared to the previous exam. 3.  Overall minimal improvement in interstitial edema but stable severe cardiomegaly with trace pleural effusions noted. 4.  Support devices as above. 5.  Persistent anasarca and liver cirrhosis with upper abdominal ascites. 6.  No pneumothorax. Other nonacute findings as above.  This report was finalized on 7/25/2023 9:11 AM by Dr. Kvng Tijerina MD.      CT Chest Without Contrast Diagnostic    Result Date: 7/19/2023  EXAM:   CT Chest Without Intravenous Contrast  EXAM DATE:   7/19/2023 9:45 AM  CLINICAL HISTORY:   worsening oxygen requirement, concern for fluid overload vs development of aspiration pneumonia; D64.9-Anemia, unspecified; A41.9-Sepsis, unspecified organism; R65.20-Severe sepsis without septic shock; J96.01-Acute respiratory failure with hypoxia; N39.0-Urinary tract infection, site not specified; I50.31-Acute diastolic (congestive) heart failure  TECHNIQUE:   Axial computed tomography images of the chest without intravenous contrast.  Sagittal and coronal reformatted images were created and reviewed.  This CT exam was performed using one or more of the following dose reduction techniques:  automated exposure control, adjustment of the mA and/or kV according to  patient size, and/or use of iterative reconstruction technique.  COMPARISON:   07/12/2023  FINDINGS:   Lungs and pleural spaces:  Development of bilateral consolidative airspace disease more pronounced in the upper lobes but also present in the lower lobes consistent with pneumonia.  Miniscule pleural effusions which are nonloculated.  Interstitial thickening has developed compatible with edema.   Heart:  Very marked cardiac enlargement is stable from previous.  No significant pericardial effusion.  No significant coronary artery calcifications.   Mediastinum:  Hiatal hernia, stable.   Bones/joints:  Unremarkable.  No acute fracture.  No dislocation.   Soft tissues:  Diffuse anasarca has slightly improved.   Vasculature:  Pulmonary venous hypertension is noted.  No thoracic aortic aneurysm.   Lymph nodes:  Reactive and/or congested lymph nodes in the mediastinum and hilar stations but no bulky adenopathy identified.   Liver:  Liver cirrhosis.   Stomach and bowel:  Gastroesophageal reflux.   Intraperitoneal space:  Upper abdominal ascites which appears stable.      1.  Development of bilateral multilobar partially consolidative pneumonia. Combination atelectasis may be considered. 2.  Slight increase in changes of CHF now with interstitial edema. Miniscule nonloculated pleural effusions with stable marked cardiac enlargement. 3.  Questionable decrease in the degree of anasarca with persistent upper abdominal ascites noted. 4.  Other incidental/nonacute findings as above.  This report was finalized on 7/19/2023 10:20 AM by Dr. Kvng Tijerina MD.      CT Chest Without Contrast Diagnostic    Result Date: 7/13/2023  CT CHEST W/O Technique: Standard axial collimation through the chest without intravenous contrast.  Reconstructed images were submitted for interpretation. Limited exposure control, adjustment of the mA and/or KV according to patient size or use of iterative reconstruction technique was utilized.  Findings:   1.  No focal infiltrate, pleural effusion or pneumothorax. 2.  A 7 mm posterior subpleural noncalcified groundglass density in the right upper lobe, likely related to postinflammatory changes. 3.  Mild pulmonary vascular congestion. 4.  Reece cardiomegaly with small to moderate superior/posterior pericardial effusion, 9-18 mm in thickness. 5.  No aortic aneurysm. 6.  No mediastinal lymphadenopathy. 7.  Motion artifacts degrading image quality.      Impression:  1.  Marked cardiomegaly and small-to-moderate pericardial effusion with mild pulmonary vascular congestion. 2.  No focal infiltrate or pleural effusion.  This report was finalized on 7/13/2023 12:26 AM by Ricky Perkins MD.      US Liver    Result Date: 7/15/2023  EXAMINATION: US LIVER-  CLINICAL INDICATION: r/o cirrhosis; D64.9-Anemia, unspecified; A41.9-Sepsis, unspecified organism; R65.20-Severe sepsis without septic shock; J96.01-Acute respiratory failure with hypoxia; N39.0-Urinary tract infection, site not specified   COMPARISON: None available  FINDINGS: Sonographic imaging of the liver  Liver is homogeneous in echotexture No intrahepatic ductal dilatation or focal hepatic mass. Hepatic flow is hepatopedal.  Small amount of perihepatic fluid is present.      1. Small volume ascites 2. The liver is homogeneous  This report was finalized on 7/15/2023 10:59 AM by Dr. Nicho Reeves MD.      XR Chest 1 View    Result Date: 7/26/2023  Procedure: X-ray examination of the chest performed on 07/26/2023. Single view. Portable technique.  HISTORY: NG tube placement. Confirm position.  COMPARISON: None.  FINDINGS:  NG tube in place with tip to the stomach. Hypoinflated lungs with central pulmonary vascular congestion. No free air in the upper abdomen. Right central vascular catheter with tip to the SVC. Endotracheal tube noted with tip approximately 3.9 cm above the barry.       1.  NG tube in place with tip to the stomach.  This report was finalized on 7/26/2023 7:11  AM by Sacha Syed MD.      XR Chest 1 View    Result Date: 7/24/2023  EXAM:   XR Chest, 1 View  EXAM DATE:   7/21/2023 6:55 AM  CLINICAL HISTORY:   Respiratory failure; D64.9-Anemia, unspecified; A41.9-Sepsis, unspecified organism; R65.20-Severe sepsis without septic shock; J96.01-Acute respiratory failure with hypoxia; N39.0-Urinary tract infection, site not specified; I50.31-Acute diastolic (congestive) heart failure  TECHNIQUE:   Frontal view of the chest.  COMPARISON:   07/20/2023  FINDINGS:   LUNGS AND PLEURAL SPACES:  Atelectasis in the lung bases.  No pneumothorax.   HEART:  Heart size is stable.   MEDIASTINUM:  Unremarkable.   BONES/JOINTS:  Unremarkable.   TUBES, LINES AND DEVICES:  The endotracheal tube (ETT) is in satisfactory position.  Right internal jugular central venous catheter tip in the superior vena cava.  Nasogastric tube tip in the stomach.        No acute cardiopulmonary findings identified.  This report was finalized on 7/24/2023 9:41 AM by Dr. Chaim Mcnulty MD.      XR Chest 1 View    Result Date: 7/22/2023  CLINICAL HISTORY: Follow-up respiratory failure.  COMPARISON: 7/20/2023.  TECHNIQUE: Single portable upright AP view of the chest.  FINDINGS: Endotracheal tube tip is 1.9 cm above the barry.  Right internal jugular vein central venous catheter tip is in the lower SVC. Nasogastric tube tip is in the stomach.  Heart size remains enlarged.  Consolidation at the left lung base is unchanged.  There is no pneumothorax.  Right lung is clear.  No change in appearance of the chest compared to 7/20/2023.       SUPPORT LINES AND TUBES IN POSITION.  UNCHANGED RETROCARDIAC OPACITY COMPARED TO 2 DAYS AGO.  This report was finalized on 7/22/2023 9:09 AM by Pily Solares MD.      XR Chest 1 View    Result Date: 7/20/2023  EXAM:   XR Chest, 1 View  EXAM DATE:   7/20/2023 2:45 PM  CLINICAL HISTORY:   Central line; D64.9-Anemia, unspecified; A41.9-Sepsis, unspecified organism; R65.20-Severe sepsis  without septic shock; J96.01-Acute respiratory failure with hypoxia; N39.0-Urinary tract infection, site not specified; I50.31-Acute diastolic (congestive) heart failure  TECHNIQUE:   Frontal view of the chest.  COMPARISON:   07/20/2023  FINDINGS:   Lungs and pleural spaces:  No pneumothorax.   Heart:  Cardiomegaly and left basilar airspace disease.   Mediastinum:  Unremarkable.   Bones/joints:  Unremarkable.   Vasculature:  Right IJ deep line noted with tip in the distal SVC.   Tubes, lines and devices:  ET tube with tip just below clavicles.  NG tube extends into the stomach.      1.  Support devices detailed above. No evidence of pneumothorax. 2.  Otherwise stable chest.  This report was finalized on 7/20/2023 3:28 PM by Dr. Kvng Tijerina MD.      XR Chest 1 View    Result Date: 7/20/2023  EXAM:   XR Chest, 1 View  EXAM DATE:   7/20/2023 8:23 AM  CLINICAL HISTORY:   respiratory failure; D64.9-Anemia, unspecified; A41.9-Sepsis, unspecified organism; R65.20-Severe sepsis without septic shock; J96.01-Acute respiratory failure with hypoxia; N39.0-Urinary tract infection, site not specified; I50.31-Acute diastolic (congestive) heart failure  TECHNIQUE:   Frontal view of the chest.  COMPARISON:   07/15/2023  FINDINGS:   Lungs and pleural spaces:  Left basal consolidation.  Mild pulmonary vascular congestion.  No pneumothorax.   Heart:  Cardiomegaly.   Mediastinum:  Unremarkable.   Bones/joints:  Unremarkable.   Tubes, lines and devices:  ET tube with tip at level of clavicles.  NG tube extends into the distal stomach.      1.  Support devices as above. 2.  Cardiomegaly and pulmonary vascular congestion. 3.  Left basilar airspace disease.  This report was finalized on 7/20/2023 8:48 AM by Dr. Kvng Tijerina MD.      XR Chest 1 View    Result Date: 7/15/2023  Procedure: Portable chest x-ray examination performed on 07/15/2023. Single view. Semiupright position.  HISTORY: Confusion. Progressive hypoxia.  COMPARISON:  None.  FINDINGS:  Enlarged heart size. Mild central pulmonary vascular congestion. Mild edema. Hypoinflated lungs. Mild elevated right hemidiaphragm. No pleural effusion or pneumothorax. No fracture or foreign body.       1.  Enlarged heart size. 2.  Central pulmonary vascular congestion with mild edema. 3.  Hypoinflated lungs. 4.  No pleural effusion or pneumothorax.  This report was finalized on 7/15/2023 4:52 PM by Sacha Syed MD.      XR Chest 1 View    Result Date: 7/12/2023  Single view chest  Indications: Sepsis protocol  Findings:  AP view the chest shows enlargement of the cardiac silhouette.  The osseous structures are normal.  Lungs are free from infiltrate and effusion.      Impression:  Cardiomegaly.  Lungs clear.  This report was finalized on 7/12/2023 10:59 PM by Wilber Lopez MD.      Adult Transthoracic Echo Limited W/ Cont if Necessary Per Protocol    Result Date: 7/20/2023    Left ventricular systolic function is normal. Left ventricular ejection fraction appears to be 66 - 70%.   Left ventricular diastolic dysfunction is noted.   The right ventricular cavity is mild to moderately dilated, D-shaped septum was noted however no assessment of the PA pressure was done right ventricular pressure not estimated.   There is a large (>2cm) circumferential pericardial effusion. There is no evidence of cardiac tamponade.   IVC is dilated and collapsible.   This is a technically limited study.     US Renal Bilateral    Result Date: 7/21/2023  EXAMINATION: US RENAL BILATERAL-  CLINICAL INDICATION:     dustin; D64.9-Anemia, unspecified; A41.9-Sepsis, unspecified organism; R65.20-Severe sepsis without septic shock; J96.01-Acute respiratory failure with hypoxia; N39.0-Urinary tract infection, site not specified; I50.31-Acute diastolic (congestive) heart failure  TECHNIQUE: Multiplanar gray scale sonographic imaging of the kidneys. Color Doppler implemented.  COMPARISON: NONE  FINDINGS:  RIGHT: The right  kidney measures 10.9 x 5.2 cm in size. No mass, hydronephrosis, or shadowing stone.  LEFT: The left kidney measures 11.8 x 5.9 cm in size. No mass, hydronephrosis, or shadowing stone.  Small perihepatic ascites noted.      1. Unremarkable sonographic appearance of both kidneys. 2. Small perihepatic ascites.  This report was finalized on 7/21/2023 11:32 AM by Dr. Kvng Tijerina MD.      US Venous Doppler Lower Extremity Bilateral (duplex)    Result Date: 7/20/2023  US VENOUS DOPPLER LOWER EXTREMITY BILATERAL (DUPLEX)-  CLINICAL INDICATION: r/o dvt; D64.9-Anemia, unspecified; A41.9-Sepsis, unspecified organism; R65.20-Severe sepsis without septic shock; J96.01-Acute respiratory failure with hypoxia; N39.0-Urinary tract infection, site not specified; I50.31-Acute diastolic (congestive) heart failure   COMPARISON: None available  TECHNIQUE: Color Doppler imaging was used with compression and augmentation to evaluate the lower extremity deep venous system.  FINDINGS: There is patent spontaneous flow from the common femoral vein through the posterior tibial veins. There was no internal clot or area of noncompressibility. Normal augmentation was elicited where applicable.      No DVT in the lower extremities on today's exam.  This report was finalized on 7/20/2023 1:17 PM by Dr. Nicho Reeves MD.      XR Abdomen KUB    Result Date: 7/22/2023  Procedure: X-ray examination of the abdomen performed on 07/22/2023. Portable technique. 2 films. Supine position.  HISTORY: Vomiting. Fecal output from OG tube.  COMPARISON: None.  FINDINGS:  Enteric drain in place with tip in the stomach. Nonobstructive bowel gas pattern. Levoconvex scoliosis affecting the lumbar spine. No free air. Ovoid shaped calcification in the right abdomen could represent a gallstone.       1.  Enteric drain in place with tip in stomach. 2.  Nonobstructive bowel gas pattern. 3.  Levoconvex scoliosis at the lumbar spine.  This report was finalized on 7/22/2023  "8:00 PM by Sacha Syed MD.         Assessment & Plan:  Judy Lutz is a 63 y.o. year-old female with     1.  Microcytic Anemia  2. Iron Deficiency Anemia  3. Leukocytosis  4. Thrombocytosis  - CBCs from admission were reviewed. Patient with ongoing microcytic anemia.  - Iron panel and Ferritin was low. Vitamin B12 and folate are replete.  - Peripheral smear shows severe microcytic anemia with moderate hypochromasia and moderate anisopoikilocytosis with rare schistocytes noted. These findings are all often seen with severe iron deficiency. Anisopoikilocytosis means (RBCs of) varied shape and size, elliptocytes are one of these abnormal shapes (an elongated \"pencil cell\" shaped one), hypochromic (caused by the lack of iron) have the appearance of target cells, and schistocytes, in her situation, are not due to hemolysis, but rather the fragility of the iron deficient RBCs.   - She was started on Niferex 150 mg PO daily.  - She was also given Ferric Gluconate 250 mg IV x 3 doses. With ongoing microcytic anemia she was also given Venofer 300 mg IV x 3 doses which she completed last week.   - CBC from today shows normocytic anemia with Hg (8.8) which remains stable. Reticulocyte count has normalized. Will plan to repeat Iron panel and Ferritin on 07/28/2023.  - General surgery evaluated patient for possible endoscopy/colonoscopy but feel that it is best to defer this as patient is now intubated/sedated and is not able to participate in oral bowel preparation, as well as would prefer to do both procedures at the same time due to increased risk of anesthesia secondary to multiple health conditions.  - Leukocytosis with predominant neutrophilia is likely multifactorial and secondary to stress (caused by severe iron deficiency) as well as ongoing infection (UTI/sepsis).   - Thrombocytosis likely reactive and secondary to iron deficiency. Will monitor.  - Continue to recommend transfusional support for Hg < 7 or < 8 " if symptomatic and platelets <20K.              Electronically signed by: WINDY Sorto , July 26, 2023 10:49 EDT

## 2023-07-26 NOTE — PROGRESS NOTES
HEPARIN INFUSION  Judy Lutz is a  63 y.o. female receiving heparin infusion.     Therapy for (VTE/Cardiac):   Cardiac  Patient Dosing Weight: 171 kg  Initial Bolus (Y/N):   N  Any Bolus (Y/N):   Y        Signs or Symptoms of Bleeding: No    Cardiac or Other (Not VTE)   Initial Bolus: 60 units/kg (Max 4,000 units)  Initial rate: 12 units/kg/hr (Max 1,000 units/hr)   Anti Xa Rebolus Infusion Hold time Change infusion Dose (Units/kg/hr) Next Anti Xa or aPTT Level Due   < 0.11 50 Units/kg  (4000 Units Max) None Increase by  3 Units/kg/hr 6 hours   0.11- 0.19 25 Units/kg  (2000 Units Max) None Increase by  2 Units/kg/hr 6 hours   0.2 - 0.29 0 None Increase by  1 Units/kg/hr 6 hours   0.3 - 0.5 0 None No Change 6 hours (after 2 consecutive levels in range check q24h @0700)   0.51 - 0.6 0 None Decrease by  1 Units/kg/hr 6 hours   0.61 - 0.8 0 30 Minutes Decrease by  2 Units/kg/hr 6 hours   0.81 - 1 0 60 Minutes Decrease by  3 Units/kg/hr 6 hours   >1 0 Hold  After Anti Xa less than 0.5 decrease previous rate by  4 Units/kg/hr  Every 2 hours until Anti Xa  less than 0.5 then when infusion restarts in 6 hours       Recommend anti-Xa every 6 hours.     Results from last 7 days   Lab Units 07/26/23  0406 07/25/23  2050 07/25/23  1242 07/25/23  0549 07/24/23  0637 07/24/23  0105 07/23/23  1735 07/23/23  1130 07/23/23  0359   INR   --   --   --   --   --   --   --  1.11*  --    HEMOGLOBIN g/dL  --   --   --  8.2*  --  9.9*  --   --  9.2*   HEMATOCRIT %  --   --   --  32.0*  --  38.7  --   --  35.8   PLATELETS 10*3/mm3  --   --   --  373  --  441  --   --  495*   HEPARIN ANTI-XA IU/ml 0.26* 0.29* 0.25* 0.16*   < > 0.32   < > <0.10*  --     < > = values in this interval not displayed.          Date   Time   Anti-Xa Current Rate (Unit/kg/hr) Bolus   (Units) Rate Change   (Unit/kg/hr) New Rate (Unit/kg/hr) Next   Anti-Xa Comments  Pump Check Daily   7/23 1130 < 0.10 5.85 - initial 5.85 1800 Spoke with patients nurse to  confirm rate and no initial bolus   7/23 1832 <0.10 5.85 4000 +3 8.85 0100 Spoke with Torey. No s/s of bleeding and no noted stops.    07/23 0130 0.32 8.85 0 0 8.85 0700 Spoke with Nancy (not the patients nurse but took the message). No s/s of bleeding.    7/24 0637 <0.1 8.85 4000 +3 11.85 1400 Discussed rate change and bolus with nurse . No s/s bleeding   7/24 14:16 <0.1 11.85 4000 +3 14.85 2130 Pump check done and discussed rate change  and bolus with nurse Asya   7/24 2300 0.21 14.85 0 +1 15.85 0500 Spoke with Nancy  since pt nurse was with another patient. No s/s of bleeding.    7/25 0640 0.16 15.85 2000 +2 17.85 1300 Spoke to JOHN Weems. Drip wasn't stopped or held any during her shift. No s/s of bleeding    7/25 1242 0.25 17.85 0 +1 18.85 2030 Spoke to JOHN Saab , increase rate , no bolus   7/25 2125 0.29 18.85 - +1 19.85 0400 Spoke with nursing about rate increase. No s/s of bleeding.   7/26 0504 0.26 19.85 0 +1 20.85 1100 Spoke with Sissy about the rate increase. No s/s of bleeding.    7/26 1105 0.32 20.85 0 0 20.85 1700 Spoke with Katiana LOPEZ.no changes, no s/s bleeding                                                                                                                   Pharmacy will continue to follow anti-Xa results and monitor for signs and symptoms of bleeding or thrombosis.

## 2023-07-26 NOTE — PROGRESS NOTES
[facility] HOSPITALIST PROGRESS NOTE     Patient Identification:  Name:  Judy Lutz  Age:  63 y.o.  Sex:  female  :  1960  MRN:  [id number]  Visit Number:  [id number]  ROOM: [address]     Primary Care Provider:  Provider, No Known     Date of Admission: 2023    Length of stay in inpatient status:  13    Subjective     Chief Compliant:    Chief Complaint   Patient presents with    Shortness of Breath     History of Presenting Illness:  The patient is still on the ventilator and as a result, I am unable to obtain a history from her.  Two daughters were at bedside today.  The patient is now spiking fevers again, this time up to 102.8; a cooling blanket has been applied.  I saw the patient with bedside nurse Brent in the room as well as one of the daughters.    Consults:  Dr. Hurst with pulmonology  Alison Zamora, and Isai with cardiology  Dr. Phelps and LISETTE Parkinson with hematology/oncology  Dr. Broussard, LISETTE Maldonado, and LISETTE Tarango with infectious disease  Dr. Aguayo and LISETTE Tse with general surgery  Dr. Stern with nephrology  Dr. aMthis with pulmonology  Dr. Cross and LISETTE Matson with tele-neurology     Procedures:  2023:  Transfusion of one unit of PRBCs  2023:  Transfusion of one unit of PRBCs  2023:  Right internal jugular triple lumen central venous catheter  2023:  Orally intubated and mechanically ventilated  2023:  TPN ordered    Objective     Current Hospital Meds:  aspirin, 81 mg, Per G Tube, Daily  chlorhexidine, 15 mL, Mouth/Throat, Q12H  diphenhydrAMINE, 25 mg, Intravenous, Q6H  fluconazole, 200 mg, Intravenous, Q24H  Linezolid, 600 mg, Intravenous, Q12H  magic barrier cream, 1 application , Topical, BID  meropenem, 1,000 mg, Intravenous, Q12H  midodrine, 10 mg, Oral, TID AC  trace minerals + multivitamin IVPB, , Intravenous, Once per day on   pantoprazole, 40 mg, Intravenous, BID AC  polyethylene glycol, 17 g, Oral, Daily  potassium  chloride, 40 mEq, Nasogastric, Daily  potassium chloride, 20 mEq, Intravenous, Q1H  rosuvastatin, 20 mg, Oral, Nightly  senna-docusate sodium, 2 tablet, Oral, BID  sodium chloride, 10 mL, Intravenous, Q12H  sodium chloride, 10 mL, Intravenous, Q12H  sodium chloride, 10 mL, Intravenous, Q12H  sodium chloride, 10 mL, Intravenous, Q12H  sodium chloride, 10 mL, Intravenous, Q12H    Adult Central Clinimix TPN, , Last Rate: 65 mL/hr at 07/25/23 1813  dexmedetomidine, 0.2-1.5 mcg/kg/hr, Last Rate: Stopped (07/25/23 1701)  fentanyl 10 mcg/mL,  mcg/hr, Last Rate: 150 mcg/hr (07/25/23 2104)  heparin, 20.85 Units/kg/hr, Last Rate: 20.85 Units/kg/hr (07/26/23 0506)  Pharmacy Consult - Pharmacy to dose,   Pharmacy to Dose Heparin,   phenylephrine, 0.5-3 mcg/kg/min, Last Rate: 1.4 mcg/kg/min (07/26/23 0541)  sodium chloride, 40 mL/hr, Last Rate: 40 mL/hr (07/26/23 0543)      Current Antimicrobial Therapy:  Anti-Infectives (From admission, onward)      Ordered     Dose/Rate Route Frequency Start Stop    07/23/23 0824  fluconazole (DIFLUCAN) IVPB 200 mg        Ordering Provider: Anna Maldonado APRN    200 mg  100 mL/hr over 60 Minutes Intravenous Every 24 Hours 07/23/23 1000 07/30/23 0959    07/21/23 1052  meropenem (MERREM) 1,000 mg in sodium chloride 0.9 % 100 mL IVPB-VTB        Ordering Provider: King Broussard MD    1,000 mg  over 3 Hours Intravenous Every 12 Hours 07/22/23 0000 07/28/23 2359    07/21/23 1029  Linezolid (ZYVOX) 600 mg 300 mL        Ordering Provider: King Broussard MD    600 mg  300 mL/hr over 60 Minutes Intravenous Every 12 Hours 07/21/23 1200 07/28/23 1159    07/21/23 1052  meropenem (MERREM) 1,000 mg in sodium chloride 0.9 % 100 mL IVPB-VTB        Ordering Provider: King Broussard MD    1,000 mg  over 30 Minutes Intravenous Once 07/21/23 1200 07/21/23 1355    07/19/23 1607  vancomycin 2500 mg/500 mL 0.9% NS IVPB ([facility])        Ordering Provider: Ginette Tarango DO    2,500 mg  over 180  Minutes Intravenous Once 07/19/23 1700 07/19/23 2116    07/18/23 1259  gentamicin (GARAMYCIN) 540 mg in sodium chloride 0.9 % IVPB        Note to Pharmacy: Separate Administration Time With Ampicillin and Other Penicillins (Ampicillin / Penicillins deactivate gentamicin when infused together).   Ordering Provider: King Broussard MD    5 mg/kg × 107 kg (Adjusted)  over 30 Minutes Intravenous Once 07/18/23 1400 07/18/23 1505    07/15/23 1915  meropenem (MERREM) 1,000 mg in sodium chloride 0.9 % 100 mL IVPB-VTB        Ordering Provider: Candido Rodney MD    1,000 mg  over 30 Minutes Intravenous Once 07/15/23 2015 07/15/23 2056    07/12/23 2137  cefTRIAXone (ROCEPHIN) 2,000 mg in sodium chloride 0.9 % 100 mL IVPB-VTB        Ordering Provider: Yusuf Luo DO    2,000 mg  200 mL/hr over 30 Minutes Intravenous Once 07/12/23 2153 07/12/23 2330          Current Diuretic Therapy:  Diuretics (From admission, onward)      Ordered     Dose/Rate Route Frequency Start Stop    07/24/23 1024  furosemide (LASIX) injection 80 mg        Ordering Provider: Rasheed Stern MD    80 mg Intravenous Once 07/24/23 1100 07/24/23 1112    07/23/23 1125  furosemide (LASIX) injection 80 mg        Ordering Provider: Rasheed Stern MD    80 mg Intravenous Once 07/23/23 1215 07/23/23 1418    07/22/23 0932  furosemide (LASIX) injection 80 mg        Ordering Provider: Rasheed Stern MD    80 mg Intravenous Once 07/22/23 1400 07/22/23 1422    07/14/23 0810  furosemide (LASIX) injection 60 mg        Ordering Provider: Leonard Dang DO    60 mg Intravenous Once 07/14/23 0900 07/14/23 0846    07/13/23 0054  furosemide (LASIX) injection 80 mg        Ordering Provider: Yusuf Luo DO    80 mg Intravenous Once 07/13/23 0110 07/13/23 0114          ----------------------------------------------------------------------------------------------------------------------  Vital Signs:  Temp:  [95.5 °F (35.3 °C)-99.1 °F (37.3 °C)] 95.5 °F  (35.3 °C)  Heart Rate:  [] 119  Resp:  [18-36] 22  BP: ()/(44-98) 100/68  FiO2 (%):  [40 %] 40 %  SpO2:  [81 %-100 %] 100 %  on   ;   Device (Oxygen Therapy): ventilator  Body mass index is 57.61 kg/m².    Wt Readings from Last 3 Encounters:   07/26/23 (!) 182 kg (401 lb 8 oz)     Intake & Output (last 3 days)         07/23 0701 07/24 0700 07/24 0701 07/25 0700 07/25 0701 07/26 0700 07/26 0701 07/27 0700    I.V. (mL/kg) 5216.8 (30.3) 4347.8 (24.4) 3067.5 (17.2)     Other  60 80     IV Piggyback 600 900 500     TPN  336 1291.8     Total Intake(mL/kg) 5816.8 (33.8) 5643.8 (31.7) 4939.4 (27.7)     Urine (mL/kg/hr) 1200 (0.3) 850 (0.2) 255 (0.1)     Emesis/NG output 740 750 700     Stool 0 0      Total Output 1940 1600 955     Net +3876.8 +4043.8 +3984.4             Stool Unmeasured Occurrence 2 x 3 x            Adult Central Clinimix TPN (and additional linked orders)  NPO Diet NPO Type: Other (see comments)  ----------------------------------------------------------------------------------------------------------------------  Physical Exam  Constitutional:       Appearance: She is well-developed. She is morbidly obese.      Interventions: She is sedated and intubated.  She does look more acutely ill today than yesterday.  HENT:      Head: Normocephalic and atraumatic.      Right Ear: External ear normal.      Left Ear: External ear normal.      Nose: Nose normal.   Eyes:      General: No scleral icterus.        Right eye: No discharge.         Left eye: No discharge.   Cardiovascular:      Rate and Rhythm: Tachycardia present. Rhythm irregular.      Pulses: Normal pulses.   Pulmonary:      Effort: Pulmonary effort is normal. No respiratory distress. She is intubated.      Breath sounds: No wheezing or rales.  She does have decreased air movement in the bases of her lungs bilaterally  Abdominal:      General: Abdomen is protuberant but it does feel more distended than yesterday. Bowel sounds are barely  audible today.   Musculoskeletal:         General: No deformity or signs of injury.      Comments: Edema of her feet and legs bilaterally has returned and she does appear to have 1+ pitting anasarca throughout.    Skin:     Capillary Refill: Capillary refill takes less than 2 seconds.      Coloration: Skin is not jaundiced or pale.   Neurological:      Comments: She was on Precedex for sedation to day as she was biting the tube on the ventilator and having respirations in the 30 range while on spontaneous.  The patient was not able to follow commands due to the sedation.  She seemed to recognize her family members.  She was able to follow commands.  Her pupillary exam is unremarkable.  Psychiatric: Sedated on Precedex and as a result unable to perform this portion of the exam.      Comments:   ----------------------------------------------------------------------------------------------------------------------  Tele:  Afib/Aflutter with heart rates 's.  I have personally reviewed/looked at the telemetry strips.   ----------------------------------------------------------------------------------------------------------------------  LABS:    CBC and coagulation:  Results from last 7 days   Lab Units 07/26/23  0406 07/25/23  0549 07/24/23  2155 07/24/23  1547 07/24/23  0951 07/24/23  0637 07/24/23  0411 07/24/23  0105 07/23/23  1130 07/23/23  0359 07/22/23  0035 07/21/23  0012 07/20/23  0559   PROCALCITONIN ng/mL  --  1.18*  --   --   --   --   --  0.91*  --   --   --   --  0.53*   LACTATE mmol/L  --  2.1* 2.6* 2.5* 2.1* 2.6* 2.5* 2.7*  --   --   --   --   --    CRP mg/dL  --  21.23*  --   --   --   --   --  20.36*  --   --   --   --   --    WBC 10*3/mm3 24.30* 18.22*  --   --   --   --   --  26.19*  --  33.91* 29.50* 18.78* 18.35*   HEMOGLOBIN g/dL 8.8* 8.2*  --   --   --   --   --  9.9*  --  9.2* 7.9* 7.0* 7.2*   HEMATOCRIT % 33.5* 32.0*  --   --   --   --   --  38.7  --  35.8 31.2* 27.4* 29.9*   MCV fL 86.6  86.3  --   --   --   --   --  85.2  --  83.8 83.2 82.5 84.5   MCHC g/dL 26.3* 25.6*  --   --   --   --   --  25.6*  --  25.7* 25.3* 25.5* 24.1*   PLATELETS 10*3/mm3 459* 373  --   --   --   --   --  441  --  495* 476* 457* 494*   INR   --   --   --   --   --   --   --   --  1.11*  --   --   --   --      Acid/base balance:  Results from last 7 days   Lab Units 07/24/23  0443 07/23/23  0410 07/22/23  0738 07/21/23  0435 07/20/23  1503 07/20/23  1028 07/20/23  0721   PH, ARTERIAL pH units 7.403 7.431 7.420 7.435 7.440 7.538* 7.257*   PCO2, ARTERIAL mm Hg 60.6* 63.3* 68.0* 68.7* 67.4* 54.6* >104.0*   PO2 ART mm Hg 69.2* 62.7* 70.5* 62.9* 65.8* 38.3* 93.7   HCO3 ART mmol/L 37.8* 42.0* 44.1* 46.1* 45.7* 46.5* 47.8*     Renal and electrolytes:  Results from last 7 days   Lab Units 07/26/23  0406 07/25/23  0549 07/24/23  0105 07/23/23  0359 07/22/23  1711 07/22/23  0035 07/21/23  0506 07/21/23  0012 07/20/23  1740 07/20/23  0559   SODIUM mmol/L 138 141 139 142  --  144  --  149*  --  150*   POTASSIUM mmol/L 3.4* 3.8 3.8 3.8 3.6 3.7  --  3.7   < > 3.5   MAGNESIUM mg/dL 1.8 2.0 2.0  --  2.1  --  2.3  --   --   --    CHLORIDE mmol/L 95* 95* 94* 95*  --  95*  --  97*  --  97*   CO2 mmol/L 29.9* 33.5* 29.5* 33.6*  --  39.1*  --  39.8*  --  45.2*   BUN mg/dL 70* 68* 63* 64*  --  69*  --  60*  --  56*   CREATININE mg/dL 2.71* 2.70* 2.64* 2.74*  --  3.09*  --  3.06*  --  2.65*   CALCIUM mg/dL 9.8 10.4 9.8 10.2  --  10.4  --  10.7*  --  11.2*   PHOSPHORUS mg/dL 6.1* 6.7* 5.4*  --   --   --   --   --   --   --    GLUCOSE mg/dL 220* 157* 121* 106*  --  152*  --  107*  --  111*   ANION GAP mmol/L 13.1 12.5 15.5* 13.4  --  9.9  --  12.2  --  7.8    < > = values in this interval not displayed.     Estimated Creatinine Clearance: 39.2 mL/min (A) (by C-G formula based on SCr of 2.71 mg/dL (H)).    Liver and pancreatic function:  Results from last 7 days   Lab Units 07/26/23  0406 07/25/23  0549 07/24/23  0105 07/23/23  0359 07/22/23  0035  07/21/23  0012 07/20/23  0559   ALBUMIN g/dL 2.1* 2.3* 2.0* 2.1* 2.4* 2.5* 3.3*   BILIRUBIN mg/dL 0.5 0.6 0.7 0.6 0.8 0.8 0.6   ALK PHOS U/L 73 81 91 93 73 49 51   AST (SGOT) U/L 11 17 23 14 12 12 14   ALT (SGPT) U/L 5 6 <5 5 5 6 8     Endocrine function:  Lab Results   Component Value Date    HGBA1C 5.80 (H) 07/13/2023     Point of care bedside glucose levels:  Results from last 7 days   Lab Units 07/26/23  0635 07/26/23  0018 07/25/23  1808 07/25/23  1306 07/25/23  0527 07/25/23  0001 07/24/23  1809 07/19/23  0925   GLUCOSE mg/dL 211* 225* 213* 223* 165* 130 111 133*     Glucose levels from [facility]:  Results from last 7 days   Lab Units 07/26/23  0406 07/25/23  0549 07/24/23  0105 07/23/23  0359 07/22/23  0035 07/21/23  0012 07/20/23  0559 07/19/23  1323   GLUCOSE mg/dL 220* 157* 121* 106* 152* 107* 111* 118*     Lab Results   Component Value Date    TSH 0.619 07/15/2023     Cultures:  Microbiology Results (last 10 days)       Procedure Component Value - Date/Time    Blood Culture - Blood, Arm, Right [[id number]]  (Normal) Collected: 07/24/23 1444    Lab Status: Preliminary result Specimen: Blood from Arm, Right Updated: 07/25/23 1500     Blood Culture No growth at 24 hours    Respiratory Culture - Sputum, ET Suction [[id number]] Collected: 07/24/23 1420    Lab Status: Preliminary result Specimen: Sputum from ET Suction Updated: 07/25/23 1055     Respiratory Culture Culture in progress     Gram Stain Moderate (3+) WBCs seen      Rare (1+) Epithelial cells seen      No organisms seen    Blood Culture - Blood, Arm, Left [[id number]]  (Normal) Collected: 07/24/23 1416    Lab Status: Preliminary result Specimen: Blood from Arm, Left Updated: 07/25/23 1500     Blood Culture No growth at 24 hours    Respiratory Panel PCR w/COVID-19(SARS-CoV-2) ALIVIA/MIRIAM/CRYSTAL/PAD/COR/MAD/TYLER In-House, NP Swab in UTM/VTM, 3-4 HR TAT - Swab, Nasopharynx [[id number]]  (Normal) Collected: 07/24/23 1411    Lab Status: Final result Specimen: Swab  from Nasopharynx Updated: 07/24/23 1520     ADENOVIRUS, PCR Not Detected     Coronavirus 229E Not Detected     Coronavirus HKU1 Not Detected     Coronavirus NL63 Not Detected     Coronavirus OC43 Not Detected     COVID19 Not Detected     Human Metapneumovirus Not Detected     Human Rhinovirus/Enterovirus Not Detected     Influenza A PCR Not Detected     Influenza B PCR Not Detected     Parainfluenza Virus 1 Not Detected     Parainfluenza Virus 2 Not Detected     Parainfluenza Virus 3 Not Detected     Parainfluenza Virus 4 Not Detected     RSV, PCR Not Detected     Bordetella pertussis pcr Not Detected     Bordetella parapertussis PCR Not Detected     Chlamydophila pneumoniae PCR Not Detected     Mycoplasma pneumo by PCR Not Detected    Narrative:      In the setting of a positive respiratory panel with a viral infection PLUS a negative procalcitonin without other underlying concern for bacterial infection, consider observing off antibiotics or discontinuation of antibiotics and continue supportive care. If the respiratory panel is positive for atypical bacterial infection (Bordetella pertussis, Chlamydophila pneumoniae, or Mycoplasma pneumoniae), consider antibiotic de-escalation to target atypical bacterial infection.    Blood Culture - Blood, Arm, Right [[id number]]  (Normal) Collected: 07/21/23 1041    Lab Status: Preliminary result Specimen: Blood from Arm, Right Updated: 07/25/23 1100     Blood Culture No growth at 4 days    Blood Culture - Blood, Arm, Left [[id number]]  (Normal) Collected: 07/21/23 1000    Lab Status: Preliminary result Specimen: Blood from Arm, Left Updated: 07/25/23 1100     Blood Culture No growth at 4 days    Urine Culture - Urine, Urine, Clean Catch [[id number]]  (Abnormal) Collected: 07/20/23 0945    Lab Status: Final result Specimen: Urine, Clean Catch Updated: 07/22/23 1410     Urine Culture 50,000 CFU/mL Candida albicans    Narrative:      Colonization of the urinary tract without  infection is common. Treatment is discouraged unless the patient is symptomatic, pregnant, or undergoing an invasive urologic procedure.    Respiratory Culture - Aspirate, ET Suction [[id number]]  (Abnormal) Collected: 07/20/23 0843    Lab Status: Final result Specimen: Aspirate from ET Suction Updated: 07/22/23 1030     Respiratory Culture Moderate growth (3+) Candida albicans      Scant growth (1+) Normal Respiratory Stephanie     Gram Stain Many (4+) WBCs seen      Few (2+) Epithelial cells seen      Mixed stephanie      Yeast with hyphae seen    MRSA Screen, PCR (Inpatient) - Swab, Nares [[id number]]  (Abnormal) Collected: 07/19/23 1306    Lab Status: Final result Specimen: Swab from Nares Updated: 07/19/23 1557     MRSA PCR MRSA Detected    Narrative:      The negative predictive value of this diagnostic test is high and should only be used to consider de-escalating anti-MRSA therapy. A positive result may indicate colonization with MRSA and must be correlated clinically.          I have personally looked at the labs and they are summarized above.  ----------------------------------------------------------------------------------------------------------------------  Detailed radiology reports for the last 24 hours:    Imaging Results (Last 24 Hours)       Procedure Component Value Units Date/Time    XR Chest 1 View [[id number]] Collected: 07/26/23 0710     Updated: 07/26/23 0713    Narrative:      Procedure: X-ray examination of the chest performed on 07/26/2023.  Single view. Portable technique.     HISTORY: NG tube placement. Confirm position.     COMPARISON: None.     FINDINGS:     NG tube in place with tip to the stomach.  Hypoinflated lungs with central pulmonary vascular congestion.  No free air in the upper abdomen.  Right central vascular catheter with tip to the SVC.  Endotracheal tube noted with tip approximately 3.9 cm above the barry.       Impression:         1.  NG tube in place with tip to the stomach.      This report was finalized on 7/26/2023 7:11 AM by Sacha Syed MD.       CT Chest Without Contrast Diagnostic [[id number]] Collected: 07/25/23 0909     Updated: 07/25/23 0913    Narrative:      EXAM:    CT Chest Without Intravenous Contrast     EXAM DATE:    7/24/2023 4:39 PM     CLINICAL HISTORY:    Worsening fever with no obvious source of infection; D64.9-Anemia,  unspecified; A41.9-Sepsis, unspecified organism; R65.20-Severe sepsis  without septic shock; J96.01-Acute respiratory failure with hypoxia;  N39.0-Urinary tract infection, site not specified; I50.31-Acute  diastolic (congestive) heart failure     TECHNIQUE:    Axial computed tomography images of the chest without intravenous  contrast.  Sagittal and coronal reformatted images were created and  reviewed.  This CT exam was performed using one or more of the following  dose reduction techniques:  automated exposure control, adjustment of  the mA and/or kV according to patient size, and/or use of iterative  reconstruction technique.     COMPARISON:    07/19/2023     FINDINGS:    Lungs and pleural spaces:  Bibasilar consolidations have increased  from the previous exam.  Interstitial edema has slightly improved.  Left  upper lobe partially consolidative airspace disease has improved from  the previous exam.  Trace pleural effusions are noted and appear  nonloculated.  No pneumothorax.    Heart:  Massive cardiomegaly is stable.  No significant pericardial  effusion.  No significant coronary artery calcifications.    Bones/joints:  Stable bony structures.  No acute fracture.  No  dislocation.    Soft tissues:  Anasarca is again noted.    Vasculature:  Portions of a right deep line are noted that extend into  the distal SVC.  No thoracic aortic aneurysm.    Lymph nodes:  Unremarkable.  No enlarged lymph nodes.    Liver:  Liver cirrhosis noted.    Intraperitoneal space:  Upper abdominal ascites appear stable.    Tubes, lines and devices:  NG tube extends into  the mid stomach  region.  ET tube with tip below level of clavicles and above the barry.       Impression:      1.  Worsening bibasilar consolidations.  2.  Slight improvement in mid and upper lung zone airspace disease when  compared to the previous exam.  3.  Overall minimal improvement in interstitial edema but stable severe  cardiomegaly with trace pleural effusions noted.  4.  Support devices as above.  5.  Persistent anasarca and liver cirrhosis with upper abdominal  ascites.  6.  No pneumothorax. Other nonacute findings as above.     This report was finalized on 7/25/2023 9:11 AM by Dr. Kvng Tijerina MD.       CT Abdomen Pelvis Without Contrast [[id number]] Collected: 07/25/23 0835     Updated: 07/25/23 0839    Narrative:      EXAM:    CT Abdomen and Pelvis Without Intravenous Contrast     EXAM DATE:    7/24/2023 4:39 PM     CLINICAL HISTORY:    Worsening fever with no obvious source of infection; D64.9-Anemia,  unspecified; A41.9-Sepsis, unspecified organism; R65.20-Severe sepsis  without septic shock; J96.01-Acute respiratory failure with hypoxia;  N39.0-Urinary tract infection, site not specified; I50.31-Acute  diastolic (congestive) heart failure     TECHNIQUE:    Axial computed tomography images of the abdomen and pelvis without  intravenous contrast.  Sagittal and coronal reformatted images were  created and reviewed.  This CT exam was performed using one or more of  the following dose reduction techniques:  automated exposure control,  adjustment of the mA and/or kV according to patient size, and/or use of  iterative reconstruction technique.     COMPARISON:    07/19/2023     FINDINGS:    LUNG BASES:  Left lower lobe consolidative opacity.    HEART:  Cardiomegaly again noted.      ABDOMEN:    LIVER:  Unremarkable.    GALLBLADDER AND BILE DUCTS:  Gallstone.  Gallbladder otherwise  unremarkable.  No ductal dilation.    PANCREAS:  Unremarkable.  No ductal dilation.    SPLEEN:  Unremarkable.  No  splenomegaly.    ADRENALS:  Unremarkable.  No mass.    KIDNEYS AND URETERS:  Unremarkable.  No obstructing stones.  No  hydronephrosis.    STOMACH AND BOWEL:  Unremarkable.  No obstruction.  No mucosal  thickening.      PELVIS:    APPENDIX:  No findings to suggest acute appendicitis.    BLADDER:  Unremarkable.  No stones.    REPRODUCTIVE:  Unremarkable as visualized.      ABDOMEN and PELVIS:    INTRAPERITONEAL SPACE:  Moderate ascites.  No free air.    BONES/JOINTS:  Degenerative disc disease throughout the lumbar spine.   Degenerative facet arthropathy throughout the lumbar spine, most  prominent in the lower lumbar spine.  No acute fracture.  No  dislocation.    SOFT TISSUES:  Unremarkable.    VASCULATURE:  Unremarkable.  No abdominal aortic aneurysm.    LYMPH NODES:  Unremarkable.  No enlarged lymph nodes.    TUBES, LINES AND DEVICES:  Nasogastric tube tip in the stomach.       Impression:      1.  Moderate ascites.  2.  Gallstone.  Gallbladder otherwise unremarkable.  3.  Degenerative changes lumbar spine as described.     This report was finalized on 7/25/2023 8:37 AM by Dr. Chaim Mcnulty MD.             I have personally looked at the radiology images and I have read the available final reports.    Assessment & Plan      -Acute hypoxemic respiratory failure that was present on admission and is due to acute exacerbation of HFpEF  -Type II non-ST elevation MI, present on admission  -Moderate to severe pericardial effusion without tamponade physiology that was present on admission  -Acute encephalopathy present on admission and returning during the hospitalization, suspect multifactorial and related to hypoxia and/or infectious/UTI  -Anasarca and bilateral pleural effusions, suspect due to the combined heart failure, present on admission  -Acute metabolic encephalopathy that was present on admission, due to the acute hypoxic respiratory failure and the acute urinary tract infection  -Acute urinary tract  infection, present on admission, with urine culture growing ESBL E. coli and Klebsiella pneumoniae  -As of 7/19/2023, acute hypercapnic and hypoxic respiratory failure that developed due to presumed bilateral bacterial pneumonia and failed BiPAP, orally intubated 7/20/2023  -Fecal-like material coming out of the OG tube, suspect due to constipation  -Symptomatic anemia, present on admission, suspect due to severe iron deficiency anemia, with no overt signs of bleeding so far this admission, status post 2 units of PRBCs thus far  -Leukocytosis, present on admission, suspect secondary to bone marrow overproduction as a result of the severe iron deficiency anemia  -As of evening of 7/22/2023, new onset AFib with RVR  -As of 7/20/2023, new Candida albicans UTI  -Sepsis, shock criteria that developed 7/21/2023, requiring vasopressor support (Levophed started 7/21/2023 and Xavi-synephrine started 7/22/2023)  -Nonoliguric acute kidney injury, noted on 7/18/2023, with peak Cr on 7/22/2023 of 3.09, current Cr 2.74 (baseline Cr 0.7-0.77)  -As of 7/26/2023, development of worsening fevers and abdominal distention  -Moderate ascites, present on admission  -Morbid obesity per BMI 57.39 kg/m², which complicates all aspects of care, and is causing obesity hypoventilation syndrome and obstructive sleep apnea  -Incidentally noted 7 mm right upper lobe subpleural noncalcified groundglass density, thought to be postinflammatory change    I have discussed the patient with Dr. Hurst multiple times today.  She currently has a cooling blanket on 4 her fever.  However, over the last 24 hours, we have been able to go down on the Xavi-Synephrine.  Her ventilator settings remain at the same level as yesterday, which is minimal support.  I am not sure where the fever is coming from.  1 thought is the atelectasis/pneumonia in her bibasilar areas per the CT scan obtained on 24 July.  However, if it is due to infection, I would have expected her  blood pressures or heart rates to worsen throughout the day, which they have not.  Thus, Dr. Hurst has ordered a new infectious work-up.  I was concerned about her abdomen being more distended and firmer than yesterday; she had 3 bowel movements yesterday but she has had none today despite taking the scheduled MiraLAX.  Therefore, I will obtain a CT scan of the abdomen and pelvis without contrast.  We will also monitor her bowel sounds closely; during my exam, it was very difficult to hear her bowel sounds.  Please note that we will continue weaning the Xavi-Synephrine for mean arterial blood pressures of 65 mmHg or less.  Dr. Hurst has decided to start the patient on Precedex as per the daughter today she appeared to be in a lot of pain.  I will have radiology eval the patient via ultrasound tomorrow to see if there is a pocket of ascitic fluid that is large enough to be sampled.  In the meantime, since patient is on less vasopressor support, I will not expand her antibiotics and will discuss if there needs to be additional ones added with the infectious disease team tomorrow.  We will continue to monitor her core temperature closely and have the cooling blanket adjusted as needed.  We will repeat blood work and an ABG in the morning.    VTE Prophylaxis:   Mechanical Order History:        Ordered        07/13/23 0225  Place Sequential Compression Device  Once            07/13/23 0225  Maintain Sequential Compression Device  Continuous                          Pharmalogical Order History:        Ordered     Dose Route Frequency Stop    07/25/23 0643  heparin 86709 units/250 mL (100 units/mL) in 0.45 % NaCl infusion  35.6 mL/hr         20.85 Units/kg/hr IV Titrated --    07/25/23 0643  heparin (porcine) 5000 UNIT/ML injection 2,000 Units         2,000 Units IV Once 07/25/23 0646    07/24/23 1518  heparin (porcine) 5000 UNIT/ML injection 4,000 Units         4,000 Units IV Once 07/24/23 1525    07/24/23 0716  heparin  (porcine) 5000 UNIT/ML injection 4,000 Units         4,000 Units IV Once 07/24/23 0743    07/23/23 1835  heparin (porcine) 5000 UNIT/ML injection 4,000 Units         4,000 Units IV Once 07/23/23 1837    07/23/23 1101  heparin 93781 units/250 mL (100 units/mL) in 0.45 % NaCl infusion  27.1 mL/hr,   Status:  Discontinued         15.85 Units/kg/hr IV Titrated 07/25/23 0643    07/23/23 1101  heparin (porcine) 5000 UNIT/ML injection 4,000 Units  Status:  Discontinued         4,000 Units IV As Needed 07/23/23 1135    07/23/23 1101  heparin (porcine) 5000 UNIT/ML injection 2,000 Units  Status:  Discontinued         2,000 Units IV As Needed 07/23/23 1135    07/23/23 1101  Pharmacy to Dose Heparin         -- XX Continuous PRN --    07/19/23 1134  heparin (porcine) 5000 UNIT/ML injection 5,000 Units  Status:  Discontinued         5,000 Units SC Every 8 Hours Scheduled 07/23/23 1101                The patient is high risk due to the following diagnoses/reasons:  Septic shock and acute hypoxic and hypercapnic respiratory failure     Disposition: Undetermined at this time    Candido Rodney MD  [facility]  07/26/23  07:48 EDT

## 2023-07-26 NOTE — PROGRESS NOTES
PROGRESS NOTE         Patient Identification:  Name:  Judy Lutz  Age:  63 y.o.  Sex:  female  :  1960  MRN:  [id number]  Visit Number:  [id number]  Primary Care Provider:  Provider, No Known         LOS: 13 days       ----------------------------------------------------------------------------------------------------------------------  Subjective       Chief Complaints:    Shortness of Breath        Interval History:      Patient remains intubated on 40% FiO2 with no fever or diarrhea reported overnight and currently on Xavi-Synephrine at 1.4.    Review of Systems:    Unable to obtain.  Intubated and sedated.    ----------------------------------------------------------------------------------------------------------------------      Objective       Current Hospital Meds:  aspirin, 81 mg, Per G Tube, Daily  chlorhexidine, 15 mL, Mouth/Throat, Q12H  diphenhydrAMINE, 25 mg, Intravenous, Q6H  fluconazole, 200 mg, Intravenous, Q24H  Linezolid, 600 mg, Intravenous, Q12H  magic barrier cream, 1 application , Topical, BID  meropenem, 1,000 mg, Intravenous, Q12H  midodrine, 10 mg, Oral, TID AC  trace minerals + multivitamin IVPB, , Intravenous, Once per day on   pantoprazole, 40 mg, Intravenous, BID AC  polyethylene glycol, 17 g, Oral, Daily  potassium chloride, 40 mEq, Nasogastric, Daily  rosuvastatin, 20 mg, Oral, Nightly  senna-docusate sodium, 2 tablet, Oral, BID  sodium chloride, 10 mL, Intravenous, Q12H  sodium chloride, 10 mL, Intravenous, Q12H  sodium chloride, 10 mL, Intravenous, Q12H  sodium chloride, 10 mL, Intravenous, Q12H  sodium chloride, 10 mL, Intravenous, Q12H      Adult Central Clinimix TPN, , Last Rate: 65 mL/hr at 23 1813  dexmedetomidine, 0.2-1.5 mcg/kg/hr, Last Rate: Stopped (23 1701)  fentanyl 10 mcg/mL,  mcg/hr, Last Rate: 150 mcg/hr (23 2104)  heparin, 20.85 Units/kg/hr, Last Rate: 20.85 Units/kg/hr (23 0506)  Pharmacy Consult -  Pharmacy to dose,   Pharmacy to Dose Heparin,   phenylephrine, 0.5-3 mcg/kg/min, Last Rate: 1.4 mcg/kg/min (07/26/23 0936)    ----------------------------------------------------------------------------------------------------------------------    Vital Signs:  Temp:  [95.5 °F (35.3 °C)-98.9 °F (37.2 °C)] 95.5 °F (35.3 °C)  Heart Rate:  [] 107  Resp:  [18-36] 22  BP: ()/(44-98) 107/44  FiO2 (%):  [40 %] 40 %  Mean Arterial Pressure (Non-Invasive) for the past 24 hrs (Last 3 readings):   Noninvasive MAP (mmHg)   07/26/23 0936 81   07/26/23 0900 80   07/26/23 0814 79       SpO2 Percentage    07/26/23 0814 07/26/23 0821 07/26/23 0900   SpO2: 98% 99% 99%     SpO2:  [81 %-100 %] 99 %  on   ;   Device (Oxygen Therapy): ventilator    Body mass index is 57.61 kg/m².  Wt Readings from Last 3 Encounters:   07/26/23 (!) 182 kg (401 lb 8 oz)        Intake/Output Summary (Last 24 hours) at 7/26/2023 1000  Last data filed at 7/26/2023 0809  Gross per 24 hour   Intake 4989.35 ml   Output 955 ml   Net 4034.35 ml       Adult Central Clinimix TPN (and additional linked orders)  NPO Diet NPO Type: Other (see comments)  ----------------------------------------------------------------------------------------------------------------------      Physical Exam:    Constitutional: Daughter is at bedside and seems to be very supportive and caring.  Patient seems to be comfortable sedated with no acute distress her eyes are open but not following commands.  RN is at bedside.  Neck: Unable to assess neck rigidity.  CVC to right IJ with no signs of infection.  Cardiovascular:  Normal rate, regular rhythm and normal heart sounds with no murmur. No edema.  Pulmonary/Chest: Lungs clear to auscultation bilaterally.  Intubated and sedated.  Abdominal: Very distended with anasarca edema and mild rigidity.  Musculoskeletal:  No edema, no tenderness, and no deformity.  No swelling or redness of joints.  Neurological: Intubated. Opening eyes  this morning.   Skin:  Skin is warm and dry.  No rash noted.  No pallor.  Peripheral IVs to the bilateral hands with no evidence of infection or phlebitis.  Psychiatric: Unable to assess.        ----------------------------------------------------------------------------------------------------------------------                Results from last 7 days   Lab Units 07/26/23  0822   PH, ARTERIAL pH units 7.342*   PO2 ART mm Hg 71.4*   PCO2, ARTERIAL mm Hg 57.4*   HCO3 ART mmol/L 31.1*       Results from last 7 days   Lab Units 07/26/23  0406 07/25/23  0549 07/24/23  2155 07/24/23  1547 07/24/23  0411 07/24/23  0105 07/23/23  1130   CRP mg/dL  --  21.23*  --   --   --  20.36*  --    LACTATE mmol/L  --  2.1* 2.6* 2.5*   < > 2.7*  --    WBC 10*3/mm3 24.30* 18.22*  --   --   --  26.19*  --    HEMOGLOBIN g/dL 8.8* 8.2*  --   --   --  9.9*  --    HEMATOCRIT % 33.5* 32.0*  --   --   --  38.7  --    MCV fL 86.6 86.3  --   --   --  85.2  --    MCHC g/dL 26.3* 25.6*  --   --   --  25.6*  --    PLATELETS 10*3/mm3 459* 373  --   --   --  441  --    INR   --   --   --   --   --   --  1.11*    < > = values in this interval not displayed.       Results from last 7 days   Lab Units 07/26/23  0406 07/25/23  0549 07/24/23  0105   SODIUM mmol/L 138 141 139   POTASSIUM mmol/L 3.4* 3.8 3.8   MAGNESIUM mg/dL 1.8 2.0 2.0   CHLORIDE mmol/L 95* 95* 94*   CO2 mmol/L 29.9* 33.5* 29.5*   BUN mg/dL 70* 68* 63*   CREATININE mg/dL 2.71* 2.70* 2.64*   CALCIUM mg/dL 9.8 10.4 9.8   GLUCOSE mg/dL 220* 157* 121*   ALBUMIN g/dL 2.1* 2.3* 2.0*   BILIRUBIN mg/dL 0.5 0.6 0.7   ALK PHOS U/L 73 81 91   AST (SGOT) U/L 11 17 23   ALT (SGPT) U/L 5 6 <5     Estimated Creatinine Clearance: 39.2 mL/min (A) (by C-G formula based on SCr of 2.71 mg/dL (H)).  No results found for: AMMONIA      Glucose   Date/Time Value Ref Range Status   07/26/2023 0635 211 (H) 70 - 130 mg/dL Final     Comment:     Meter: [id number] : [id number] RENÉ HERRERA   07/26/2023 0018 225 (H)  70 - 130 mg/dL Final     Comment:     Meter: [id number] : [id number] RENÉ HERRERA   07/25/2023 1808 213 (H) 70 - 130 mg/dL Final     Comment:     Meter: [id number] : [id number] CRESCENCIO THORNE   07/25/2023 1306 223 (H) 70 - 130 mg/dL Final     Comment:     Meter: [id number] : [id number] CRESCENCIO THORNE   07/25/2023 0527 165 (H) 70 - 130 mg/dL Final     Comment:     Meter: [id number] : [id number] DANTE PELLETIER   07/25/2023 0001 130 70 - 130 mg/dL Final     Comment:     Meter: [id number] : [id number] JEN VARGAS   07/24/2023 1809 111 70 - 130 mg/dL Final     Comment:     Meter: [id number] : [id number] NIVIA RAINEY       Lab Results   Component Value Date    HGBA1C 5.80 (H) 07/13/2023     Lab Results   Component Value Date    TSH 0.619 07/15/2023       Blood Culture   Date Value Ref Range Status   07/12/2023 No growth at 5 days  Final   07/12/2023 No growth at 5 days  Final     Urine Culture   Date Value Ref Range Status   07/15/2023 >100,000 CFU/mL Escherichia coli ESBL (A)  Final     Comment:       Consider infectious disease consult.  Susceptibility results may not correlate to clinical outcomes.   07/12/2023 >100,000 CFU/mL Escherichia coli ESBL (A)  Final     Comment:       Consider infectious disease consult.  Susceptibility results may not correlate to clinical outcomes.   07/12/2023 (A)  Final    >100,000 CFU/mL Klebsiella pneumoniae ssp pneumoniae     No results found for: WOUNDCX  No results found for: STOOLCX  No results found for: RESPCX  Pain Management Panel  More data may exist         Latest Ref Rng & Units 7/19/2023 7/14/2023   Pain Management Panel   Creatinine, Urine mg/dL 139.1  8.5    ----------------------------------------------------------------------------------------------------------------------  Imaging Results (Last 24 Hours)       Procedure Component Value Units Date/Time    XR Chest 1 View [[id number]] Collected: 07/26/23 0710     Updated: 07/26/23 0713     Narrative:      Procedure: X-ray examination of the chest performed on 07/26/2023.  Single view. Portable technique.     HISTORY: NG tube placement. Confirm position.     COMPARISON: None.     FINDINGS:     NG tube in place with tip to the stomach.  Hypoinflated lungs with central pulmonary vascular congestion.  No free air in the upper abdomen.  Right central vascular catheter with tip to the SVC.  Endotracheal tube noted with tip approximately 3.9 cm above the barry.       Impression:         1.  NG tube in place with tip to the stomach.     This report was finalized on 7/26/2023 7:11 AM by Sacha Syed MD.               ----------------------------------------------------------------------------------------------------------------------    Pertinent Infectious Disease Results      Urine culture collected on 7/15/2023 showed growth of over 100,000 colonies of ESBL E. Coli. Blood cultures from 7/21/2023 show no growth thus far.  Respiratory culture from 7/20/2023 finalized as Candida albicans.  Urine culture from 7/20/2023 finalized as 50,000 colonies of Candida albicans.    Assessment/Plan       Assessment     Sepsis  ESBL UTI  Pneumonia      Plan      Overall patient seems to be stable from respiratory and infectious disease standpoint and remains on 40% FiO2 without any evidence of respiratory distress at this point.  Hemodynamically patient seems to be showing slight improvement but continues on Xavi-Synephrine at 1.4 with a concerning 1 episode of hypothermia last night at 95.5.    Laboratory wise patient has shown improvement of her pH up to 7.342 along with slight worsening of her leukocytosis at 24.30 and CRP level at 21.23 with elevated procalcitonin at 1.18.    Her microbiology data is fairly insignificant aside from growth of Candida albicans from urine culture and respiratory culture collected on 7/20/2023 along with positive MRSA screen on 7/19/2023 and ESBL E. coli from 7/15/2023.    In the setting  of persistent septic shock would recommend broad-spectrum coverage for possible MRSA pneumonia and ESBL infection with linezolid and meropenem as well as short course of fluconazole with multiple specimen showing growth of Candida albicans.  Overall fairly guarded prognosis we will continue to follow closely.      ANTIMICROBIAL THERAPY    fluconazole - 200-0.9 MG/100ML-%  Linezolid - 600 MG/300ML  meropenem (MERREM) 1gm IVPB in 100ml NS (VTB)     Code Status:   Code Status and Medical Interventions:   Ordered at: 07/13/23 0152     Code Status (Patient has no pulse and is not breathing):    CPR (Attempt to Resuscitate)     Medical Interventions (Patient has pulse or is breathing):    Full Support       King Broussard MD  07/26/23  10:00 EDT

## 2023-07-27 NOTE — PLAN OF CARE
Goal Outcome Evaluation:  Plan of Care Reviewed With: patient        Progress: no change  Outcome Evaluation: Pt remains intubated/sedated. Precedex, tatum, and heparin gtt infusing. OG remains to low wall suction. Pt continues with poor UOP. VSS. Pt has been febrile this shift but trending down. Will continue with plan of care.

## 2023-07-27 NOTE — NURSING NOTE
Pt receiving bath with myself and 2 other RNs at bedside. Upon rolling pt, pt became bradycardic in the 30s and I was unable to palpate pulse. Doppler was brought to bedside and femoral pulse was located. Atropine administered at this time for bradycardia. STAT EKG called for rhythm change post atropine administration. Dr. Rodney was called and arrived to bedside, see orders to max neosynephrine. Dr. Hurst present via telecommunication. Family called to pt bedside and updated by Dr. Rodney.    1212 1 mg atropine   1222 6 mg adenosine   1224 5 mg metoprolol

## 2023-07-27 NOTE — PROGRESS NOTES
Chief Complaint:  Pulmonary and critical care is following for ventilator management and critical illness management      Subjective    Overnight events reviewed.  All the labs medications and the notes reviewed.  Case discussed with nephrologist.    Goals of care discussed with patient's daughter.  Patient is DNR.    Later in the day patient became bradycardic was given atropine.  I was contacted as patient went into supraventricular tachycardia/V. tach.  Gave 1 dose of adenosine which did not help.  Gave metoprolol 5 mg IV once controlled the heart rate a little bit.  Then gave fentanyl 100 mcg IV once.     Review of systems-could not be obtained as patient is intubated      Vital Signs  Temp:  [98.1 °F (36.7 °C)-102.9 °F (39.4 °C)] 99.4 °F (37.4 °C)  Heart Rate:  [] 112  Resp:  [22-26] 22  BP: ()/() 147/90  FiO2 (%):  [40 %-100 %] 100 %  Body mass index is 58.25 kg/m².    Intake/Output Summary (Last 24 hours) at 7/27/2023 1331  Last data filed at 7/27/2023 1104  Gross per 24 hour   Intake 4988.53 ml   Output 575 ml   Net 4413.53 ml       I/O this shift:  In: 500 [IV Piggyback:500]  Out: -     Physical Exam:   GENERAL APPEARANCE: Chronically ill in appearance.  Intubated    HEAD: Normal cephalic  EYES: PERRLA ET tube in place  THROAT: ET tube in place. Oral cavity and pharynx normal. No inflammation, swelling, exudate, or lesions.     Neck: Supple.     CARDIAC: Normal S1 and S2. No S3, S4 or murmurs. Rhythm is regular.     LUNGS: Clear to auscultation and percussion without rales, rhonchi, wheezing or diminished breath sounds.  Decreased breath sounds    ABDOMEN: Obese positive bowel sounds. Soft, nondistended, nontender.     EXTREMITIES: No significant deformity or joint abnormality. No edema. Peripheral pulses intact.    NEUROLOGICAL: Unable to assess due to sedation status.     PSYCHIATRIC: Unable to assess due to sedation status     Results Review:     I reviewed the patient's new  clinical results.  Results from last 7 days   Lab Units 07/27/23  0044 07/26/23  0406 07/25/23  0549   WBC 10*3/mm3 29.81* 24.30* 18.22*   HEMOGLOBIN g/dL 9.1* 8.8* 8.2*   PLATELETS 10*3/mm3 427 459* 373       Results from last 7 days   Lab Units 07/27/23  0044 07/26/23  1328 07/26/23  0406 07/25/23  0549   SODIUM mmol/L 135*  --  138 141   POTASSIUM mmol/L 3.7 3.9 3.4* 3.8   CHLORIDE mmol/L 95*  --  95* 95*   CO2 mmol/L 26.2  --  29.9* 33.5*   BUN mg/dL 74*  --  70* 68*   CREATININE mg/dL 2.93*  --  2.71* 2.70*   CALCIUM mg/dL 9.9  --  9.8 10.4   GLUCOSE mg/dL 229*  --  220* 157*   MAGNESIUM mg/dL 1.6  --  1.8 2.0       Lab Results   Component Value Date    INR 1.19 (H) 07/27/2023    INR 1.11 (H) 07/23/2023    INR 1.38 (H) 07/12/2023    PROTIME 15.6 (H) 07/27/2023    PROTIME 14.9 (H) 07/23/2023    PROTIME 17.6 (H) 07/12/2023     Results from last 7 days   Lab Units 07/27/23  0044 07/26/23  0406 07/25/23  0549   ALK PHOS U/L 97 73 81   BILIRUBIN mg/dL 0.7 0.5 0.6   ALT (SGPT) U/L 6 5 6   AST (SGOT) U/L 15 11 17       Results from last 7 days   Lab Units 07/27/23  0409   PH, ARTERIAL pH units 7.335*   PO2 ART mm Hg 80.7*   PCO2, ARTERIAL mm Hg 50.8*   HCO3 ART mmol/L 27.1*       Imaging Results (Last 24 Hours)       Procedure Component Value Units Date/Time    CT Abdomen Pelvis Without Contrast [[id number]] Collected: 07/27/23 0058     Updated: 07/27/23 0104    Narrative:      Examination: CT abdomen and pelvis without contrast     CLINICAL HISTORY: Worsening fevers     COMPARISON: 7/24/2023     TECHNIQUE: Contiguous 5 mm thick slices were obtained through the  abdomen and pelvis without contrast. Coronal and sagittal reformats were  performed.     FINDINGS:     ABDOMEN:  Consolidation is noted posteriorly within the left lower lobe likely  representing pneumonia in the correct clinical setting. Similarly there  is opacity posteriorly within the right lower lobe suspicious for  pneumonia. Small adjacent pleural  effusions are noted. Evaluation is  markedly limited due to habitus related artifact which is present. The  liver appears cirrhotic in morphology with nodularity of the liver  capsule although evaluation is markedly limited on this unenhanced  study. A large amount of upper abdominal and pelvic ascites is present.  Calcification is noted within the gallbladder fundus. This is likely  related to a stone within the fundus although calcification within the  wall cannot be excluded on this study. At least one left renal calculus  is noted. No hydronephrosis. Evaluation of the ureters is limited due to  habitus related streak artifact. The spleen is normal in size. The  pancreas appears grossly normal.     PELVIS: Evaluation is markedly limited. No definite high-grade small  bowel obstruction. No free air is identified within the upper abdomen to  suggest bowel perforation although evaluation is markedly limited.     Evaluation of the bones demonstrates no acute or suspicious osseous  lesions.       Impression:      1. Consolidation within both lower lobes likely representing pneumonia  in the correct clinical setting with small adjacent effusions.  2. Cirrhotic morphology of the liver with a large amount of abdominal  and pelvic ascites.  3. Severe degradation of image quality due to habitus related streak  artifact. This largely obscures the pelvis.  4. Calcification within the gallbladder likely on the basis of  cholelithiasis.     This report was finalized on 7/27/2023 1:02 AM by José Miguel Kline MD.                    aspirin, 81 mg, Per G Tube, Daily  chlorhexidine, 15 mL, Mouth/Throat, Q12H  diphenhydrAMINE, 25 mg, Intravenous, Q6H  fluconazole, 200 mg, Intravenous, Q24H  Linezolid, 600 mg, Intravenous, Q12H  magic barrier cream, 1 application , Topical, BID  meropenem, 1,000 mg, Intravenous, Q12H  metoclopramide, 5 mg, Intravenous, BID  midodrine, 10 mg, Oral, TID AC  trace minerals + multivitamin IVPB, ,  Intravenous, Once per day on Mon Wed Fri  pantoprazole, 40 mg, Intravenous, BID AC  polyethylene glycol, 17 g, Oral, Daily  potassium chloride, 40 mEq, Nasogastric, Daily  rosuvastatin, 20 mg, Oral, Nightly  senna-docusate sodium, 2 tablet, Oral, BID  sodium chloride, 10 mL, Intravenous, Q12H  sodium chloride, 10 mL, Intravenous, Q12H  sodium chloride, 10 mL, Intravenous, Q12H  sodium chloride, 10 mL, Intravenous, Q12H  sodium chloride, 10 mL, Intravenous, Q12H      Adult Central Clinimix TPN, , Last Rate: 65 mL/hr at 07/26/23 1824  dexmedetomidine, 0.2-1.5 mcg/kg/hr, Last Rate: Stopped (07/27/23 1212)  heparin, 18.85 Units/kg/hr, Last Rate: 19.85 Units/kg/hr (07/27/23 1320)  Pharmacy Consult - Pharmacy to dose,   Pharmacy to Dose Heparin,   phenylephrine, 0.5-3 mcg/kg/min, Last Rate: 3 mcg/kg/min (07/27/23 1236)      Site   Date Value Ref Range Status   07/27/2023 Left Brachial  Final     Dash's Test   Date Value Ref Range Status   07/27/2023 N/A  Final     pH, Arterial   Date Value Ref Range Status   07/27/2023 7.335 (L) 7.350 - 7.450 pH units Final     Comment:     84 Value below reference range     pCO2, Arterial   Date Value Ref Range Status   07/27/2023 50.8 (H) 35.0 - 45.0 mm Hg Final     Comment:     83 Value above reference range     pO2, Arterial   Date Value Ref Range Status   07/27/2023 80.7 (L) 83.0 - 108.0 mm Hg Final     HCO3, Arterial   Date Value Ref Range Status   07/27/2023 27.1 (H) 20.0 - 26.0 mmol/L Final     Comment:     83 Value above reference range     Base Excess, Arterial   Date Value Ref Range Status   07/27/2023 0.8 0.0 - 2.0 mmol/L Final     O2 Saturation, Arterial   Date Value Ref Range Status   07/27/2023 95.8 94.0 - 99.0 % Final     Hemoglobin, Blood Gas   Date Value Ref Range Status   07/27/2023 9.9 (L) 13.5 - 17.5 g/dL Final     Comment:     84 Value below reference range     Hematocrit, Blood Gas   Date Value Ref Range Status   07/27/2023 30.3 (L) 38.0 - 51.0 % Final     Comment:      84 Value below reference range     Oxyhemoglobin   Date Value Ref Range Status   07/27/2023 93.7 (L) 94 - 99 % Final     Comment:     84 Value below reference range     Oxyhemoglobin Venous   Date Value Ref Range Status   07/26/2023 62.9 45.0 - 75.0 % Final     Methemoglobin   Date Value Ref Range Status   07/27/2023 0.00 0.00 - 3.00 % Final     Comment:     84 Value below reference range     Carboxyhemoglobin   Date Value Ref Range Status   07/27/2023 2.3 0 - 5 % Final     CO2 Content   Date Value Ref Range Status   07/27/2023 28.7 22 - 33 mmol/L Final     Barometric Pressure for Blood Gas   Date Value Ref Range Status   07/27/2023 730 mmHg Final     Modality   Date Value Ref Range Status   07/27/2023 Ventilator  Final     FIO2   Date Value Ref Range Status   07/27/2023 40 % Final     udy Result    Narrative & Impression   CLINICAL HISTORY: Follow-up respiratory failure.     COMPARISON: 7/20/2023.     TECHNIQUE: Single portable upright AP view of the chest.     FINDINGS: Endotracheal tube tip is 1.9 cm above the barry.  Right  internal jugular vein central venous catheter tip is in the lower SVC.   Nasogastric tube tip is in the stomach.     Heart size remains enlarged.  Consolidation at the left lung base is  unchanged.  There is no pneumothorax.  Right lung is clear.  No change  in appearance of the chest compared to 7/20/2023.     IMPRESSION:     SUPPORT LINES AND TUBES IN POSITION.     UNCHANGED RETROCARDIAC OPACITY COMPARED TO 2 DAYS AGO.     This report was finalized on 7/22/2023 9:09 AM by Pily Solares MD.          Medication Review:   Microbiology Results (last 10 days)       Procedure Component Value - Date/Time    Blood Culture - Blood, Arm, Right [[id number]]  (Normal) Collected: 07/24/23 1444    Lab Status: Preliminary result Specimen: Blood from Arm, Right Updated: 07/26/23 1500     Blood Culture No growth at 2 days    Respiratory Culture - Sputum, ET Suction [[id number]]  (Abnormal) Collected:  07/24/23 1420    Lab Status: Final result Specimen: Sputum from ET Suction Updated: 07/26/23 1101     Respiratory Culture Rare Candida albicans      No Normal Respiratory Karol     Gram Stain Moderate (3+) WBCs seen      Rare (1+) Epithelial cells seen      No organisms seen    Blood Culture - Blood, Arm, Left [[id number]]  (Normal) Collected: 07/24/23 1416    Lab Status: Preliminary result Specimen: Blood from Arm, Left Updated: 07/26/23 1500     Blood Culture No growth at 2 days    Respiratory Panel PCR w/COVID-19(SARS-CoV-2) [facility] In-House, NP Swab in UTM/VTM, 3-4 HR TAT - Swab, Nasopharynx [[id number]]  (Normal) Collected: 07/24/23 1411    Lab Status: Final result Specimen: Swab from Nasopharynx Updated: 07/24/23 1520     ADENOVIRUS, PCR Not Detected     Coronavirus 229E Not Detected     Coronavirus HKU1 Not Detected     Coronavirus NL63 Not Detected     Coronavirus OC43 Not Detected     COVID19 Not Detected     Human Metapneumovirus Not Detected     Human Rhinovirus/Enterovirus Not Detected     Influenza A PCR Not Detected     Influenza B PCR Not Detected     Parainfluenza Virus 1 Not Detected     Parainfluenza Virus 2 Not Detected     Parainfluenza Virus 3 Not Detected     Parainfluenza Virus 4 Not Detected     RSV, PCR Not Detected     Bordetella pertussis pcr Not Detected     Bordetella parapertussis PCR Not Detected     Chlamydophila pneumoniae PCR Not Detected     Mycoplasma pneumo by PCR Not Detected    Narrative:      In the setting of a positive respiratory panel with a viral infection PLUS a negative procalcitonin without other underlying concern for bacterial infection, consider observing off antibiotics or discontinuation of antibiotics and continue supportive care. If the respiratory panel is positive for atypical bacterial infection (Bordetella pertussis, Chlamydophila pneumoniae, or Mycoplasma pneumoniae), consider antibiotic de-escalation to target atypical bacterial  infection.    Blood Culture - Blood, Arm, Right [[id number]]  (Normal) Collected: 07/21/23 1041    Lab Status: Final result Specimen: Blood from Arm, Right Updated: 07/26/23 1100     Blood Culture No growth at 5 days    Blood Culture - Blood, Arm, Left [[id number]]  (Normal) Collected: 07/21/23 1000    Lab Status: Final result Specimen: Blood from Arm, Left Updated: 07/26/23 1100     Blood Culture No growth at 5 days    Urine Culture - Urine, Urine, Clean Catch [[id number]]  (Abnormal) Collected: 07/20/23 0945    Lab Status: Final result Specimen: Urine, Clean Catch Updated: 07/22/23 1410     Urine Culture 50,000 CFU/mL Candida albicans    Narrative:      Colonization of the urinary tract without infection is common. Treatment is discouraged unless the patient is symptomatic, pregnant, or undergoing an invasive urologic procedure.    Respiratory Culture - Aspirate, ET Suction [[id number]]  (Abnormal) Collected: 07/20/23 0843    Lab Status: Final result Specimen: Aspirate from ET Suction Updated: 07/22/23 1030     Respiratory Culture Moderate growth (3+) Candida albicans      Scant growth (1+) Normal Respiratory Stephanie     Gram Stain Many (4+) WBCs seen      Few (2+) Epithelial cells seen      Mixed stephanie      Yeast with hyphae seen    MRSA Screen, PCR (Inpatient) - Swab, Nares [[id number]]  (Abnormal) Collected: 07/19/23 1306    Lab Status: Final result Specimen: Swab from Nares Updated: 07/19/23 1557     MRSA PCR MRSA Detected    Narrative:      The negative predictive value of this diagnostic test is high and should only be used to consider de-escalating anti-MRSA therapy. A positive result may indicate colonization with MRSA and must be correlated clinically.           Assessment & Plan     Neuro-  Weaned off fentanyl.  Currently on Precedex.  We will do a weaning trial.  Drips adjusted for RASS goal of 0 to -1    Drips adjusted to maintain a RASS goal of 0 to -1        Respiratory-  hypoxic respiratory failure-oxygen  requirements reviewed.  Current ventilator settings reviewed.      Latest ABG reviewed-ventilator settings adjusted c Continue FiO2 to maintain saturation 88 to 92%   White count worsened.  Repeat cultures done.     Latest Reference Range & Units 07/27/23 04:09   pH, Arterial 7.350 - 7.450 pH units 7.335 (L)   pCO2, Arterial 35.0 - 45.0 mm Hg 50.8 (H)   pO2, Arterial 83.0 - 108.0 mm Hg 80.7 (L)   HCO3, Arterial 20.0 - 26.0 mmol/L 27.1 (H)   Base Excess 0.0 - 2.0 mmol/L 0.8   O2 Saturation, Arterial 94.0 - 99.0 % 95.8   CO2 Content 22 - 33 mmol/L 28.7   A-a DO2 0.0 - 300.0 mmHg 136.5   Carboxyhemoglobin 0 - 5 % 2.3   Methemoglobin 0.00 - 3.00 % 0.00   Oxyhemoglobin 94 - 99 % 93.7 (L)   Hematocrit, Blood Gas 38.0 - 51.0 % 30.3 (L)   Hemoglobin, Blood Gas 13.5 - 17.5 g/dL 9.9 (L)   Site  Left Brachial   Dash's Test  N/A   Modality  Ventilator   FIO2 % 40   Ventilator Mode  VC/AC   Set Tidal Volume  410.00   Set Mech Resp Rate  22.0   PEEP  8.0   Barometric Pressure for Blood Gas mmHg 730   Collected by  [id number]   (L): Data is abnormally low  (H): Data is abnormally high    Discussed goals of care with patient daughter.  Patient is DNR.  During the discussion patient's nurse and RT were present with me.  Case discussed with nephrologist.    We will try again tomorrow  Vent settings   Vent Settings          Resp Rate (Set): 22  Pressure Support (cm H2O): 14 cm H20  FiO2 (%): 100 % (increased fio2)  PEEP/CPAP (cm H2O): 12 cm H20 (Dr. Rodney at bedside)    Minute Ventilation (L/min) (Obs): 9.19 L/min  Resp Rate (Observed) Vent: 22     I:E Ratio (Obs): 1:2.6    PIP Observed (cm H2O): 49 cm H2O          VAP- precautions  Head end - 30 %  Mouth care   DVt prophylaxis    Morbid obesity- overall complicates the care and prognosis    Cardiology- hemodynamically -unstable   SHOCK- Adjusted vasopressors to maintain MAP more than 65 mm hg   Currently on Xavi-Synephrine and midodrine.  Continue to monitor HR- rate and rhythm, BP    Results for orders placed during the hospital encounter of 07/12/23    Adult Transthoracic Echo Limited W/ Cont if Necessary Per Protocol    Interpretation Summary    This is a limited study to assess the progression of pericardial effusion.    Left ventricular ejection fraction appears to be 51 - 55%.    The right ventricular cavity is moderately dilated.    Moderately reduced right ventricular systolic function noted.    There is a moderate (1-2cm) pericardial effusion adjacent to the left ventricle. There is no evidence of cardiac tamponade.    Comments: The degree of pericardial effusion seem to have improved as compared to the previous study.      Nephrology- Cr BUN stable  I/O- reviewed-urine output decreasing  Nephrology on case -Case and prognosis discussed with them    GI- PPI prophylaxis  Continue current treatment.  CT of the abdomen and pelvis reviewed.  Continue TPN.  Started Reglan yesterday which did not make any difference.    Heme- CBC  Hb- transfuse for hb less than 7 - received 2 units of blood  Platelet-   WBC- high but better- ID on case   Antibiotic treatment reviewed.  Medication list reviewed.  Antibiotics reviewed ID  Culture  And Antibiotics reviewed   Micro reviewed   CT of the abdomen pelvis and CT chest reviewed.  White count high.  Repeat chest x-ray does not show any major pulmonary infiltrates    Endocrinology- Maintain Blood sugar 140 -180      Electrolytes-   Mag phos       DVT prophylaxis- cont   Overall prognosis very poor.  Discussed goals of care with patient's daughter at bedside.  Patient is currently DNR.  Bed side rounds were done with RT and patient's nurse. All the lab and clinical findings were discussed with them and plan was also discussed in great detail.  Critically ill with shock sepsis and respiratory failure  Adjusted drips and vent settings  Cc 34 mins   Family member present- daughter       Symptomatic anemia               Jose Antonio Hurst MD  07/27/23  13:31  EDT

## 2023-07-27 NOTE — PROGRESS NOTES
[facility]  PROGRESS NOTE      Patient information:  Name: Judy Lutz  Age/Sex: 63 y.o. female  :  1960        PCP: Provider, No Known  Attending: Sapphire Lanre DianeDO  MRN:  [id number]  Visit Number:  [id number]    LOS:  LOS: 14 days   CODE STATUS:    Code Status and Medical Interventions:   Ordered at: 23 0152     Code Status (Patient has no pulse and is not breathing):    CPR (Attempt to Resuscitate)     Medical Interventions (Patient has pulse or is breathing):    Full Support       PROBLEM LIST:Principal Problem:    Symptomatic anemia      Reason for Cardiology follow-up: Pericardial effusion and cardiomyopathy        Subjective   ADMISSION INFORMATION:  Chief Complaint   Patient presents with    Shortness of Breath       HPI: Judy Lutz is a 63-year-old female with no reported past medical history.  She presented to [facility] ED on 2023 with complaints of increased fatigue, malaise, shortness of breath, and generalized weakness.  Cardiology was consulted for further evaluation and management of cardiomyopathy and pericardial effusion.          Interval History:   Patient is in room CCU #8 and was examined by Dr. Jett. Telemetry reveals atrial fibrillation/flutter rate 70-80's. Xavi and Heparin drips infusing.  Patient's hemoglobin is 9.1 with platelet count of 427, sodium 135, potassium 3.7, magnesium 1.6, BUN 74, and creatinine 2.93. Echocardiogram yesterday showed a decrease in pericardial effusion from >2cm to 1-2cm. Patient remains sedated and intubated.  Patient was on a bed resting quietly no distress noted at this time. Patient's daughter at bedside.     Vital Signs  Temp:  [98.8 °F (37.1 °C)-102.9 °F (39.4 °C)] 99.5 °F (37.5 °C)  Heart Rate:  [] 86  Resp:  [22-26] 24  BP: ()/() 109/64  Flow (L/min):  [2] 2  FiO2 (%):  [40 %] 40 %  Vital Signs (last 72 hrs)          0700   0659  07/25 0700 07/26 0659 07/26 0700 07/27 0659 07/27 0700 07/27 0754   Most Recent      Temp (°F) 98.7 -  101.1    95.5 -  99.1    98.8 -  102.9       99.5 (37.5) 07/27 0400    Heart Rate 61 -  134    65 -  123    80 -  134       86 07/27 0645    Resp 18 -  25    18 -  36    22 -  26       24 07/26 1830    BP 71/52 -  137/84    68/55 -  128/82    80/62 -  144/93       109/64 07/27 0645    SpO2 (%) 93 -  100    81 -  100    58 -  100       90 07/27 0645          Body mass index is 58.25 kg/m².    Intake/Output Summary (Last 24 hours) at 7/27/2023 0754  Last data filed at 7/27/2023 0633  Gross per 24 hour   Intake 4938.53 ml   Output 575 ml   Net 4363.53 ml       Objective     Physical Exam:      General Appearance:  Intubated and sedated, in no acute distress.   Head:    Normocephalic, without obvious abnormality, atraumatic.       Neck:   No adenopathy, supple, trachea midline, no thyromegaly, no carotid bruit, no JVD.   Lungs:     Intubated with mechanical ventilation with rhonchi noted.  Respirations even and unlabored    Heart:  Irregular rhythm and normal rate, normal S1 and S2, no          murmur, no gallop, no rub, no click   Chest Wall:    No abnormalities observed.   Abdomen:     Normal bowel sounds, no masses, no organomegaly, soft nontender, nondistended, no guarding, no rebound tenderness.   Extremities: Sedated, 2+ edema, no cyanosis, no redness. SCUDS in use.    Pulses:   Pulses palpable and equal bilaterally.   Skin:   No bleeding, bruising or rash.   Neurologic: Intubated and sedated.       Results review   Results Review:   Results from last 7 days   Lab Units 07/27/23  0044 07/26/23  0406 07/25/23  0549 07/24/23  0105 07/23/23  0359 07/22/23  0035 07/21/23  0012   WBC 10*3/mm3 29.81* 24.30* 18.22* 26.19* 33.91* 29.50* 18.78*   HEMOGLOBIN g/dL 9.1* 8.8* 8.2* 9.9* 9.2* 7.9* 7.0*   PLATELETS 10*3/mm3 427 459* 373 441 495* 476* 457*     Results from last 7 days   Lab Units 07/27/23  0044 07/26/23  9493  07/26/23  0406 07/25/23  0549 07/24/23  0105 07/23/23  0359 07/22/23  1711 07/22/23  0035 07/21/23  0012   SODIUM mmol/L 135*  --  138 141 139 142  --  144 149*   POTASSIUM mmol/L 3.7 3.9 3.4* 3.8 3.8 3.8 3.6 3.7 3.7   CHLORIDE mmol/L 95*  --  95* 95* 94* 95*  --  95* 97*   CO2 mmol/L 26.2  --  29.9* 33.5* 29.5* 33.6*  --  39.1* 39.8*   BUN mg/dL 74*  --  70* 68* 63* 64*  --  69* 60*   CREATININE mg/dL 2.93*  --  2.71* 2.70* 2.64* 2.74*  --  3.09* 3.06*   CALCIUM mg/dL 9.9  --  9.8 10.4 9.8 10.2  --  10.4 10.7*   GLUCOSE mg/dL 229*  --  220* 157* 121* 106*  --  152* 107*   ALT (SGPT) U/L 6  --  5 6 <5 5  --  5 6   AST (SGOT) U/L 15  --  11 17 23 14  --  12 12         Lab Results   Component Value Date    PROBNP >70,000.0 (H) 07/27/2023    PROBNP 35,507.0 (H) 07/18/2023    PROBNP 26,037.0 (H) 07/13/2023     No results found.  Lab Results   Component Value Date    ABSOLUTELUNG 28 07/15/2023     Lab Results   Component Value Date    INR 1.19 (H) 07/27/2023    INR 1.11 (H) 07/23/2023    INR 1.38 (H) 07/12/2023     Lab Results   Component Value Date    MG 1.6 07/27/2023    MG 1.8 07/26/2023    MG 2.0 07/25/2023     Lab Results   Component Value Date    TSH 0.619 07/15/2023      No results found for: CHOL, TRIG, HDL, LDL  Pain Management Panel  More data may exist         Latest Ref Rng & Units 7/19/2023 7/14/2023   Pain Management Panel   Creatinine, Urine mg/dL 139.1  8.5      Microbiology Results (last 10 days)       Procedure Component Value - Date/Time    Blood Culture - Blood, Arm, Right [[id number]]  (Normal) Collected: 07/24/23 1444    Lab Status: Preliminary result Specimen: Blood from Arm, Right Updated: 07/26/23 1500     Blood Culture No growth at 2 days    Respiratory Culture - Sputum, ET Suction [[id number]]  (Abnormal) Collected: 07/24/23 1420    Lab Status: Final result Specimen: Sputum from ET Suction Updated: 07/26/23 1101     Respiratory Culture Rare Candida albicans      No Normal Respiratory Karol      Gram Stain Moderate (3+) WBCs seen      Rare (1+) Epithelial cells seen      No organisms seen    Blood Culture - Blood, Arm, Left [[id number]]  (Normal) Collected: 07/24/23 1416    Lab Status: Preliminary result Specimen: Blood from Arm, Left Updated: 07/26/23 1500     Blood Culture No growth at 2 days    Respiratory Panel PCR w/COVID-19(SARS-CoV-2) ALIVIA/MIRIAM/CRYSTAL/PAD/COR/MAD/TYLER In-House, NP Swab in UTM/VTM, 3-4 HR TAT - Swab, Nasopharynx [[id number]]  (Normal) Collected: 07/24/23 1411    Lab Status: Final result Specimen: Swab from Nasopharynx Updated: 07/24/23 1520     ADENOVIRUS, PCR Not Detected     Coronavirus 229E Not Detected     Coronavirus HKU1 Not Detected     Coronavirus NL63 Not Detected     Coronavirus OC43 Not Detected     COVID19 Not Detected     Human Metapneumovirus Not Detected     Human Rhinovirus/Enterovirus Not Detected     Influenza A PCR Not Detected     Influenza B PCR Not Detected     Parainfluenza Virus 1 Not Detected     Parainfluenza Virus 2 Not Detected     Parainfluenza Virus 3 Not Detected     Parainfluenza Virus 4 Not Detected     RSV, PCR Not Detected     Bordetella pertussis pcr Not Detected     Bordetella parapertussis PCR Not Detected     Chlamydophila pneumoniae PCR Not Detected     Mycoplasma pneumo by PCR Not Detected    Narrative:      In the setting of a positive respiratory panel with a viral infection PLUS a negative procalcitonin without other underlying concern for bacterial infection, consider observing off antibiotics or discontinuation of antibiotics and continue supportive care. If the respiratory panel is positive for atypical bacterial infection (Bordetella pertussis, Chlamydophila pneumoniae, or Mycoplasma pneumoniae), consider antibiotic de-escalation to target atypical bacterial infection.    Blood Culture - Blood, Arm, Right [[id number]]  (Normal) Collected: 07/21/23 1041    Lab Status: Final result Specimen: Blood from Arm, Right Updated: 07/26/23 1100     Blood  Culture No growth at 5 days    Blood Culture - Blood, Arm, Left [[id number]]  (Normal) Collected: 07/21/23 1000    Lab Status: Final result Specimen: Blood from Arm, Left Updated: 07/26/23 1100     Blood Culture No growth at 5 days    Urine Culture - Urine, Urine, Clean Catch [[id number]]  (Abnormal) Collected: 07/20/23 0945    Lab Status: Final result Specimen: Urine, Clean Catch Updated: 07/22/23 1410     Urine Culture 50,000 CFU/mL Candida albicans    Narrative:      Colonization of the urinary tract without infection is common. Treatment is discouraged unless the patient is symptomatic, pregnant, or undergoing an invasive urologic procedure.    Respiratory Culture - Aspirate, ET Suction [[id number]]  (Abnormal) Collected: 07/20/23 0843    Lab Status: Final result Specimen: Aspirate from ET Suction Updated: 07/22/23 1030     Respiratory Culture Moderate growth (3+) Candida albicans      Scant growth (1+) Normal Respiratory Stephanie     Gram Stain Many (4+) WBCs seen      Few (2+) Epithelial cells seen      Mixed stephanie      Yeast with hyphae seen    MRSA Screen, PCR (Inpatient) - Swab, Nares [[id number]]  (Abnormal) Collected: 07/19/23 1306    Lab Status: Final result Specimen: Swab from Nares Updated: 07/19/23 1557     MRSA PCR MRSA Detected    Narrative:      The negative predictive value of this diagnostic test is high and should only be used to consider de-escalating anti-MRSA therapy. A positive result may indicate colonization with MRSA and must be correlated clinically.           Imaging Results (Last 48 Hours)       Procedure Component Value Units Date/Time    CT Abdomen Pelvis Without Contrast [[id number]] Collected: 07/27/23 0058     Updated: 07/27/23 0104    Narrative:      Examination: CT abdomen and pelvis without contrast     CLINICAL HISTORY: Worsening fevers     COMPARISON: 7/24/2023     TECHNIQUE: Contiguous 5 mm thick slices were obtained through the  abdomen and pelvis without contrast. Coronal and  sagittal reformats were  performed.     FINDINGS:     ABDOMEN:  Consolidation is noted posteriorly within the left lower lobe likely  representing pneumonia in the correct clinical setting. Similarly there  is opacity posteriorly within the right lower lobe suspicious for  pneumonia. Small adjacent pleural effusions are noted. Evaluation is  markedly limited due to habitus related artifact which is present. The  liver appears cirrhotic in morphology with nodularity of the liver  capsule although evaluation is markedly limited on this unenhanced  study. A large amount of upper abdominal and pelvic ascites is present.  Calcification is noted within the gallbladder fundus. This is likely  related to a stone within the fundus although calcification within the  wall cannot be excluded on this study. At least one left renal calculus  is noted. No hydronephrosis. Evaluation of the ureters is limited due to  habitus related streak artifact. The spleen is normal in size. The  pancreas appears grossly normal.     PELVIS: Evaluation is markedly limited. No definite high-grade small  bowel obstruction. No free air is identified within the upper abdomen to  suggest bowel perforation although evaluation is markedly limited.     Evaluation of the bones demonstrates no acute or suspicious osseous  lesions.       Impression:      1. Consolidation within both lower lobes likely representing pneumonia  in the correct clinical setting with small adjacent effusions.  2. Cirrhotic morphology of the liver with a large amount of abdominal  and pelvic ascites.  3. Severe degradation of image quality due to habitus related streak  artifact. This largely obscures the pelvis.  4. Calcification within the gallbladder likely on the basis of  cholelithiasis.     This report was finalized on 7/27/2023 1:02 AM by José Miguel Kline MD.       XR Chest 1 View [[id number]] Collected: 07/26/23 0710     Updated: 07/26/23 0713    Narrative:      Procedure:  X-ray examination of the chest performed on 07/26/2023.  Single view. Portable technique.     HISTORY: NG tube placement. Confirm position.     COMPARISON: None.     FINDINGS:     NG tube in place with tip to the stomach.  Hypoinflated lungs with central pulmonary vascular congestion.  No free air in the upper abdomen.  Right central vascular catheter with tip to the SVC.  Endotracheal tube noted with tip approximately 3.9 cm above the barry.       Impression:         1.  NG tube in place with tip to the stomach.     This report was finalized on 7/26/2023 7:11 AM by Sacha Syed MD.       CT Chest Without Contrast Diagnostic [[id number]] Collected: 07/25/23 0909     Updated: 07/25/23 0913    Narrative:      EXAM:    CT Chest Without Intravenous Contrast     EXAM DATE:    7/24/2023 4:39 PM     CLINICAL HISTORY:    Worsening fever with no obvious source of infection; D64.9-Anemia,  unspecified; A41.9-Sepsis, unspecified organism; R65.20-Severe sepsis  without septic shock; J96.01-Acute respiratory failure with hypoxia;  N39.0-Urinary tract infection, site not specified; I50.31-Acute  diastolic (congestive) heart failure     TECHNIQUE:    Axial computed tomography images of the chest without intravenous  contrast.  Sagittal and coronal reformatted images were created and  reviewed.  This CT exam was performed using one or more of the following  dose reduction techniques:  automated exposure control, adjustment of  the mA and/or kV according to patient size, and/or use of iterative  reconstruction technique.     COMPARISON:    07/19/2023     FINDINGS:    Lungs and pleural spaces:  Bibasilar consolidations have increased  from the previous exam.  Interstitial edema has slightly improved.  Left  upper lobe partially consolidative airspace disease has improved from  the previous exam.  Trace pleural effusions are noted and appear  nonloculated.  No pneumothorax.    Heart:  Massive cardiomegaly is stable.  No significant  pericardial  effusion.  No significant coronary artery calcifications.    Bones/joints:  Stable bony structures.  No acute fracture.  No  dislocation.    Soft tissues:  Anasarca is again noted.    Vasculature:  Portions of a right deep line are noted that extend into  the distal SVC.  No thoracic aortic aneurysm.    Lymph nodes:  Unremarkable.  No enlarged lymph nodes.    Liver:  Liver cirrhosis noted.    Intraperitoneal space:  Upper abdominal ascites appear stable.    Tubes, lines and devices:  NG tube extends into the mid stomach  region.  ET tube with tip below level of clavicles and above the barry.       Impression:      1.  Worsening bibasilar consolidations.  2.  Slight improvement in mid and upper lung zone airspace disease when  compared to the previous exam.  3.  Overall minimal improvement in interstitial edema but stable severe  cardiomegaly with trace pleural effusions noted.  4.  Support devices as above.  5.  Persistent anasarca and liver cirrhosis with upper abdominal  ascites.  6.  No pneumothorax. Other nonacute findings as above.     This report was finalized on 7/25/2023 9:11 AM by Dr. Kvng Tijerina MD.       CT Abdomen Pelvis Without Contrast [[id number]] Collected: 07/25/23 0835     Updated: 07/25/23 0839    Narrative:      EXAM:    CT Abdomen and Pelvis Without Intravenous Contrast     EXAM DATE:    7/24/2023 4:39 PM     CLINICAL HISTORY:    Worsening fever with no obvious source of infection; D64.9-Anemia,  unspecified; A41.9-Sepsis, unspecified organism; R65.20-Severe sepsis  without septic shock; J96.01-Acute respiratory failure with hypoxia;  N39.0-Urinary tract infection, site not specified; I50.31-Acute  diastolic (congestive) heart failure     TECHNIQUE:    Axial computed tomography images of the abdomen and pelvis without  intravenous contrast.  Sagittal and coronal reformatted images were  created and reviewed.  This CT exam was performed using one or more of  the following dose  reduction techniques:  automated exposure control,  adjustment of the mA and/or kV according to patient size, and/or use of  iterative reconstruction technique.     COMPARISON:    07/19/2023     FINDINGS:    LUNG BASES:  Left lower lobe consolidative opacity.    HEART:  Cardiomegaly again noted.      ABDOMEN:    LIVER:  Unremarkable.    GALLBLADDER AND BILE DUCTS:  Gallstone.  Gallbladder otherwise  unremarkable.  No ductal dilation.    PANCREAS:  Unremarkable.  No ductal dilation.    SPLEEN:  Unremarkable.  No splenomegaly.    ADRENALS:  Unremarkable.  No mass.    KIDNEYS AND URETERS:  Unremarkable.  No obstructing stones.  No  hydronephrosis.    STOMACH AND BOWEL:  Unremarkable.  No obstruction.  No mucosal  thickening.      PELVIS:    APPENDIX:  No findings to suggest acute appendicitis.    BLADDER:  Unremarkable.  No stones.    REPRODUCTIVE:  Unremarkable as visualized.      ABDOMEN and PELVIS:    INTRAPERITONEAL SPACE:  Moderate ascites.  No free air.    BONES/JOINTS:  Degenerative disc disease throughout the lumbar spine.   Degenerative facet arthropathy throughout the lumbar spine, most  prominent in the lower lumbar spine.  No acute fracture.  No  dislocation.    SOFT TISSUES:  Unremarkable.    VASCULATURE:  Unremarkable.  No abdominal aortic aneurysm.    LYMPH NODES:  Unremarkable.  No enlarged lymph nodes.    TUBES, LINES AND DEVICES:  Nasogastric tube tip in the stomach.       Impression:      1.  Moderate ascites.  2.  Gallstone.  Gallbladder otherwise unremarkable.  3.  Degenerative changes lumbar spine as described.     This report was finalized on 7/25/2023 8:37 AM by Dr. Chaim Mcnulty MD.               ECHO:  Results for orders placed during the hospital encounter of 07/12/23    Adult Transthoracic Echo Limited W/ Cont if Necessary Per Protocol    Interpretation Summary    This is a limited study to assess the progression of pericardial effusion.    Left ventricular ejection fraction appears to be 51  - 55%.    The right ventricular cavity is moderately dilated.    Moderately reduced right ventricular systolic function noted.    There is a moderate (1-2cm) pericardial effusion adjacent to the left ventricle. There is no evidence of cardiac tamponade.    Comments: The degree of pericardial effusion seem to have improved as compared to the previous study.      STRESS TEST:  No results found for this or any previous visit.       HEART CATH:  No results found for this or any previous visit.        TELEMETRY:    Atrial Fibrillation/Flutter 70-80's                   I reviewed the patient's new clinical results.    Medication Review:   Current list of medications may not reflect those currently placed in orders that are not signed or are being held.     aspirin, 81 mg, Per G Tube, Daily  chlorhexidine, 15 mL, Mouth/Throat, Q12H  diphenhydrAMINE, 25 mg, Intravenous, Q6H  fluconazole, 200 mg, Intravenous, Q24H  Linezolid, 600 mg, Intravenous, Q12H  magic barrier cream, 1 application , Topical, BID  meropenem, 1,000 mg, Intravenous, Q12H  metoclopramide, 5 mg, Intravenous, BID  midodrine, 10 mg, Oral, TID AC  trace minerals + multivitamin IVPB, , Intravenous, Once per day on Mon Wed Fri  pantoprazole, 40 mg, Intravenous, BID AC  polyethylene glycol, 17 g, Oral, Daily  potassium chloride, 40 mEq, Nasogastric, Daily  rosuvastatin, 20 mg, Oral, Nightly  senna-docusate sodium, 2 tablet, Oral, BID  sodium chloride, 10 mL, Intravenous, Q12H  sodium chloride, 10 mL, Intravenous, Q12H  sodium chloride, 10 mL, Intravenous, Q12H  sodium chloride, 10 mL, Intravenous, Q12H  sodium chloride, 10 mL, Intravenous, Q12H      Adult Central Clinimix TPN, , Last Rate: 65 mL/hr at 07/26/23 1824  dexmedetomidine, 0.2-1.5 mcg/kg/hr, Last Rate: 0.9 mcg/kg/hr (07/27/23 0429)  heparin, 20.85 Units/kg/hr, Last Rate: 20.85 Units/kg/hr (07/27/23 0429)  Pharmacy Consult - Pharmacy to dose,   Pharmacy to Dose Heparin,   phenylephrine, 0.5-3 mcg/kg/min,  Last Rate: 0.5 mcg/kg/min (07/27/23 0606)        acetaminophen    acetaminophen    artificial tears    senna-docusate sodium **AND** polyethylene glycol **AND** bisacodyl **AND** bisacodyl    [DISCONTINUED] senna-docusate sodium **AND** polyethylene glycol **AND** [DISCONTINUED] bisacodyl **AND** bisacodyl    fentaNYL citrate (PF)    lactulose    Magnesium Standard Dose Replacement - Follow Nurse / BPA Driven Protocol    ondansetron    Pharmacy Consult - Pharmacy to dose    Pharmacy to Dose Heparin    Phosphorus Replacement - Follow Nurse / BPA Driven Protocol    Potassium Replacement - Follow Nurse / BPA Driven Protocol    simethicone    sodium chloride    sodium chloride    sodium chloride    sodium chloride    sodium chloride    sodium chloride    sodium chloride    sodium chloride    Assessment                    Plan                         Electronically signed by WINDY Pereira, 07/27/23, 9:42 AM EDT.         Please note that portions of this note were completed with a voice recognition program.    Please note that portions of this note were copied and has been reviewed and is accurate as of 7/27/2023 .

## 2023-07-27 NOTE — PROGRESS NOTES
HEPARIN INFUSION  Judy Lutz is a  63 y.o. female receiving heparin infusion.     Therapy for (VTE/Cardiac):   Cardiac  Patient Dosing Weight: 171 kg  Initial Bolus (Y/N):   N  Any Bolus (Y/N):   Y        Signs or Symptoms of Bleeding: No    Cardiac or Other (Not VTE)   Initial Bolus: 60 units/kg (Max 4,000 units)  Initial rate: 12 units/kg/hr (Max 1,000 units/hr)   Anti Xa Rebolus Infusion Hold time Change infusion Dose (Units/kg/hr) Next Anti Xa or aPTT Level Due   < 0.11 50 Units/kg  (4000 Units Max) None Increase by  3 Units/kg/hr 6 hours   0.11- 0.19 25 Units/kg  (2000 Units Max) None Increase by  2 Units/kg/hr 6 hours   0.2 - 0.29 0 None Increase by  1 Units/kg/hr 6 hours   0.3 - 0.5 0 None No Change 6 hours (after 2 consecutive levels in range check q24h @0700)   0.51 - 0.6 0 None Decrease by  1 Units/kg/hr 6 hours   0.61 - 0.8 0 30 Minutes Decrease by  2 Units/kg/hr 6 hours   0.81 - 1 0 60 Minutes Decrease by  3 Units/kg/hr 6 hours   >1 0 Hold  After Anti Xa less than 0.5 decrease previous rate by  4 Units/kg/hr  Every 2 hours until Anti Xa  less than 0.5 then when infusion restarts in 6 hours       Recommend anti-Xa every 6 hours.     Results from last 7 days   Lab Units 07/26/23  1731 07/26/23  1105 07/26/23  0406 07/25/23  1242 07/25/23  0549 07/24/23  0637 07/24/23  0105 07/23/23  1735 07/23/23  1130   INR   --   --   --   --   --   --   --   --  1.11*   HEMOGLOBIN g/dL  --   --  8.8*  --  8.2*  --  9.9*  --   --    HEMATOCRIT %  --   --  33.5*  --  32.0*  --  38.7  --   --    PLATELETS 10*3/mm3  --   --  459*  --  373  --  441  --   --    HEPARIN ANTI-XA IU/ml 0.31 0.32 0.26*   < > 0.16*   < > 0.32   < > <0.10*    < > = values in this interval not displayed.          Date   Time   Anti-Xa Current Rate (Unit/kg/hr) Bolus   (Units) Rate Change   (Unit/kg/hr) New Rate (Unit/kg/hr) Next   Anti-Xa Comments  Pump Check Daily   7/23 1130 < 0.10 5.85 - initial 5.85 1800 Spoke with patients nurse to  confirm rate and no initial bolus   7/23 1832 <0.10 5.85 4000 +3 8.85 0100 Spoke with Torey. No s/s of bleeding and no noted stops.    07/23 0130 0.32 8.85 0 0 8.85 0700 Spoke with Nancy (not the patients nurse but took the message). No s/s of bleeding.    7/24 0637 <0.1 8.85 4000 +3 11.85 1400 Discussed rate change and bolus with nurse . No s/s bleeding   7/24 14:16 <0.1 11.85 4000 +3 14.85 2130 Pump check done and discussed rate change  and bolus with nurse Asya   7/24 2300 0.21 14.85 0 +1 15.85 0500 Spoke with Nancy  since pt nurse was with another patient. No s/s of bleeding.    7/25 0640 0.16 15.85 2000 +2 17.85 1300 Spoke to JOHN Weems. Drip wasn't stopped or held any during her shift. No s/s of bleeding    7/25 1242 0.25 17.85 0 +1 18.85 2030 Spoke to JOHN Saab , increase rate , no bolus   7/25 2125 0.29 18.85 - +1 19.85 0400 Spoke with nursing about rate increase. No s/s of bleeding.   7/26 0504 0.26 19.85 0 +1 20.85 1100 Spoke with Sissy about the rate increase. No s/s of bleeding.    7/26 1105 0.32 20.85 0 0 20.85 1700 Spoke with Katiana LOPEZ.no changes, no s/s bleeding   07/26 1800 0.31 20.85 0 0 20.85 0700 Confirm with RN. No s/s of bleeding . Continue with the same rate.    7/27 0644 0.27 18.85 0 0 18.85 1000 D/w Natividad LOPEZ in rounds, rate  modified to 18.85 per pump setting, holding adjustments at this time   7/27 1050 0.29 18.85 0 +1 19.85 1700 D/w Natividad LOPEZ , no bolus, increase rate, no s/s bleeding voiced                                                                                  Pharmacy will continue to follow anti-Xa results and monitor for signs and symptoms of bleeding or thrombosis.    LEIDY Camargo Pharm. D

## 2023-07-27 NOTE — NURSING NOTE
Called CT regarding STAT CT order. Stated that they are backed up right now and would call when ready.

## 2023-07-27 NOTE — PLAN OF CARE
Problem: Fall Injury Risk  Goal: Absence of Fall and Fall-Related Injury  Outcome: Met  Intervention: Identify and Manage Contributors  Recent Flowsheet Documentation  Taken 7/27/2023 1400 by Natividad Johnson RN  Medication Review/Management: medications reviewed  Intervention: Promote Injury-Free Environment  Recent Flowsheet Documentation  Taken 7/27/2023 1500 by Natividad Johnson RN  Safety Promotion/Fall Prevention:   safety round/check completed   fall prevention program maintained  Taken 7/27/2023 1400 by Natividad Johnson RN  Safety Promotion/Fall Prevention:   safety round/check completed   fall prevention program maintained  Taken 7/27/2023 0700 by Natividad Johnson RN  Safety Promotion/Fall Prevention:   safety round/check completed   fall prevention program maintained     Problem: Skin Injury Risk Increased  Goal: Skin Health and Integrity  Outcome: Met  Intervention: Optimize Skin Protection  Recent Flowsheet Documentation  Taken 7/27/2023 1400 by Natividad Johnson RN  Head of Bed (HOB) Positioning: HOB lowered     Problem: Adult Inpatient Plan of Care  Goal: Plan of Care Review  Outcome: Met  Flowsheets (Taken 7/27/2023 1635)  Plan of Care Reviewed With:   patient   family  Goal: Patient-Specific Goal (Individualized)  Outcome: Met  Goal: Absence of Hospital-Acquired Illness or Injury  Outcome: Met  Intervention: Identify and Manage Fall Risk  Recent Flowsheet Documentation  Taken 7/27/2023 1500 by Natividad Johnson RN  Safety Promotion/Fall Prevention:   safety round/check completed   fall prevention program maintained  Taken 7/27/2023 1400 by Natividad Johnson RN  Safety Promotion/Fall Prevention:   safety round/check completed   fall prevention program maintained  Taken 7/27/2023 0700 by Nativdiad Johnson RN  Safety Promotion/Fall Prevention:   safety round/check completed   fall prevention program maintained  Intervention: Prevent Skin Injury  Recent Flowsheet Documentation  Taken 7/27/2023 1400 by Natividad Johnson RN  Body  Position: (hemodynamically unstable; unable to tolerate turns at this time; Dr. Rodney aware) supine  Intervention: Prevent and Manage VTE (Venous Thromboembolism) Risk  Recent Flowsheet Documentation  Taken 7/27/2023 1400 by Natividad Johnson RN  Activity Management: bedrest  Goal: Optimal Comfort and Wellbeing  Outcome: Met  Goal: Readiness for Transition of Care  Outcome: Met     Problem: Nausea and Vomiting  Goal: Fluid and Electrolyte Balance  Outcome: Met     Problem: Adjustment to Illness (Sepsis/Septic Shock)  Goal: Optimal Coping  Outcome: Met     Problem: Bleeding (Sepsis/Septic Shock)  Goal: Absence of Bleeding  Outcome: Met     Problem: Glycemic Control Impaired (Sepsis/Septic Shock)  Goal: Blood Glucose Level Within Desired Range  Outcome: Met     Problem: Infection Progression (Sepsis/Septic Shock)  Goal: Absence of Infection Signs and Symptoms  Outcome: Met  Intervention: Promote Recovery  Recent Flowsheet Documentation  Taken 7/27/2023 1400 by Natividad Johnson, RN  Activity Management: bedrest     Problem: Nutrition Impaired (Sepsis/Septic Shock)  Goal: Optimal Nutrition Intake  Outcome: Met   Goal Outcome Evaluation:  Plan of Care Reviewed With: patient, family

## 2023-07-27 NOTE — SIGNIFICANT NOTE
Awaiting pt family members to transition to comfort measures. Dr Rodney at bedside. Will exclude 1400 assessment at this time due to current patient condition.

## 2023-07-27 NOTE — PROGRESS NOTES
[facility] HOSPITALIST PROGRESS NOTE     Patient Identification:  Name:  Judy Lutz  Age:  63 y.o.  Sex:  female  :  1960  MRN:  [id number]  Visit Number:  [id number]  ROOM: [address]     Primary Care Provider:  Provider, No Known     Date of Admission: 2023    Length of stay in inpatient status:  14    Subjective     Chief Compliant:    Chief Complaint   Patient presents with    Shortness of Breath     History of Presenting Illness:  Called urgently to the patient's side as the heart rates were low then increased significantly with the atropine.  Dr. Hurst was on the phone with the nurses when I entered the room; he ordered IV metoprolol and the heart rates did decrease but to the low tachycardic range.  I called the family to bedside as the oxygen levels were decreasing and the blood pressures were decreasing; she was also starting to become mottled.  The patient is still intubated but she is no longer interacting with me and thus I cannot obtain a history from her.  The daughter was at bedside during some of my rounds; the patient was becoming more mottled when the daughter entered the room.    Objective     Current Hospital Meds:  aspirin, 81 mg, Per G Tube, Daily  chlorhexidine, 15 mL, Mouth/Throat, Q12H  diphenhydrAMINE, 25 mg, Intravenous, Q6H  fluconazole, 200 mg, Intravenous, Q24H  Linezolid, 600 mg, Intravenous, Q12H  magic barrier cream, 1 application , Topical, BID  meropenem, 1,000 mg, Intravenous, Q12H  metoclopramide, 5 mg, Intravenous, BID  midodrine, 10 mg, Oral, TID AC  trace minerals + multivitamin IVPB, , Intravenous, Once per day on   pantoprazole, 40 mg, Intravenous, BID AC  polyethylene glycol, 17 g, Oral, Daily  potassium chloride, 40 mEq, Nasogastric, Daily  rosuvastatin, 20 mg, Oral, Nightly  senna-docusate sodium, 2 tablet, Oral, BID  sodium chloride, 10 mL, Intravenous, Q12H  sodium chloride, 10 mL, Intravenous, Q12H  sodium chloride, 10 mL,  Intravenous, Q12H  sodium chloride, 10 mL, Intravenous, Q12H  sodium chloride, 10 mL, Intravenous, Q12H    Adult Central Clinimix TPN, , Last Rate: 65 mL/hr at 07/26/23 1824  dexmedetomidine, 0.2-1.5 mcg/kg/hr, Last Rate: Stopped (07/27/23 1200)  heparin, 18.85 Units/kg/hr, Last Rate: 19.85 Units/kg/hr (07/27/23 1147)  Pharmacy Consult - Pharmacy to dose,   Pharmacy to Dose Heparin,   phenylephrine, 0.5-3 mcg/kg/min, Last Rate: 3 mcg/kg/min (07/27/23 1236)      Current Antimicrobial Therapy:  Anti-Infectives (From admission, onward)      Ordered     Dose/Rate Route Frequency Start Stop    07/23/23 0824  fluconazole (DIFLUCAN) IVPB 200 mg        Ordering Provider: Anna Maldonado APRN    200 mg  100 mL/hr over 60 Minutes Intravenous Every 24 Hours 07/23/23 1000 07/30/23 0959    07/21/23 1052  meropenem (MERREM) 1,000 mg in sodium chloride 0.9 % 100 mL IVPB-VTB        Ordering Provider: Candido Rodney MD    1,000 mg  over 3 Hours Intravenous Every 12 Hours 07/22/23 0000 08/02/23 1817    07/21/23 1029  Linezolid (ZYVOX) 600 mg 300 mL        Ordering Provider: King Broussard MD    600 mg  300 mL/hr over 60 Minutes Intravenous Every 12 Hours 07/21/23 1200 07/28/23 1159    07/21/23 1052  meropenem (MERREM) 1,000 mg in sodium chloride 0.9 % 100 mL IVPB-VTB        Ordering Provider: King Broussard MD    1,000 mg  over 30 Minutes Intravenous Once 07/21/23 1200 07/21/23 1355    07/19/23 1607  vancomycin 2500 mg/500 mL 0.9% NS IVPB ([facility])        Ordering Provider: Ginette Tarango DO    2,500 mg  over 180 Minutes Intravenous Once 07/19/23 1700 07/19/23 2116    07/18/23 1259  gentamicin (GARAMYCIN) 540 mg in sodium chloride 0.9 % IVPB        Note to Pharmacy: Separate Administration Time With Ampicillin and Other Penicillins (Ampicillin / Penicillins deactivate gentamicin when infused together).   Ordering Provider: King Broussard MD    5 mg/kg × 107 kg (Adjusted)  over 30 Minutes Intravenous Once 07/18/23  1400 07/18/23 1505    07/15/23 1915  meropenem (MERREM) 1,000 mg in sodium chloride 0.9 % 100 mL IVPB-VTB        Ordering Provider: Candido Rodney MD    1,000 mg  over 30 Minutes Intravenous Once 07/15/23 2015 07/15/23 2056    07/12/23 2137  cefTRIAXone (ROCEPHIN) 2,000 mg in sodium chloride 0.9 % 100 mL IVPB-VTB        Ordering Provider: Yusuf Luo DO    2,000 mg  200 mL/hr over 30 Minutes Intravenous Once 07/12/23 2153 07/12/23 2330          Current Diuretic Therapy:  Diuretics (From admission, onward)      Ordered     Dose/Rate Route Frequency Start Stop    07/26/23 0802  furosemide (LASIX) injection 80 mg        Ordering Provider: Rasheed Stern MD    80 mg Intravenous Once 07/26/23 0900 07/26/23 0813    07/24/23 1024  furosemide (LASIX) injection 80 mg        Ordering Provider: Rasheed Stern MD    80 mg Intravenous Once 07/24/23 1100 07/24/23 1112    07/23/23 1125  furosemide (LASIX) injection 80 mg        Ordering Provider: Rasheed Stern MD    80 mg Intravenous Once 07/23/23 1215 07/23/23 1418    07/22/23 0932  furosemide (LASIX) injection 80 mg        Ordering Provider: Rasheed Stern MD    80 mg Intravenous Once 07/22/23 1400 07/22/23 1422    07/14/23 0810  furosemide (LASIX) injection 60 mg        Ordering Provider: Leonard Dang DO    60 mg Intravenous Once 07/14/23 0900 07/14/23 0846    07/13/23 0054  furosemide (LASIX) injection 80 mg        Ordering Provider: Yusuf Luo DO    80 mg Intravenous Once 07/13/23 0110 07/13/23 0114          ----------------------------------------------------------------------------------------------------------------------  Vital Signs:  Temp:  [98.1 °F (36.7 °C)-102.9 °F (39.4 °C)] 98.1 °F (36.7 °C)  Heart Rate:  [] 121  Resp:  [22-26] 22  BP: ()/() 136/49  FiO2 (%):  [40 %-100 %] 100 %  SpO2:  [58 %-100 %] 80 %  on   ;   Device (Oxygen Therapy): ventilator  Body mass index is 58.25 kg/m².    Wt Readings from Last 3 Encounters:    07/27/23 (!) 184 kg (406 lb)     Intake & Output (last 3 days)         07/24 0701 07/25 0700 07/25 0701 07/26 0700 07/26 0701 07/27 0700 07/27 0701 07/28 0700    I.V. (mL/kg) 4347.8 (24.4) 3067.5 (17.2) 2904.7 (16.3)     Other 60 80      IV Piggyback 900 500 450 500     1291.8 1583.8     Total Intake(mL/kg) 5643.8 (31.7) 4939.4 (27.7) 4938.5 (27.7) 500 (2.8)    Urine (mL/kg/hr) 850 (0.2) 255 (0.1) 125 (0)     Emesis/NG output 750 700 450     Stool 0       Total Output 1600 955 575     Net +4043.8 +3984.4 +4363.5 +500            Stool Unmeasured Occurrence 3 x             Adult Central Clinimix TPN (and additional linked orders)  NPO Diet NPO Type: Other (see comments)  ----------------------------------------------------------------------------------------------------------------------  Physical Exam  Vitals and nursing note reviewed. Exam conducted with a chaperone present.   Constitutional:       General: She is in acute distress.      Appearance: She is toxic-appearing.      Interventions: She is intubated.      Comments: Obtunded.   HENT:      Head: Normocephalic and atraumatic.      Right Ear: External ear normal.      Left Ear: External ear normal.      Nose: Nose normal.   Eyes:      Conjunctiva/sclera:      Right eye: Chemosis present. No hemorrhage.     Left eye: Chemosis present. No hemorrhage.     Pupils: Pupils are equal.      Right eye: Pupil is sluggish.      Left eye: Pupil is sluggish.      Comments: Mid-size; only contract about 0.5 mm with a light.   Cardiovascular:      Rate and Rhythm: Tachycardia present. Rhythm irregular.   Pulmonary:      Effort: Respiratory distress present. She is intubated.      Breath sounds: No wheezing.   Abdominal:      General: Bowel sounds are absent. There is distension.      Tenderness: There is no abdominal tenderness.   Musculoskeletal:         General: Swelling present. No deformity or signs of injury.   Skin:     General: Skin is cool.       Capillary Refill: Capillary refill takes more than 3 seconds.      Coloration: Skin is mottled. Skin is not jaundiced.      Findings: Bruising present. No erythema, petechiae or rash.   Neurological:      Mental Status: She is lethargic.      Comments: Unresponsive today; not on sedation.  Unable to perform this portion of the exam.  She did not interact with me like she did a few days ago.   Psychiatric:      Comments: Unresponsive today; not on sedation.  Unable to perform this portion of the exam.     ----------------------------------------------------------------------------------------------------------------------  Tele:  Afib with heart rates 110-130's.  I have personally reviewed/looked at the telemetry strips.  ----------------------------------------------------------------------------------------------------------------------  LABS:    CBC and coagulation:  Results from last 7 days   Lab Units 07/27/23  0044 07/26/23  0406 07/25/23  0549 07/24/23  2155 07/24/23  1547 07/24/23  0951 07/24/23  0637 07/24/23  0411 07/24/23  0105 07/24/23  0105 07/23/23  1130 07/23/23  0359 07/22/23  0035 07/21/23  0012   PROCALCITONIN ng/mL 3.57*  --  1.18*  --   --   --   --   --   --  0.91*  --   --   --   --    LACTATE mmol/L 2.5*  --  2.1* 2.6* 2.5* 2.1* 2.6* 2.5*   < > 2.7*  --   --   --   --    CRP mg/dL  --   --  21.23*  --   --   --   --   --   --  20.36*  --   --   --   --    WBC 10*3/mm3 29.81* 24.30* 18.22*  --   --   --   --   --   --  26.19*  --  33.91* 29.50* 18.78*   HEMOGLOBIN g/dL 9.1* 8.8* 8.2*  --   --   --   --   --   --  9.9*  --  9.2* 7.9* 7.0*   HEMATOCRIT % 36.0 33.5* 32.0*  --   --   --   --   --   --  38.7  --  35.8 31.2* 27.4*   MCV fL 89.1 86.6 86.3  --   --   --   --   --   --  85.2  --  83.8 83.2 82.5   MCHC g/dL 25.3* 26.3* 25.6*  --   --   --   --   --   --  25.6*  --  25.7* 25.3* 25.5*   PLATELETS 10*3/mm3 427 459* 373  --   --   --   --   --   --  441  --  495* 476* 457*   INR  1.19*  --    --   --   --   --   --   --   --   --  1.11*  --   --   --     < > = values in this interval not displayed.     Acid/base balance:  Results from last 7 days   Lab Units 07/27/23  0409 07/26/23  0822 07/24/23  0443 07/23/23  0410 07/22/23  0738 07/21/23  0435 07/20/23  1503   PH, ARTERIAL pH units 7.335* 7.342* 7.403 7.431 7.420 7.435 7.440   PCO2, ARTERIAL mm Hg 50.8* 57.4* 60.6* 63.3* 68.0* 68.7* 67.4*   PO2 ART mm Hg 80.7* 71.4* 69.2* 62.7* 70.5* 62.9* 65.8*   HCO3 ART mmol/L 27.1* 31.1* 37.8* 42.0* 44.1* 46.1* 45.7*     Renal and electrolytes:  Results from last 7 days   Lab Units 07/27/23  0044 07/26/23  1328 07/26/23  0406 07/25/23  0549 07/24/23  0105 07/23/23  0359 07/22/23  1711 07/22/23  0035 07/21/23  0506 07/21/23  0012   SODIUM mmol/L 135*  --  138 141 139 142  --  144  --  149*   POTASSIUM mmol/L 3.7 3.9 3.4* 3.8 3.8 3.8 3.6 3.7  --  3.7   MAGNESIUM mg/dL 1.6  --  1.8 2.0 2.0  --  2.1  --  2.3  --    CHLORIDE mmol/L 95*  --  95* 95* 94* 95*  --  95*  --  97*   CO2 mmol/L 26.2  --  29.9* 33.5* 29.5* 33.6*  --  39.1*  --  39.8*   BUN mg/dL 74*  --  70* 68* 63* 64*  --  69*  --  60*   CREATININE mg/dL 2.93*  --  2.71* 2.70* 2.64* 2.74*  --  3.09*  --  3.06*   CALCIUM mg/dL 9.9  --  9.8 10.4 9.8 10.2  --  10.4  --  10.7*   PHOSPHORUS mg/dL 5.8*  --  6.1* 6.7* 5.4*  --   --   --   --   --    GLUCOSE mg/dL 229*  --  220* 157* 121* 106*  --  152*  --  107*   ANION GAP mmol/L 13.8  --  13.1 12.5 15.5* 13.4  --  9.9  --  12.2     Estimated Creatinine Clearance: 36.6 mL/min (A) (by C-G formula based on SCr of 2.93 mg/dL (H)).    Liver and pancreatic function:  Results from last 7 days   Lab Units 07/27/23  0044 07/26/23  0406 07/25/23  0549 07/24/23  0105 07/23/23  0359 07/22/23  0035 07/21/23  0012   ALBUMIN g/dL 1.9* 2.1* 2.3* 2.0* 2.1* 2.4* 2.5*   BILIRUBIN mg/dL 0.7 0.5 0.6 0.7 0.6 0.8 0.8   ALK PHOS U/L 97 73 81 91 93 73 49   AST (SGOT) U/L 15 11 17 23 14 12 12   ALT (SGPT) U/L 6 5 6 <5 5 5 6   AMMONIA  umol/L 35  --   --   --   --   --   --      Endocrine function:  Point of care bedside glucose levels:  Results from last 7 days   Lab Units 07/27/23  1155 07/27/23  0611 07/27/23  0038 07/26/23  1915 07/26/23  1239 07/26/23  0635 07/26/23  0018 07/25/23  1808 07/25/23  1306 07/25/23  0527 07/25/23  0001 07/24/23  1809   GLUCOSE mg/dL 234* 221* 225* 217* 223* 211* 225* 213* 223* 165* 130 111     Glucose levels from [facility]:  Results from last 7 days   Lab Units 07/27/23  0044 07/26/23  0406 07/25/23  0549 07/24/23  0105 07/23/23  0359 07/22/23  0035 07/21/23  0012   GLUCOSE mg/dL 229* 220* 157* 121* 106* 152* 107*     Cardiac:  Results from last 7 days   Lab Units 07/27/23  0044   PROBNP pg/mL >70,000.0*       Cultures:  Microbiology Results (last 10 days)       Procedure Component Value - Date/Time    Blood Culture - Blood, Arm, Right [[id number]]  (Normal) Collected: 07/24/23 1444    Lab Status: Preliminary result Specimen: Blood from Arm, Right Updated: 07/26/23 1500     Blood Culture No growth at 2 days    Respiratory Culture - Sputum, ET Suction [[id number]]  (Abnormal) Collected: 07/24/23 1420    Lab Status: Final result Specimen: Sputum from ET Suction Updated: 07/26/23 1101     Respiratory Culture Rare Candida albicans      No Normal Respiratory Karol     Gram Stain Moderate (3+) WBCs seen      Rare (1+) Epithelial cells seen      No organisms seen    Blood Culture - Blood, Arm, Left [[id number]]  (Normal) Collected: 07/24/23 1416    Lab Status: Preliminary result Specimen: Blood from Arm, Left Updated: 07/26/23 1500     Blood Culture No growth at 2 days    Respiratory Panel PCR w/COVID-19(SARS-CoV-2) ALIVIA/MIRIAM/CRYSTAL/PAD/[facility]LER In-House, NP Swab in UTM/VTM, 3-4 HR TAT - Swab, Nasopharynx [[id number]]  (Normal) Collected: 07/24/23 1411    Lab Status: Final result Specimen: Swab from Nasopharynx Updated: 07/24/23 1520     ADENOVIRUS, PCR Not Detected     Coronavirus 229E Not Detected     Coronavirus HKU1 Not  Detected     Coronavirus NL63 Not Detected     Coronavirus OC43 Not Detected     COVID19 Not Detected     Human Metapneumovirus Not Detected     Human Rhinovirus/Enterovirus Not Detected     Influenza A PCR Not Detected     Influenza B PCR Not Detected     Parainfluenza Virus 1 Not Detected     Parainfluenza Virus 2 Not Detected     Parainfluenza Virus 3 Not Detected     Parainfluenza Virus 4 Not Detected     RSV, PCR Not Detected     Bordetella pertussis pcr Not Detected     Bordetella parapertussis PCR Not Detected     Chlamydophila pneumoniae PCR Not Detected     Mycoplasma pneumo by PCR Not Detected    Narrative:      In the setting of a positive respiratory panel with a viral infection PLUS a negative procalcitonin without other underlying concern for bacterial infection, consider observing off antibiotics or discontinuation of antibiotics and continue supportive care. If the respiratory panel is positive for atypical bacterial infection (Bordetella pertussis, Chlamydophila pneumoniae, or Mycoplasma pneumoniae), consider antibiotic de-escalation to target atypical bacterial infection.    Blood Culture - Blood, Arm, Right [[id number]]  (Normal) Collected: 07/21/23 1041    Lab Status: Final result Specimen: Blood from Arm, Right Updated: 07/26/23 1100     Blood Culture No growth at 5 days    Blood Culture - Blood, Arm, Left [[id number]]  (Normal) Collected: 07/21/23 1000    Lab Status: Final result Specimen: Blood from Arm, Left Updated: 07/26/23 1100     Blood Culture No growth at 5 days    Urine Culture - Urine, Urine, Clean Catch [[id number]]  (Abnormal) Collected: 07/20/23 0945    Lab Status: Final result Specimen: Urine, Clean Catch Updated: 07/22/23 1410     Urine Culture 50,000 CFU/mL Candida albicans    Narrative:      Colonization of the urinary tract without infection is common. Treatment is discouraged unless the patient is symptomatic, pregnant, or undergoing an invasive urologic procedure.     Respiratory Culture - Aspirate, ET Suction [[id number]]  (Abnormal) Collected: 07/20/23 0843    Lab Status: Final result Specimen: Aspirate from ET Suction Updated: 07/22/23 1030     Respiratory Culture Moderate growth (3+) Candida albicans      Scant growth (1+) Normal Respiratory Stephanie     Gram Stain Many (4+) WBCs seen      Few (2+) Epithelial cells seen      Mixed stephanie      Yeast with hyphae seen    MRSA Screen, PCR (Inpatient) - Swab, Nares [[id number]]  (Abnormal) Collected: 07/19/23 1306    Lab Status: Final result Specimen: Swab from Nares Updated: 07/19/23 1557     MRSA PCR MRSA Detected    Narrative:      The negative predictive value of this diagnostic test is high and should only be used to consider de-escalating anti-MRSA therapy. A positive result may indicate colonization with MRSA and must be correlated clinically.        I have personally looked at the labs and they are summarized above.  ----------------------------------------------------------------------------------------------------------------------  Detailed radiology reports for the last 24 hours:    Imaging Results (Last 24 Hours)       Procedure Component Value Units Date/Time    CT Abdomen Pelvis Without Contrast [[id number]] Collected: 07/27/23 0058     Updated: 07/27/23 0104    Narrative:      Examination: CT abdomen and pelvis without contrast     CLINICAL HISTORY: Worsening fevers     COMPARISON: 7/24/2023     TECHNIQUE: Contiguous 5 mm thick slices were obtained through the  abdomen and pelvis without contrast. Coronal and sagittal reformats were  performed.     FINDINGS:     ABDOMEN:  Consolidation is noted posteriorly within the left lower lobe likely  representing pneumonia in the correct clinical setting. Similarly there  is opacity posteriorly within the right lower lobe suspicious for  pneumonia. Small adjacent pleural effusions are noted. Evaluation is  markedly limited due to habitus related artifact which is present.  The  liver appears cirrhotic in morphology with nodularity of the liver  capsule although evaluation is markedly limited on this unenhanced  study. A large amount of upper abdominal and pelvic ascites is present.  Calcification is noted within the gallbladder fundus. This is likely  related to a stone within the fundus although calcification within the  wall cannot be excluded on this study. At least one left renal calculus  is noted. No hydronephrosis. Evaluation of the ureters is limited due to  habitus related streak artifact. The spleen is normal in size. The  pancreas appears grossly normal.     PELVIS: Evaluation is markedly limited. No definite high-grade small  bowel obstruction. No free air is identified within the upper abdomen to  suggest bowel perforation although evaluation is markedly limited.     Evaluation of the bones demonstrates no acute or suspicious osseous  lesions.       Impression:      1. Consolidation within both lower lobes likely representing pneumonia  in the correct clinical setting with small adjacent effusions.  2. Cirrhotic morphology of the liver with a large amount of abdominal  and pelvic ascites.  3. Severe degradation of image quality due to habitus related streak  artifact. This largely obscures the pelvis.  4. Calcification within the gallbladder likely on the basis of  cholelithiasis.     This report was finalized on 7/27/2023 1:02 AM by José Miguel Kline MD.             I have personally looked at the radiology images and I have read the available final report.    Assessment & Plan      -Acute hypoxemic respiratory failure that was present on admission and is due to acute exacerbation of HFpEF  -Type II non-ST elevation MI, present on admission  -Moderate to severe pericardial effusion without tamponade physiology that was present on admission  -Acute encephalopathy present on admission and returning during the hospitalization, suspect multifactorial and related to hypoxia  and/or infectious/UTI  -Anasarca and bilateral pleural effusions, suspect due to the combined heart failure, present on admission  -Acute metabolic encephalopathy that was present on admission, due to the acute hypoxic respiratory failure and the acute urinary tract infection  -Acute urinary tract infection, present on admission, with urine culture growing ESBL E. coli and Klebsiella pneumoniae  -As of 7/19/2023, acute hypercapnic and hypoxic respiratory failure that developed due to presumed bilateral bacterial pneumonia and failed BiPAP, orally intubated 7/20/2023  -Fecal-like material coming out of the OG tube, suspect due to constipation  -Symptomatic anemia, present on admission, suspect due to severe iron deficiency anemia, with no overt signs of bleeding so far this admission, status post 2 units of PRBCs thus far  -Leukocytosis, present on admission, suspect secondary to bone marrow overproduction as a result of the severe iron deficiency anemia  -As of evening of 7/22/2023, new onset AFib with RVR  -As of 7/20/2023, new Candida albicans UTI  -Sepsis, shock criteria that developed 7/21/2023, requiring vasopressor support (Levophed started 7/21/2023 and Xavi-synephrine started 7/22/2023)  -Nonoliguric acute kidney injury, noted on 7/18/2023, with peak Cr on 7/22/2023 of 3.09, current Cr 2.74 (baseline Cr 0.7-0.77)  -As of 7/26/2023, development of worsening fevers and abdominal distention  -Moderate ascites, present on admission  -Morbid obesity per BMI 57.39 kg/m², which complicates all aspects of care, and is causing obesity hypoventilation syndrome and obstructive sleep apnea  -Incidentally noted 7 mm right upper lobe subpleural noncalcified groundglass density, thought to be postinflammatory change    Dr. Hurst contacted me and he talked to the family; they made the patient DNR/DNI.  Then, after the family arrived, they decided to make her comfort measures.  I was in the room when she was extubated and  instructed the nurse give a dose of fentanyl as she appeared to be in pain based on her facial expressions.  The family is now visiting and we will continue to keep her comfortable.    VTE Prophylaxis:   Mechanical Order History:        Ordered        07/13/23 0225  Place Sequential Compression Device  Once            07/13/23 0225  Maintain Sequential Compression Device  Continuous                     Pharmalogical Order History:        Ordered     Dose Route Frequency Stop    07/25/23 0643  heparin 80427 units/250 mL (100 units/mL) in 0.45 % NaCl infusion  32.2 mL/hr         18.85 Units/kg/hr IV Titrated --    07/25/23 0643  heparin (porcine) 5000 UNIT/ML injection 2,000 Units         2,000 Units IV Once 07/25/23 0646    07/24/23 1518  heparin (porcine) 5000 UNIT/ML injection 4,000 Units         4,000 Units IV Once 07/24/23 1525    07/24/23 0716  heparin (porcine) 5000 UNIT/ML injection 4,000 Units         4,000 Units IV Once 07/24/23 0743    07/23/23 1835  heparin (porcine) 5000 UNIT/ML injection 4,000 Units         4,000 Units IV Once 07/23/23 1837    07/23/23 1101  heparin 29384 units/250 mL (100 units/mL) in 0.45 % NaCl infusion  27.1 mL/hr,   Status:  Discontinued         15.85 Units/kg/hr IV Titrated 07/25/23 0643    07/23/23 1101  heparin (porcine) 5000 UNIT/ML injection 4,000 Units  Status:  Discontinued         4,000 Units IV As Needed 07/23/23 1135    07/23/23 1101  heparin (porcine) 5000 UNIT/ML injection 2,000 Units  Status:  Discontinued         2,000 Units IV As Needed 07/23/23 1135    07/23/23 1101  Pharmacy to Dose Heparin         -- XX Continuous PRN --    07/19/23 1134  heparin (porcine) 5000 UNIT/ML injection 5,000 Units  Status:  Discontinued         5,000 Units SC Every 8 Hours Scheduled 07/23/23 1101                  The patient is high risk due to the following diagnoses/reasons:  Multisystem organ failure    Disposition: Terminally extubated    Candido Rodney MD  [facility]  Hospitalist  07/27/23  13:00 EDT

## 2023-07-27 NOTE — PROGRESS NOTES
Nephrology Progress Note      Subjective     Remains on 40% FiO2 no gross changes overnight remains intubated on mechanical ventilation patient remains on mechanical ventilator with no overall improvement.    Objective       Vital signs :     Temp:  [99.5 °F (37.5 °C)-102.9 °F (39.4 °C)] 99.5 °F (37.5 °C)  Heart Rate:  [] 81  Resp:  [22-26] 24  BP: ()/() 105/72  FiO2 (%):  [40 %] 40 %    Intake/Output                         07/25/23 0701 - 07/26/23 0700 07/26/23 0701 - 07/27/23 0700     [id number] 0967-1179 Total 0378-5776 0413-8940 Total                 Intake    I.V.  1254.9  1812.6 3067.5  1241.7  1663 2904.7    Other  80  -- 80  --  -- --    Flush/ Irrigation Intake (mL) (NG/OG Tube Orogastric 16 Fr Center mouth) 80 -- 80 -- -- --    IV Piggyback  500  -- 500  450  -- 450    TPN  543.3  748.6 1291.8  797.3  786.5 1583.8    Total Intake 2378.2 2561.2 4939.4 2489 2449.5 4938.5       Output    Urine  155  100 255  75  50 125    Emesis/NG output  500  200 700  300  150 450    Total Output 655 300 955 375 200 575             Physical Exam:    General Appearance : On mechanical ventilation  Lungs : clear to auscultation, respirations regular  Heart :  regular rhythm & normal rate, normal S1, S2 and no murmur, no rub  Abdomen : soft, non distended  Extremities : Trace  Neurologic : Unable to assess        Laboratory Data :     Albumin Albumin   Date Value Ref Range Status   07/27/2023 1.9 (L) 3.5 - 5.2 g/dL Final   07/26/2023 2.1 (L) 3.5 - 5.2 g/dL Final   07/25/2023 2.3 (L) 3.5 - 5.2 g/dL Final      Magnesium Magnesium   Date Value Ref Range Status   07/27/2023 1.6 1.6 - 2.4 mg/dL Final   07/26/2023 1.8 1.6 - 2.4 mg/dL Final   07/25/2023 2.0 1.6 - 2.4 mg/dL Final          PTH               No results found for: PTH    CBC and coagulation:  Results from last 7 days   Lab Units 07/27/23  0044 07/26/23  0406 07/25/23  0549 07/24/23  2155 07/24/23  0411 07/24/23  0105 07/23/23  1130   PROCALCITONIN ng/mL  3.57*  --  1.18*  --   --  0.91*  --    LACTATE mmol/L 2.5*  --  2.1* 2.6*   < > 2.7*  --    CRP mg/dL  --   --  21.23*  --   --  20.36*  --    WBC 10*3/mm3 29.81* 24.30* 18.22*  --   --  26.19*  --    HEMOGLOBIN g/dL 9.1* 8.8* 8.2*  --   --  9.9*  --    HEMATOCRIT % 36.0 33.5* 32.0*  --   --  38.7  --    MCV fL 89.1 86.6 86.3  --   --  85.2  --    MCHC g/dL 25.3* 26.3* 25.6*  --   --  25.6*  --    PLATELETS 10*3/mm3 427 459* 373  --   --  441  --    INR  1.19*  --   --   --   --   --  1.11*    < > = values in this interval not displayed.     Acid/base balance:  Results from last 7 days   Lab Units 07/27/23  0409 07/26/23  0822 07/24/23  0443   PH, ARTERIAL pH units 7.335* 7.342* 7.403   PO2 ART mm Hg 80.7* 71.4* 69.2*   PCO2, ARTERIAL mm Hg 50.8* 57.4* 60.6*   HCO3 ART mmol/L 27.1* 31.1* 37.8*     Renal and electrolytes:    Results from last 7 days   Lab Units 07/27/23  0044 07/26/23  1328 07/26/23  0406 07/25/23  0549 07/24/23  0105 07/23/23  0359 07/23/23  0359   SODIUM mmol/L 135*  --  138 141 139  --  142   POTASSIUM mmol/L 3.7 3.9 3.4* 3.8 3.8  --  3.8   MAGNESIUM mg/dL 1.6  --  1.8 2.0 2.0  --   --    CHLORIDE mmol/L 95*  --  95* 95* 94*  --  95*   CO2 mmol/L 26.2  --  29.9* 33.5* 29.5*  --  33.6*   BUN mg/dL 74*  --  70* 68* 63*  --  64*   CREATININE mg/dL 2.93*  --  2.71* 2.70* 2.64*  --  2.74*   CALCIUM mg/dL 9.9  --  9.8 10.4 9.8  --  10.2   PHOSPHORUS mg/dL 5.8*  --  6.1* 6.7* 5.4*   < >  --     < > = values in this interval not displayed.     Estimated Creatinine Clearance: 36.6 mL/min (A) (by C-G formula based on SCr of 2.93 mg/dL (H)).  @GFRCG:3@   Liver and pancreatic function:  Results from last 7 days   Lab Units 07/27/23  0044 07/26/23  0406 07/25/23  0549   ALBUMIN g/dL 1.9* 2.1* 2.3*   BILIRUBIN mg/dL 0.7 0.5 0.6   ALK PHOS U/L 97 73 81   AST (SGOT) U/L 15 11 17   ALT (SGPT) U/L 6 5 6   AMMONIA umol/L 35  --   --          Cardiac:  Results from last 7 days   Lab Units 07/27/23  0044   PROBNP  pg/mL >70,000.0*       Liver and pancreatic function:  Results from last 7 days   Lab Units 07/27/23  0044 07/26/23  0406 07/25/23  0549   ALBUMIN g/dL 1.9* 2.1* 2.3*   BILIRUBIN mg/dL 0.7 0.5 0.6   ALK PHOS U/L 97 73 81   AST (SGOT) U/L 15 11 17   ALT (SGPT) U/L 6 5 6   AMMONIA umol/L 35  --   --        Medications :     aspirin, 81 mg, Per G Tube, Daily  chlorhexidine, 15 mL, Mouth/Throat, Q12H  diphenhydrAMINE, 25 mg, Intravenous, Q6H  fluconazole, 200 mg, Intravenous, Q24H  Linezolid, 600 mg, Intravenous, Q12H  magic barrier cream, 1 application , Topical, BID  meropenem, 1,000 mg, Intravenous, Q12H  metoclopramide, 5 mg, Intravenous, BID  midodrine, 10 mg, Oral, TID AC  trace minerals + multivitamin IVPB, , Intravenous, Once per day on Mon Wed Fri  pantoprazole, 40 mg, Intravenous, BID AC  polyethylene glycol, 17 g, Oral, Daily  potassium chloride, 40 mEq, Nasogastric, Daily  rosuvastatin, 20 mg, Oral, Nightly  senna-docusate sodium, 2 tablet, Oral, BID  sodium chloride, 10 mL, Intravenous, Q12H  sodium chloride, 10 mL, Intravenous, Q12H  sodium chloride, 10 mL, Intravenous, Q12H  sodium chloride, 10 mL, Intravenous, Q12H  sodium chloride, 10 mL, Intravenous, Q12H      Adult Central Clinimix TPN, , Last Rate: 65 mL/hr at 07/26/23 1824  dexmedetomidine, 0.2-1.5 mcg/kg/hr, Last Rate: 0.9 mcg/kg/hr (07/27/23 0429)  heparin, 20.85 Units/kg/hr, Last Rate: 20.85 Units/kg/hr (07/27/23 0429)  Pharmacy Consult - Pharmacy to dose,   Pharmacy to Dose Heparin,   phenylephrine, 0.5-3 mcg/kg/min, Last Rate: 0.5 mcg/kg/min (07/27/23 0606)          Assessment & Plan     - TRACIE, nonoliguric  - Severe sepsis with septic shock  - Hypercalcemia  - Primary respiratory acidosis  - Hypoxic hypercarbic respiratory failure likely multifactorial  - History of congestive heart failure, well compensated  - Bacterial pneumonia    Creatinine further worsened today to 2.90, urine output has significantly dropped.  Again mild fluid overload  clinically.  Overall no improvement in clinical condition and remains ventilator dependent.  As there is no marleny fluid overload we will continue holding diuretics.  And watch closely  Had a detailed discussion with the family discussed prognosis, current clinical condition patient would like to discuss further about comfort measures.  Discussed in detail with critical care team    TRACIE likely multifactorial including severe sepsis with septic shock  Baseline creatinine around 2.6 admitted with 1  UA suggestive of urinary tract infection difficult to interpret  No hydronephrosis on renal ultrasound  No florid fluid overload clinically likely on volume depletion side.  - Continue on pressors to keep MAP more than 65 mmHg  - Please avoid any nephrotoxic agents, hypotension and adjust medications according to estimated GFR      Rasheed Stern MD  07/27/23  08:38 EDT

## 2023-07-27 NOTE — PROGRESS NOTES
[facility]  PROGRESS NOTE      Patient information:  Name: Judy Lutz  Age/Sex: 63 y.o. female  :  1960        PCP: Provider, No Known  Attending: Sapphire Lanre DianeDO  MRN:  [id number]  Visit Number:  [id number]    LOS:  LOS: 14 days   CODE STATUS:    Code Status and Medical Interventions:   Ordered at: 23 0152     Code Status (Patient has no pulse and is not breathing):    CPR (Attempt to Resuscitate)     Medical Interventions (Patient has pulse or is breathing):    Full Support       PROBLEM LIST:Principal Problem:    Symptomatic anemia      Reason for Cardiology follow-up: Pericardial effusion and paroxysmal atrial fibrillation.       Subjective   ADMISSION INFORMATION:  Chief Complaint   Patient presents with    Shortness of Breath       HPI: Judy Lutz is a 63-year-old female with no reported past medical history.  She presented to [facility] ED on 2023 with complaints of increased fatigue, malaise, shortness of breath, and generalized weakness.  Cardiology was consulted for further evaluation and management of cardiomyopathy and pericardial effusion.          Interval History:   Patient is in room CCU #8 and was examined by Dr. Jett. Telemetry reveals atrial fibrillation/flutter rate 70-80's. Xavi and Heparin drips infusing.  Patient's hemoglobin is 9.1 with platelet count of 427, sodium 135, potassium 3.7, magnesium 1.6, BUN 74, and creatinine 2.93. Echocardiogram yesterday showed a decrease in pericardial effusion from >2cm to 1-2cm. Patient remains sedated and intubated.  Patient was on a bed resting quietly no distress noted at this time. Patient's daughter at bedside.     Vital Signs  Temp:  [98.8 °F (37.1 °C)-102.9 °F (39.4 °C)] 99.5 °F (37.5 °C)  Heart Rate:  [] 86  Resp:  [22-26] 24  BP: ()/() 109/64  Flow (L/min):  [2] 2  FiO2 (%):  [40 %] 40 %  Vital Signs (last 72 hrs)           0700 07/25 0659 07/25 0700 07/26 0659 07/26 0700 07/27 0659 07/27 0700 07/27 0754   Most Recent      Temp (°F) 98.7 -  101.1    95.5 -  99.1    98.8 -  102.9       99.5 (37.5) 07/27 0400    Heart Rate 61 -  134    65 -  123    80 -  134       86 07/27 0645    Resp 18 -  25    18 -  36    22 -  26       24 07/26 1830    BP 71/52 -  137/84    68/55 -  128/82    80/62 -  144/93       109/64 07/27 0645    SpO2 (%) 93 -  100    81 -  100    58 -  100       90 07/27 0645          Body mass index is 58.25 kg/m².    Intake/Output Summary (Last 24 hours) at 7/27/2023 0754  Last data filed at 7/27/2023 0633  Gross per 24 hour   Intake 4938.53 ml   Output 575 ml   Net 4363.53 ml       Objective     I have seen and examined Mrs. Lutz today.  Physical Exam:      General Appearance:  Intubated and sedated, in no acute distress.   Head:    Normocephalic, without obvious abnormality, atraumatic.       Neck:   No adenopathy, supple, trachea midline, no thyromegaly, no carotid bruit, no JVD.   Lungs:     Intubated with mechanical ventilation with rhonchi noted.  Respirations even and unlabored    Heart:  Irregular rhythm and normal rate, normal S1 and S2, no          murmur, no gallop, no rub, no click   Chest Wall:    No abnormalities observed.   Abdomen:     Normal bowel sounds, no masses, no organomegaly, soft nontender, nondistended, no guarding, no rebound tenderness.   Extremities: Sedated, 2+ edema, no cyanosis, no redness. SCUDS in use.    Pulses:   Pulses palpable and equal bilaterally.   Skin:   No bleeding, bruising or rash.   Neurologic: Intubated and sedated.       Results review   Results Review:   Results from last 7 days   Lab Units 07/27/23  0044 07/26/23  0406 07/25/23  0549 07/24/23  0105 07/23/23  0359 07/22/23  0035 07/21/23  0012   WBC 10*3/mm3 29.81* 24.30* 18.22* 26.19* 33.91* 29.50* 18.78*   HEMOGLOBIN g/dL 9.1* 8.8* 8.2* 9.9* 9.2* 7.9* 7.0*   PLATELETS 10*3/mm3 427 459* 373 441 495* 476* 457*      Results from last 7 days   Lab Units 07/27/23  0044 07/26/23  1328 07/26/23  0406 07/25/23  0549 07/24/23  0105 07/23/23  0359 07/22/23  1711 07/22/23  0035 07/21/23  0012   SODIUM mmol/L 135*  --  138 141 139 142  --  144 149*   POTASSIUM mmol/L 3.7 3.9 3.4* 3.8 3.8 3.8 3.6 3.7 3.7   CHLORIDE mmol/L 95*  --  95* 95* 94* 95*  --  95* 97*   CO2 mmol/L 26.2  --  29.9* 33.5* 29.5* 33.6*  --  39.1* 39.8*   BUN mg/dL 74*  --  70* 68* 63* 64*  --  69* 60*   CREATININE mg/dL 2.93*  --  2.71* 2.70* 2.64* 2.74*  --  3.09* 3.06*   CALCIUM mg/dL 9.9  --  9.8 10.4 9.8 10.2  --  10.4 10.7*   GLUCOSE mg/dL 229*  --  220* 157* 121* 106*  --  152* 107*   ALT (SGPT) U/L 6  --  5 6 <5 5  --  5 6   AST (SGOT) U/L 15  --  11 17 23 14  --  12 12         Lab Results   Component Value Date    PROBNP >70,000.0 (H) 07/27/2023    PROBNP 35,507.0 (H) 07/18/2023    PROBNP 26,037.0 (H) 07/13/2023     No results found.  Lab Results   Component Value Date    ABSOLUTELUNG 28 07/15/2023     Lab Results   Component Value Date    INR 1.19 (H) 07/27/2023    INR 1.11 (H) 07/23/2023    INR 1.38 (H) 07/12/2023     Lab Results   Component Value Date    MG 1.6 07/27/2023    MG 1.8 07/26/2023    MG 2.0 07/25/2023     Lab Results   Component Value Date    TSH 0.619 07/15/2023      No results found for: CHOL, TRIG, HDL, LDL  Pain Management Panel  More data may exist         Latest Ref Rng & Units 7/19/2023 7/14/2023   Pain Management Panel   Creatinine, Urine mg/dL 139.1  8.5           Imaging Results (Last 48 Hours)       Procedure Component Value Units Date/Time    CT Abdomen Pelvis Without Contrast [[id number]] Collected: 07/27/23 0058     Updated: 07/27/23 0104    Narrative:      Examination: CT abdomen and pelvis without contrast     CLINICAL HISTORY: Worsening fevers     COMPARISON: 7/24/2023     TECHNIQUE: Contiguous 5 mm thick slices were obtained through the  abdomen and pelvis without contrast. Coronal and sagittal reformats were  performed.      FINDINGS:     ABDOMEN:  Consolidation is noted posteriorly within the left lower lobe likely  representing pneumonia in the correct clinical setting. Similarly there  is opacity posteriorly within the right lower lobe suspicious for  pneumonia. Small adjacent pleural effusions are noted. Evaluation is  markedly limited due to habitus related artifact which is present. The  liver appears cirrhotic in morphology with nodularity of the liver  capsule although evaluation is markedly limited on this unenhanced  study. A large amount of upper abdominal and pelvic ascites is present.  Calcification is noted within the gallbladder fundus. This is likely  related to a stone within the fundus although calcification within the  wall cannot be excluded on this study. At least one left renal calculus  is noted. No hydronephrosis. Evaluation of the ureters is limited due to  habitus related streak artifact. The spleen is normal in size. The  pancreas appears grossly normal.     PELVIS: Evaluation is markedly limited. No definite high-grade small  bowel obstruction. No free air is identified within the upper abdomen to  suggest bowel perforation although evaluation is markedly limited.     Evaluation of the bones demonstrates no acute or suspicious osseous  lesions.       Impression:      1. Consolidation within both lower lobes likely representing pneumonia  in the correct clinical setting with small adjacent effusions.  2. Cirrhotic morphology of the liver with a large amount of abdominal  and pelvic ascites.  3. Severe degradation of image quality due to habitus related streak  artifact. This largely obscures the pelvis.  4. Calcification within the gallbladder likely on the basis of  cholelithiasis.     This report was finalized on 7/27/2023 1:02 AM by José Miguel Kline MD.       XR Chest 1 View [[id number]] Collected: 07/26/23 0710     Updated: 07/26/23 0713    Narrative:      Procedure: X-ray examination of the chest performed  on 07/26/2023.  Single view. Portable technique.     HISTORY: NG tube placement. Confirm position.     COMPARISON: None.     FINDINGS:     NG tube in place with tip to the stomach.  Hypoinflated lungs with central pulmonary vascular congestion.  No free air in the upper abdomen.  Right central vascular catheter with tip to the SVC.  Endotracheal tube noted with tip approximately 3.9 cm above the barry.       Impression:         1.  NG tube in place with tip to the stomach.     This report was finalized on 7/26/2023 7:11 AM by Sacha Syed MD.       CT Chest Without Contrast Diagnostic [[id number]] Collected: 07/25/23 0909     Updated: 07/25/23 0913    Narrative:      EXAM:    CT Chest Without Intravenous Contrast     EXAM DATE:    7/24/2023 4:39 PM     CLINICAL HISTORY:    Worsening fever with no obvious source of infection; D64.9-Anemia,  unspecified; A41.9-Sepsis, unspecified organism; R65.20-Severe sepsis  without septic shock; J96.01-Acute respiratory failure with hypoxia;  N39.0-Urinary tract infection, site not specified; I50.31-Acute  diastolic (congestive) heart failure     TECHNIQUE:    Axial computed tomography images of the chest without intravenous  contrast.  Sagittal and coronal reformatted images were created and  reviewed.  This CT exam was performed using one or more of the following  dose reduction techniques:  automated exposure control, adjustment of  the mA and/or kV according to patient size, and/or use of iterative  reconstruction technique.     COMPARISON:    07/19/2023     FINDINGS:    Lungs and pleural spaces:  Bibasilar consolidations have increased  from the previous exam.  Interstitial edema has slightly improved.  Left  upper lobe partially consolidative airspace disease has improved from  the previous exam.  Trace pleural effusions are noted and appear  nonloculated.  No pneumothorax.    Heart:  Massive cardiomegaly is stable.  No significant pericardial  effusion.  No significant  coronary artery calcifications.    Bones/joints:  Stable bony structures.  No acute fracture.  No  dislocation.    Soft tissues:  Anasarca is again noted.    Vasculature:  Portions of a right deep line are noted that extend into  the distal SVC.  No thoracic aortic aneurysm.    Lymph nodes:  Unremarkable.  No enlarged lymph nodes.    Liver:  Liver cirrhosis noted.    Intraperitoneal space:  Upper abdominal ascites appear stable.    Tubes, lines and devices:  NG tube extends into the mid stomach  region.  ET tube with tip below level of clavicles and above the barry.       Impression:      1.  Worsening bibasilar consolidations.  2.  Slight improvement in mid and upper lung zone airspace disease when  compared to the previous exam.  3.  Overall minimal improvement in interstitial edema but stable severe  cardiomegaly with trace pleural effusions noted.  4.  Support devices as above.  5.  Persistent anasarca and liver cirrhosis with upper abdominal  ascites.  6.  No pneumothorax. Other nonacute findings as above.     This report was finalized on 7/25/2023 9:11 AM by Dr. Kvng Tijerina MD.       CT Abdomen Pelvis Without Contrast [[id number]] Collected: 07/25/23 0835     Updated: 07/25/23 0839    Narrative:      EXAM:    CT Abdomen and Pelvis Without Intravenous Contrast     EXAM DATE:    7/24/2023 4:39 PM     CLINICAL HISTORY:    Worsening fever with no obvious source of infection; D64.9-Anemia,  unspecified; A41.9-Sepsis, unspecified organism; R65.20-Severe sepsis  without septic shock; J96.01-Acute respiratory failure with hypoxia;  N39.0-Urinary tract infection, site not specified; I50.31-Acute  diastolic (congestive) heart failure     TECHNIQUE:    Axial computed tomography images of the abdomen and pelvis without  intravenous contrast.  Sagittal and coronal reformatted images were  created and reviewed.  This CT exam was performed using one or more of  the following dose reduction techniques:  automated exposure  control,  adjustment of the mA and/or kV according to patient size, and/or use of  iterative reconstruction technique.     COMPARISON:    07/19/2023     FINDINGS:    LUNG BASES:  Left lower lobe consolidative opacity.    HEART:  Cardiomegaly again noted.      ABDOMEN:    LIVER:  Unremarkable.    GALLBLADDER AND BILE DUCTS:  Gallstone.  Gallbladder otherwise  unremarkable.  No ductal dilation.    PANCREAS:  Unremarkable.  No ductal dilation.    SPLEEN:  Unremarkable.  No splenomegaly.    ADRENALS:  Unremarkable.  No mass.    KIDNEYS AND URETERS:  Unremarkable.  No obstructing stones.  No  hydronephrosis.    STOMACH AND BOWEL:  Unremarkable.  No obstruction.  No mucosal  thickening.      PELVIS:    APPENDIX:  No findings to suggest acute appendicitis.    BLADDER:  Unremarkable.  No stones.    REPRODUCTIVE:  Unremarkable as visualized.      ABDOMEN and PELVIS:    INTRAPERITONEAL SPACE:  Moderate ascites.  No free air.    BONES/JOINTS:  Degenerative disc disease throughout the lumbar spine.   Degenerative facet arthropathy throughout the lumbar spine, most  prominent in the lower lumbar spine.  No acute fracture.  No  dislocation.    SOFT TISSUES:  Unremarkable.    VASCULATURE:  Unremarkable.  No abdominal aortic aneurysm.    LYMPH NODES:  Unremarkable.  No enlarged lymph nodes.    TUBES, LINES AND DEVICES:  Nasogastric tube tip in the stomach.       Impression:      1.  Moderate ascites.  2.  Gallstone.  Gallbladder otherwise unremarkable.  3.  Degenerative changes lumbar spine as described.     This report was finalized on 7/25/2023 8:37 AM by Dr. Chaim Mcnulty MD.               ECHO:  Results for orders placed during the hospital encounter of 07/12/23    Adult Transthoracic Echo Limited W/ Cont if Necessary Per Protocol    Interpretation Summary    This is a limited study to assess the progression of pericardial effusion.    Left ventricular ejection fraction appears to be 51 - 55%.    The right ventricular cavity is  moderately dilated.    Moderately reduced right ventricular systolic function noted.    There is a moderate (1-2cm) pericardial effusion adjacent to the left ventricle. There is no evidence of cardiac tamponade.    Comments: The degree of pericardial effusion seem to have improved as compared to the previous study.      STRESS TEST:  No results found for this or any previous visit.       HEART CATH:  No results found for this or any previous visit.        TELEMETRY:    Atrial Fibrillation/Flutter 70-80's                   I reviewed the patient's new clinical results.    Medication Review:   Current list of medications may not reflect those currently placed in orders that are not signed or are being held.     aspirin, 81 mg, Per G Tube, Daily  chlorhexidine, 15 mL, Mouth/Throat, Q12H  diphenhydrAMINE, 25 mg, Intravenous, Q6H  fluconazole, 200 mg, Intravenous, Q24H  Linezolid, 600 mg, Intravenous, Q12H  magic barrier cream, 1 application , Topical, BID  meropenem, 1,000 mg, Intravenous, Q12H  metoclopramide, 5 mg, Intravenous, BID  midodrine, 10 mg, Oral, TID AC  trace minerals + multivitamin IVPB, , Intravenous, Once per day on Mon Wed Fri  pantoprazole, 40 mg, Intravenous, BID AC  polyethylene glycol, 17 g, Oral, Daily  potassium chloride, 40 mEq, Nasogastric, Daily  rosuvastatin, 20 mg, Oral, Nightly  senna-docusate sodium, 2 tablet, Oral, BID  sodium chloride, 10 mL, Intravenous, Q12H  sodium chloride, 10 mL, Intravenous, Q12H  sodium chloride, 10 mL, Intravenous, Q12H  sodium chloride, 10 mL, Intravenous, Q12H  sodium chloride, 10 mL, Intravenous, Q12H      Adult Central Clinimix TPN, , Last Rate: 65 mL/hr at 07/26/23 1824  dexmedetomidine, 0.2-1.5 mcg/kg/hr, Last Rate: 0.9 mcg/kg/hr (07/27/23 0429)  heparin, 20.85 Units/kg/hr, Last Rate: 20.85 Units/kg/hr (07/27/23 0429)  Pharmacy Consult - Pharmacy to dose,   Pharmacy to Dose Heparin,   phenylephrine, 0.5-3 mcg/kg/min, Last Rate: 0.5 mcg/kg/min (07/27/23  0606)        acetaminophen    acetaminophen    artificial tears    senna-docusate sodium **AND** polyethylene glycol **AND** bisacodyl **AND** bisacodyl    [DISCONTINUED] senna-docusate sodium **AND** polyethylene glycol **AND** [DISCONTINUED] bisacodyl **AND** bisacodyl    fentaNYL citrate (PF)    lactulose    Magnesium Standard Dose Replacement - Follow Nurse / BPA Driven Protocol    ondansetron    Pharmacy Consult - Pharmacy to dose    Pharmacy to Dose Heparin    Phosphorus Replacement - Follow Nurse / BPA Driven Protocol    Potassium Replacement - Follow Nurse / BPA Driven Protocol    simethicone    sodium chloride    sodium chloride    sodium chloride    sodium chloride    sodium chloride    sodium chloride    sodium chloride    sodium chloride    Assessment      Acute hypercapnic/hypoxic respiratory failure, with multifactorial etiology including pneumonia, obesity with hypoventilation and with element of heart failure earlier currently patient is intubated on mechanical ventilation, with the sedation is gradually being weaned off  Acute HFpEF,  3.  Moderate-sized pericardial effusion with no tamponade, with right ventricular dilatation   4.  Elevated troponin on admission likely type II NSTEMI  5.  New onset atrial fibrillation, patient's at least [id number] score of 2 is also she has a history of     Plan                         Electronically signed by WINDY Pereira, 07/27/23, 9:42 AM EDT.         Please note that portions of this note were completed with a voice recognition program.    Please note that portions of this note were copied and has been reviewed and is accurate as of 7/27/2023 .

## 2023-07-27 NOTE — DISCHARGE SUMMARY
[facility] MEDICINE DEATH SUMMARY    Patient Identification:  Name:  Judy Lutz  Age:  63 y.o.  Sex:  female  :  1960  MRN:  [id number]  Visit Number:  [id number]    Date of Admission: 2023  Date of Death: 2023 at 15:25.    PCP: Provider, No Known    DISCHARGE DIAGNOSIS  -Acute hypoxemic respiratory failure that was present on admission and is due to acute exacerbation of HFpEF  -Type II non-ST elevation MI, present on admission  -Moderate to severe pericardial effusion without tamponade physiology that was present on admission  -Acute encephalopathy present on admission and returning during the hospitalization, suspect multifactorial and related to hypoxia and/or infectious/UTI  -Anasarca and bilateral pleural effusions, suspect due to the combined heart failure and cirrhosis due to PIERCE, present on admission  -Acute metabolic encephalopathy that was present on admission, due to the acute hypoxic respiratory failure and the acute urinary tract infection  -Acute urinary tract infection, present on admission, with urine culture growing ESBL E. coli and Klebsiella pneumoniae  -As of 2023, acute hypercapnic and hypoxic respiratory failure that developed due to presumed bilateral bacterial pneumonia and failed BiPAP, orally intubated 2023  -Fecal-like material coming out of the OG tube, suspect due to constipation  -Symptomatic anemia, present on admission, suspect due to severe iron deficiency anemia, with no overt signs of bleeding so far this admission, status post 2 units of PRBCs thus far  -Leukocytosis, present on admission, suspect secondary to bone marrow overproduction as a result of the severe iron deficiency anemia  -As of evening of 2023, new onset AFib with RVR  -As of 2023, new Candida albicans UTI  -Sepsis, shock criteria that developed 2023, requiring vasopressor support (Levophed started 2023 and Xavi-synephrine started  7/22/2023)  -Nonoliguric acute kidney injury, noted on 7/18/2023, with peak Cr on 7/22/2023 of 3.09, current Cr 2.74 (baseline Cr 0.7-0.77)  -As of 7/26/2023, development of worsening fevers and abdominal distention  -Moderate ascites, present on admission  -Morbid obesity per BMI 57.39 kg/m², which complicates all aspects of care, and is causing obesity hypoventilation syndrome and obstructive sleep apnea  -Incidentally noted 7 mm right upper lobe subpleural noncalcified groundglass density, thought to be postinflammatory change    CONSULTS   Dr. Hurst with pulmonology  Alison Zamora, and Isai with cardiology  Dr. Phelps and LISETTE Parkinson with hematology/oncology  Dr. Broussard, LISETTE Maldonado, and LISETTE Tarango with infectious disease  Dr. Aguayo and LISETTE Tse with general surgery  Dr. Stern with nephrology  Dr Dr. Mathis and Aris with pulmonology  Dr. Cross and LISETTE Matson with tele-neurology    PROCEDURES PERFORMED  7/12/2023:  Transfusion of one unit of PRBCs  7/19/2023:  Transfusion of one unit of PRBCs  7/20/2023:  Right internal jugular triple lumen central venous catheter  7/20/2023:  Orally intubated and mechanically ventilated  7/24/2023:  TPN ordered  7/27/2023:  Terminally extubated    HOSPITAL COURSE  Patient was a 63 y.o. female presented to [facility] on 7/12/2023 with fatigue, generalized weakness, and shortness of air.  In the ED, she was found to have anasarca (+3 pitting), large pericardial effusion (1-2 cm) without tamponade, and hypoxic respiratory failure.   She also was found to have a hemoglobin level of 6.  She was given 1 units of packed red blood cells while in the emergency department.  Thus, she was admitted to the progressive care unit for further suspected acute heart failure with preserved ejection fraction exacerbation.  Please see the admitting history and physical for further details.  She was given IV Lasix and albumin and a Gonzalez was inserted for strict input and output  measurements.  We did perform ultrasound of her abdomen as well as CT scans and only a small amount of perihepatic ascites was seen, even with the liver showing cirrhosis.  Cardiology and nephrology was consulted; she was given diuresis, albumin, and IV fluids at different intervals during her hospitalization based on how bad her edema was that day or how her creatinine was trending.  Cardiology was planning and ischemic work-up when she was more stable.     The patient then was noted to be in A-fib with RVR    The patient was noted to have a leukocytosis that persisted despite antibiotics; heme-onc thought that the leukocytosis occurred because of how severe the anemia was, as this was making the bone marrow overactive.  There was no bleeding witnessed while hospitalized.  She did receive 1 more unit of packed red blood cells 1 week into her hospitalization; however, again, no active bleeding was seen at that time.  It was decided that endoscopy would be performed in the outpatient settings that she did not require any further packed red blood cells while hospitalized.      Her urine cultures from admission finalized as ESBL E. coli and Klebsiella; she was receiving empiric high-dose Rocephin for the UTI that was found on admission.  Therefore, we changed her antibiotic to meropenem.  Repeat urine culture then showed yeast; Diflucan was added.    However, the patient was becoming progressively weak; PaCO2 was greater than 100 and thus she was placed on BiPAP.  However, she continued to worsen and she was finally intubated.  Zyvox and linezolid were added to the meropenem and Diflucan.  She ended up on Xavi-Synephrine and Levophed and with the above treatment we were able to get her down to a low-dose of Xavi-Synephrine.  However, we were making some progress and actually were doing spontaneous breathing trials for few days prior to her death.  She was following commands, recognized her family, and was biting on the  tube. The patient then went into acute kidney injury.  Also during this time, the patient was having fecal material coming out of her OG; she was given enemas and the severe constipation seem to be improving.  However, we were never able to get the OG secretions low enough to start tube feeds and thus we placed her TPN.    The patient suddenly worsened on the day of this.  Her heart rate dropped and when atropine was given she went into A-fib with RVR.  She was then given metoprolol to slow down her heart rate.  Her blood pressures started dropping and the oxygen saturations started dropping.  The patient then began looking mottled.  She was not alert and not able to track this with her eyes despite is holding all sedation for her sedation vacation that day.  Family had already been contacted by Dr. Hurst and they had already made her a DNR.  I asked the family to come into the room on the day of death and they decided to make her comfort measures only.  The patient was terminally extubated and  at 15:25.      DISCHARGE DISPOSITION         TEST  RESULTS PENDING AT DISCHARGE  Pending Labs       Order Current Status    Blood Culture - Blood, Arm, Right In process    Blood Culture - Blood, Hand, Right In process    Blood Culture - Blood, Arm, Left Preliminary result    Blood Culture - Blood, Arm, Right Preliminary result          Candido Rodney MD  23  15:37 EDT      Please send a copy of this dictation to the following providers:  Provider, No Known

## 2023-07-27 NOTE — PROGRESS NOTES
[facility] HOSPITALIST DEATH NOTE    Date of death:  7/27/2023  Patient time of death occurred at 15:25.    Candido Rodney MD  07/27/23  15:36 EDT

## 2023-07-28 LAB
QT INTERVAL: 258 MS
QT INTERVAL: 300 MS
QTC INTERVAL: 400 MS
QTC INTERVAL: 427 MS

## 2023-07-29 LAB
BACTERIA SPEC AEROBE CULT: NORMAL
BACTERIA SPEC AEROBE CULT: NORMAL

## 2023-07-31 LAB
BACTERIA SPEC AEROBE CULT: NORMAL
BACTERIA SPEC AEROBE CULT: NORMAL